# Patient Record
Sex: MALE | Race: WHITE | NOT HISPANIC OR LATINO | Employment: UNEMPLOYED | URBAN - NONMETROPOLITAN AREA
[De-identification: names, ages, dates, MRNs, and addresses within clinical notes are randomized per-mention and may not be internally consistent; named-entity substitution may affect disease eponyms.]

---

## 2021-11-02 ENCOUNTER — APPOINTMENT (OUTPATIENT)
Dept: GENERAL RADIOLOGY | Facility: HOSPITAL | Age: 41
End: 2021-11-02
Attending: STUDENT IN AN ORGANIZED HEALTH CARE EDUCATION/TRAINING PROGRAM
Payer: MEDICAID

## 2021-11-02 ENCOUNTER — HOSPITAL ENCOUNTER (EMERGENCY)
Facility: HOSPITAL | Age: 41
Discharge: ANOTHER HOSPITAL | End: 2021-11-03
Attending: STUDENT IN AN ORGANIZED HEALTH CARE EDUCATION/TRAINING PROGRAM | Admitting: STUDENT IN AN ORGANIZED HEALTH CARE EDUCATION/TRAINING PROGRAM
Payer: MEDICAID

## 2021-11-02 VITALS
BODY MASS INDEX: 48.15 KG/M2 | HEART RATE: 107 BPM | WEIGHT: 315 LBS | OXYGEN SATURATION: 87 % | RESPIRATION RATE: 64 BRPM | DIASTOLIC BLOOD PRESSURE: 80 MMHG | SYSTOLIC BLOOD PRESSURE: 100 MMHG | TEMPERATURE: 99.4 F

## 2021-11-02 DIAGNOSIS — J96.01 ACUTE RESPIRATORY FAILURE WITH HYPOXIA (H): ICD-10-CM

## 2021-11-02 DIAGNOSIS — N17.9 ACUTE KIDNEY INJURY (NONTRAUMATIC) (H): ICD-10-CM

## 2021-11-02 DIAGNOSIS — U07.1 LAB TEST POSITIVE FOR DETECTION OF COVID-19 VIRUS: ICD-10-CM

## 2021-11-02 LAB
ALBUMIN SERPL-MCNC: 2.4 G/DL (ref 3.4–5)
ALP SERPL-CCNC: 81 U/L (ref 40–150)
ALT SERPL W P-5'-P-CCNC: 37 U/L (ref 0–70)
ANION GAP SERPL CALCULATED.3IONS-SCNC: 14 MMOL/L (ref 3–14)
AST SERPL W P-5'-P-CCNC: 62 U/L (ref 0–45)
BASE EXCESS BLDV CALC-SCNC: 1 MMOL/L (ref -7.7–1.9)
BASOPHILS # BLD AUTO: 0.1 10E3/UL (ref 0–0.2)
BASOPHILS NFR BLD AUTO: 0 %
BILIRUB SERPL-MCNC: 0.3 MG/DL (ref 0.2–1.3)
BUN SERPL-MCNC: 42 MG/DL (ref 7–30)
CALCIUM SERPL-MCNC: 8.2 MG/DL (ref 8.5–10.1)
CHLORIDE BLD-SCNC: 92 MMOL/L (ref 94–109)
CO2 SERPL-SCNC: 22 MMOL/L (ref 20–32)
CREAT SERPL-MCNC: 2.5 MG/DL (ref 0.66–1.25)
CRP SERPL-MCNC: 142 MG/L (ref 0–8)
D DIMER PPP FEU-MCNC: 4.82 UG/ML FEU (ref 0–0.5)
EOSINOPHIL # BLD AUTO: 0 10E3/UL (ref 0–0.7)
EOSINOPHIL NFR BLD AUTO: 0 %
ERYTHROCYTE [DISTWIDTH] IN BLOOD BY AUTOMATED COUNT: 14.3 % (ref 10–15)
GFR SERPL CREATININE-BSD FRML MDRD: 30 ML/MIN/1.73M2
GLUCOSE BLD-MCNC: 136 MG/DL (ref 70–99)
HCO3 BLDV-SCNC: 26 MMOL/L (ref 21–28)
HCT VFR BLD AUTO: 47.6 % (ref 40–53)
HGB BLD-MCNC: 16.1 G/DL (ref 13.3–17.7)
IMM GRANULOCYTES # BLD: 0.3 10E3/UL
IMM GRANULOCYTES NFR BLD: 3 %
LACTATE SERPL-SCNC: 3.1 MMOL/L (ref 0.7–2)
LYMPHOCYTES # BLD AUTO: 1.4 10E3/UL (ref 0.8–5.3)
LYMPHOCYTES NFR BLD AUTO: 12 %
MCH RBC QN AUTO: 27.3 PG (ref 26.5–33)
MCHC RBC AUTO-ENTMCNC: 33.8 G/DL (ref 31.5–36.5)
MCV RBC AUTO: 81 FL (ref 78–100)
MONOCYTES # BLD AUTO: 0.6 10E3/UL (ref 0–1.3)
MONOCYTES NFR BLD AUTO: 6 %
NEUTROPHILS # BLD AUTO: 9.1 10E3/UL (ref 1.6–8.3)
NEUTROPHILS NFR BLD AUTO: 79 %
NRBC # BLD AUTO: 0.1 10E3/UL
NRBC BLD AUTO-RTO: 1 /100
NT-PROBNP SERPL-MCNC: 219 PG/ML (ref 0–450)
O2/TOTAL GAS SETTING VFR VENT: 70 %
OXYHGB MFR BLDV: 78 % (ref 70–75)
PCO2 BLDV: 40 MM HG (ref 40–50)
PH BLDV: 7.42 [PH] (ref 7.32–7.43)
PLATELET # BLD AUTO: 297 10E3/UL (ref 150–450)
PO2 BLDV: 44 MM HG (ref 25–47)
POTASSIUM BLD-SCNC: 3.4 MMOL/L (ref 3.4–5.3)
PROCALCITONIN SERPL-MCNC: 0.51 NG/ML
PROT SERPL-MCNC: 8.2 G/DL (ref 6.8–8.8)
RBC # BLD AUTO: 5.9 10E6/UL (ref 4.4–5.9)
SARS-COV-2 RNA RESP QL NAA+PROBE: POSITIVE
SODIUM SERPL-SCNC: 128 MMOL/L (ref 133–144)
TROPONIN I SERPL-MCNC: 0.05 UG/L (ref 0–0.04)
WBC # BLD AUTO: 11.5 10E3/UL (ref 4–11)

## 2021-11-02 PROCEDURE — 82040 ASSAY OF SERUM ALBUMIN: CPT | Performed by: STUDENT IN AN ORGANIZED HEALTH CARE EDUCATION/TRAINING PROGRAM

## 2021-11-02 PROCEDURE — 85379 FIBRIN DEGRADATION QUANT: CPT | Performed by: STUDENT IN AN ORGANIZED HEALTH CARE EDUCATION/TRAINING PROGRAM

## 2021-11-02 PROCEDURE — 93010 ELECTROCARDIOGRAM REPORT: CPT | Performed by: INTERNAL MEDICINE

## 2021-11-02 PROCEDURE — 96374 THER/PROPH/DIAG INJ IV PUSH: CPT | Mod: 59

## 2021-11-02 PROCEDURE — C9803 HOPD COVID-19 SPEC COLLECT: HCPCS

## 2021-11-02 PROCEDURE — 84145 PROCALCITONIN (PCT): CPT | Performed by: STUDENT IN AN ORGANIZED HEALTH CARE EDUCATION/TRAINING PROGRAM

## 2021-11-02 PROCEDURE — 31500 INSERT EMERGENCY AIRWAY: CPT

## 2021-11-02 PROCEDURE — 250N000011 HC RX IP 250 OP 636: Performed by: STUDENT IN AN ORGANIZED HEALTH CARE EDUCATION/TRAINING PROGRAM

## 2021-11-02 PROCEDURE — 83605 ASSAY OF LACTIC ACID: CPT | Performed by: STUDENT IN AN ORGANIZED HEALTH CARE EDUCATION/TRAINING PROGRAM

## 2021-11-02 PROCEDURE — 86140 C-REACTIVE PROTEIN: CPT | Performed by: STUDENT IN AN ORGANIZED HEALTH CARE EDUCATION/TRAINING PROGRAM

## 2021-11-02 PROCEDURE — 258N000003 HC RX IP 258 OP 636: Performed by: STUDENT IN AN ORGANIZED HEALTH CARE EDUCATION/TRAINING PROGRAM

## 2021-11-02 PROCEDURE — 87040 BLOOD CULTURE FOR BACTERIA: CPT | Performed by: STUDENT IN AN ORGANIZED HEALTH CARE EDUCATION/TRAINING PROGRAM

## 2021-11-02 PROCEDURE — 71045 X-RAY EXAM CHEST 1 VIEW: CPT | Mod: XU

## 2021-11-02 PROCEDURE — 99291 CRITICAL CARE FIRST HOUR: CPT | Mod: 25 | Performed by: STUDENT IN AN ORGANIZED HEALTH CARE EDUCATION/TRAINING PROGRAM

## 2021-11-02 PROCEDURE — 94660 CPAP INITIATION&MGMT: CPT

## 2021-11-02 PROCEDURE — 84484 ASSAY OF TROPONIN QUANT: CPT | Performed by: STUDENT IN AN ORGANIZED HEALTH CARE EDUCATION/TRAINING PROGRAM

## 2021-11-02 PROCEDURE — 99285 EMERGENCY DEPT VISIT HI MDM: CPT | Mod: 25

## 2021-11-02 PROCEDURE — 85025 COMPLETE CBC W/AUTO DIFF WBC: CPT | Performed by: STUDENT IN AN ORGANIZED HEALTH CARE EDUCATION/TRAINING PROGRAM

## 2021-11-02 PROCEDURE — 999N000157 HC STATISTIC RCP TIME EA 10 MIN

## 2021-11-02 PROCEDURE — 250N000013 HC RX MED GY IP 250 OP 250 PS 637: Performed by: STUDENT IN AN ORGANIZED HEALTH CARE EDUCATION/TRAINING PROGRAM

## 2021-11-02 PROCEDURE — U0005 INFEC AGEN DETEC AMPLI PROBE: HCPCS | Performed by: STUDENT IN AN ORGANIZED HEALTH CARE EDUCATION/TRAINING PROGRAM

## 2021-11-02 PROCEDURE — 82805 BLOOD GASES W/O2 SATURATION: CPT | Performed by: STUDENT IN AN ORGANIZED HEALTH CARE EDUCATION/TRAINING PROGRAM

## 2021-11-02 PROCEDURE — 93005 ELECTROCARDIOGRAM TRACING: CPT | Mod: 59

## 2021-11-02 PROCEDURE — 96376 TX/PRO/DX INJ SAME DRUG ADON: CPT | Mod: 59

## 2021-11-02 PROCEDURE — 31500 INSERT EMERGENCY AIRWAY: CPT | Performed by: STUDENT IN AN ORGANIZED HEALTH CARE EDUCATION/TRAINING PROGRAM

## 2021-11-02 PROCEDURE — 96375 TX/PRO/DX INJ NEW DRUG ADDON: CPT

## 2021-11-02 PROCEDURE — 36415 COLL VENOUS BLD VENIPUNCTURE: CPT | Performed by: STUDENT IN AN ORGANIZED HEALTH CARE EDUCATION/TRAINING PROGRAM

## 2021-11-02 PROCEDURE — 83880 ASSAY OF NATRIURETIC PEPTIDE: CPT | Performed by: STUDENT IN AN ORGANIZED HEALTH CARE EDUCATION/TRAINING PROGRAM

## 2021-11-02 RX ORDER — LORAZEPAM 2 MG/ML
2 INJECTION INTRAMUSCULAR ONCE
Status: DISCONTINUED | OUTPATIENT
Start: 2021-11-02 | End: 2021-11-03 | Stop reason: HOSPADM

## 2021-11-02 RX ORDER — ASPIRIN 81 MG/1
324 TABLET, CHEWABLE ORAL ONCE
Status: COMPLETED | OUTPATIENT
Start: 2021-11-02 | End: 2021-11-02

## 2021-11-02 RX ORDER — LORAZEPAM 2 MG/ML
1 INJECTION INTRAMUSCULAR
Status: COMPLETED | OUTPATIENT
Start: 2021-11-02 | End: 2021-11-02

## 2021-11-02 RX ORDER — IOPAMIDOL 755 MG/ML
73 INJECTION, SOLUTION INTRAVASCULAR ONCE
Status: DISCONTINUED | OUTPATIENT
Start: 2021-11-02 | End: 2021-11-03 | Stop reason: HOSPADM

## 2021-11-02 RX ORDER — LORAZEPAM 2 MG/ML
2 INJECTION INTRAMUSCULAR ONCE
Status: COMPLETED | OUTPATIENT
Start: 2021-11-02 | End: 2021-11-02

## 2021-11-02 RX ORDER — SODIUM CHLORIDE 9 MG/ML
INJECTION, SOLUTION INTRAVENOUS CONTINUOUS
Status: DISCONTINUED | OUTPATIENT
Start: 2021-11-02 | End: 2021-11-03 | Stop reason: HOSPADM

## 2021-11-02 RX ORDER — DEXAMETHASONE SODIUM PHOSPHATE 10 MG/ML
6 INJECTION, SOLUTION INTRAMUSCULAR; INTRAVENOUS ONCE
Status: COMPLETED | OUTPATIENT
Start: 2021-11-02 | End: 2021-11-02

## 2021-11-02 RX ORDER — HEPARIN SODIUM 10000 [USP'U]/100ML
0-5000 INJECTION, SOLUTION INTRAVENOUS CONTINUOUS
Status: DISCONTINUED | OUTPATIENT
Start: 2021-11-02 | End: 2021-11-03 | Stop reason: HOSPADM

## 2021-11-02 RX ORDER — HEPARIN SODIUM 10000 [USP'U]/100ML
0-5000 INJECTION, SOLUTION INTRAVENOUS CONTINUOUS
Status: DISCONTINUED | OUTPATIENT
Start: 2021-11-02 | End: 2021-11-02

## 2021-11-02 RX ORDER — ONDANSETRON 2 MG/ML
4 INJECTION INTRAMUSCULAR; INTRAVENOUS EVERY 30 MIN PRN
Status: DISCONTINUED | OUTPATIENT
Start: 2021-11-02 | End: 2021-11-03 | Stop reason: HOSPADM

## 2021-11-02 RX ADMIN — LORAZEPAM 1 MG: 2 INJECTION INTRAMUSCULAR; INTRAVENOUS at 22:03

## 2021-11-02 RX ADMIN — DEXAMETHASONE SODIUM PHOSPHATE 6 MG: 10 INJECTION, SOLUTION INTRAMUSCULAR; INTRAVENOUS at 22:13

## 2021-11-02 RX ADMIN — HEPARIN SODIUM 1800 UNITS/HR: 10000 INJECTION, SOLUTION INTRAVENOUS at 21:05

## 2021-11-02 RX ADMIN — LORAZEPAM 1 MG: 2 INJECTION INTRAMUSCULAR; INTRAVENOUS at 20:55

## 2021-11-02 RX ADMIN — SODIUM CHLORIDE: 9 INJECTION, SOLUTION INTRAVENOUS at 22:03

## 2021-11-02 RX ADMIN — SODIUM CHLORIDE 1000 ML: 9 INJECTION, SOLUTION INTRAVENOUS at 20:59

## 2021-11-02 RX ADMIN — LORAZEPAM 2 MG: 2 INJECTION INTRAMUSCULAR; INTRAVENOUS at 23:10

## 2021-11-02 RX ADMIN — ASPIRIN 324 MG: 81 TABLET, CHEWABLE ORAL at 20:50

## 2021-11-02 RX ADMIN — LORAZEPAM 2 MG: 2 INJECTION INTRAMUSCULAR; INTRAVENOUS at 22:17

## 2021-11-03 ENCOUNTER — HOSPITAL ENCOUNTER (INPATIENT)
Facility: CLINIC | Age: 41
LOS: 33 days | Discharge: ANOTHER HOSPITAL | End: 2021-12-06
Attending: STUDENT IN AN ORGANIZED HEALTH CARE EDUCATION/TRAINING PROGRAM | Admitting: STUDENT IN AN ORGANIZED HEALTH CARE EDUCATION/TRAINING PROGRAM
Payer: MEDICAID

## 2021-11-03 ENCOUNTER — APPOINTMENT (OUTPATIENT)
Dept: GENERAL RADIOLOGY | Facility: HOSPITAL | Age: 41
End: 2021-11-03
Attending: STUDENT IN AN ORGANIZED HEALTH CARE EDUCATION/TRAINING PROGRAM
Payer: MEDICAID

## 2021-11-03 ENCOUNTER — APPOINTMENT (OUTPATIENT)
Dept: GENERAL RADIOLOGY | Facility: CLINIC | Age: 41
End: 2021-11-03
Attending: STUDENT IN AN ORGANIZED HEALTH CARE EDUCATION/TRAINING PROGRAM
Payer: MEDICAID

## 2021-11-03 DIAGNOSIS — J96.01 ACUTE RESPIRATORY FAILURE WITH HYPOXIA (H): Primary | ICD-10-CM

## 2021-11-03 PROBLEM — J96.00 ACUTE RESPIRATORY FAILURE (H): Status: ACTIVE | Noted: 2021-11-03

## 2021-11-03 LAB
ANION GAP SERPL CALCULATED.3IONS-SCNC: 10 MMOL/L (ref 3–14)
ANION GAP SERPL CALCULATED.3IONS-SCNC: 8 MMOL/L (ref 3–14)
ANION GAP SERPL CALCULATED.3IONS-SCNC: 8 MMOL/L (ref 3–14)
ANION GAP SERPL CALCULATED.3IONS-SCNC: 9 MMOL/L (ref 3–14)
BASE EXCESS BLDA CALC-SCNC: -0.8 MMOL/L (ref -9–1.8)
BASE EXCESS BLDV CALC-SCNC: -1.1 MMOL/L (ref -7.7–1.9)
BASE EXCESS BLDV CALC-SCNC: -2.9 MMOL/L (ref -7.7–1.9)
BASE EXCESS BLDV CALC-SCNC: 0.2 MMOL/L (ref -7.7–1.9)
BASE EXCESS BLDV CALC-SCNC: 1.2 MMOL/L (ref -7.7–1.9)
BUN SERPL-MCNC: 47 MG/DL (ref 7–30)
BUN SERPL-MCNC: 50 MG/DL (ref 7–30)
BUN SERPL-MCNC: 51 MG/DL (ref 7–30)
BUN SERPL-MCNC: 53 MG/DL (ref 7–30)
CA-I BLD-MCNC: 4 MG/DL (ref 4.4–5.2)
CALCIUM SERPL-MCNC: 7.2 MG/DL (ref 8.5–10.1)
CALCIUM SERPL-MCNC: 7.6 MG/DL (ref 8.5–10.1)
CALCIUM SERPL-MCNC: 7.6 MG/DL (ref 8.5–10.1)
CALCIUM SERPL-MCNC: 7.7 MG/DL (ref 8.5–10.1)
CHLORIDE BLD-SCNC: 100 MMOL/L (ref 94–109)
CHLORIDE BLD-SCNC: 97 MMOL/L (ref 94–109)
CHLORIDE BLD-SCNC: 99 MMOL/L (ref 94–109)
CHLORIDE BLD-SCNC: 99 MMOL/L (ref 94–109)
CO2 SERPL-SCNC: 24 MMOL/L (ref 20–32)
CO2 SERPL-SCNC: 24 MMOL/L (ref 20–32)
CO2 SERPL-SCNC: 25 MMOL/L (ref 20–32)
CO2 SERPL-SCNC: 25 MMOL/L (ref 20–32)
CREAT SERPL-MCNC: 2.17 MG/DL (ref 0.66–1.25)
CREAT SERPL-MCNC: 2.38 MG/DL (ref 0.66–1.25)
CREAT SERPL-MCNC: 2.55 MG/DL (ref 0.66–1.25)
CREAT SERPL-MCNC: 2.85 MG/DL (ref 0.66–1.25)
ERYTHROCYTE [DISTWIDTH] IN BLOOD BY AUTOMATED COUNT: 14.3 % (ref 10–15)
ERYTHROCYTE [DISTWIDTH] IN BLOOD BY AUTOMATED COUNT: 14.3 % (ref 10–15)
GFR SERPL CREATININE-BSD FRML MDRD: 26 ML/MIN/1.73M2
GFR SERPL CREATININE-BSD FRML MDRD: 30 ML/MIN/1.73M2
GFR SERPL CREATININE-BSD FRML MDRD: 32 ML/MIN/1.73M2
GFR SERPL CREATININE-BSD FRML MDRD: 36 ML/MIN/1.73M2
GLUCOSE BLD-MCNC: 146 MG/DL (ref 70–99)
GLUCOSE BLD-MCNC: 149 MG/DL (ref 70–99)
GLUCOSE BLD-MCNC: 155 MG/DL (ref 70–99)
GLUCOSE BLD-MCNC: 156 MG/DL (ref 70–99)
GLUCOSE BLDC GLUCOMTR-MCNC: 155 MG/DL (ref 70–99)
GLUCOSE BLDC GLUCOMTR-MCNC: 162 MG/DL (ref 70–99)
GLUCOSE BLDC GLUCOMTR-MCNC: 168 MG/DL (ref 70–99)
GLUCOSE BLDC GLUCOMTR-MCNC: 169 MG/DL (ref 70–99)
GLUCOSE BLDC GLUCOMTR-MCNC: 178 MG/DL (ref 70–99)
HCO3 BLD-SCNC: 24 MMOL/L (ref 21–28)
HCO3 BLDV-SCNC: 26 MMOL/L (ref 21–28)
HCO3 BLDV-SCNC: 26 MMOL/L (ref 21–28)
HCO3 BLDV-SCNC: 27 MMOL/L (ref 21–28)
HCO3 BLDV-SCNC: 27 MMOL/L (ref 21–28)
HCT VFR BLD AUTO: 38.8 % (ref 40–53)
HCT VFR BLD AUTO: 40.1 % (ref 40–53)
HGB BLD-MCNC: 13 G/DL (ref 13.3–17.7)
HGB BLD-MCNC: 13.3 G/DL (ref 13.3–17.7)
LACTATE SERPL-SCNC: 1.5 MMOL/L (ref 0.7–2)
MAGNESIUM SERPL-MCNC: 2.1 MG/DL (ref 1.6–2.3)
MCH RBC QN AUTO: 27.4 PG (ref 26.5–33)
MCH RBC QN AUTO: 27.5 PG (ref 26.5–33)
MCHC RBC AUTO-ENTMCNC: 33.2 G/DL (ref 31.5–36.5)
MCHC RBC AUTO-ENTMCNC: 33.5 G/DL (ref 31.5–36.5)
MCV RBC AUTO: 82 FL (ref 78–100)
MCV RBC AUTO: 83 FL (ref 78–100)
O2/TOTAL GAS SETTING VFR VENT: 100 %
O2/TOTAL GAS SETTING VFR VENT: 90 %
PCO2 BLD: 41 MM HG (ref 35–45)
PCO2 BLDV: 49 MM HG (ref 40–50)
PCO2 BLDV: 51 MM HG (ref 40–50)
PCO2 BLDV: 52 MM HG (ref 40–50)
PCO2 BLDV: 59 MM HG (ref 40–50)
PH BLD: 7.38 [PH] (ref 7.35–7.45)
PH BLDV: 7.24 [PH] (ref 7.32–7.43)
PH BLDV: 7.31 [PH] (ref 7.32–7.43)
PH BLDV: 7.33 [PH] (ref 7.32–7.43)
PH BLDV: 7.36 [PH] (ref 7.32–7.43)
PHOSPHATE SERPL-MCNC: 6.5 MG/DL (ref 2.5–4.5)
PLATELET # BLD AUTO: 232 10E3/UL (ref 150–450)
PLATELET # BLD AUTO: 243 10E3/UL (ref 150–450)
PO2 BLD: 117 MM HG (ref 80–105)
PO2 BLDV: 37 MM HG (ref 25–47)
PO2 BLDV: 40 MM HG (ref 25–47)
PO2 BLDV: 40 MM HG (ref 25–47)
PO2 BLDV: 44 MM HG (ref 25–47)
POTASSIUM BLD-SCNC: 3.7 MMOL/L (ref 3.4–5.3)
POTASSIUM BLD-SCNC: 3.8 MMOL/L (ref 3.4–5.3)
POTASSIUM BLD-SCNC: 3.8 MMOL/L (ref 3.4–5.3)
POTASSIUM BLD-SCNC: 4 MMOL/L (ref 3.4–5.3)
RBC # BLD AUTO: 4.74 10E6/UL (ref 4.4–5.9)
RBC # BLD AUTO: 4.84 10E6/UL (ref 4.4–5.9)
SODIUM SERPL-SCNC: 130 MMOL/L (ref 133–144)
SODIUM SERPL-SCNC: 132 MMOL/L (ref 133–144)
SODIUM SERPL-SCNC: 133 MMOL/L (ref 133–144)
SODIUM SERPL-SCNC: 133 MMOL/L (ref 133–144)
TROPONIN I SERPL-MCNC: 0.24 UG/L (ref 0–0.04)
TROPONIN I SERPL-MCNC: 0.29 UG/L (ref 0–0.04)
UFH PPP CHRO-ACNC: 0.11 IU/ML
UFH PPP CHRO-ACNC: 0.52 IU/ML
WBC # BLD AUTO: 10.2 10E3/UL (ref 4–11)
WBC # BLD AUTO: 11.6 10E3/UL (ref 4–11)

## 2021-11-03 PROCEDURE — 94002 VENT MGMT INPAT INIT DAY: CPT

## 2021-11-03 PROCEDURE — 80048 BASIC METABOLIC PNL TOTAL CA: CPT | Performed by: STUDENT IN AN ORGANIZED HEALTH CARE EDUCATION/TRAINING PROGRAM

## 2021-11-03 PROCEDURE — 74018 RADEX ABDOMEN 1 VIEW: CPT | Mod: 26 | Performed by: STUDENT IN AN ORGANIZED HEALTH CARE EDUCATION/TRAINING PROGRAM

## 2021-11-03 PROCEDURE — 250N000009 HC RX 250

## 2021-11-03 PROCEDURE — 82803 BLOOD GASES ANY COMBINATION: CPT | Performed by: STUDENT IN AN ORGANIZED HEALTH CARE EDUCATION/TRAINING PROGRAM

## 2021-11-03 PROCEDURE — 85520 HEPARIN ASSAY: CPT | Performed by: STUDENT IN AN ORGANIZED HEALTH CARE EDUCATION/TRAINING PROGRAM

## 2021-11-03 PROCEDURE — 82962 GLUCOSE BLOOD TEST: CPT

## 2021-11-03 PROCEDURE — 200N000002 HC R&B ICU UMMC

## 2021-11-03 PROCEDURE — 36556 INSERT NON-TUNNEL CV CATH: CPT | Performed by: STUDENT IN AN ORGANIZED HEALTH CARE EDUCATION/TRAINING PROGRAM

## 2021-11-03 PROCEDURE — 71045 X-RAY EXAM CHEST 1 VIEW: CPT | Mod: 26 | Performed by: RADIOLOGY

## 2021-11-03 PROCEDURE — 36592 COLLECT BLOOD FROM PICC: CPT | Performed by: STUDENT IN AN ORGANIZED HEALTH CARE EDUCATION/TRAINING PROGRAM

## 2021-11-03 PROCEDURE — 999N000157 HC STATISTIC RCP TIME EA 10 MIN

## 2021-11-03 PROCEDURE — 84484 ASSAY OF TROPONIN QUANT: CPT | Performed by: STUDENT IN AN ORGANIZED HEALTH CARE EDUCATION/TRAINING PROGRAM

## 2021-11-03 PROCEDURE — 94644 CONT INHLJ TX 1ST HOUR: CPT

## 2021-11-03 PROCEDURE — 83735 ASSAY OF MAGNESIUM: CPT | Performed by: STUDENT IN AN ORGANIZED HEALTH CARE EDUCATION/TRAINING PROGRAM

## 2021-11-03 PROCEDURE — 250N000011 HC RX IP 250 OP 636: Performed by: STUDENT IN AN ORGANIZED HEALTH CARE EDUCATION/TRAINING PROGRAM

## 2021-11-03 PROCEDURE — 85027 COMPLETE CBC AUTOMATED: CPT | Performed by: STUDENT IN AN ORGANIZED HEALTH CARE EDUCATION/TRAINING PROGRAM

## 2021-11-03 PROCEDURE — 250N000009 HC RX 250: Performed by: STUDENT IN AN ORGANIZED HEALTH CARE EDUCATION/TRAINING PROGRAM

## 2021-11-03 PROCEDURE — 999N000065 XR CHEST PORT 1 VIEW

## 2021-11-03 PROCEDURE — 36600 WITHDRAWAL OF ARTERIAL BLOOD: CPT

## 2021-11-03 PROCEDURE — 5A1955Z RESPIRATORY VENTILATION, GREATER THAN 96 CONSECUTIVE HOURS: ICD-10-PCS | Performed by: STUDENT IN AN ORGANIZED HEALTH CARE EDUCATION/TRAINING PROGRAM

## 2021-11-03 PROCEDURE — 85041 AUTOMATED RBC COUNT: CPT | Performed by: STUDENT IN AN ORGANIZED HEALTH CARE EDUCATION/TRAINING PROGRAM

## 2021-11-03 PROCEDURE — 272N000272 HC CONTINUOUS NEBULIZER MICRO PUMP

## 2021-11-03 PROCEDURE — 250N000013 HC RX MED GY IP 250 OP 250 PS 637: Performed by: STUDENT IN AN ORGANIZED HEALTH CARE EDUCATION/TRAINING PROGRAM

## 2021-11-03 PROCEDURE — 83605 ASSAY OF LACTIC ACID: CPT | Performed by: STUDENT IN AN ORGANIZED HEALTH CARE EDUCATION/TRAINING PROGRAM

## 2021-11-03 PROCEDURE — 93010 ELECTROCARDIOGRAM REPORT: CPT | Performed by: INTERNAL MEDICINE

## 2021-11-03 PROCEDURE — 250N000012 HC RX MED GY IP 250 OP 636 PS 637: Performed by: STUDENT IN AN ORGANIZED HEALTH CARE EDUCATION/TRAINING PROGRAM

## 2021-11-03 PROCEDURE — 84100 ASSAY OF PHOSPHORUS: CPT | Performed by: STUDENT IN AN ORGANIZED HEALTH CARE EDUCATION/TRAINING PROGRAM

## 2021-11-03 PROCEDURE — 94645 CONT INHLJ TX EACH ADDL HOUR: CPT

## 2021-11-03 PROCEDURE — 93005 ELECTROCARDIOGRAM TRACING: CPT

## 2021-11-03 PROCEDURE — 82330 ASSAY OF CALCIUM: CPT | Performed by: STUDENT IN AN ORGANIZED HEALTH CARE EDUCATION/TRAINING PROGRAM

## 2021-11-03 PROCEDURE — C9113 INJ PANTOPRAZOLE SODIUM, VIA: HCPCS | Performed by: STUDENT IN AN ORGANIZED HEALTH CARE EDUCATION/TRAINING PROGRAM

## 2021-11-03 PROCEDURE — 99291 CRITICAL CARE FIRST HOUR: CPT | Mod: GC | Performed by: STUDENT IN AN ORGANIZED HEALTH CARE EDUCATION/TRAINING PROGRAM

## 2021-11-03 PROCEDURE — 74018 RADEX ABDOMEN 1 VIEW: CPT

## 2021-11-03 PROCEDURE — 258N000003 HC RX IP 258 OP 636: Performed by: STUDENT IN AN ORGANIZED HEALTH CARE EDUCATION/TRAINING PROGRAM

## 2021-11-03 PROCEDURE — 99291 CRITICAL CARE FIRST HOUR: CPT | Mod: 25 | Performed by: STUDENT IN AN ORGANIZED HEALTH CARE EDUCATION/TRAINING PROGRAM

## 2021-11-03 RX ORDER — FENTANYL CITRATE 50 UG/ML
50-100 INJECTION, SOLUTION INTRAMUSCULAR; INTRAVENOUS
Status: DISCONTINUED | OUTPATIENT
Start: 2021-11-03 | End: 2021-11-07

## 2021-11-03 RX ORDER — PROPOFOL 10 MG/ML
5-75 INJECTION, EMULSION INTRAVENOUS CONTINUOUS
Status: DISCONTINUED | OUTPATIENT
Start: 2021-11-03 | End: 2021-11-06

## 2021-11-03 RX ORDER — ONDANSETRON 2 MG/ML
4 INJECTION INTRAMUSCULAR; INTRAVENOUS EVERY 6 HOURS PRN
Status: DISCONTINUED | OUTPATIENT
Start: 2021-11-03 | End: 2021-12-06 | Stop reason: HOSPADM

## 2021-11-03 RX ORDER — AMINO AC/PROTEIN HYDR/WHEY PRO 10G-100/30
2 LIQUID (ML) ORAL
Status: DISCONTINUED | OUTPATIENT
Start: 2021-11-03 | End: 2021-11-29

## 2021-11-03 RX ORDER — PROCHLORPERAZINE 25 MG
25 SUPPOSITORY, RECTAL RECTAL EVERY 12 HOURS PRN
Status: DISCONTINUED | OUTPATIENT
Start: 2021-11-03 | End: 2021-12-06 | Stop reason: HOSPADM

## 2021-11-03 RX ORDER — DEXAMETHASONE SODIUM PHOSPHATE 4 MG/ML
6 INJECTION, SOLUTION INTRA-ARTICULAR; INTRALESIONAL; INTRAMUSCULAR; INTRAVENOUS; SOFT TISSUE DAILY
Status: COMPLETED | OUTPATIENT
Start: 2021-11-03 | End: 2021-11-12

## 2021-11-03 RX ORDER — NALOXONE HYDROCHLORIDE 0.4 MG/ML
0.4 INJECTION, SOLUTION INTRAMUSCULAR; INTRAVENOUS; SUBCUTANEOUS
Status: DISCONTINUED | OUTPATIENT
Start: 2021-11-03 | End: 2021-12-06 | Stop reason: HOSPADM

## 2021-11-03 RX ORDER — NALOXONE HYDROCHLORIDE 0.4 MG/ML
0.2 INJECTION, SOLUTION INTRAMUSCULAR; INTRAVENOUS; SUBCUTANEOUS
Status: DISCONTINUED | OUTPATIENT
Start: 2021-11-03 | End: 2021-12-06 | Stop reason: HOSPADM

## 2021-11-03 RX ORDER — AMOXICILLIN 250 MG
1 CAPSULE ORAL 2 TIMES DAILY PRN
Status: DISCONTINUED | OUTPATIENT
Start: 2021-11-03 | End: 2021-11-05

## 2021-11-03 RX ORDER — DEXTROSE MONOHYDRATE 100 MG/ML
INJECTION, SOLUTION INTRAVENOUS CONTINUOUS PRN
Status: DISCONTINUED | OUTPATIENT
Start: 2021-11-03 | End: 2021-12-06 | Stop reason: HOSPADM

## 2021-11-03 RX ORDER — HEPARIN SODIUM 10000 [USP'U]/100ML
0-5000 INJECTION, SOLUTION INTRAVENOUS CONTINUOUS
Status: DISCONTINUED | OUTPATIENT
Start: 2021-11-03 | End: 2021-11-08

## 2021-11-03 RX ORDER — DEXTROSE MONOHYDRATE 25 G/50ML
25-50 INJECTION, SOLUTION INTRAVENOUS
Status: DISCONTINUED | OUTPATIENT
Start: 2021-11-03 | End: 2021-12-06 | Stop reason: HOSPADM

## 2021-11-03 RX ORDER — ONDANSETRON 4 MG/1
4 TABLET, ORALLY DISINTEGRATING ORAL EVERY 6 HOURS PRN
Status: DISCONTINUED | OUTPATIENT
Start: 2021-11-03 | End: 2021-12-06 | Stop reason: HOSPADM

## 2021-11-03 RX ORDER — MIDAZOLAM HCL IN 0.9 % NACL/PF 1 MG/ML
1-8 PLASTIC BAG, INJECTION (ML) INTRAVENOUS CONTINUOUS
Status: DISCONTINUED | OUTPATIENT
Start: 2021-11-03 | End: 2021-11-06

## 2021-11-03 RX ORDER — NICOTINE POLACRILEX 4 MG
15-30 LOZENGE BUCCAL
Status: DISCONTINUED | OUTPATIENT
Start: 2021-11-03 | End: 2021-12-06 | Stop reason: HOSPADM

## 2021-11-03 RX ORDER — AMOXICILLIN 250 MG
2 CAPSULE ORAL 2 TIMES DAILY PRN
Status: DISCONTINUED | OUTPATIENT
Start: 2021-11-03 | End: 2021-11-05

## 2021-11-03 RX ORDER — ACETAMINOPHEN 325 MG/1
650 TABLET ORAL EVERY 4 HOURS PRN
Status: DISCONTINUED | OUTPATIENT
Start: 2021-11-03 | End: 2021-12-06 | Stop reason: HOSPADM

## 2021-11-03 RX ORDER — PROPOFOL 10 MG/ML
INJECTION, EMULSION INTRAVENOUS
Status: DISCONTINUED
Start: 2021-11-03 | End: 2021-11-03 | Stop reason: HOSPADM

## 2021-11-03 RX ORDER — B COMPLEX C NO.10/FOLIC ACID 900MCG/5ML
5 LIQUID (ML) ORAL DAILY
Status: DISCONTINUED | OUTPATIENT
Start: 2021-11-03 | End: 2021-11-26

## 2021-11-03 RX ORDER — CALCIUM GLUCONATE 20 MG/ML
2 INJECTION, SOLUTION INTRAVENOUS ONCE
Status: COMPLETED | OUTPATIENT
Start: 2021-11-03 | End: 2021-11-03

## 2021-11-03 RX ORDER — PROCHLORPERAZINE MALEATE 10 MG
10 TABLET ORAL EVERY 6 HOURS PRN
Status: DISCONTINUED | OUTPATIENT
Start: 2021-11-03 | End: 2021-12-06 | Stop reason: HOSPADM

## 2021-11-03 RX ORDER — CALCIUM GLUCONATE 20 MG/ML
1 INJECTION, SOLUTION INTRAVENOUS ONCE
Status: COMPLETED | OUTPATIENT
Start: 2021-11-03 | End: 2021-11-03

## 2021-11-03 RX ORDER — POLYETHYLENE GLYCOL 3350 17 G/17G
17 POWDER, FOR SOLUTION ORAL DAILY PRN
Status: DISCONTINUED | OUTPATIENT
Start: 2021-11-03 | End: 2021-11-18

## 2021-11-03 RX ADMIN — Medication 20 NG/KG/MIN: at 21:30

## 2021-11-03 RX ADMIN — HEPARIN SODIUM 1200 UNITS/HR: 10000 INJECTION, SOLUTION INTRAVENOUS at 13:29

## 2021-11-03 RX ADMIN — PROPOFOL 40 MCG/KG/MIN: 10 INJECTION, EMULSION INTRAVENOUS at 02:44

## 2021-11-03 RX ADMIN — PROPOFOL 25 MCG/KG/MIN: 10 INJECTION, EMULSION INTRAVENOUS at 11:24

## 2021-11-03 RX ADMIN — TOCILIZUMAB 800 MG: 20 INJECTION, SOLUTION, CONCENTRATE INTRAVENOUS at 13:31

## 2021-11-03 RX ADMIN — PROPOFOL 15 MCG/KG/MIN: 10 INJECTION, EMULSION INTRAVENOUS at 05:53

## 2021-11-03 RX ADMIN — Medication 20 NG/KG/MIN: at 12:35

## 2021-11-03 RX ADMIN — CALCIUM GLUCONATE 2 G: 20 INJECTION, SOLUTION INTRAVENOUS at 16:02

## 2021-11-03 RX ADMIN — Medication 2 PACKET: at 17:53

## 2021-11-03 RX ADMIN — DEXAMETHASONE SODIUM PHOSPHATE 6 MG: 4 INJECTION, SOLUTION INTRA-ARTICULAR; INTRALESIONAL; INTRAMUSCULAR; INTRAVENOUS; SOFT TISSUE at 12:47

## 2021-11-03 RX ADMIN — Medication 2 PACKET: at 21:00

## 2021-11-03 RX ADMIN — CALCIUM GLUCONATE 1 G: 20 INJECTION, SOLUTION INTRAVENOUS at 06:18

## 2021-11-03 RX ADMIN — Medication 200 MCG/HR: at 18:35

## 2021-11-03 RX ADMIN — INSULIN ASPART 1 UNITS: 100 INJECTION, SOLUTION INTRAVENOUS; SUBCUTANEOUS at 15:07

## 2021-11-03 RX ADMIN — MIDAZOLAM (PF) 1 MG/ML IN 0.9 % SODIUM CHLORIDE INTRAVENOUS SOLUTION 4 MG/HR: at 14:31

## 2021-11-03 RX ADMIN — PANTOPRAZOLE SODIUM 40 MG: 40 INJECTION, POWDER, FOR SOLUTION INTRAVENOUS at 08:26

## 2021-11-03 RX ADMIN — Medication 2 PACKET: at 12:49

## 2021-11-03 RX ADMIN — Medication 5 MG: at 15:18

## 2021-11-03 RX ADMIN — Medication 2 PACKET: at 13:46

## 2021-11-03 RX ADMIN — HEPARIN SODIUM 1350 UNITS/HR: 10000 INJECTION, SOLUTION INTRAVENOUS at 20:59

## 2021-11-03 RX ADMIN — INSULIN ASPART 1 UNITS: 100 INJECTION, SOLUTION INTRAVENOUS; SUBCUTANEOUS at 20:09

## 2021-11-03 RX ADMIN — PROPOFOL 20 MCG/KG/MIN: 10 INJECTION, EMULSION INTRAVENOUS at 22:40

## 2021-11-03 RX ADMIN — Medication 5 ML: at 12:48

## 2021-11-03 RX ADMIN — Medication 100 MCG/HR: at 02:49

## 2021-11-03 ASSESSMENT — ACTIVITIES OF DAILY LIVING (ADL)
ADLS_ACUITY_SCORE: 12
ADLS_ACUITY_SCORE: 18
ADLS_ACUITY_SCORE: 20
ADLS_ACUITY_SCORE: 18
ADLS_ACUITY_SCORE: 18
ADLS_ACUITY_SCORE: 20
ADLS_ACUITY_SCORE: 20
ADLS_ACUITY_SCORE: 18
ADLS_ACUITY_SCORE: 20
ADLS_ACUITY_SCORE: 18
ADLS_ACUITY_SCORE: 20
ADLS_ACUITY_SCORE: 20
ADLS_ACUITY_SCORE: 12
ADLS_ACUITY_SCORE: 20
ADLS_ACUITY_SCORE: 18
ADLS_ACUITY_SCORE: 18
ADLS_ACUITY_SCORE: 20
ADLS_ACUITY_SCORE: 20

## 2021-11-03 ASSESSMENT — MIFFLIN-ST. JEOR: SCORE: 2567

## 2021-11-03 NOTE — ED PROVIDER NOTES
History     Chief Complaint   Patient presents with     Shortness of Breath     HPI  Brandon Schafer is a 41 year old male with with no relevant Past medical history presents to the emergency department today complaining of shortness of breath.  He is had symptoms of headache and cough and fever with shortness of breath and diarrhea sore throat for the past week and is not Covid vaccinated.  No blood in his vomit, no blood in his cough.  Does not have a history of clots in his legs or his lungs.  He does complain of chest pain.  EMS was dispatched to his house, they found him saturating on room air at 55%, noted that his lower extremities appeared mottled, placed him on a BiPAP and transported him here.    Allergies:  No Known Allergies    Problem List:    Patient Active Problem List    Diagnosis Date Noted     Acute respiratory failure (H) 11/03/2021     Priority: Medium        Past Medical History:    No past medical history on file.    Past Surgical History:    No past surgical history on file.    Family History:    No family history on file.    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        Review of Systems  A complete review of systems was performed and is otherwise negative.     Physical Exam   BP: 121/58  Pulse: 114  Temp: 99.4  F (37.4  C)  Resp: (!) 28  Weight: (!) 161 kg (355 lb)  SpO2: 94 %      Physical Exam  Constitutional: Alert and conversant.  Increased work of breathing  HENT: NCAT   Eyes: Normal pupils   Neck: supple   CV: Normal rate, regular rhythm, no murmur   Pulmonary/Chest: BiPAP in place, clear  to auscultation bilaterally   Abdominal: Soft, non-tender, non-distended   MSK: ROSALES.   Neuro: Alert and appropriate   Skin: Warm and dry. No diaphoresis.  Mottled lower extremities  Psych: Appropriate mood and affect     ED Course     ED Course as of Nov 03 0719 Tue Nov 02, 2021   1955 41M with SOB on bipap. Likely covid, due to degree of hypoxia, will CTA for PE. CP present, Trop EKG. Anticipate admission. Sepsis work up underway.      2029 Patient with STEFAN.  Will hold off on CT scan at this time.  With the troponin elevated, dimer elevated, will treat with heparin   Creatinine(!): 2.50   2108 An event happened where the patient's BiPAP had fallen off and he had slid down to the bottom of the bed.  He was breathing in the 50s with oxygen back down in the 80s%, turning purple.  We did replace the BiPAP, moved him up the bed, sat him up.  His oxygen improved to 92% and his color improved as well.  I had a conversation with him once his oxygen had improved about whether he would be willing to go on a ventilator if we needed to if things got worse.  He stated he absolutely refused and would not want to be intubated.  I told him that because he was demonstrating how sick he was at this time, there is important to have a conversation about what to do if he is not able to discuss care with us if things got worse.  He stated he would not want he broke life-saving measures, he declines chest compressions, shocks.  He states that he would never want to be put on a ventilator even for short period of time.  He has the capacity to be to make this decision, his oxygen is at a reasonable level, he is reasonable, he understands that this could lead to his death.  He still is making this decision and I will respect it.      2116 He did ask if he could go home.  I told him that if he left here he would die.  He agreed to stay.      2222 SARS CoV2 PCR(!): Positive   2222 Unable to CT.  Will start heparin therapy   D-Dimer  Quantitative(!): 4.82   2222 Heparin therapy ongoing for elevated dimer and suspected clot in the lungs, this will suffice for treatment of the possible NSTEMI.  He did chew up to 324 mg of aspirin   Troponin I(!): 0.053   2222 Patient getting very anxious, requesting to take his mask off.  He understands that this will cause his oxygen to go down.  He asked that we treat his anxiety.  I did give him Ativan,      2223 I spoke with the patient, he states that at this point he would like his medical decision maker      2223  when he is unable to make decisions to be his brother Brad.  His brother Brad's phone number is [phone].  I have updated Brad.  He also noted that he has children and that if he could not take care of his children and somebody else needed to he would ask that Brad take care of the children.  At this time his oxygen was 92%.      2258 Patient has been ripping off his mask, he is not tolerating the BiPAP.  We have given him 3 mg IV Ativan and he still is not tolerating it.  He ripped it off and refuses to put it back on his oxygen is plummeting.  We put him on a nonrebreather and then onto high flow nasal cannula.  He is getting up to 86% on that not as high as we seem to be able to get it.  His respiratory rate is about in the 50s.  I impressed upon him the importance of proceeding to intubation at this point.  He refused.  I told him that this would be the only intervention that we can do now to help him survive.  He continued to refuse.  His brother is at the bedside here doing his best to help  him and convince him to be intubated      Wed Nov 03, 2021 0049 Patient became too obtunded to make decisions himself, appears delirious. I spoke with the flight crew physician and he and I are in agreement that the patient will likely code from a respiratory arrest in flight if not intubated. I spoke with Brad and the patient's mother about this. They felt that if Brandon could have  communicated, that Brandon would be willing to tolerate a temporary intubation for transport if he was able to communicate himself.  They state that we should intubate now for the flight and then they will discuss next steps with the [facility] staff when he gets there. I have called to update [facility]      0329 Patient intubated, and transported.        [facility]    -Intubation    Date/Time: 11/3/2021 3:29 AM  Performed by: Charly Kaur MD  Authorized by: Charly Kaur MD       PRE-PROCEDURE DETAILS     Patient status:  Altered mental status    Pretreatment medications:  None    Paralytics:  Rocuronium      PROCEDURE DETAILS     Preoxygenation:  BiPAP    Intubation method:  Oral    Oral intubation technique:  Video-assisted    Laryngoscope blade:  Mac 4    Tube size (mm):  7.5    Tube type:  Cuffed    Number of attempts:  1    Cricoid pressure: yes      Tube visualized through cords: yes      PLACEMENT ASSESSMENT     Tube secured with:  ETT lorenzana    Breath sounds:  Equal and absent over the epigastrium    Placement verification: chest rise, condensation, CXR verification, direct visualization, equal breath sounds, ETCO2 detector and tube exhalation      CXR findings:  ETT in proper place  PROCEDURE   Patient Tolerance:  Patient tolerated the procedure well with no immediate complications               Results for orders placed or performed during the hospital encounter of 11/02/21 (from the past 24 hour(s))   CBC with platelets differential    Narrative    The following orders were created for panel order CBC with platelets differential.  Procedure                               Abnormality         Status                     ---------                               -----------         ------                     CBC with platelets and d...[[id number]]  Abnormal            Final result                 Please view results for these tests on the individual orders.   Comprehensive metabolic panel   Result Value Ref Range     Sodium 128 (L) 133 - 144 mmol/L    Potassium 3.4 3.4 - 5.3 mmol/L    Chloride 92 (L) 94 - 109 mmol/L    Carbon Dioxide (CO2) 22 20 - 32 mmol/L    Anion Gap 14 3 - 14 mmol/L    Urea Nitrogen 42 (H) 7 - 30 mg/dL    Creatinine 2.50 (H) 0.66 - 1.25 mg/dL    Calcium 8.2 (L) 8.5 - 10.1 mg/dL    Glucose 136 (H) 70 - 99 mg/dL    Alkaline Phosphatase 81 40 - 150 U/L    AST 62 (H) 0 - 45 U/L    ALT 37 0 - 70 U/L    Protein Total 8.2 6.8 - 8.8 g/dL    Albumin 2.4 (L) 3.4 - 5.0 g/dL    Bilirubin Total 0.3 0.2 - 1.3 mg/dL    GFR Estimate 30 (L) >60 mL/min/1.73m2   Lactic acid whole blood   Result Value Ref Range    Lactic Acid 3.1 (H) 0.7 - 2.0 mmol/L   Blood gas venous and oxyhgb   Result Value Ref Range    pH Venous 7.42 7.32 - 7.43    pCO2 Venous 40 40 - 50 mm Hg    pO2 Venous 44 25 - 47 mm Hg    Bicarbonate Venous 26 21 - 28 mmol/L    FIO2 70     Oxyhemoglobin Venous 78 (H) 70 - 75 %    Base Excess/Deficit (+/-) 1.0 -7.7 - 1.9 mmol/L   D dimer quantitative   Result Value Ref Range    D-Dimer Quantitative 4.82 (H) 0.00 - 0.50 ug/mL FEU    Narrative    This D-dimer assay is intended for use in conjunction with a clinical pretest probability assessment model to exclude pulmonary embolism (PE) and deep venous thrombosis (DVT) in outpatients suspected of PE or DVT. The cut-off value is 0.50 ug/mL FEU.   Procalcitonin   Result Value Ref Range    Procalcitonin 0.51 (H) <0.05 ng/mL   CRP inflammation   Result Value Ref Range    CRP Inflammation 142.0 (H) 0.0 - 8.0 mg/L   CBC with platelets and differential   Result Value Ref Range    WBC Count 11.5 (H) 4.0 - 11.0 10e3/uL    RBC Count 5.90 4.40 - 5.90 10e6/uL    Hemoglobin 16.1 13.3 - 17.7 g/dL    Hematocrit 47.6 40.0 - 53.0 %    MCV 81 78 - 100 fL    MCH 27.3 26.5 - 33.0 pg    MCHC 33.8 31.5 - 36.5 g/dL    RDW 14.3 10.0 - 15.0 %    Platelet Count 297 150 - 450 10e3/uL    % Neutrophils 79 %    % Lymphocytes 12 %    % Monocytes 6 %    % Eosinophils 0 %    % Basophils 0 %    %  Immature Granulocytes 3 %    NRBCs per 100 WBC 1 (H) <1 /100    Absolute Neutrophils 9.1 (H) 1.6 - 8.3 10e3/uL    Absolute Lymphocytes 1.4 0.8 - 5.3 10e3/uL    Absolute Monocytes 0.6 0.0 - 1.3 10e3/uL    Absolute Eosinophils 0.0 0.0 - 0.7 10e3/uL    Absolute Basophils 0.1 0.0 - 0.2 10e3/uL    Absolute Immature Granulocytes 0.3 (H) <=0.0 10e3/uL    Absolute NRBCs 0.1 10e3/uL   Troponin I   Result Value Ref Range    Troponin I 0.053 (H) 0.000 - 0.045 ug/L   NT pro BNP   Result Value Ref Range    N terminal Pro BNP Inpatient 219 0 - 450 pg/mL   Asymptomatic COVID-19 Virus (Coronavirus) by PCR Nasopharyngeal    Specimen: Nasopharyngeal; Swab   Result Value Ref Range    SARS CoV2 PCR Positive (A) Negative    Narrative    Testing was performed using the [employer] Xpress SARS-CoV-2 Assay on the   Cepheid Gene-Xpert Instrument Systems. Additional information about   this Emergency Use Authorization (EUA) assay can be found via the Lab   Guide. This test should be ordered for the detection of SARS-CoV-2 in   individuals who meet SARS-CoV-2 clinical and/or epidemiological   criteria. Test performance is unknown in asymptomatic patients. This   test is for in vitro diagnostic use under the FDA EUA for   laboratories certified under CLIA to perform high complexity testing.   This test has not been FDA cleared or approved. A negative result   does not rule out the presence of PCR inhibitors in the specimen or   target RNA in concentration below the limit of detection for the   assay. The possibility of a false negative should be considered if   the patient's recent exposure or clinical presentation suggests   COVID-19. This test was validated by [facility] laboratory. This laboratory is certified under the Clinical Laboratory Improve  ment Amendments (CLIA) as qualified to perform high complexity testing.   XR Chest Port 1 View    Narrative    Procedure:XR CHEST PORT 1 VIEW    Clinical history:Male, 41 years,  covid likely    Technique: Single view was obtained.    Comparison: None    Findings: The cardiac silhouette is normal. The pulmonary vasculature  is normal.    The lungs there is a subtle areas of increased density in the mid and  lower lung zones. No evidence of pleural effusion. Bony structures are  unremarkable.      Impression    Impression:   Bilateral pneumonia primarily involving the mid and lower lung zones.    This report is in agreement with the preliminary report.    WHIT PRATER MD         SYSTEM ID:  [id number]   -Intubation    Narrative    Charly Kaur MD     11/3/2021  3:35 AM  [facility]    -Intubation    Date/Time: 11/3/2021 3:29 AM  Performed by: Charly Kaur MD  Authorized by: Charly Kaur MD       PRE-PROCEDURE DETAILS     Patient status:  Altered mental status    Pretreatment medications:  None    Paralytics:  Rocuronium      PROCEDURE DETAILS     Preoxygenation:  BiPAP    Intubation method:  Oral    Oral intubation technique:  Video-assisted    Laryngoscope blade:  Mac 4    Tube size (mm):  7.5    Tube type:  Cuffed    Number of attempts:  1    Cricoid pressure: yes      Tube visualized through cords: yes      PLACEMENT ASSESSMENT     Tube secured with:  ETT lorenzana    Breath sounds:  Equal and absent over the epigastrium    Placement verification: chest rise, condensation, CXR verification,   direct visualization, equal breath sounds, ETCO2 detector and tube   exhalation      CXR findings:  ETT in proper place  PROCEDURE   Patient Tolerance:  Patient tolerated the procedure well with no immediate   complications         Medications   LORazepam (ATIVAN) injection 1 mg (1 mg Intravenous Given 11/2/21 2203)   aspirin (ASA) chewable tablet 324 mg (324 mg Oral Given 11/2/21 2050)   0.9% sodium chloride BOLUS (0 mLs Intravenous Stopped 11/2/21 2203)   heparin ANTICOAGULANT loading dose for  HIGH INTENSITY TREATMENT* Give BEFORE starting heparin infusion (8,000 Units Intravenous Given  11/2/21 2105)   dexamethasone PF (DECADRON) injection 6 mg (6 mg Intravenous Given 11/2/21 2213)   LORazepam (ATIVAN) injection 2 mg (2 mg Intravenous Given 11/2/21 2217)   LORazepam (ATIVAN) injection 2 mg (2 mg Intravenous Given 11/2/21 2310)       Assessments & Plan (with Medical Decision Making)     I have reviewed the nursing notes.    I have reviewed the findings, diagnosis, plan and need for follow up with the patient.    Critical Care time was 75 minutes for this patient excluding procedures.    Discharge Medication List as of 11/3/2021  1:27 AM          Final diagnoses:   Acute respiratory failure with hypoxia (H)       11/2/2021   [facility]Y [facility]     hCarly Kaur MD  11/03/21 0335       Charly Kaur MD  11/03/21 0727

## 2021-11-03 NOTE — PROCEDURES
Procedure: Right internal jugular triple lumen central line placement  Brandon A Gilmar  Date of service: November 3, 2021    Diagnosis: COVID ARDS  Indication: Need for access with ARDS, multiple drips needed  Consent: Emergent    Sterile gown, cap, sterile gloves, face mask and hand hygeine performed.  The RIJ was prepped and draped in the usual fashion with large sterile drape.  Ultrasound guidance was used to cannulate the RIJ and the catheter was inserted using seldinger technique, sutured in place, and sterile dressing placed. 1 attempt needed. Chest xray pending. No immediate complications.     Melida Rolle MD  Pulmonary & Critical Care Medicine

## 2021-11-03 NOTE — PROGRESS NOTES
[facility]      MICU Progress Note  Brandon Schafer MRN: [id number]  1980  Date of Admission:11/3/2021  Primary care provider: No primary care provider on file.      Assessment and Plan:     Brandon Schafer is a 41 year old male who is admitted for hypoxic respiratory failure secondary to Covid-19. He is currently intubated and sedated.       Changes Today:  - See goals of care note from 11/3. Will give Brandon vidales trial on current therapies for the next 48h.   - Start tocilizumab   - Start tube feeds  - Start a heparin gtt for ppx  - Trend ABGs      Neurology/Psych:  # Pain and sedation  -Fentanyl gtt  -Versed gtt  -Propofol gtt  -RASS goal -5  -Will not plan to paralyze at this point given the possible transition to comfort care      Cardiovascular:  #Elevated Troponin  Troponin found to be 0.053 at outside hospital. Likely due to demand ischemia in the setting of respiratory failure.   - Trend troponin to peak       Pulmonary:  #Acute hypoxic respiratory failure secondary to Covid 19  Patient intubated prior to transport flight due to concern for airway. Will continue Covid-19 directed therapies over at least the next 48h. However, will avoid paralyzing given the potential to transition to comfort focused care.   - Dexamethasone 6 mg Qday   - Defer remdesivir given renal disease  - Tocilizumab 800mg today   - Repeat CXR tomorrow am  - BID ABGs (RT collect)      Gastrointestinal:  No acute issues.  - PPI for GI ppx    #Nutrition:   - RD consult to initial tube feeds      Renal/Electrolytes:  # Acute kidney injury  Pt has not seen a doctor in 20yrs per family. Unclear if he had any underlying renal abnormalities. Currently making urine and does not have any emergent indications for dialysis.   - Trend BMP, Mg q6h      Hematology/Oncology:  # Risk for VTE in the setting of Covid-19  - Heparin gtt       Endocrinology:  # Hyperglycemia in the setting of critical illness  - sliding scale insulin        Infectious Disease:  # Covid-19 pneumonia  Treatments as above. No indication for antibiotic coverage at this time.       Skin/Musculoskeletal/Rheumatology:  No concerns at this time.      General Cares/Prophylaxis:    DVT Prophylaxis: Heparin gtt  GI Prophylaxis: PPI  Restraints: Soft restraints   Family Communication: BrotherBrad. Updated today.   Code Status: DNR     Lines/tubes/drains:  - ETT  - OG tube  - R internal jugular CVC       Patient was seen and discussed with attending physician Dr. Owens, who agrees with above assessment and plan.    Tamara Bailey MD  [facility]  Medicine-Pediatrics, PGY-3      Interval History:     No acute events since admission. Increasing sedation requirements to remain comfortable on the ventilator. Good UOP. Electrolytes stable. Tolerating trickle feeds.     PHYSICAL EXAM  Temp  Av.6  F (37  C)  Min: 98  F (36.7  C)  Max: 99.4  F (37.4  C)      Pulse  Av.5  Min: 76  Max: 114 Resp  Av.1  Min: 12  Max: 65  FiO2 (%)  Av.7 %  Min: 70 %  Max: 100 %  SpO2  Av.3 %  Min: 82 %  Max: 100 %       Ventilation Mode: CMV/AC  (Continuous Mandatory Ventilation/ Assist Control)  FiO2 (%): 100 %  Rate Set (breaths/minute): 14 breaths/min  Tidal Volume Set (mL): 450 mL  PEEP (cm H2O): 14 cmH2O  Oxygen Concentration (%): 100 %  Resp: 30      Intake/Output Summary (Last 24 hours) at 11/3/2021 1551  Last data filed at 11/3/2021 1500  Gross per 24 hour   Intake 308.06 ml   Output 1500 ml   Net -1191.94 ml       GEN: Intubated, sedated   EYES: PERRL, Anicteric sclera.   CV: RRR, no gallops, rubs, or mumurs  PULM: Coarse breath sounds bilaterally, symmetric chest rise  GI: normal bowel sounds, non-tender, no rebound tenderness or guarding, soft periumbilical hernia  : stevenson catheter in place  EXTREMITIES: no pedal edema, chronic venous stasis changes of bilateral lower extremities  NEURO: sedated  SKIN: chronic venous stasis changes of  bilateral lower extremities     Labs  ROUTINE ICU LABS (Last four results)  CMP  Recent Labs   Lab 11/03/21  1507 11/03/21  1454 11/03/21  1306 11/03/21  1246 11/03/21  0840 11/03/21 0416 11/03/21 0222 11/02/21 1943     --   --  132*  --  130*  --  128*   POTASSIUM 3.8  --   --  3.7  --  4.0  --  3.4   CHLORIDE 100  --   --  99  --  97  --  92*   CO2 25  --   --  25  --  24  --  22   ANIONGAP 8  --   --  8  --  9  --  14   * 168* 162* 146*   < > 155*   < > 136*   BUN 53*  --   --  50*  --  47*  --  42*   CR 2.38*  --   --  2.55*  --  2.85*  --  2.50*   GFRESTIMATED 32*  --   --  30*  --  26*  --  30*   VIPUL 7.6*  --   --  7.6*  --  7.2*  --  8.2*   MAG  --   --   --   --   --  2.1  --   --    PHOS  --   --   --   --   --  6.5*  --   --    PROTTOTAL  --   --   --   --   --   --   --  8.2   ALBUMIN  --   --   --   --   --   --   --  2.4*   BILITOTAL  --   --   --   --   --   --   --  0.3   ALKPHOS  --   --   --   --   --   --   --  81   AST  --   --   --   --   --   --   --  62*   ALT  --   --   --   --   --   --   --  37    < > = values in this interval not displayed.     CBC  Recent Labs   Lab 11/03/21  1247 11/03/21 0416 11/02/21 1943   WBC 10.2 11.6* 11.5*   RBC 4.74 4.84 5.90   HGB 13.0* 13.3 16.1   HCT 38.8* 40.1 47.6   MCV 82 83 81   MCH 27.4 27.5 27.3   MCHC 33.5 33.2 33.8   RDW 14.3 14.3 14.3    243 297     INRNo lab results found in last 7 days.  Arterial Blood Gas  Recent Labs   Lab 11/03/21  1507 11/03/21  0318 11/02/21  1943   O2PER 100 100 70

## 2021-11-03 NOTE — PROGRESS NOTES
Goals of care discussion:     Per discussion with pt's brother (Brad Schafer [phone]) and mother (Mahsa Schafer), we will give Brandon 48h to declare himself on the ventilator. Brandon expressed to the ED physicians that he would not want to be on a ventilator and would not want CPR. His mother and brother want to respect those wishes. They agreed to a 48h trial in order to see if the current therapies will improve Brandon's clinical status.     If at any point he decompensates, we will update Brad and Mahsa, and will likely transition to comfort focused care at that time. If he remains stable or improves, then we will re-discuss Brandon's care with his family around 3pm on Friday, 11/5.     Dr. Owens led the discussion, and I was present for the conversation.     Tamara Bailey MD  [facility]  Medicine-Pediatrics, PGY-3

## 2021-11-03 NOTE — ED NOTES
Pt pulling off Bipap with RT and RN in room. Pt refusing to let staff place Bipap back on. NRB placed on patient while RT went to get Airvo Hi-Flow NC. Pt placed on Airvo HF NC at 2243.

## 2021-11-03 NOTE — ED NOTES
[employer] will be landing at 2300. They will call when they are 15 minutes away. Security notified and Dr. Kaur notified.

## 2021-11-03 NOTE — PROGRESS NOTES
Called to ER. Pt very restless and pulling off mask. Placed on NRB until I could place on Airvo. 60L and FiO2 100%. Waiting for transfer. Respirations 50s to 60s.     2300: Flight crew here and has taken over airway. RT standing by

## 2021-11-03 NOTE — H&P
MEDICAL ICU H&P  11/03/2021    Date of Hospital Admission: 11/03/2021  Date of ICU Admission: 11/03/2021  Reason for Critical Care Admission: Acute hypoxic respiratory failure  Date of Service (when I saw the patient): 11/03/2021    ASSESSMENT: Brandon Schafer is a 41 year old male who was admitted for hypoxic respiratory failure secondary to Covid-19 lung injury.    PLAN:    Neuro:  # Pain and sedation  -Fentanyl gtt  -Versed gtt  -Propofol gtt  - RASS goal -5    Pulmonary:  #Acute hypoxic respiratory failure secondary to Covid 19  Patient intubated prior to transport flight due to concern for airway. Inflammatory markers elevated. On arrival, discussed patient's wishes with brother and mother.  Will hold off on paralyzing and proning as family stated that patient would not want aggressive, invasive interventions and they are considering compassionate extubation.     - Dexamethasone 6 mg Qday   - Defer remdesivir given renal disease  - Consider tocilizumab 800 mg, pending goals of care  - CXR    Cardiovascular:  #Elevated Troponin  Troponin found to be 0.53 at outside hospital. Likely due to demand ischemia in the setting of Covid 19. Will hold off on further workup until goals clarified with family.     GI/Nutrition:  Hold off on enteral feeds for now.     Renal/Fluids/Electrolytes:  #Acidemia, respiratory with inadequate metabolic compensation  Patient's pH currently at 7.24. bicarb at 26 on VBG. BMP pending. This most likely represents a respiratory acidosis secondary to Covid 19.   #Hyponatremia  -Consider diuresis in the morning  #Hypocalcemia  Calcium and ionized calcium low  -Replete Ca  #Acute Kidney injury  Patient's creatine at 2.5. Per family, has kidney issues at baseline.      Endocrine:  No acute issues    ID:  #Covid 19 pneumonia  - Covid treatments as above.     #Sepsis -- viral vs bacterial  Blood cultures drawn by outside ED on 11/02. Patient is afebrile and has a mild leukocytosis at 11.6.  Lactate initially elevated at outside ED, now back within normal limits.  - Hold off on antibiotics for now    Hematology:    NTD    Musculoskeletal:  NTD    Skin:  #Stasis dermatitis  -WOC consult    General Cares/Prophylaxis:    DVT Prophylaxis: Defer to primary service  GI Prophylaxis: PPI  Restraints: None  Family Communication: Goals of care discussed with mother and brother  Code Status: DNR    Lines/tubes/drains:  - 7.5 cm ET tube  - OG tube  - Peripheral IV X2    Disposition:  - Medical ICU     Patient seen and findings/plan discussed with medical ICU staff, Dr. Rolle.    Garland Solano  MS4  -----------------------------------------------------------------------    Resident/Fellow Attestation   I, Surekha Jameson, was present with the medical/[employer] student who participated in the service and in the documentation of the note.  I have verified the history and personally performed the physical exam and medical decision making.  I agree with the assessment and plan of care as documented in the note.      41M without documented chronic medical conditions, presents with acute hypoxic respiratory failure. Per chart, told ED provider that he did not want to be intubated. While in the ED, he became delirious and removed BiPAP and tried to leave the hospital. His family was called and ultimately decided that he should be intubated for airway protection for his trip to [facility] for further cares. When I called family upon his arrival, they shared their concerns that he would not wish to be intubated and would not want aggressive cares, given their concern for very poor prognosis and lasting morbidity related to COVID-19 diagnosis. Unfortunately, he would very likely die shortly after extubation without respiratory support and it is not clear if he would be able to safely transition back to BiPAP. After extensive discussions overnight, the patient's mother and brother will come to [facility] for a care conference with the primary  team and possibly pursue comfort cares with compassionate extubation this afternoon. On my exam, he is intubated, deeply sedated and supine. His heart rate is regular, his respirations are synchronous with the vent, his abdomen is soft, his lower extremities are cool with weak pulses, no edema, and chronic skin changes suggestive of PAD. There are scattered, 1cm diameter, round, tan, plaques on bilateral lower extremities.     We will continue patient on ventilator overnight with deep sedation for vent synchrony, and avoid any further aggressive treatments or invasive measures until care conference completed.     Surekha Jameson MD  PGY3  Date of Service (when I saw the patient): 11/03/21    -----------------------------------------------------------------------    HISTORY PRESENTING ILLNESS:   41 y.o. male who presented to [facility] ED with shortness of breath by EMS after saturating 55% on room air, found to be Covid 19 positive. Patient was placed on Bipap in the ED and was planned for air transport to the ICU. However, due to concerns regarding patient's ability to protect his airway during flight, he was intubated prior to transportation    REVIEW OF SYSTEMS: Unable to obtain secondary to sedation    PAST MEDICAL HISTORY:   No past medical history on file.  SURGICAL HISTORY:  No past surgical history on file.  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     Sexual activity: Not  on file   Other Topics Concern     Not on file   Social History Narrative     Not on file     Social Determinants of Health     Financial Resource Strain:      Difficulty of Paying Living Expenses:    Food Insecurity:      Worried About Running Out of Food in the Last Year:      Ran Out of Food in the Last Year:    Transportation Needs:      Lack of Transportation (Medical):      Lack of Transportation (Non-Medical):    Physical Activity:      Days of Exercise per Week:      Minutes of Exercise per Session:    Stress:      Feeling of Stress :    Social Connections:      Frequency of Communication with Friends and Family:      Frequency of Social Gatherings with Friends and Family:      Attends Jewish Services:      Active Member of Clubs or Organizations:      Attends Club or Organization Meetings:      Marital Status:    Intimate Partner Violence:      Fear of Current or Ex-Partner:      Emotionally Abused:      Physically Abused:      Sexually Abused:      FAMILY HISTORY:   No family history on file.  ALLERGIES:   No Known Allergies  MEDICATIONS:  [COMPLETED] 0.9% sodium chloride BOLUS   Followed by  sodium chloride 0.9% infusion  [COMPLETED] aspirin (ASA) chewable tablet 324 mg  [COMPLETED] dexamethasone PF (DECADRON) injection 6 mg  heparin 25,000 units in 0.45% NaCl 250 mL ANTICOAGULANT infusion  [COMPLETED] heparin ANTICOAGULANT loading dose for  HIGH INTENSITY TREATMENT* Give BEFORE starting heparin infusion  iopamidol (ISOVUE-370) solution 73 mL  [COMPLETED] LORazepam (ATIVAN) injection 1 mg  [COMPLETED] LORazepam (ATIVAN) injection 2 mg  LORazepam (ATIVAN) injection 2 mg  [COMPLETED] LORazepam (ATIVAN) injection 2 mg  ondansetron (ZOFRAN) injection 4 mg  sodium chloride (PF) 0.9% PF flush 100 mL  sodium chloride (PF) 0.9% PF flush 3 mL  sodium chloride (PF) 0.9% PF flush 3 mL    Acetaminophen (TYLENOL PO), Take 1,000 mg by mouth daily as needed for mild pain or fever      PHYSICAL EXAMINATION:  Temp:   [99.4  F (37.4  C)] 99.4  F (37.4  C)  Pulse:  [106-114] 107  Resp:  [28-65] 64  BP: (100-142)/() 100/80  FiO2 (%):  [70 %-100 %] 100 %  SpO2:  [86 %-97 %] 87 %  General: Supine, deeply sedated  HEENT: Neck Supple  Neuro: Unable to assess while sedated  Pulm/Resp: Bilateral rhonchi in all lung fields  CV: RRR, no murmurs rubs or gallops  Abdomen: Soft, non-distended, 5 cm umbilical hernia present  : stevenson catheter in place, urine yellow and clear  Incisions/Skin: Bilateral evidence of stasis dermatitis    LABS: Reviewed.   Arterial Blood Gases   No lab results found in last 7 days.  Complete Blood Count   Recent Labs   Lab 11/02/21 1943   WBC 11.5*   HGB 16.1        Basic Metabolic Panel  Recent Labs   Lab 11/02/21 1943   *   POTASSIUM 3.4   CHLORIDE 92*   CO2 22   BUN 42*   CR 2.50*   *     Liver Function Tests  Recent Labs   Lab 11/02/21 1943   AST 62*   ALT 37   ALKPHOS 81   BILITOTAL 0.3   ALBUMIN 2.4*     Coagulation Profile  No lab results found in last 7 days.    IMAGING:  No results found for this or any previous visit (from the past 24 hour(s)).

## 2021-11-03 NOTE — ED NOTES
Pt intubated. ET tube 7.5cm, 27 at the lip and secured by MD. OG tube placed by MD. 68cm at the lip. Secured with tape. Verified by xray.

## 2021-11-03 NOTE — ED NOTES
Report given to SHONDA Bhatt [facility] flight team. Pt placed on flight team monitors at that time.

## 2021-11-03 NOTE — ED NOTES
2310 Air transport crew arrived, report given, care assumed and patient transferred to air care monitors. Due to concerns for the patient airway in flight calls were made between the MD at [facility] and Medical Director for [facility] team. After talking with family and between MDs the patient was intubated by the MD in the ED prior to the flight. Pt exited under the care of [facility] flight crew at 0051.

## 2021-11-03 NOTE — PLAN OF CARE
Admitted/transferred from: [facility]  Reason for admission/transfer: Respiratory failure   2 RN skin assessment: completed by Trey  Result of skin assessment and interventions/actions: Intact ex cracked heels. Jaison bilateral lower extremities  Height, weight, drug calc weight: 6 feet, 163.4kg   Patient belongings None  MDRO education added to care plan N/A    Major Shift Events:  Admitted around 0215 from [facility]. Sedation switched to propofol and fentanyl. Coughs with stimulation. Pupils reactive. Loose stool x1, Flores placed with 800mL output. Updates given to family through MICU resident. CMV vent settings 100%, 450, RR 30, PEEP 14. R internal jugular placed at bedside. Chest and abdominal xray completed.      Plan: Possible comfort cares today. Fiance here waiting in lobby.   For vital signs and complete assessments, please see documentation flowsheets.

## 2021-11-03 NOTE — ED NOTES
"Pt found with BiPap off trying to crawl out of bed. Other staff notified and help place patient back into the bed with the Bipap being held in place until patient better situated and then re-straped to face in proper position verified by RT. MD talking with patient. Pt stated to MD and staff \"I want to go home.\" MD stated to patient \"If you leave you will die at home.\" Pt still wanting to go home but is willing to stay for treatment at this time.   "

## 2021-11-03 NOTE — ED TRIAGE NOTES
"Pt arrives via EMS after 1 week of \"not feeling well\". Pt was found to be at 55% O2 upon EMS arrival. Pt placed on NRB then switched to Bipap. Pt arrives on bipap at 93% on 70% FiO2. Pt is alert and oriented x4.   "

## 2021-11-03 NOTE — PROGRESS NOTES
"CLINICAL NUTRITION SERVICES - ASSESSMENT NOTE     Nutrition Prescription    RECOMMENDATIONS FOR MDs/PROVIDERS TO ORDER:  --Additional water flushes and bowel regimen as per primary team.     Malnutrition Status:    Unable to determine due to unable to perform all aspects for NFPE    Recommendations already ordered by Registered Dietitian (RD):  TF:   --Enteral access: OGT  --GOAL: Novasource Renal @ 30 ml/hr (720 ml) + 10 Prosource provides 1840 kcal (23 kcal/kg IBW), 175 g pro (2.2 g/kg IBW), 132 g CHO, 516 ml free water, and 0 g fiber daily, 1540 mg Phos daily.   --Start TF @ 10 ml/hr and advance by 10 ml q 8 hrs until goal rate.  --Do not start or advance TF rate unless K+ >3.0, Mg++ > 1.5,  and Phos > 1.9.  --Minimum 30 ml q 4 hrs water flushes for tube patency.  --If gastric enteral access: HOB > 30 degrees or Reverse Trendelenburg >10-25 degrees. Do not reduce TF rate of hold TF unless pt shows signs of TF intolerance.   --Nephronex    Future/Additional Recommendations:  --TF tolerance, advancement via OGT.  --GOC.      REASON FOR ASSESSMENT  Brandon Schafer is a/an 41 year old male assessed by the dietitian for Provider Order - Registered Dietitian to Assess and Order TF per Medical Nutrition Therapy Protocol    NUTRITION HISTORY  PMH limited, admitted from OSH with COVID-19. Pt was intubated prior to transfer to [facility]. GOC conversations ongoing. Okay to begin TF while awaiting decisions.     CURRENT NUTRITION ORDERS  Diet: NPO    LABS  Na 130 (L)  K+ 4.0 (WNL)  Cr 2.85 (H)  Phos 6.5 (H)  .0 (H)    MEDICATIONS  Decadron  Fentanyl  Propofol    ANTHROPOMETRICS  Height: 182.9 cm (6' 0\")  Most Recent Weight: (!) 163.4 kg (360 lb 3.7 oz)    IBW: 80.9 kg (202% IBW)  BMI: Obesity Grade III BMI >40 (48.75 kg/m2)  Weight History:   Wt Readings from Last 30 Encounters:   11/03/21 (!) 163.4 kg (360 lb 3.7 oz)   11/02/21 (!) 161 kg (355 lb)   03/10/15 113.4 kg (250 lb)     Dosing Weight: 81 kg IBW    ASSESSED " NUTRITION NEEDS  Estimated Energy Needs: 9736-9294 kcals/day (22 - 25 kcal/kg IBW)  Justification: Obese and Vented  Estimated Protein Needs: 162-202 grams protein/day (2 - 2.5 grams of pro/kg IBW)  Justification: Hypercatabolism with critical illness and Obesity guidelines  Estimated Fluid Needs: (1 mL/kcal)   Justification: Maintenance and Per provider pending fluid status    PHYSICAL FINDINGS  See malnutrition section below.  ETT  OGT (AXR)    MALNUTRITION  % Intake: Unable to assess  % Weight Loss: Unable to assess  Subcutaneous Fat Loss: Unable to assess COVID+  Muscle Loss: Unable to assess COVID+  Fluid Accumulation/Edema: None noted  Malnutrition Diagnosis: Unable to determine due to unable to perform all aspects for NFPE    NUTRITION DIAGNOSIS  Inadequate oral intake related to mechanical ventilation inhibiting ability to take nutrition PO as evidenced by NPO status, requiring the start of enteral nutrition to meet 100% of needs.       INTERVENTIONS  Implementation  Collaboration with other providers - rounds  Enteral Nutrition - Initiate     Goals  Total avg nutritional intake to meet a minimum of 22 kcal/kg and 2 g PRO/kg daily (per dosing wt 81 kg IBW).     Monitoring/Evaluation  Progress toward goals will be monitored and evaluated per protocol.    Shawanda Powell, MS, RD, LD, [facility] pager: [phone]  ASCOM: [id number]

## 2021-11-03 NOTE — PROGRESS NOTES
Pt to ER via ambulance on bipap. Placed on hospital BiPAP with settings 15/8 with FiO2 70%. SpO2 94%, Pt uncomfortable. MD sobeida carreon RN aware and meds given.

## 2021-11-03 NOTE — PROGRESS NOTES
Care Management Follow Up    Length of Stay (days): 0    Expected Discharge Date:  TBD     Concerns to be Addressed: family support  Patient plan of care discussed at interdisciplinary rounds: Yes    Additional Information:  SW spoke to pt's brother (Brad) via phone to coordinate a time for a care conference tomorrow. Brad indicated that after family discussions that they are likely going to speak to the medical team about moving to comfort cares to respect pt's wishes. SW offered support to pt's brother and will send requested resources via email ([email or web address]). CHRISTIAN coordinated with MICU team.     DINH Haas, SW  ICU   [facility]  Ph: [phone]  [phone]

## 2021-11-03 NOTE — PROGRESS NOTES
Decision made to intubate pt. Dr Kaur intubated with 7.5mm ETT using glidescope. Positive CO2 color change. Breath sounds adequate per MD. PEEP valve placed on ambu at 10. SpO2 increasing to mid 90s approx 2 minutes post intubation. ETT secured with anchorfast at 27cm at the lip. ETCO2 in place reading mid 50s per flight crew equipment. Pt placed on flight ventilator per their settings.

## 2021-11-03 NOTE — PROGRESS NOTES
Patient arrived in ICU with 7.5 ETT, 27 @ lip. Current vent settings: CMV/AC: 28, 450, 100%, +14. RT will continue to follow.

## 2021-11-03 NOTE — PROGRESS NOTES
Called to ER. Pt pulled mask off. SpO2 low 80s. Placed back on mask with FiO2 90%. SpO2 90% at this time

## 2021-11-03 NOTE — PLAN OF CARE
Patient currently on CMV , Rate 30, Peep 14, FiO2 80% with O2 sat at 95%. All VS WNL at this time.     Propofol at 20, Versed at 4, Fentanyl at 200, Heparin at 1200. Insulin sliding scale initiated. TF started at 10 w/30cc FWF q4.     Trops elevated, MICU aware.     Calcium supplemented per order. Spoke to family several times today.     No changes to report at this time. Patient and family currently on ipad.

## 2021-11-04 ENCOUNTER — APPOINTMENT (OUTPATIENT)
Dept: ULTRASOUND IMAGING | Facility: CLINIC | Age: 41
End: 2021-11-04
Attending: STUDENT IN AN ORGANIZED HEALTH CARE EDUCATION/TRAINING PROGRAM
Payer: MEDICAID

## 2021-11-04 ENCOUNTER — APPOINTMENT (OUTPATIENT)
Dept: GENERAL RADIOLOGY | Facility: CLINIC | Age: 41
End: 2021-11-04
Attending: STUDENT IN AN ORGANIZED HEALTH CARE EDUCATION/TRAINING PROGRAM
Payer: MEDICAID

## 2021-11-04 LAB
ALBUMIN SERPL-MCNC: 1.9 G/DL (ref 3.4–5)
ALP SERPL-CCNC: 62 U/L (ref 40–150)
ALT SERPL W P-5'-P-CCNC: 31 U/L (ref 0–70)
ANION GAP SERPL CALCULATED.3IONS-SCNC: 6 MMOL/L (ref 3–14)
ANION GAP SERPL CALCULATED.3IONS-SCNC: 6 MMOL/L (ref 3–14)
AST SERPL W P-5'-P-CCNC: 40 U/L (ref 0–45)
ATRIAL RATE - MUSE: 71 BPM
ATRIAL RATE - MUSE: 72 BPM
BASE EXCESS BLDA CALC-SCNC: -0.3 MMOL/L (ref -9–1.8)
BASE EXCESS BLDA CALC-SCNC: -0.7 MMOL/L (ref -9–1.8)
BASE EXCESS BLDV CALC-SCNC: 0.7 MMOL/L (ref -7.7–1.9)
BILIRUB DIRECT SERPL-MCNC: <0.1 MG/DL (ref 0–0.2)
BILIRUB SERPL-MCNC: 0.3 MG/DL (ref 0.2–1.3)
BUN SERPL-MCNC: 54 MG/DL (ref 7–30)
BUN SERPL-MCNC: 57 MG/DL (ref 7–30)
CALCIUM SERPL-MCNC: 7.5 MG/DL (ref 8.5–10.1)
CALCIUM SERPL-MCNC: 7.8 MG/DL (ref 8.5–10.1)
CHLORIDE BLD-SCNC: 102 MMOL/L (ref 94–109)
CHLORIDE BLD-SCNC: 102 MMOL/L (ref 94–109)
CO2 SERPL-SCNC: 25 MMOL/L (ref 20–32)
CO2 SERPL-SCNC: 26 MMOL/L (ref 20–32)
CREAT SERPL-MCNC: 1.89 MG/DL (ref 0.66–1.25)
CREAT SERPL-MCNC: 1.96 MG/DL (ref 0.66–1.25)
DIASTOLIC BLOOD PRESSURE - MUSE: NORMAL MMHG
DIASTOLIC BLOOD PRESSURE - MUSE: NORMAL MMHG
ERYTHROCYTE [DISTWIDTH] IN BLOOD BY AUTOMATED COUNT: 14.3 % (ref 10–15)
GFR SERPL CREATININE-BSD FRML MDRD: 41 ML/MIN/1.73M2
GFR SERPL CREATININE-BSD FRML MDRD: 43 ML/MIN/1.73M2
GLUCOSE BLD-MCNC: 142 MG/DL (ref 70–99)
GLUCOSE BLD-MCNC: 183 MG/DL (ref 70–99)
GLUCOSE BLDC GLUCOMTR-MCNC: 143 MG/DL (ref 70–99)
GLUCOSE BLDC GLUCOMTR-MCNC: 150 MG/DL (ref 70–99)
GLUCOSE BLDC GLUCOMTR-MCNC: 156 MG/DL (ref 70–99)
GLUCOSE BLDC GLUCOMTR-MCNC: 165 MG/DL (ref 70–99)
GLUCOSE BLDC GLUCOMTR-MCNC: 188 MG/DL (ref 70–99)
GLUCOSE BLDC GLUCOMTR-MCNC: 201 MG/DL (ref 70–99)
HCO3 BLD-SCNC: 25 MMOL/L (ref 21–28)
HCO3 BLD-SCNC: 26 MMOL/L (ref 21–28)
HCO3 BLDV-SCNC: 27 MMOL/L (ref 21–28)
HCT VFR BLD AUTO: 37.3 % (ref 40–53)
HGB BLD-MCNC: 12.3 G/DL (ref 13.3–17.7)
INTERPRETATION ECG - MUSE: NORMAL
INTERPRETATION ECG - MUSE: NORMAL
MAGNESIUM SERPL-MCNC: 2.3 MG/DL (ref 1.6–2.3)
MCH RBC QN AUTO: 27.4 PG (ref 26.5–33)
MCHC RBC AUTO-ENTMCNC: 33 G/DL (ref 31.5–36.5)
MCV RBC AUTO: 83 FL (ref 78–100)
O2/TOTAL GAS SETTING VFR VENT: 80 %
O2/TOTAL GAS SETTING VFR VENT: 80 %
O2/TOTAL GAS SETTING VFR VENT: 90 %
P AXIS - MUSE: 43 DEGREES
P AXIS - MUSE: 69 DEGREES
PCO2 BLD: 46 MM HG (ref 35–45)
PCO2 BLD: 46 MM HG (ref 35–45)
PCO2 BLDV: 47 MM HG (ref 40–50)
PH BLD: 7.35 [PH] (ref 7.35–7.45)
PH BLD: 7.36 [PH] (ref 7.35–7.45)
PH BLDV: 7.36 [PH] (ref 7.32–7.43)
PLATELET # BLD AUTO: 255 10E3/UL (ref 150–450)
PO2 BLD: 118 MM HG (ref 80–105)
PO2 BLD: 125 MM HG (ref 80–105)
PO2 BLDV: 43 MM HG (ref 25–47)
POTASSIUM BLD-SCNC: 3.8 MMOL/L (ref 3.4–5.3)
POTASSIUM BLD-SCNC: 3.9 MMOL/L (ref 3.4–5.3)
PR INTERVAL - MUSE: 150 MS
PR INTERVAL - MUSE: 158 MS
PROCALCITONIN SERPL-MCNC: 0.42 NG/ML
PROT SERPL-MCNC: 6.1 G/DL (ref 6.8–8.8)
QRS DURATION - MUSE: 82 MS
QRS DURATION - MUSE: 84 MS
QT - MUSE: 412 MS
QT - MUSE: 416 MS
QTC - MUSE: 451 MS
QTC - MUSE: 452 MS
R AXIS - MUSE: -3 DEGREES
R AXIS - MUSE: -4 DEGREES
RBC # BLD AUTO: 4.49 10E6/UL (ref 4.4–5.9)
SODIUM SERPL-SCNC: 133 MMOL/L (ref 133–144)
SODIUM SERPL-SCNC: 134 MMOL/L (ref 133–144)
SYSTOLIC BLOOD PRESSURE - MUSE: NORMAL MMHG
SYSTOLIC BLOOD PRESSURE - MUSE: NORMAL MMHG
T AXIS - MUSE: 129 DEGREES
T AXIS - MUSE: 151 DEGREES
UFH PPP CHRO-ACNC: 0.14 IU/ML
UFH PPP CHRO-ACNC: 0.4 IU/ML
UFH PPP CHRO-ACNC: <0.1 IU/ML
VENTRICULAR RATE- MUSE: 71 BPM
VENTRICULAR RATE- MUSE: 72 BPM
WBC # BLD AUTO: 7.2 10E3/UL (ref 4–11)

## 2021-11-04 PROCEDURE — 250N000009 HC RX 250: Performed by: STUDENT IN AN ORGANIZED HEALTH CARE EDUCATION/TRAINING PROGRAM

## 2021-11-04 PROCEDURE — 93970 EXTREMITY STUDY: CPT | Mod: 26 | Performed by: RADIOLOGY

## 2021-11-04 PROCEDURE — C9113 INJ PANTOPRAZOLE SODIUM, VIA: HCPCS | Performed by: STUDENT IN AN ORGANIZED HEALTH CARE EDUCATION/TRAINING PROGRAM

## 2021-11-04 PROCEDURE — 85027 COMPLETE CBC AUTOMATED: CPT | Performed by: STUDENT IN AN ORGANIZED HEALTH CARE EDUCATION/TRAINING PROGRAM

## 2021-11-04 PROCEDURE — 94640 AIRWAY INHALATION TREATMENT: CPT | Mod: 76

## 2021-11-04 PROCEDURE — 71045 X-RAY EXAM CHEST 1 VIEW: CPT

## 2021-11-04 PROCEDURE — 93005 ELECTROCARDIOGRAM TRACING: CPT

## 2021-11-04 PROCEDURE — 71045 X-RAY EXAM CHEST 1 VIEW: CPT | Mod: 26 | Performed by: RADIOLOGY

## 2021-11-04 PROCEDURE — 84145 PROCALCITONIN (PCT): CPT | Performed by: STUDENT IN AN ORGANIZED HEALTH CARE EDUCATION/TRAINING PROGRAM

## 2021-11-04 PROCEDURE — 93010 ELECTROCARDIOGRAM REPORT: CPT | Performed by: INTERNAL MEDICINE

## 2021-11-04 PROCEDURE — 83735 ASSAY OF MAGNESIUM: CPT | Performed by: STUDENT IN AN ORGANIZED HEALTH CARE EDUCATION/TRAINING PROGRAM

## 2021-11-04 PROCEDURE — 200N000002 HC R&B ICU UMMC

## 2021-11-04 PROCEDURE — 3E043XZ INTRODUCTION OF VASOPRESSOR INTO CENTRAL VEIN, PERCUTANEOUS APPROACH: ICD-10-PCS | Performed by: STUDENT IN AN ORGANIZED HEALTH CARE EDUCATION/TRAINING PROGRAM

## 2021-11-04 PROCEDURE — 80048 BASIC METABOLIC PNL TOTAL CA: CPT | Performed by: STUDENT IN AN ORGANIZED HEALTH CARE EDUCATION/TRAINING PROGRAM

## 2021-11-04 PROCEDURE — 999N000157 HC STATISTIC RCP TIME EA 10 MIN

## 2021-11-04 PROCEDURE — XW033H5 INTRODUCTION OF TOCILIZUMAB INTO PERIPHERAL VEIN, PERCUTANEOUS APPROACH, NEW TECHNOLOGY GROUP 5: ICD-10-PCS | Performed by: STUDENT IN AN ORGANIZED HEALTH CARE EDUCATION/TRAINING PROGRAM

## 2021-11-04 PROCEDURE — 82803 BLOOD GASES ANY COMBINATION: CPT | Performed by: STUDENT IN AN ORGANIZED HEALTH CARE EDUCATION/TRAINING PROGRAM

## 2021-11-04 PROCEDURE — 82040 ASSAY OF SERUM ALBUMIN: CPT | Performed by: STUDENT IN AN ORGANIZED HEALTH CARE EDUCATION/TRAINING PROGRAM

## 2021-11-04 PROCEDURE — 250N000011 HC RX IP 250 OP 636: Performed by: STUDENT IN AN ORGANIZED HEALTH CARE EDUCATION/TRAINING PROGRAM

## 2021-11-04 PROCEDURE — 93970 EXTREMITY STUDY: CPT

## 2021-11-04 PROCEDURE — 94003 VENT MGMT INPAT SUBQ DAY: CPT

## 2021-11-04 PROCEDURE — 250N000013 HC RX MED GY IP 250 OP 250 PS 637: Performed by: STUDENT IN AN ORGANIZED HEALTH CARE EDUCATION/TRAINING PROGRAM

## 2021-11-04 PROCEDURE — 99291 CRITICAL CARE FIRST HOUR: CPT | Mod: 25 | Performed by: STUDENT IN AN ORGANIZED HEALTH CARE EDUCATION/TRAINING PROGRAM

## 2021-11-04 PROCEDURE — 99223 1ST HOSP IP/OBS HIGH 75: CPT | Performed by: NURSE PRACTITIONER

## 2021-11-04 PROCEDURE — 36620 INSERTION CATHETER ARTERY: CPT | Performed by: STUDENT IN AN ORGANIZED HEALTH CARE EDUCATION/TRAINING PROGRAM

## 2021-11-04 PROCEDURE — 85520 HEPARIN ASSAY: CPT | Performed by: STUDENT IN AN ORGANIZED HEALTH CARE EDUCATION/TRAINING PROGRAM

## 2021-11-04 PROCEDURE — 82310 ASSAY OF CALCIUM: CPT | Performed by: STUDENT IN AN ORGANIZED HEALTH CARE EDUCATION/TRAINING PROGRAM

## 2021-11-04 PROCEDURE — 94645 CONT INHLJ TX EACH ADDL HOUR: CPT

## 2021-11-04 RX ORDER — FUROSEMIDE 10 MG/ML
20 INJECTION INTRAMUSCULAR; INTRAVENOUS ONCE
Status: COMPLETED | OUTPATIENT
Start: 2021-11-04 | End: 2021-11-04

## 2021-11-04 RX ORDER — AZITHROMYCIN 250 MG/1
250 TABLET, FILM COATED ORAL DAILY
Status: COMPLETED | OUTPATIENT
Start: 2021-11-05 | End: 2021-11-08

## 2021-11-04 RX ORDER — POLYETHYLENE GLYCOL 3350 17 G/17G
17 POWDER, FOR SOLUTION ORAL DAILY
Status: DISCONTINUED | OUTPATIENT
Start: 2021-11-04 | End: 2021-11-15

## 2021-11-04 RX ORDER — IPRATROPIUM BROMIDE AND ALBUTEROL SULFATE 2.5; .5 MG/3ML; MG/3ML
3 SOLUTION RESPIRATORY (INHALATION) EVERY 4 HOURS
Status: DISCONTINUED | OUTPATIENT
Start: 2021-11-04 | End: 2021-11-05

## 2021-11-04 RX ORDER — LIDOCAINE 40 MG/G
CREAM TOPICAL
Status: COMPLETED | OUTPATIENT
Start: 2021-11-04 | End: 2021-11-04

## 2021-11-04 RX ORDER — NOREPINEPHRINE BITARTRATE 0.06 MG/ML
.01-.6 INJECTION, SOLUTION INTRAVENOUS CONTINUOUS
Status: DISCONTINUED | OUTPATIENT
Start: 2021-11-04 | End: 2021-11-13

## 2021-11-04 RX ORDER — AZITHROMYCIN 250 MG/1
500 TABLET, FILM COATED ORAL ONCE
Status: COMPLETED | OUTPATIENT
Start: 2021-11-04 | End: 2021-11-04

## 2021-11-04 RX ADMIN — PROPOFOL 40 MCG/KG/MIN: 10 INJECTION, EMULSION INTRAVENOUS at 13:41

## 2021-11-04 RX ADMIN — PROPOFOL 45 MCG/KG/MIN: 10 INJECTION, EMULSION INTRAVENOUS at 20:20

## 2021-11-04 RX ADMIN — INSULIN ASPART 1 UNITS: 100 INJECTION, SOLUTION INTRAVENOUS; SUBCUTANEOUS at 16:20

## 2021-11-04 RX ADMIN — IPRATROPIUM BROMIDE AND ALBUTEROL SULFATE 3 ML: .5; 3 SOLUTION RESPIRATORY (INHALATION) at 16:21

## 2021-11-04 RX ADMIN — FUROSEMIDE 20 MG: 10 INJECTION, SOLUTION INTRAMUSCULAR; INTRAVENOUS at 09:58

## 2021-11-04 RX ADMIN — Medication 2 PACKET: at 21:55

## 2021-11-04 RX ADMIN — PANTOPRAZOLE SODIUM 40 MG: 40 INJECTION, POWDER, FOR SOLUTION INTRAVENOUS at 20:12

## 2021-11-04 RX ADMIN — Medication 2 PACKET: at 10:40

## 2021-11-04 RX ADMIN — Medication 2 PACKET: at 18:07

## 2021-11-04 RX ADMIN — Medication 20 NG/KG/MIN: at 06:35

## 2021-11-04 RX ADMIN — IPRATROPIUM BROMIDE AND ALBUTEROL SULFATE 3 ML: .5; 3 SOLUTION RESPIRATORY (INHALATION) at 13:10

## 2021-11-04 RX ADMIN — MIDAZOLAM (PF) 1 MG/ML IN 0.9 % SODIUM CHLORIDE INTRAVENOUS SOLUTION 6 MG/HR: at 06:05

## 2021-11-04 RX ADMIN — INSULIN ASPART 1 UNITS: 100 INJECTION, SOLUTION INTRAVENOUS; SUBCUTANEOUS at 03:31

## 2021-11-04 RX ADMIN — HEPARIN SODIUM 1800 UNITS/HR: 10000 INJECTION, SOLUTION INTRAVENOUS at 13:36

## 2021-11-04 RX ADMIN — Medication 100 MCG/HR: at 08:30

## 2021-11-04 RX ADMIN — INSULIN ASPART 1 UNITS: 100 INJECTION, SOLUTION INTRAVENOUS; SUBCUTANEOUS at 00:33

## 2021-11-04 RX ADMIN — INSULIN ASPART 2 UNITS: 100 INJECTION, SOLUTION INTRAVENOUS; SUBCUTANEOUS at 20:26

## 2021-11-04 RX ADMIN — HEPARIN SODIUM 1350 UNITS/HR: 10000 INJECTION, SOLUTION INTRAVENOUS at 00:33

## 2021-11-04 RX ADMIN — IPRATROPIUM BROMIDE AND ALBUTEROL SULFATE 3 ML: .5; 3 SOLUTION RESPIRATORY (INHALATION) at 19:36

## 2021-11-04 RX ADMIN — Medication 20 NG/KG/MIN: at 16:30

## 2021-11-04 RX ADMIN — Medication 2 PACKET: at 14:58

## 2021-11-04 RX ADMIN — INSULIN ASPART 1 UNITS: 100 INJECTION, SOLUTION INTRAVENOUS; SUBCUTANEOUS at 12:26

## 2021-11-04 RX ADMIN — POLYETHYLENE GLYCOL 3350 17 G: 17 POWDER, FOR SOLUTION ORAL at 18:07

## 2021-11-04 RX ADMIN — PANTOPRAZOLE SODIUM 40 MG: 40 INJECTION, POWDER, FOR SOLUTION INTRAVENOUS at 08:12

## 2021-11-04 RX ADMIN — DEXAMETHASONE SODIUM PHOSPHATE 6 MG: 4 INJECTION, SOLUTION INTRA-ARTICULAR; INTRALESIONAL; INTRAMUSCULAR; INTRAVENOUS; SOFT TISSUE at 12:26

## 2021-11-04 RX ADMIN — PROPOFOL 45 MCG/KG/MIN: 10 INJECTION, EMULSION INTRAVENOUS at 18:31

## 2021-11-04 RX ADMIN — PROPOFOL 30 MCG/KG/MIN: 10 INJECTION, EMULSION INTRAVENOUS at 08:12

## 2021-11-04 RX ADMIN — MIDAZOLAM (PF) 1 MG/ML IN 0.9 % SODIUM CHLORIDE INTRAVENOUS SOLUTION 6 MG/HR: at 21:48

## 2021-11-04 RX ADMIN — PROPOFOL 30 MCG/KG/MIN: 10 INJECTION, EMULSION INTRAVENOUS at 10:40

## 2021-11-04 RX ADMIN — Medication 2 PACKET: at 08:31

## 2021-11-04 RX ADMIN — PROPOFOL 20 MCG/KG/MIN: 10 INJECTION, EMULSION INTRAVENOUS at 03:53

## 2021-11-04 RX ADMIN — INSULIN ASPART 1 UNITS: 100 INJECTION, SOLUTION INTRAVENOUS; SUBCUTANEOUS at 08:40

## 2021-11-04 RX ADMIN — Medication 0.03 MCG/KG/MIN: at 14:50

## 2021-11-04 RX ADMIN — AZITHROMYCIN 500 MG: 250 TABLET, FILM COATED ORAL at 09:58

## 2021-11-04 RX ADMIN — PROPOFOL 45 MCG/KG/MIN: 10 INJECTION, EMULSION INTRAVENOUS at 22:26

## 2021-11-04 RX ADMIN — PROPOFOL 40 MCG/KG/MIN: 10 INJECTION, EMULSION INTRAVENOUS at 16:19

## 2021-11-04 RX ADMIN — LIDOCAINE: 40 CREAM TOPICAL at 12:25

## 2021-11-04 RX ADMIN — Medication 5 ML: at 08:30

## 2021-11-04 ASSESSMENT — ACTIVITIES OF DAILY LIVING (ADL)
ADLS_ACUITY_SCORE: 21
ADLS_ACUITY_SCORE: 21
ADLS_ACUITY_SCORE: 20
ADLS_ACUITY_SCORE: 21
ADLS_ACUITY_SCORE: 20
ADLS_ACUITY_SCORE: 20
ADLS_ACUITY_SCORE: 21
ADLS_ACUITY_SCORE: 21
ADLS_ACUITY_SCORE: 20
ADLS_ACUITY_SCORE: 21
ADLS_ACUITY_SCORE: 20

## 2021-11-04 NOTE — PLAN OF CARE
Major Shift Events: Still sedated on Propofol, Fentanyl and Versed. RASS goal -4 to -5. Veletri at 20ng/kg/min. Moderate amount of thick creamy/blood tinged secretions through ETT tube. Flores with about 100ml/hr output. No BM overnight. TF running at goal through OG at 30ml/hr. Trops trending down. MICU notified about inverted t wave, EKG done with no evident changes. Vitals stable throughout the night. CMV settings 80%/450/RR30/16    Plan: CC on Friday at 3pm with MICU, pts mother and brother.     For vital signs and complete assessments, please see documentation flowsheets.

## 2021-11-04 NOTE — PLAN OF CARE
Major Shift Events:  RASS -4/-5. Prop, fent, versed for sedation. Levo added for MAP > 65. BP tenuous when interacting w patient. CMV 80%/30/450/16. Intermittent emesis throughout shift. Brown w slight red tinge from mouth and nose. MICU notified. Tube feeds held at 1600. Very thick/dry secretions, awaiting med changes to help loosen/moisten secretions. Flores in place w adequate UOP. 20mg IV Lasix x1 w good response. Miralax added to promote GI motility and hopefully decrease emesis occurences. Hep antiXa at 1200 <0.1, bolus given and dose increased to 1800 units.   A line placed by MD this morning.  Plan: Monitor for signs of emesis. Adjust meds as appropriate. Care conference tomorrow at 1500. Restart tube feeds when notified. Continue w current POC.  For vital signs and complete assessments, please see documentation flowsheets.

## 2021-11-04 NOTE — PROGRESS NOTES
[facility]      MICU Progress Note  Brandon Schafer MRN: [id number]  1980  Date of Admission:11/3/2021  Primary care provider: No primary care provider on file.      Assessment and Plan:     Brandon Schafer is a 41 year old male who is admitted for hypoxic respiratory failure secondary to Covid-19. He is currently intubated and sedated.       Changes Today:  - arterial line placed for ABGs  - bilateral LE US- no DVT  - scheduled duonebs given smoking history  - 5 day course of azithromycin for possible COPD exacerbation  - lasix 20mg IV once  - NE gtt for BP support    Neurology/Psych:  # Pain and sedation  -Fentanyl gtt  -Versed gtt  -Propofol gtt  -RASS goal -5  -Will not plan to paralyze at this point given the possible transition to comfort care      Cardiovascular:  #Elevated Troponin, resolved   Troponin found to be 0.053 at outside hospital. Peaked at 0.24. Likely due to demand ischemia in the setting of respiratory failure.       Pulmonary:  #Acute hypoxic respiratory failure secondary to Covid 19  Patient intubated prior to transport flight due to concern for airway. Will continue Covid-19 directed therapies over at least the next 48h. However, will avoid paralyzing given the potential to transition to comfort focused care.   - Dexamethasone 6 mg Qday   - Defer remdesivir given renal disease  - Tocilizumab 800mg today   - Repeat CXR tomorrow am  - BID ABGs (RT collect)    # Concern for COPD exacerbation  H/o smoking.  - Azithromycin, 5 day course  - Scheduled duonebs      Gastrointestinal:  No acute issues.  - PPI for GI ppx    #Nutrition:   - RD consult for tube feeds      Renal/Electrolytes:  # Acute kidney injury, improving   Pt has not seen a doctor in 20yrs per family. Unclear if he had any underlying renal abnormalities. Currently making urine and does not have any emergent indications for dialysis.   - Lasix 20mg IV once today   - Trend BMP, Mg q12h      Hematology/Oncology:  # Risk  for VTE in the setting of Covid-19  Bilateral lower extremity US negative for DVT.   - Heparin gtt       Endocrinology:  # Hyperglycemia in the setting of critical illness  - sliding scale insulin       Infectious Disease:  # Covid-19 pneumonia  Treatments as above. No indication for antibiotic coverage at this time.       Skin/Musculoskeletal/Rheumatology:  No concerns at this time.      General Cares/Prophylaxis:    DVT Prophylaxis: Heparin gtt  GI Prophylaxis: PPI  Restraints: Soft restraints   Family Communication: BrotherBrad. Updated today.   Code Status: DNR     Lines/tubes/drains:  - ETT  - OG tube  - R internal jugular CVC       Patient was seen and discussed with attending physician Dr. Owens, who agrees with above assessment and plan.    Tamara Bailey MD  [facility]  Medicine-Pediatrics, PGY-3      Interval History:     No acute events overnight. Making urine. Stable on current vent settings. MAPs >65.     PHYSICAL EXAM  Temp  Av.6  F (37  C)  Min: 98  F (36.7  C)  Max: 99.4  F (37.4  C)      Pulse  Av.5  Min: 76  Max: 114 Resp  Av.1  Min: 12  Max: 65  FiO2 (%)  Av.7 %  Min: 70 %  Max: 100 %  SpO2  Av.3 %  Min: 82 %  Max: 100 %       Ventilation Mode: CMV/AC  (Continuous Mandatory Ventilation/ Assist Control)  FiO2 (%): 80 %  Rate Set (breaths/minute): 30 breaths/min  Tidal Volume Set (mL): 450 mL  PEEP (cm H2O): 16 cmH2O  Oxygen Concentration (%): 80 %  Resp: 30      Intake/Output Summary (Last 24 hours) at 2021 1341  Last data filed at 2021 1200  Gross per 24 hour   Intake 2160.78 ml   Output 2150 ml   Net 10.78 ml       GEN: Intubated, sedated   EYES: PERRL, Anicteric sclera.   CV: RRR, no gallops, rubs, or mumurs  PULM: Coarse breath sounds bilaterally, symmetric chest rise  GI: normal bowel sounds, non-tender, no rebound tenderness or guarding, soft periumbilical hernia  : stevenson catheter in place  EXTREMITIES: no pedal edema, chronic  venous stasis changes of bilateral lower extremities  NEURO: sedated  SKIN: chronic venous stasis changes of bilateral lower extremities     Labs  ROUTINE ICU LABS (Last four results)  CMP  Recent Labs   Lab 11/04/21  1225 11/04/21  0839 11/04/21  0335 11/04/21  0331 11/04/21  0029 11/03/21 2129 11/03/21 2008 11/03/21  1507 11/03/21  1306 11/03/21  1246 11/03/21  0840 11/03/21  0416 11/03/21  0222 11/02/21  1943   NA  --   --  133  --   --  133  --  133  --  132*   < > 130*  --  128*   POTASSIUM  --   --  3.8  --   --  3.8  --  3.8  --  3.7   < > 4.0  --  3.4   CHLORIDE  --   --  102  --   --  99  --  100  --  99   < > 97  --  92*   CO2  --   --  25  --   --  24  --  25  --  25   < > 24  --  22   ANIONGAP  --   --  6  --   --  10  --  8  --  8   < > 9  --  14   * 143* 142* 150*   < > 156*   < > 149*   < > 146*   < > 155*   < > 136*   BUN  --   --  54*  --   --  51*  --  53*  --  50*   < > 47*  --  42*   CR  --   --  1.96*  --   --  2.17*  --  2.38*  --  2.55*   < > 2.85*  --  2.50*   GFRESTIMATED  --   --  41*  --   --  36*  --  32*  --  30*   < > 26*  --  30*   VIPUL  --   --  7.8*  --   --  7.7*  --  7.6*  --  7.6*   < > 7.2*  --  8.2*   MAG  --   --   --   --   --   --   --   --   --   --   --  2.1  --   --    PHOS  --   --   --   --   --   --   --   --   --   --   --  6.5*  --   --    PROTTOTAL  --   --  6.1*  --   --   --   --   --   --   --   --   --   --  8.2   ALBUMIN  --   --  1.9*  --   --   --   --   --   --   --   --   --   --  2.4*   BILITOTAL  --   --  0.3  --   --   --   --   --   --   --   --   --   --  0.3   ALKPHOS  --   --  62  --   --   --   --   --   --   --   --   --   --  81   AST  --   --  40  --   --   --   --   --   --   --   --   --   --  62*   ALT  --   --  31  --   --   --   --   --   --   --   --   --   --  37    < > = values in this interval not displayed.     CBC  Recent Labs   Lab 11/04/21  0335 11/03/21  1247 11/03/21  0416 11/02/21  1943   WBC 7.2 10.2 11.6* 11.5*   RBC 4.49  4.74 4.84 5.90   HGB 12.3* 13.0* 13.3 16.1   HCT 37.3* 38.8* 40.1 47.6   MCV 83 82 83 81   MCH 27.4 27.4 27.5 27.3   MCHC 33.0 33.5 33.2 33.8   RDW 14.3 14.3 14.3 14.3    232 243 297     INRNo lab results found in last 7 days.  Arterial Blood Gas  Recent Labs   Lab 11/04/21  0335 11/03/21  2129 11/03/21  1801 11/03/21  1648   PH  --   --   --  7.38   PCO2  --   --   --  41   PO2  --   --   --  117*   HCO3  --   --   --  24   O2PER 90 90 100 100

## 2021-11-04 NOTE — PROCEDURES
[facility] Procedure Note         Medicine Bedside Procedure:     PROCEDURE PERFORMED:  Arterial Catheter Site:  Left Radial Artery    PAUSE FOR THE CAUSE: Right patient: Yes      Right body part: Yes      Right procedure Yes    ULTRASOUND GUIDANCE:  Yes    PRIMARY INDICATION:  monitoring of blood pressure, monitoring of arterial oxygenation and respiratory failure    DIAGNOSTIC DATA REVIEW:    CBC:   Lab Results   Component Value Date    WBC 7.2 11/04/2021    RBC 4.49 11/04/2021    HGB 12.3 11/04/2021    HCT 37.3 11/04/2021    MCV 83 11/04/2021    RDW 14.3 11/04/2021     11/04/2021       H&P STATUS: H&P was reviewed, the patient was examined and no change has occurred in patient's condition since H&P was completed.    BARRIER PRECAUTIONS & STERILE TECHNIQUE  As documented in the Pre-Procedure Check List.    LOCAL ANESTHESIA:  Lidocaine 1% 3ml without epinephrine    NUMBER OF KITS USED:  1    STERILE DRESSINGS:  Applied    ESTIMATED BLOOD LOSS:  Minimal    SPECIMENS COLLECTED:  Non-applicable    SPECIMENS SENT:  Non-applicable    FOLLOW- UP CHEST X-RAY:  Not indicated  COMPLICATIONS:  none    PRIMARY PROCEDURALIST:  MD William Alex MS4    SUPERVISING PHYSICIAN:  Cherelle Owens MD

## 2021-11-04 NOTE — PROGRESS NOTES
Care Management Follow Up    Length of Stay (days): 1    Expected Discharge Date:       Concerns to be Addressed:  Care Conference      Patient plan of care discussed at interdisciplinary rounds: Yes    Anticipated Discharge Disposition:  unknown     Anticipated Discharge Services:  [facility]  Anticipated Discharge DME:      Patient/family educated on Medicare website which has current facility and service quality ratings:    Education Provided on the Discharge Plan:    Patient/Family in Agreement with the Plan:      Referrals Placed by CM/SW:    Private pay costs discussed: Not applicable    Additional Information:  Talked to Brad over the phone, patients brother. He agreed to a 3pm care conference tomorrow Friday 11/5. Writer gave him the call information to brother for conference.    Kelsey Gonzalez RN Care Coordinator   [facility]  [phone]           Kelsey Gonzalez, RN

## 2021-11-04 NOTE — CONSULTS
[facility]  Palliative Care Consultation Note    Patient: Brandon Schafer  Date of Admission:  11/3/2021    Requesting Clinician / Team: Tamara Bailey MD / MICU  Reason for consult:  Goals of care     Decisional support     Patient and family support    Recommendations:    Continue with time limited trial of full restorative cares, with plan to re-evaluate treatment tomorrow afternoon      Palliative will plan to participate in family care conference tomorrow at 3 pm; please keep us updated if there are changes to that plan.     These recommendations have been discussed with MICU.    Thank you for the opportunity to participate in the care of this patient and family. Our team: will continue to follow.       During regular M-F work hours -- if you are not sure who specifically to contact -- please contact us by sending a text page to our team consult pager at [phone].    After regular work hours and on weekends/holidays, you can call our answering service at [phone]. Also, who's on call for us is available in [facility] [employer].     JUANCHO Murray CNP  Palliative Care Consult Team  Pager: [phone]    70 minutes spent. This includes > than 50% of my time coordinating care and discussing patient's covid infection, complicated medical situation and goals of care, with his brother Brad. Attempted to reach mother by phone as well, but her phone does not accept unknown phone numbers.      Assessments:  Brandon Schafer is a 41 year old male with COVID 19 infection, who was admitted with hypoxic respiratory failure, intubated and sedated.      Today, the patient was seen for goals of care and family support in the setting of:  COVID 19 infection  Hypoxic respiratory failure requiring mechanical ventilation     Prognosis, Goals, & Planning:      Functional Status just prior to hospitalization: 0 (Fully active, able to carry on all activities without  "restriction)      Prognosis, Goals, and/or Advance Care Planning were addressed today: Yes family understands Brandon might not survive his current illness and want to follow his wishes for which treatments he would be willing to accept.       Patient's decision making preferences: unable to assess - patient's brother said Brandon is a very private and independent person, who typically made decisions on his own          Patient has decision-making capacity today for complex decisions: No            I have concerns about the patient/family's health literacy today: No           Patient has a completed Health Care Directive: No.       Code status: No CPR; pre-arrest intubation OK    Coping, Meaning, & Spirituality:   Mood, coping, and/or meaning in the context of serious illness were addressed today: Yes  Summary/Comments: Per Brad, Brandon's Scientologist benjamín has been critical to his sobriety and helpful with coping.     Social:     Living situation: with MICH Perez and two children (Harriett 9, Yvette 3)    Martin family / caregivers: brother Brad is surrogate spokesperson, 1 other brother and sister, mother Mahsa, and S.O. 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 Family reports Brandon had not had medical treatment for ~20 years. On Tuesday night (11/2), Brandon's brother Brad said he was called over to Brandon's house to check on him. rBad said Brandon \"looked terribly\" so he called EMS because he didn't believe his brother would survive the night if he didn't go to the hospital. Brandon was brought to [facility] and then transferred to [facility] with hypoxic respiratory failure.     Brad shared that the ED physician from [facility] shared with him that Brandon did not want to be intubated. Brad believes Brandon's vitals were stable " (oxygen around 90%) when he told the doctor this, and based on what the ED staff shared with Brad, Brad believes Brandon had capacity. Brandon's mother ultimately made the decision to transfer him to [facility] for a second opinion, and he was intubated prior to the transfer flight, for airway concerns. This has weighed heavy on Brad, and something he acknowledged has caused him to feel a lot of pressure. Brad shared a desire to make the right decision and honor his brother's wishes. He and Brandon had never previously spoken about their wishes at end of life before, but per Brad, it would surprise him if Brandon had spoken to anyone about that; he was very private and kept to himself. Brad shared that a DNR/DNI wish would very much be in alignment with how Brandon has lived his life.     Family had been prepared to extubate to comfort measures only (CMO) but decided to continue current cares and give Brandon a time limited trial, to see if he could make any improvements in 48 hours; time will be up and family care conference is planned for Friday afternoon at 3 pm (11/5). Brad was agreeable to palliative's participation in this meeting.     Key Palliative Symptom Data:  We are not helping to manage these symptoms currently in this patient.    ROS:  Unable to complete ROS as patient is intubated, sedated, and in covid isolation      Past Medical History:  No past medical history on file. Unable to discuss with patient, as he is intubated and sedated     Past Surgical History:  No past surgical history on file. Unable to discuss with patient, as he is intubated and sedate      Family History:  Unable to discuss with patient, as he is intubated and sedated      Allergies:  No Known Allergies     Medications:  I have reviewed this patient's medication profile and medications from this hospitalization.     Physical Exam:  Vital Signs: Temp: 98.9  F (37.2  C) Temp src: Axillary BP: 100/55 Pulse: 73   Resp: 30 SpO2: 97 % O2 Device:  Mechanical Ventilator    Weight: 360 lbs 3.71 oz    Exam limited by positive covid infection.  Constitutional - critically ill male, intubated and sedated, with multiple infusions running     Data reviewed:  [facility] Labs   Lab 11/04/21  1225 11/04/21  0839 11/04/21  0335 11/04/21  0331 11/04/21  0029 11/03/21 2129 11/03/21 2008 11/03/21  1507 11/03/21  1306 11/03/21  1246 11/03/21  0840 11/03/21  0416 11/03/21  0222 11/02/21  1943   NA  --   --  133  --   --  133  --  133  --  132*   < > 130*  --  128*   POTASSIUM  --   --  3.8  --   --  3.8  --  3.8  --  3.7   < > 4.0  --  3.4   CHLORIDE  --   --  102  --   --  99  --  100  --  99   < > 97  --  92*   CO2  --   --  25  --   --  24  --  25  --  25   < > 24  --  22   ANIONGAP  --   --  6  --   --  10  --  8  --  8   < > 9  --  14   * 143* 142* 150*   < > 156*   < > 149*   < > 146*   < > 155*   < > 136*   BUN  --   --  54*  --   --  51*  --  53*  --  50*   < > 47*  --  42*   CR  --   --  1.96*  --   --  2.17*  --  2.38*  --  2.55*   < > 2.85*  --  2.50*   GFRESTIMATED  --   --  41*  --   --  36*  --  32*  --  30*   < > 26*  --  30*   VIPUL  --   --  7.8*  --   --  7.7*  --  7.6*  --  7.6*   < > 7.2*  --  8.2*   MAG  --   --   --   --   --   --   --   --   --   --   --  2.1  --   --    PHOS  --   --   --   --   --   --   --   --   --   --   --  6.5*  --   --    PROTTOTAL  --   --  6.1*  --   --   --   --   --   --   --   --   --   --  8.2   ALBUMIN  --   --  1.9*  --   --   --   --   --   --   --   --   --   --  2.4*   BILITOTAL  --   --  0.3  --   --   --   --   --   --   --   --   --   --  0.3   ALKPHOS  --   --  62  --   --   --   --   --   --   --   --   --   --  81   AST  --   --  40  --   --   --   --   --   --   --   --   --   --  62*   ALT  --   --  31  --   --   --   --   --   --   --   --   --   --  37    < > = values in this interval not displayed.     CBC  Recent Labs   Lab 11/04/21  0335 11/03/21  1247 11/03/21  0416 11/02/21  1943   WBC 7.2 10.2  11.6* 11.5*   RBC 4.49 4.74 4.84 5.90   HGB 12.3* 13.0* 13.3 16.1   HCT 37.3* 38.8* 40.1 47.6   MCV 83 82 83 81   MCH 27.4 27.4 27.5 27.3   MCHC 33.0 33.5 33.2 33.8   RDW 14.3 14.3 14.3 14.3    232 243 297     Arterial Blood Gas  Recent Labs   Lab 11/04/21  0335 11/03/21  2129 11/03/21  1801 11/03/21  1648   PH  --   --   --  7.38   PCO2  --   --   --  41   PO2  --   --   --  117*   HCO3  --   --   --  24   O2PER 90 90 100 100

## 2021-11-05 ENCOUNTER — APPOINTMENT (OUTPATIENT)
Dept: GENERAL RADIOLOGY | Facility: CLINIC | Age: 41
End: 2021-11-05
Attending: NURSE PRACTITIONER
Payer: MEDICAID

## 2021-11-05 LAB
ANION GAP SERPL CALCULATED.3IONS-SCNC: 7 MMOL/L (ref 3–14)
ANION GAP SERPL CALCULATED.3IONS-SCNC: 8 MMOL/L (ref 3–14)
BASE EXCESS BLDA CALC-SCNC: 0.3 MMOL/L (ref -9–1.8)
BASE EXCESS BLDA CALC-SCNC: 0.8 MMOL/L (ref -9–1.8)
BASE EXCESS BLDA CALC-SCNC: 1 MMOL/L (ref -9–1.8)
BUN SERPL-MCNC: 48 MG/DL (ref 7–30)
BUN SERPL-MCNC: 52 MG/DL (ref 7–30)
CALCIUM SERPL-MCNC: 7.5 MG/DL (ref 8.5–10.1)
CALCIUM SERPL-MCNC: 7.8 MG/DL (ref 8.5–10.1)
CHLORIDE BLD-SCNC: 104 MMOL/L (ref 94–109)
CHLORIDE BLD-SCNC: 105 MMOL/L (ref 94–109)
CO2 SERPL-SCNC: 25 MMOL/L (ref 20–32)
CO2 SERPL-SCNC: 26 MMOL/L (ref 20–32)
CREAT SERPL-MCNC: 1.53 MG/DL (ref 0.66–1.25)
CREAT SERPL-MCNC: 1.56 MG/DL (ref 0.66–1.25)
ERYTHROCYTE [DISTWIDTH] IN BLOOD BY AUTOMATED COUNT: 14.6 % (ref 10–15)
GFR SERPL CREATININE-BSD FRML MDRD: 54 ML/MIN/1.73M2
GFR SERPL CREATININE-BSD FRML MDRD: 55 ML/MIN/1.73M2
GLUCOSE BLD-MCNC: 165 MG/DL (ref 70–99)
GLUCOSE BLD-MCNC: 174 MG/DL (ref 70–99)
GLUCOSE BLDC GLUCOMTR-MCNC: 136 MG/DL (ref 70–99)
GLUCOSE BLDC GLUCOMTR-MCNC: 144 MG/DL (ref 70–99)
GLUCOSE BLDC GLUCOMTR-MCNC: 160 MG/DL (ref 70–99)
GLUCOSE BLDC GLUCOMTR-MCNC: 168 MG/DL (ref 70–99)
GLUCOSE BLDC GLUCOMTR-MCNC: 180 MG/DL (ref 70–99)
GLUCOSE BLDC GLUCOMTR-MCNC: 198 MG/DL (ref 70–99)
HCO3 BLD-SCNC: 26 MMOL/L (ref 21–28)
HCO3 BLD-SCNC: 26 MMOL/L (ref 21–28)
HCO3 BLD-SCNC: 27 MMOL/L (ref 21–28)
HCT VFR BLD AUTO: 37.3 % (ref 40–53)
HGB BLD-MCNC: 12.2 G/DL (ref 13.3–17.7)
MAGNESIUM SERPL-MCNC: 2.5 MG/DL (ref 1.6–2.3)
MAGNESIUM SERPL-MCNC: 2.6 MG/DL (ref 1.6–2.3)
MCH RBC QN AUTO: 27.5 PG (ref 26.5–33)
MCHC RBC AUTO-ENTMCNC: 32.7 G/DL (ref 31.5–36.5)
MCV RBC AUTO: 84 FL (ref 78–100)
O2/TOTAL GAS SETTING VFR VENT: 60 %
O2/TOTAL GAS SETTING VFR VENT: 80 %
O2/TOTAL GAS SETTING VFR VENT: 80 %
PCO2 BLD: 43 MM HG (ref 35–45)
PCO2 BLD: 44 MM HG (ref 35–45)
PCO2 BLD: 45 MM HG (ref 35–45)
PH BLD: 7.37 [PH] (ref 7.35–7.45)
PH BLD: 7.38 [PH] (ref 7.35–7.45)
PH BLD: 7.39 [PH] (ref 7.35–7.45)
PLATELET # BLD AUTO: 303 10E3/UL (ref 150–450)
PO2 BLD: 104 MM HG (ref 80–105)
PO2 BLD: 120 MM HG (ref 80–105)
PO2 BLD: 92 MM HG (ref 80–105)
POTASSIUM BLD-SCNC: 3.7 MMOL/L (ref 3.4–5.3)
POTASSIUM BLD-SCNC: 4 MMOL/L (ref 3.4–5.3)
RBC # BLD AUTO: 4.43 10E6/UL (ref 4.4–5.9)
SODIUM SERPL-SCNC: 137 MMOL/L (ref 133–144)
SODIUM SERPL-SCNC: 138 MMOL/L (ref 133–144)
UFH PPP CHRO-ACNC: 0.27 IU/ML
WBC # BLD AUTO: 10.5 10E3/UL (ref 4–11)

## 2021-11-05 PROCEDURE — 94003 VENT MGMT INPAT SUBQ DAY: CPT

## 2021-11-05 PROCEDURE — C9113 INJ PANTOPRAZOLE SODIUM, VIA: HCPCS | Performed by: STUDENT IN AN ORGANIZED HEALTH CARE EDUCATION/TRAINING PROGRAM

## 2021-11-05 PROCEDURE — 999N000065 XR ABDOMEN PORT 1 VIEWS

## 2021-11-05 PROCEDURE — 83735 ASSAY OF MAGNESIUM: CPT | Performed by: STUDENT IN AN ORGANIZED HEALTH CARE EDUCATION/TRAINING PROGRAM

## 2021-11-05 PROCEDURE — 200N000002 HC R&B ICU UMMC

## 2021-11-05 PROCEDURE — 74018 RADEX ABDOMEN 1 VIEW: CPT | Mod: 26 | Performed by: RADIOLOGY

## 2021-11-05 PROCEDURE — 94640 AIRWAY INHALATION TREATMENT: CPT

## 2021-11-05 PROCEDURE — 94645 CONT INHLJ TX EACH ADDL HOUR: CPT

## 2021-11-05 PROCEDURE — 82803 BLOOD GASES ANY COMBINATION: CPT | Performed by: STUDENT IN AN ORGANIZED HEALTH CARE EDUCATION/TRAINING PROGRAM

## 2021-11-05 PROCEDURE — 250N000013 HC RX MED GY IP 250 OP 250 PS 637: Performed by: STUDENT IN AN ORGANIZED HEALTH CARE EDUCATION/TRAINING PROGRAM

## 2021-11-05 PROCEDURE — 99233 SBSQ HOSP IP/OBS HIGH 50: CPT | Performed by: NURSE PRACTITIONER

## 2021-11-05 PROCEDURE — 44500 INTRO GASTROINTESTINAL TUBE: CPT

## 2021-11-05 PROCEDURE — 85520 HEPARIN ASSAY: CPT | Performed by: STUDENT IN AN ORGANIZED HEALTH CARE EDUCATION/TRAINING PROGRAM

## 2021-11-05 PROCEDURE — 250N000011 HC RX IP 250 OP 636: Performed by: STUDENT IN AN ORGANIZED HEALTH CARE EDUCATION/TRAINING PROGRAM

## 2021-11-05 PROCEDURE — 80048 BASIC METABOLIC PNL TOTAL CA: CPT | Performed by: STUDENT IN AN ORGANIZED HEALTH CARE EDUCATION/TRAINING PROGRAM

## 2021-11-05 PROCEDURE — 250N000011 HC RX IP 250 OP 636

## 2021-11-05 PROCEDURE — 250N000009 HC RX 250: Performed by: STUDENT IN AN ORGANIZED HEALTH CARE EDUCATION/TRAINING PROGRAM

## 2021-11-05 PROCEDURE — 85014 HEMATOCRIT: CPT | Performed by: STUDENT IN AN ORGANIZED HEALTH CARE EDUCATION/TRAINING PROGRAM

## 2021-11-05 PROCEDURE — 94640 AIRWAY INHALATION TREATMENT: CPT | Mod: 76

## 2021-11-05 PROCEDURE — 250N000009 HC RX 250

## 2021-11-05 PROCEDURE — 999N000015 HC STATISTIC ARTERIAL MONITORING DAILY

## 2021-11-05 PROCEDURE — 99291 CRITICAL CARE FIRST HOUR: CPT | Performed by: STUDENT IN AN ORGANIZED HEALTH CARE EDUCATION/TRAINING PROGRAM

## 2021-11-05 PROCEDURE — 999N000157 HC STATISTIC RCP TIME EA 10 MIN

## 2021-11-05 RX ORDER — IPRATROPIUM BROMIDE AND ALBUTEROL SULFATE 2.5; .5 MG/3ML; MG/3ML
3 SOLUTION RESPIRATORY (INHALATION)
Status: DISCONTINUED | OUTPATIENT
Start: 2021-11-05 | End: 2021-11-08

## 2021-11-05 RX ORDER — LIDOCAINE HYDROCHLORIDE 20 MG/ML
5 SOLUTION OROPHARYNGEAL ONCE
Status: DISCONTINUED | OUTPATIENT
Start: 2021-11-05 | End: 2021-11-08 | Stop reason: CLARIF

## 2021-11-05 RX ORDER — ACETYLCYSTEINE 200 MG/ML
2 SOLUTION ORAL; RESPIRATORY (INHALATION) 2 TIMES DAILY
Status: DISCONTINUED | OUTPATIENT
Start: 2021-11-05 | End: 2021-11-12

## 2021-11-05 RX ORDER — AMOXICILLIN 250 MG
1 CAPSULE ORAL 2 TIMES DAILY
Status: DISCONTINUED | OUTPATIENT
Start: 2021-11-05 | End: 2021-11-15

## 2021-11-05 RX ORDER — FUROSEMIDE 10 MG/ML
20 INJECTION INTRAMUSCULAR; INTRAVENOUS ONCE
Status: COMPLETED | OUTPATIENT
Start: 2021-11-05 | End: 2021-11-05

## 2021-11-05 RX ORDER — AMOXICILLIN 250 MG
2 CAPSULE ORAL 2 TIMES DAILY
Status: DISCONTINUED | OUTPATIENT
Start: 2021-11-05 | End: 2021-11-15

## 2021-11-05 RX ADMIN — INSULIN ASPART 1 UNITS: 100 INJECTION, SOLUTION INTRAVENOUS; SUBCUTANEOUS at 03:30

## 2021-11-05 RX ADMIN — ACETYLCYSTEINE 2 ML: 200 SOLUTION ORAL; RESPIRATORY (INHALATION) at 17:50

## 2021-11-05 RX ADMIN — PANTOPRAZOLE SODIUM 40 MG: 40 INJECTION, POWDER, FOR SOLUTION INTRAVENOUS at 20:04

## 2021-11-05 RX ADMIN — Medication 200 MCG/HR: at 17:18

## 2021-11-05 RX ADMIN — PROPOFOL 45 MCG/KG/MIN: 10 INJECTION, EMULSION INTRAVENOUS at 10:50

## 2021-11-05 RX ADMIN — PROPOFOL 60 MCG/KG/MIN: 10 INJECTION, EMULSION INTRAVENOUS at 17:19

## 2021-11-05 RX ADMIN — DOCUSATE SODIUM 50 MG AND SENNOSIDES 8.6 MG 2 TABLET: 8.6; 5 TABLET, FILM COATED ORAL at 08:31

## 2021-11-05 RX ADMIN — PROPOFOL 45 MCG/KG/MIN: 10 INJECTION, EMULSION INTRAVENOUS at 09:02

## 2021-11-05 RX ADMIN — PROPOFOL 60 MCG/KG/MIN: 10 INJECTION, EMULSION INTRAVENOUS at 15:38

## 2021-11-05 RX ADMIN — DEXAMETHASONE SODIUM PHOSPHATE 6 MG: 4 INJECTION, SOLUTION INTRA-ARTICULAR; INTRALESIONAL; INTRAMUSCULAR; INTRAVENOUS; SOFT TISSUE at 12:04

## 2021-11-05 RX ADMIN — AZITHROMYCIN 250 MG: 250 TABLET, FILM COATED ORAL at 08:31

## 2021-11-05 RX ADMIN — PROPOFOL 45 MCG/KG/MIN: 10 INJECTION, EMULSION INTRAVENOUS at 13:55

## 2021-11-05 RX ADMIN — IPRATROPIUM BROMIDE AND ALBUTEROL SULFATE 3 ML: .5; 3 SOLUTION RESPIRATORY (INHALATION) at 17:50

## 2021-11-05 RX ADMIN — PROPOFOL 45 MCG/KG/MIN: 10 INJECTION, EMULSION INTRAVENOUS at 04:17

## 2021-11-05 RX ADMIN — PROPOFOL 75 MCG/KG/MIN: 10 INJECTION, EMULSION INTRAVENOUS at 18:29

## 2021-11-05 RX ADMIN — INSULIN ASPART 1 UNITS: 100 INJECTION, SOLUTION INTRAVENOUS; SUBCUTANEOUS at 15:24

## 2021-11-05 RX ADMIN — POLYETHYLENE GLYCOL 3350 17 G: 17 POWDER, FOR SOLUTION ORAL at 08:31

## 2021-11-05 RX ADMIN — HEPARIN SODIUM 1800 UNITS/HR: 10000 INJECTION, SOLUTION INTRAVENOUS at 13:55

## 2021-11-05 RX ADMIN — HEPARIN SODIUM 1800 UNITS/HR: 10000 INJECTION, SOLUTION INTRAVENOUS at 00:14

## 2021-11-05 RX ADMIN — PROPOFOL 45 MCG/KG/MIN: 10 INJECTION, EMULSION INTRAVENOUS at 00:09

## 2021-11-05 RX ADMIN — Medication 2 PACKET: at 10:21

## 2021-11-05 RX ADMIN — IPRATROPIUM BROMIDE AND ALBUTEROL SULFATE 3 ML: .5; 3 SOLUTION RESPIRATORY (INHALATION) at 07:31

## 2021-11-05 RX ADMIN — INSULIN ASPART 2 UNITS: 100 INJECTION, SOLUTION INTRAVENOUS; SUBCUTANEOUS at 00:03

## 2021-11-05 RX ADMIN — IPRATROPIUM BROMIDE AND ALBUTEROL SULFATE 3 ML: .5; 3 SOLUTION RESPIRATORY (INHALATION) at 00:28

## 2021-11-05 RX ADMIN — INSULIN ASPART 1 UNITS: 100 INJECTION, SOLUTION INTRAVENOUS; SUBCUTANEOUS at 08:34

## 2021-11-05 RX ADMIN — PROPOFOL 75 MCG/KG/MIN: 10 INJECTION, EMULSION INTRAVENOUS at 21:12

## 2021-11-05 RX ADMIN — MIDAZOLAM (PF) 1 MG/ML IN 0.9 % SODIUM CHLORIDE INTRAVENOUS SOLUTION 6 MG/HR: at 12:35

## 2021-11-05 RX ADMIN — FUROSEMIDE 20 MG: 10 INJECTION, SOLUTION INTRAVENOUS at 10:19

## 2021-11-05 RX ADMIN — PROPOFOL 70 MCG/KG/MIN: 10 INJECTION, EMULSION INTRAVENOUS at 23:58

## 2021-11-05 RX ADMIN — Medication 5 ML: at 08:31

## 2021-11-05 RX ADMIN — PANTOPRAZOLE SODIUM 40 MG: 40 INJECTION, POWDER, FOR SOLUTION INTRAVENOUS at 08:31

## 2021-11-05 RX ADMIN — INSULIN ASPART 1 UNITS: 100 INJECTION, SOLUTION INTRAVENOUS; SUBCUTANEOUS at 20:01

## 2021-11-05 RX ADMIN — PROPOFOL 45 MCG/KG/MIN: 10 INJECTION, EMULSION INTRAVENOUS at 06:26

## 2021-11-05 RX ADMIN — Medication 2 PACKET: at 08:34

## 2021-11-05 RX ADMIN — Medication 2 PACKET: at 14:52

## 2021-11-05 RX ADMIN — Medication 50 MCG: at 02:11

## 2021-11-05 RX ADMIN — PROPOFOL 45 MCG/KG/MIN: 10 INJECTION, EMULSION INTRAVENOUS at 02:11

## 2021-11-05 RX ADMIN — PROPOFOL 75 MCG/KG/MIN: 10 INJECTION, EMULSION INTRAVENOUS at 22:33

## 2021-11-05 RX ADMIN — PROPOFOL 75 MCG/KG/MIN: 10 INJECTION, EMULSION INTRAVENOUS at 19:53

## 2021-11-05 RX ADMIN — Medication 50 MCG: at 13:59

## 2021-11-05 RX ADMIN — Medication 20 NG/KG/MIN: at 14:03

## 2021-11-05 RX ADMIN — IPRATROPIUM BROMIDE AND ALBUTEROL SULFATE 3 ML: .5; 3 SOLUTION RESPIRATORY (INHALATION) at 04:09

## 2021-11-05 RX ADMIN — Medication 125 MCG/HR: at 01:27

## 2021-11-05 RX ADMIN — Medication 20 NG/KG/MIN: at 03:05

## 2021-11-05 ASSESSMENT — ACTIVITIES OF DAILY LIVING (ADL)
ADLS_ACUITY_SCORE: 23
ADLS_ACUITY_SCORE: 21
ADLS_ACUITY_SCORE: 23
ADLS_ACUITY_SCORE: 21
ADLS_ACUITY_SCORE: 23
ADLS_ACUITY_SCORE: 23
ADLS_ACUITY_SCORE: 21
ADLS_ACUITY_SCORE: 23
ADLS_ACUITY_SCORE: 21
ADLS_ACUITY_SCORE: 23
ADLS_ACUITY_SCORE: 21
ADLS_ACUITY_SCORE: 23
ADLS_ACUITY_SCORE: 21
ADLS_ACUITY_SCORE: 23

## 2021-11-05 ASSESSMENT — MIFFLIN-ST. JEOR: SCORE: 2562

## 2021-11-05 NOTE — PROGRESS NOTES
CLINICAL NUTRITION SERVICES - BRIEF NOTE  *Please see full assessment note from 11/3/2021    New Findings:  ppFT attempted (see procedure note). FT likely gastric. Discussed with MICU team and okay to resume TF via FT if gastric.    Interventions  Collaboration with other providers    RD to follow per protocol.    Shawanda Powell MS, RD, LD, [facility]  MICU pager: [phone]  ASCOM: [phone]

## 2021-11-05 NOTE — PROGRESS NOTES
"[facility]  Palliative Care Daily Progress Note       Recommendations & Counseling       Continue full restorative medical efforts. If complications occur and prognosis becomes less optimistic, family would want to re-evaluate treatment plan, given patient has expressed resistance to being on life support in the past.       Kate Richardson, Palliative , is also involved for ongoing emotional and spiritual support      Thank you for the opportunity to be involved in the care of this patient. Please reach out with questions or concerns.     JUANCHO Murray CNP  Palliative Care Consult Team  Pager: [phone]    [facility] Inpatient Team Consult pager [phone] (M-F 8-4:30)  After-hours Answering Service [phone]   Palliative Clinic: [phone]     45 minutes spent. This includes participating in a family meeting from [phone], where we discussed Brandon's covid infection, a medical update, and discussing goals of care.       Assessments          Brandon Schafer is a 41 year old male with COVID 19 infection, who was admitted with hypoxic respiratory failure, intubated and sedated. Planning to prone this afternoon.     Today, the patient was seen for:  COVID 19 infection  Hypoxic respiratory failure requiring mechanical ventilation     Prognosis, Goals, or Advance Care Planning was addressed today with: Yes.    Mood, coping, and/or meaning in the context of serious illness were addressed today: Yes.            Interval History:     Family care conference held today with patient's mother Mahsa and two brothers Brad and Kirit. Dr. Cardozo from [facility] gave a medical update stating that Brandon's kidneys have improved and now his main medical issue is just his lungs, which are \"stable to slightly improved.\" We discussed how family has been faced with difficult decision making, given Brandon had said previously he would not want to be on life support, but is making " progress and seems to have a reasonable chance for a full recovery. Family asked for a medical recommendation about how to proceed and Dr. Cardozo shared that she would continue current cares, unless we run into complications. Family seemed appreciative for her perspective, and agreed to continue the current course. Weekly care conferences were planned for Thursdays at 1pm and the ICU team agreed to continue daily updates until then.     Key Palliative Symptoms:  We are not helping to manage these symptoms currently in this patient.           Review of Systems:     Unable to discuss with patient, as he is intubated and sedated          Medications:     I have reviewed this patient's medication profile and medications during this hospitalization.           Physical Exam:   Vitals were reviewed  Temp: 98.6  F (37  C) Temp src: Axillary   Pulse: 72   Resp: 29 SpO2: 94 % O2 Device: Mechanical Ventilator      Exam limited by positive covid infection.  Constitutional - critically ill male, intubated and sedated, with multiple infusions running            Data Reviewed:       [facility] Labs   Lab 11/05/21  1203 11/05/21  0830 11/05/21  0329 11/05/21  0318 11/04/21  1618 11/04/21  1614 11/04/21  0839 11/04/21  0335 11/04/21  0029 11/03/21  2129 11/03/21  0840 11/03/21  0416 11/03/21  0222 11/02/21  1943   NA  --   --   --  137  --  134  --  133  --  133   < > 130*  --  128*   POTASSIUM  --   --   --  3.7  --  3.9  --  3.8  --  3.8   < > 4.0  --  3.4   CHLORIDE  --   --   --  105  --  102  --  102  --  99   < > 97  --  92*   CO2  --   --   --  25  --  26  --  25  --  24   < > 24  --  22   ANIONGAP  --   --   --  7  --  6  --  6  --  10   < > 9  --  14   * 144* 160* 165*   < > 183*   < > 142*   < > 156*   < > 155*   < > 136*   BUN  --   --   --  52*  --  57*  --  54*  --  51*   < > 47*  --  42*   CR  --   --   --  1.56*  --  1.89*  --  1.96*  --  2.17*   < > 2.85*  --  2.50*   GFRESTIMATED  --   --   --  54*  --  43*  --   41*  --  36*   < > 26*  --  30*   VIPUL  --   --   --  7.5*  --  7.5*  --  7.8*  --  7.7*   < > 7.2*  --  8.2*   MAG  --   --   --  2.6*  --  2.3  --   --   --   --   --  2.1  --   --    PHOS  --   --   --   --   --   --   --   --   --   --   --  6.5*  --   --    PROTTOTAL  --   --   --   --   --   --   --  6.1*  --   --   --   --   --  8.2   ALBUMIN  --   --   --   --   --   --   --  1.9*  --   --   --   --   --  2.4*   BILITOTAL  --   --   --   --   --   --   --  0.3  --   --   --   --   --  0.3   ALKPHOS  --   --   --   --   --   --   --  62  --   --   --   --   --  81   AST  --   --   --   --   --   --   --  40  --   --   --   --   --  62*   ALT  --   --   --   --   --   --   --  31  --   --   --   --   --  37    < > = values in this interval not displayed.     CBC  Recent Labs   Lab 11/05/21 0318 11/04/21  0335 11/03/21  1247 11/03/21  0416   WBC 10.5 7.2 10.2 11.6*   RBC 4.43 4.49 4.74 4.84   HGB 12.2* 12.3* 13.0* 13.3   HCT 37.3* 37.3* 38.8* 40.1   MCV 84 83 82 83   MCH 27.5 27.4 27.4 27.5   MCHC 32.7 33.0 33.5 33.2   RDW 14.6 14.3 14.3 14.3    255 232 243   Arterial Blood Gas  Recent Labs   Lab 11/05/21  1031 11/05/21  0320 11/04/21  2151 11/04/21  1614   PH 7.38 7.39 7.36 7.35   PCO2 45 43 46* 46*   PO2 92 120* 125* 118*   HCO3 27 26 26 25   O2PER 80 80 80 80

## 2021-11-05 NOTE — PROGRESS NOTES
PALLIATIVE CARE SOCIAL WORK Progress Note   [facility] Unit 4A    Care Conference    PCSW attended care conference with primary team and palliative team. Family participated by phone. Family asked good questions and appreciate the care Brandon is receiving. They want to continue the current plan of care, knowing if he has further setbacks he would likely not want to continue. Discussed visitor policy and how this can change when he's recovered from covid or end of life. They appreciate the ipad to talk with him and will check with bedside RN to schedule more time.     Plan: PCSW will continue to follow for support while Palliative Care is following.     DINH Pineda, [facility]  Palliative Care Clinical   Pager [phone]    [facility] Inpatient Team Consult Pager [phone] Mon-Fri 8-4:30  After hours Answering Service [phone]

## 2021-11-05 NOTE — PROCEDURES
Small Bowel Feeding Tube Placement Assessment  Reason for Feeding Tube Placement: ppFT per provider request in a pt going to prone, some emesis this am  Cortrak Start Time: 14:00   Cortrak End Time: 15:30  Medicine Delivered During Procedure: sedation per RN  Placement Successful:  No, presumed gastric vs proximal duodenum    Procedure Complications: kinking of FT past pylorus and unable to flush tube  Final Placement Charan at exit of nare 98 cm  Face to Face time with patient: 90 minutes    Bridle Placement:   Reason for bridle placement: securement of FT   Medicine delivered during procedure: lubricating jelly   Procedure: Successful  Location of top of clip on FT: @ 99 cm marker   Condition of nose/skin at time of bridle placement: Unremarkable   Face to Face time with patient: 5 minutes.    Shawanda Powell, MS, RD, LD, [facility]  MICU pager: [phone]  ASCOM: [id number]

## 2021-11-05 NOTE — PROGRESS NOTES
MEDICAL ICU PROGRESS NOTE  11/05/2021      Date of Service (when I saw the patient): 11/05/2021    ASSESSMENT: Brandon Schafer is a 41 year old male with questionable PMH of remote VTE who was admitted on 11/3/2021 with ARDS 2/2 COVID pneumonia.    CHANGES and MAJOR THINGS TODAY:   Furosemide 20 mg IV once  Senna scheduled, bisacodyl supp  Mucomyst, metaneb BID  Duonebs q6 hours  Prone positioning  Family meeting at 12:15      PLAN:    Neuro:  # Pain and sedation  - Fentanyl infusion  - Midazolam infusion  - Propofol infusion  - RASS goal -5  - Not currently planning on initiating NMB    Pulmonary:  # ARDS 2/2 COVID pneumonia  Intubated 11/2 prior to transport flight.  - Lung protective ventilation with AC 30/450/+16/80% with PIP 33 and Pplat 27; P/F 150 this morning  - No indication for NMB at this time  - Will place in prone position today  - Inhaled epoprostenol at 20 ng/kg/min  - Volume management with 20 mg furosemide net neg 500-1L  - Secretion clearance with duonebs q6 hours, and BID mucomyst, metaneb  - Infectious work-up and treatment as below    # ?COPD exacerbation  Wheezing on exam on 11/4.  - Continue Duonebs  - Azithromycin x5 days    Cardiovascular:  # Hypotension  Transient, and in the setting of sedation. No prior TTE. Troponin peaked at 0.293. EKG with T wave inversion in lateral leads. Suspect troponin was 2/2 demand ischemia in setting of hypoxia.    GI/Nutrition:  # At risk for malnutrition  - Nutrition consult for TF  [facility]  - Bowel regimen: scheduled Miralax, add scheduled senna; prn senna and Miralax    # Coffee ground material from OG (11/4), resolved  - PPI increased to BID    Renal/Fluids/Electrolytes:  # Non-oliguric STEFAN  Unclear baseline, has not seen doctor in 20 years. Cr 2.5 on admission.  - Continue diuresis with furosemide 20 mg daily   - Goal net negative 1L    Endocrine:  # Stress and steroid induced hyperglycemia  - Medium resistance sliding scale  insulin     ID:  # COVID pneumonia  Symptoms (headache, SOB, fever, cough, diarrhea) 1 week prior to presentation. Not COVID vaccinated. Tested positive on presentation on 11/2. S/p tocilizumab 11/4.  - Dexamethasone 6 mg daily  - Deferring remdesivir given STEFAN    Hematology:    # Coagulopathy 2/2 COVID  US BLE negative for DVT.  - Heparin infusion    General Cares/Prophylaxis:    DVT Prophylaxis: Heparin infusion  GI Prophylaxis: PPI  Family Communication: Brothers and mother will be updated at family meeting at 12:15 pm  Code Status: DNR    Lines/tubes/drains:  - PIV x2  - CVC internal jugular   - Arterial line left radial  - OG  - Flores  - ETT    Disposition:  - Medical ICU     Patient seen and findings/plan discussed with medical ICU staff, Dr. Owens.    Abril Laguerre, ACNP    ====================================  INTERVAL HISTORY:   Thick ETT secretions, difficult to suction. BP stable. Good UOP.    OBJECTIVE:   1. VITAL SIGNS:   Temp:  [98.1  F (36.7  C)-98.9  F (37.2  C)] 98.5  F (36.9  C)  Pulse:  [69-86] 81  Resp:  [24-37] 30  BP: ()/(49-55) 104/54  MAP:  [63 mmHg-94 mmHg] 74 mmHg  Arterial Line BP: ()/(51-71) 113/59  FiO2 (%):  [80 %] 80 %  SpO2:  [94 %-98 %] 97 %  Ventilation Mode: CMV/AC  (Continuous Mandatory Ventilation/ Assist Control)  FiO2 (%): 80 %  Rate Set (breaths/minute): 30 breaths/min  Tidal Volume Set (mL): 450 mL  PEEP (cm H2O): 16 cmH2O  Oxygen Concentration (%): 80 %  Resp: 30    2. INTAKE/ OUTPUT:   I/O last 3 completed shifts:  In: 2708.37 [I.V.:1703.37; NG/GT:695]  Out: 3010 [Urine:3010]    3. PHYSICAL EXAMINATION:  General: Sedate  HEENT: Mucous membranes moist  Neuro: RASS -4 to -5  Pulm/Resp: Clear breath sounds bilaterally without rhonchi, crackles or wheeze, breathing non-labored  CV: RRR, S1/S2 without m/r/g; pedal pulses 2+ with trace LE edema  Abdomen: Obese, soft, non-distended, non-tender  : Flores catheter in place, urine yellow and clear  Incisions/Skin: No  rashes noted    4. LABS:   Arterial Blood Gases   Recent Labs   Lab 11/05/21  0320 11/04/21 2151 11/04/21  1614 11/03/21  1648   PH 7.39 7.36 7.35 7.38   PCO2 43 46* 46* 41   PO2 120* 125* 118* 117*   HCO3 26 26 25 24     Complete Blood Count   Recent Labs   Lab 11/05/21 0318 11/04/21  0335 11/03/21  1247 11/03/21  0416   WBC 10.5 7.2 10.2 11.6*   HGB 12.2* 12.3* 13.0* 13.3    255 232 243     Basic Metabolic Panel  Recent Labs   Lab 11/05/21 0329 11/05/21 0318 11/05/21  0002 11/04/21 2024 11/04/21  1618 11/04/21  1614 11/04/21  0839 11/04/21  0335 11/04/21  0029 11/03/21 2129   NA  --  137  --   --   --  134  --  133  --  133   POTASSIUM  --  3.7  --   --   --  3.9  --  3.8  --  3.8   CHLORIDE  --  105  --   --   --  102  --  102  --  99   CO2  --  25  --   --   --  26  --  25  --  24   BUN  --  52*  --   --   --  57*  --  54*  --  51*   CR  --  1.56*  --   --   --  1.89*  --  1.96*  --  2.17*   * 165* 198* 201*   < > 183*   < > 142*   < > 156*    < > = values in this interval not displayed.     Liver Function Tests  Recent Labs   Lab 11/04/21 0335 11/02/21  1943   AST 40 62*   ALT 31 37   ALKPHOS 62 81   BILITOTAL 0.3 0.3   ALBUMIN 1.9* 2.4*     5. RADIOLOGY:   Recent Results (from the past 24 hour(s))   US Lower Extremity Venous Duplex Bilateral    Narrative    EXAMINATION: DOPPLER VENOUS ULTRASOUND OF BILATERAL LOWER EXTREMITIES,  11/4/2021 10:13 AM     COMPARISON: None.    HISTORY: Concern for VTE in the setting of COVID     TECHNIQUE:  Gray-scale evaluation with compression, spectral flow and  color Doppler assessment of the deep venous system of both legs from  groin to knee, and then at the ankles.    FINDINGS:  In both lower extremities, the common femoral, femoral, popliteal and  posterior tibial veins demonstrate normal compressibility and blood  flow.      Impression    IMPRESSION:  No evidence of deep venous thrombosis in either lower extremity.       I have personally reviewed the  examination and initial interpretation  and I agree with the findings.    DELFINA CORONEL MD         SYSTEM ID:  [id number]

## 2021-11-05 NOTE — PROGRESS NOTES
[facility] MICU STAFF NOTE     Brandon Schafer remains critically ill with hypoxic respiratory failure covid pneumonia/ards .      I personally examined and evaluated the patient today. I have evaluated all laboratory values and imaging studies from the past 24 hours.     Key findings and decisions made today included:         42 y/o male admitted with hypoxemic respiratory failure due to covid pneumonia / ards      Prior medical history , limited . Distant history of DVT?      brother, mother - surrogate decision makers - patient wishes prior intubation expressed not to be on prolonged life support .        Interval events - adequate diuresis . Stable/improved oxygenation .      => covid pneumonia/ ards   --> lung protective ventilation . monitoring ABGs . Tocilizumab. Contraindication to remdesivir - renal imapirment . Steroids .   => bronchospasm , potential undiagnosed airway disease . Hx of smoking   --> duonebs , azithromycin sot 11/4 - eot 11/8 , steroids on board   => STEFAN on CKD ? , improving   --> monitor lytes, urine output         Adaquate urine output . On ppi . TF . - BM . On hep prophylaxis      Family meeting today - continue current course of management , if severely decompensates inform family with likely plan to transition to comfort measures .        I personally managed the ventilator, sedation, pain control and analgesia, metabolic abnormalities, antibiotic therapy, and nutritional status     Consults ongoing and ordered are:none      Procedures that will happen today are: mechanical ventilation      All treatments were placed at my direction.  I formulated today s plan with the house staff team or resident(s) and agree with the findings and plan in the associated note.      I spent a total of 40 minutes (excluding procedure time) personally providing and directing critical care services at the bedside and on the critical care unit for Brandon Schafer     Cherelle Owens MD   Pulmonary  and Critical Care Staff

## 2021-11-05 NOTE — PLAN OF CARE
Major Shift Events:  Sedated, RASS -4- -5. Grimaces to pain. Afebrile. SR 70-80s. MAP goal >65. Required Levo briefly for increased sedation w/ NJ placement, has been off since. Tolerating CMV settings on 70-80% FiO2. PEEP decreased to 14. Minimal secretions via ETT and PO. NG placed by dietary. OG taken out. NG retracted per MD d/t inability to flush. Abdominal XR ordered to verify, restart TFs once verified. No BM this shift, BM meds given and flatus+. Flores in place w/ adequate UO. Lasix 20 mg given x1. Care conference w/ family completed, will continue current cares. Proned @ 6880. Updated family multiple times through shift.   Plan: Continue to monitor hemodynamic and respiratory status. Update team if there are any changes.   For vital signs and complete assessments, please see documentation flowsheets.

## 2021-11-05 NOTE — PROGRESS NOTES
SPIRITUAL HEALTH SERVICES  SPIRITUAL ASSESSMENT Progress Note (Palliative Focus)  [facility] 4A    REFERRAL SOURCE: Staff referral.    Prayer outside patient Brandon Schafer's room per family's request, as conveyed by Palliative Care medical provider. Per family, Brandon's Shinto benjamín is an important source of meaning to him and part of his recovery journey.     Plan: I will follow for spiritual support while Palliative Care is consulted.    Kate Richardson  Palliative   Pager [phone]  [facility] Inpatient Team Consult pager [phone] (M-F 8-4:30)  After-hours Answering Service [phone]

## 2021-11-05 NOTE — CARE CONFERENCE
A meeting was held with the family of Brandon Schafer. It occurred in the conference room with family on the phone, Brandon was unable to participate secondary to intubation/sedation.     Those present from the care team included:  Cherelle Owens - ICU attending  Abril Laguerre - ICU NP  Teetee Ruiz - palliative NP  Yodit - palliative     Those present from the family included:  Brother, Brad  Brother, Kirit  Mother  They requested immediate family only be present at the meeting    We reviewed the notable changes of the last 48 hours, mainly that his renal function is improving and oxygenation stable. While he is on a lot of oxygen support on the ventilator, we suspect his FiO2 could be weaned down. We discussed that he is near the threshold for prone positioning, and anticipating he may benefit from it we will plan to prone him today. We will give a dose of diuretic and increase his bowel regimen.     Given no catastrophic complications have occurred and he is stable to improving, we recommended continuing our current cares. The family agreed that would be reasonable. We discussed that he will likely require the ventilator on the order of days to weeks.     Plan:  - Continue current cares  - Place prone  - Call family if any complications occur that would delay/derail his improvement, they would discuss changing goals of care if that were to happen  - Family meeting again Thursday, November 11 at 1:00 pm; Teetee and Yodit from palliative care will attend    RYNE Chirinos

## 2021-11-05 NOTE — PLAN OF CARE
Problem: Adult Inpatient Plan of Care  Goal: Optimal Comfort and Wellbeing  Outcome: No Change     Major Shift Events:  Patient -4 to -5 RASS. Fentanyl, Propofol, and Versed used for sedation. Pupils 1-2 mm, round and sluggish. Pt only responded to vigorous stimuli, facial muscles would tense. Current Vent settings: CMV, 80%, 30 RR, 450 Tidal, Peep 16. Secretions thick and yellow from in line suction. Foamy secretions noticed from mouth occasionally. Normal sinus, SBP and MAP within goal range. No BP interventions needed. TF restarted at 10 ml/hr per MICU. Flores had adequate yellow output. No BM during shift. Anti xa results were within goal range at 0.4 and 0.27 (Goal range: 0.25 to 0.5). Heparin running at 1,800 units/hr throughout shift.      Plan: Reassess plan after Care conference  tomorrow. Plan to increase TF 10 ml Q8 until goal of 30 ml/hr.     For vital signs and complete assessments, please see documentation flowsheets.

## 2021-11-06 ENCOUNTER — APPOINTMENT (OUTPATIENT)
Dept: GENERAL RADIOLOGY | Facility: CLINIC | Age: 41
End: 2021-11-06
Attending: STUDENT IN AN ORGANIZED HEALTH CARE EDUCATION/TRAINING PROGRAM
Payer: MEDICAID

## 2021-11-06 LAB
ANION GAP SERPL CALCULATED.3IONS-SCNC: 6 MMOL/L (ref 3–14)
ANION GAP SERPL CALCULATED.3IONS-SCNC: 6 MMOL/L (ref 3–14)
BASE EXCESS BLDA CALC-SCNC: 1.9 MMOL/L (ref -9–1.8)
BASE EXCESS BLDA CALC-SCNC: 2 MMOL/L (ref -9–1.8)
BASE EXCESS BLDA CALC-SCNC: 2.4 MMOL/L (ref -9–1.8)
BUN SERPL-MCNC: 44 MG/DL (ref 7–30)
BUN SERPL-MCNC: 45 MG/DL (ref 7–30)
CALCIUM SERPL-MCNC: 7.7 MG/DL (ref 8.5–10.1)
CALCIUM SERPL-MCNC: 8 MG/DL (ref 8.5–10.1)
CHLORIDE BLD-SCNC: 106 MMOL/L (ref 94–109)
CHLORIDE BLD-SCNC: 107 MMOL/L (ref 94–109)
CO2 SERPL-SCNC: 26 MMOL/L (ref 20–32)
CO2 SERPL-SCNC: 26 MMOL/L (ref 20–32)
CREAT SERPL-MCNC: 1.3 MG/DL (ref 0.66–1.25)
CREAT SERPL-MCNC: 1.33 MG/DL (ref 0.66–1.25)
ERYTHROCYTE [DISTWIDTH] IN BLOOD BY AUTOMATED COUNT: 15.1 % (ref 10–15)
GFR SERPL CREATININE-BSD FRML MDRD: 65 ML/MIN/1.73M2
GFR SERPL CREATININE-BSD FRML MDRD: 67 ML/MIN/1.73M2
GLUCOSE BLD-MCNC: 134 MG/DL (ref 70–99)
GLUCOSE BLD-MCNC: 171 MG/DL (ref 70–99)
GLUCOSE BLDC GLUCOMTR-MCNC: 101 MG/DL (ref 70–99)
GLUCOSE BLDC GLUCOMTR-MCNC: 127 MG/DL (ref 70–99)
GLUCOSE BLDC GLUCOMTR-MCNC: 132 MG/DL (ref 70–99)
GLUCOSE BLDC GLUCOMTR-MCNC: 136 MG/DL (ref 70–99)
GLUCOSE BLDC GLUCOMTR-MCNC: 146 MG/DL (ref 70–99)
GLUCOSE BLDC GLUCOMTR-MCNC: 154 MG/DL (ref 70–99)
HCO3 BLD-SCNC: 27 MMOL/L (ref 21–28)
HCO3 BLD-SCNC: 28 MMOL/L (ref 21–28)
HCO3 BLD-SCNC: 28 MMOL/L (ref 21–28)
HCT VFR BLD AUTO: 38.6 % (ref 40–53)
HGB BLD-MCNC: 12.7 G/DL (ref 13.3–17.7)
MAGNESIUM SERPL-MCNC: 2.6 MG/DL (ref 1.6–2.3)
MAGNESIUM SERPL-MCNC: 2.8 MG/DL (ref 1.6–2.3)
MCH RBC QN AUTO: 28.2 PG (ref 26.5–33)
MCHC RBC AUTO-ENTMCNC: 32.9 G/DL (ref 31.5–36.5)
MCV RBC AUTO: 86 FL (ref 78–100)
O2/TOTAL GAS SETTING VFR VENT: 50 %
O2/TOTAL GAS SETTING VFR VENT: 50 %
O2/TOTAL GAS SETTING VFR VENT: 90 %
PCO2 BLD: 41 MM HG (ref 35–45)
PCO2 BLD: 46 MM HG (ref 35–45)
PCO2 BLD: 47 MM HG (ref 35–45)
PH BLD: 7.38 [PH] (ref 7.35–7.45)
PH BLD: 7.39 [PH] (ref 7.35–7.45)
PH BLD: 7.43 [PH] (ref 7.35–7.45)
PHOSPHATE SERPL-MCNC: 3.2 MG/DL (ref 2.5–4.5)
PLATELET # BLD AUTO: 354 10E3/UL (ref 150–450)
PO2 BLD: 104 MM HG (ref 80–105)
PO2 BLD: 80 MM HG (ref 80–105)
PO2 BLD: 82 MM HG (ref 80–105)
POTASSIUM BLD-SCNC: 3.8 MMOL/L (ref 3.4–5.3)
POTASSIUM BLD-SCNC: 4.2 MMOL/L (ref 3.4–5.3)
RBC # BLD AUTO: 4.51 10E6/UL (ref 4.4–5.9)
SODIUM SERPL-SCNC: 138 MMOL/L (ref 133–144)
SODIUM SERPL-SCNC: 139 MMOL/L (ref 133–144)
TRIGL SERPL-MCNC: 770 MG/DL
UFH PPP CHRO-ACNC: 0.27 IU/ML
WBC # BLD AUTO: 15.1 10E3/UL (ref 4–11)

## 2021-11-06 PROCEDURE — 250N000011 HC RX IP 250 OP 636

## 2021-11-06 PROCEDURE — 999N000157 HC STATISTIC RCP TIME EA 10 MIN: Mod: 76

## 2021-11-06 PROCEDURE — 93010 ELECTROCARDIOGRAM REPORT: CPT | Performed by: INTERNAL MEDICINE

## 2021-11-06 PROCEDURE — 250N000009 HC RX 250: Performed by: STUDENT IN AN ORGANIZED HEALTH CARE EDUCATION/TRAINING PROGRAM

## 2021-11-06 PROCEDURE — 250N000011 HC RX IP 250 OP 636: Performed by: STUDENT IN AN ORGANIZED HEALTH CARE EDUCATION/TRAINING PROGRAM

## 2021-11-06 PROCEDURE — 999N000015 HC STATISTIC ARTERIAL MONITORING DAILY

## 2021-11-06 PROCEDURE — 94640 AIRWAY INHALATION TREATMENT: CPT | Mod: 76

## 2021-11-06 PROCEDURE — 94003 VENT MGMT INPAT SUBQ DAY: CPT

## 2021-11-06 PROCEDURE — 71045 X-RAY EXAM CHEST 1 VIEW: CPT | Mod: 26 | Performed by: RADIOLOGY

## 2021-11-06 PROCEDURE — 250N000009 HC RX 250

## 2021-11-06 PROCEDURE — 99291 CRITICAL CARE FIRST HOUR: CPT | Mod: GC | Performed by: STUDENT IN AN ORGANIZED HEALTH CARE EDUCATION/TRAINING PROGRAM

## 2021-11-06 PROCEDURE — C9113 INJ PANTOPRAZOLE SODIUM, VIA: HCPCS | Performed by: STUDENT IN AN ORGANIZED HEALTH CARE EDUCATION/TRAINING PROGRAM

## 2021-11-06 PROCEDURE — 85520 HEPARIN ASSAY: CPT | Performed by: STUDENT IN AN ORGANIZED HEALTH CARE EDUCATION/TRAINING PROGRAM

## 2021-11-06 PROCEDURE — 258N000003 HC RX IP 258 OP 636: Performed by: STUDENT IN AN ORGANIZED HEALTH CARE EDUCATION/TRAINING PROGRAM

## 2021-11-06 PROCEDURE — 250N000013 HC RX MED GY IP 250 OP 250 PS 637: Performed by: STUDENT IN AN ORGANIZED HEALTH CARE EDUCATION/TRAINING PROGRAM

## 2021-11-06 PROCEDURE — 93005 ELECTROCARDIOGRAM TRACING: CPT

## 2021-11-06 PROCEDURE — 84100 ASSAY OF PHOSPHORUS: CPT | Performed by: STUDENT IN AN ORGANIZED HEALTH CARE EDUCATION/TRAINING PROGRAM

## 2021-11-06 PROCEDURE — 84478 ASSAY OF TRIGLYCERIDES: CPT | Performed by: STUDENT IN AN ORGANIZED HEALTH CARE EDUCATION/TRAINING PROGRAM

## 2021-11-06 PROCEDURE — 80048 BASIC METABOLIC PNL TOTAL CA: CPT | Performed by: STUDENT IN AN ORGANIZED HEALTH CARE EDUCATION/TRAINING PROGRAM

## 2021-11-06 PROCEDURE — 83735 ASSAY OF MAGNESIUM: CPT | Performed by: STUDENT IN AN ORGANIZED HEALTH CARE EDUCATION/TRAINING PROGRAM

## 2021-11-06 PROCEDURE — 74018 RADEX ABDOMEN 1 VIEW: CPT | Mod: 26 | Performed by: RADIOLOGY

## 2021-11-06 PROCEDURE — 250N000013 HC RX MED GY IP 250 OP 250 PS 637

## 2021-11-06 PROCEDURE — 85027 COMPLETE CBC AUTOMATED: CPT | Performed by: STUDENT IN AN ORGANIZED HEALTH CARE EDUCATION/TRAINING PROGRAM

## 2021-11-06 PROCEDURE — 71045 X-RAY EXAM CHEST 1 VIEW: CPT | Mod: 76

## 2021-11-06 PROCEDURE — 200N000002 HC R&B ICU UMMC

## 2021-11-06 PROCEDURE — 74018 RADEX ABDOMEN 1 VIEW: CPT

## 2021-11-06 PROCEDURE — 71045 X-RAY EXAM CHEST 1 VIEW: CPT

## 2021-11-06 PROCEDURE — 82803 BLOOD GASES ANY COMBINATION: CPT | Performed by: STUDENT IN AN ORGANIZED HEALTH CARE EDUCATION/TRAINING PROGRAM

## 2021-11-06 PROCEDURE — 94645 CONT INHLJ TX EACH ADDL HOUR: CPT

## 2021-11-06 RX ORDER — QUETIAPINE FUMARATE 25 MG/1
25 TABLET, FILM COATED ORAL 2 TIMES DAILY
Status: DISCONTINUED | OUTPATIENT
Start: 2021-11-06 | End: 2021-11-17

## 2021-11-06 RX ORDER — PROPOFOL 10 MG/ML
INJECTION, EMULSION INTRAVENOUS
Status: COMPLETED
Start: 2021-11-06 | End: 2021-11-06

## 2021-11-06 RX ORDER — FUROSEMIDE 10 MG/ML
20 INJECTION INTRAMUSCULAR; INTRAVENOUS ONCE
Status: COMPLETED | OUTPATIENT
Start: 2021-11-06 | End: 2021-11-06

## 2021-11-06 RX ORDER — ATROPINE SULFATE 0.1 MG/ML
INJECTION INTRAVENOUS
Status: DISCONTINUED
Start: 2021-11-06 | End: 2021-11-06 | Stop reason: HOSPADM

## 2021-11-06 RX ORDER — QUETIAPINE FUMARATE 50 MG/1
50 TABLET, FILM COATED ORAL AT BEDTIME
Status: DISCONTINUED | OUTPATIENT
Start: 2021-11-06 | End: 2021-11-17

## 2021-11-06 RX ORDER — DEXMEDETOMIDINE HYDROCHLORIDE 4 UG/ML
0.2-0.7 INJECTION, SOLUTION INTRAVENOUS CONTINUOUS
Status: DISCONTINUED | OUTPATIENT
Start: 2021-11-06 | End: 2021-11-06

## 2021-11-06 RX ORDER — MIDAZOLAM HCL IN 0.9 % NACL/PF 1 MG/ML
1-14 PLASTIC BAG, INJECTION (ML) INTRAVENOUS CONTINUOUS
Status: DISCONTINUED | OUTPATIENT
Start: 2021-11-06 | End: 2021-11-15

## 2021-11-06 RX ORDER — BISACODYL 10 MG
10 SUPPOSITORY, RECTAL RECTAL ONCE
Status: COMPLETED | OUTPATIENT
Start: 2021-11-06 | End: 2021-11-06

## 2021-11-06 RX ADMIN — INSULIN ASPART 1 UNITS: 100 INJECTION, SOLUTION INTRAVENOUS; SUBCUTANEOUS at 17:50

## 2021-11-06 RX ADMIN — Medication 2 PACKET: at 18:26

## 2021-11-06 RX ADMIN — INSULIN ASPART 1 UNITS: 100 INJECTION, SOLUTION INTRAVENOUS; SUBCUTANEOUS at 19:46

## 2021-11-06 RX ADMIN — Medication 200 MCG/HR: at 05:46

## 2021-11-06 RX ADMIN — FENTANYL CITRATE 100 MCG: 50 INJECTION, SOLUTION INTRAMUSCULAR; INTRAVENOUS at 15:12

## 2021-11-06 RX ADMIN — PROPOFOL 70 MCG/KG/MIN: 10 INJECTION, EMULSION INTRAVENOUS at 06:58

## 2021-11-06 RX ADMIN — MIDAZOLAM (PF) 1 MG/ML IN 0.9 % SODIUM CHLORIDE INTRAVENOUS SOLUTION 8 MG/HR: at 00:06

## 2021-11-06 RX ADMIN — KETAMINE HYDROCHLORIDE 100 MG/HR: 100 INJECTION, SOLUTION, CONCENTRATE INTRAMUSCULAR; INTRAVENOUS at 16:04

## 2021-11-06 RX ADMIN — BISACODYL 10 MG: 10 SUPPOSITORY RECTAL at 19:46

## 2021-11-06 RX ADMIN — PROPOFOL 60 MCG/KG/MIN: 10 INJECTION, EMULSION INTRAVENOUS at 10:22

## 2021-11-06 RX ADMIN — PROPOFOL 65 MCG/KG/MIN: 10 INJECTION, EMULSION INTRAVENOUS at 04:38

## 2021-11-06 RX ADMIN — PROPOFOL 65 MCG/KG/MIN: 10 INJECTION, EMULSION INTRAVENOUS at 08:11

## 2021-11-06 RX ADMIN — Medication 2 PACKET: at 22:35

## 2021-11-06 RX ADMIN — ACETYLCYSTEINE 2 ML: 200 SOLUTION ORAL; RESPIRATORY (INHALATION) at 07:13

## 2021-11-06 RX ADMIN — MIDAZOLAM 2 MG: 1 INJECTION INTRAMUSCULAR; INTRAVENOUS at 14:03

## 2021-11-06 RX ADMIN — DEXAMETHASONE SODIUM PHOSPHATE 6 MG: 4 INJECTION, SOLUTION INTRA-ARTICULAR; INTRALESIONAL; INTRAMUSCULAR; INTRAVENOUS; SOFT TISSUE at 13:50

## 2021-11-06 RX ADMIN — Medication 20 NG/KG/MIN: at 19:58

## 2021-11-06 RX ADMIN — DOCUSATE SODIUM 50 MG AND SENNOSIDES 8.6 MG 2 TABLET: 8.6; 5 TABLET, FILM COATED ORAL at 19:45

## 2021-11-06 RX ADMIN — MIDAZOLAM 4 MG: 1 INJECTION INTRAMUSCULAR; INTRAVENOUS at 16:11

## 2021-11-06 RX ADMIN — IPRATROPIUM BROMIDE AND ALBUTEROL SULFATE 3 ML: .5; 3 SOLUTION RESPIRATORY (INHALATION) at 07:13

## 2021-11-06 RX ADMIN — AZITHROMYCIN 250 MG: 250 TABLET, FILM COATED ORAL at 18:25

## 2021-11-06 RX ADMIN — MIDAZOLAM 1 MG: 1 INJECTION INTRAMUSCULAR; INTRAVENOUS at 15:27

## 2021-11-06 RX ADMIN — PROPOFOL 70 MCG/KG/MIN: 10 INJECTION, EMULSION INTRAVENOUS at 01:24

## 2021-11-06 RX ADMIN — IPRATROPIUM BROMIDE AND ALBUTEROL SULFATE 3 ML: .5; 3 SOLUTION RESPIRATORY (INHALATION) at 02:32

## 2021-11-06 RX ADMIN — IPRATROPIUM BROMIDE AND ALBUTEROL SULFATE 3 ML: .5; 3 SOLUTION RESPIRATORY (INHALATION) at 21:33

## 2021-11-06 RX ADMIN — MIDAZOLAM 2 MG: 1 INJECTION INTRAMUSCULAR; INTRAVENOUS at 17:20

## 2021-11-06 RX ADMIN — Medication 50 MCG: at 14:00

## 2021-11-06 RX ADMIN — PANTOPRAZOLE SODIUM 40 MG: 40 INJECTION, POWDER, FOR SOLUTION INTRAVENOUS at 19:45

## 2021-11-06 RX ADMIN — DEXMEDETOMIDINE 0.2 MCG/KG/HR: 100 INJECTION, SOLUTION, CONCENTRATE INTRAVENOUS at 10:20

## 2021-11-06 RX ADMIN — FUROSEMIDE 20 MG: 10 INJECTION, SOLUTION INTRAVENOUS at 12:35

## 2021-11-06 RX ADMIN — HEPARIN SODIUM 1800 UNITS/HR: 10000 INJECTION, SOLUTION INTRAVENOUS at 04:37

## 2021-11-06 RX ADMIN — PROPOFOL 70 MCG/KG/MIN: 10 INJECTION, EMULSION INTRAVENOUS at 05:45

## 2021-11-06 RX ADMIN — Medication 5 ML: at 18:26

## 2021-11-06 RX ADMIN — FENTANYL CITRATE 50 MCG: 50 INJECTION, SOLUTION INTRAMUSCULAR; INTRAVENOUS at 16:10

## 2021-11-06 RX ADMIN — Medication 7 MG/HR: at 12:37

## 2021-11-06 RX ADMIN — Medication 50 MCG: at 17:20

## 2021-11-06 RX ADMIN — Medication 20 NG/KG/MIN: at 00:03

## 2021-11-06 RX ADMIN — QUETIAPINE FUMARATE 50 MG: 50 TABLET ORAL at 19:45

## 2021-11-06 RX ADMIN — PANTOPRAZOLE SODIUM 40 MG: 40 INJECTION, POWDER, FOR SOLUTION INTRAVENOUS at 07:49

## 2021-11-06 RX ADMIN — PROPOFOL 65 MCG/KG/MIN: 10 INJECTION, EMULSION INTRAVENOUS at 02:41

## 2021-11-06 RX ADMIN — Medication 50 MCG: at 12:29

## 2021-11-06 RX ADMIN — Medication 200 MCG/HR: at 17:15

## 2021-11-06 RX ADMIN — Medication 20 NG/KG/MIN: at 09:51

## 2021-11-06 RX ADMIN — HEPARIN SODIUM 1800 UNITS/HR: 10000 INJECTION, SOLUTION INTRAVENOUS at 17:39

## 2021-11-06 RX ADMIN — ACETYLCYSTEINE 2 ML: 200 SOLUTION ORAL; RESPIRATORY (INHALATION) at 21:33

## 2021-11-06 ASSESSMENT — ACTIVITIES OF DAILY LIVING (ADL)
ADLS_ACUITY_SCORE: 19
ADLS_ACUITY_SCORE: 23
ADLS_ACUITY_SCORE: 19
ADLS_ACUITY_SCORE: 23
ADLS_ACUITY_SCORE: 23
ADLS_ACUITY_SCORE: 19
ADLS_ACUITY_SCORE: 23
ADLS_ACUITY_SCORE: 19
ADLS_ACUITY_SCORE: 23
ADLS_ACUITY_SCORE: 19
ADLS_ACUITY_SCORE: 23

## 2021-11-06 NOTE — PROGRESS NOTES
@ 0400 on 11/6/21 todd SchaferBrandon experienced a chipped tooth during the pronated head turn. Staff involved include : Garland Ruiz RN & Miles Lacy RT. Pt was lifted using mechanical lift to allow proper head clearance. Then RT began moving the ETT tube from the Right side of the mouth to the left. During the move I (Garland Ruiz) heard a clicking sound. After finishing the reposition and lowering the pt back down with the lift I noticed bleeding coming from the pts mouth. Pt was suctioned and upon inspection with a pen light, I noticed that the middle incision on the right upper quadrant of teeth was chipped and pt was biting down on tongue. Bleeding was minimal and stopped quickly thereafter requiring minimal suction to maintain airway. Unable to locate chipped tooth, likely d/t suctioning. Tongue was disengaged during suctioning. Additionally during this turn the NG tube was dislodged and bridle broken. NG Location changed from 89 @ nare and bridled to 66 @ nare. MD was informed of this event, and no additional orders were added to plan of care at this time.

## 2021-11-06 NOTE — PLAN OF CARE
Major Shift Events  Overnight pt remained in stable condition with issues of chipped middle incisor, hypercapnia, and hypertriglyceridemia. @ 0400 pt experienced a chipped tooth during ETT tube reposition r/t proned pt (see progress note for further details on event). Additionally pt blood gas and labs showed elevated pCO2 and Triglycerides (r/t propofol), MD informed of these results. For pCO2= 47 MD was comfortable with current gas and no changes were planned. For Triglycerides = 770, MD was informed and no change in sedation plan at this time but will consult with day team to change propofol for precedex if able.     Neuro: RASS: -4, pupils 2 mm round sluggish, no response to painful stimuli, facial grimace to deep pain  Cv: SR (60-70), MAP > 65, SYS < 170, pulses +2, afebrile  Resp: ETT @ 27 lip, CMV (50%, 450, 30, 14), LS: clear/dim, small inline secretions yellow thick, large oral secretions clear thin, yellow mucous draining from nare  GI/: Flores w/ AUO, BM -, BS SUE d/t prone, TF held d/t NG placement requiring verification  Skin: Umbillicus hernia, chipped tooth, generalized ecchymosis, scleral edema     Drips: Fent 200, Heparin 1800  Sedation: Versed 8, Prop 75     Plan: Follow POC. Monitor closely, notify MD of acute changes.  For vital signs and complete assessments, please see documentation flowsheets.

## 2021-11-06 NOTE — PROGRESS NOTES
[facility] MICU STAFF NOTE     Brandon Schafer remains critically ill with hypoxic respiratory failure covid pneumonia/ards .      I personally examined and evaluated the patient today. I have evaluated all laboratory values and imaging studies from the past 24 hours.     Key findings and decisions made today included:         40 y/o male admitted with hypoxemic respiratory failure due to covid pneumonia / ards      Prior medical history , limited . Distant history of DVT?      brothers, mother - surrogate decision makers - patient wishes prior intubation expressed not to be on prolonged life support . In collaboration with surrogate decision makers, palliative team --> continue current course of management , if severely decompensates inform family with likely plan to transition to comfort measures .         Interval events - pro jose l overnight . Decreasing o2 requirements . Renal function continues to improve by sCr marker and adequate urine output . Will work towards lightening sedation and potential SAT/SBT if vent parameters permit  . Favor  higher relative PEEP due to body habitus to avoid atelectasis .       => covid pneumonia/ ards   --> lung protective ventilation . monitoring ABGs . Tocilizumab. Contraindication to remdesivir - renal imapirment . Steroids .   => bronchospasm , potential undiagnosed airway disease . Hx of smoking   --> duonebs , azithromycin sot 11/4 - eot 11/8 , steroids on board   => STEFAN on CKD ? , improving   --> monitor lytes, urine output          Adaquate urine output . On ppi . TF . - BM, optimize bowel regimen  . On hep prophylaxis         I personally managed the ventilator, sedation, pain control and analgesia, metabolic abnormalities, antibiotic therapy, and nutritional status     Consults ongoing and ordered are:none      Procedures that will happen today are: mechanical ventilation      All treatments were placed at my direction.  I formulated today s plan with the  house staff team or resident(s) and agree with the findings and plan in the associated note.      I spent a total of 40 minutes (excluding procedure time) personally providing and directing critical care services at the bedside and on the critical care unit for Brandon Owens MD   Pulmonary and Critical Care Staff

## 2021-11-06 NOTE — PROGRESS NOTES
MEDICAL ICU PROGRESS NOTE  11/06/2021      Date of Service (when I saw the patient): 11/06/2021    ASSESSMENT: Brandon Schafer is a 41 year old male with questionable PMH of remote VTE who was admitted on 11/3/2021 with ARDS 2/2 COVID pneumonia.    CHANGES and MAJOR THINGS TODAY:   - discontinue propofol due to elevated triglycerides   - trial of precedex led to bradycardia, so will avoid for now  - ketamine for additional sedation as needed   - start seroquel 25mg, 25mg, and 50mg to augment sedation  - CXR and ABG after supination this am       PLAN:    Neuro:  # Pain and sedation  - Fentanyl infusion  - Midazolam infusion  - Discontinue propofol   - Can start ketamine for additional sedation if needed   - RASS goal -2 while supine  - Not currently planning on initiating NMB    Pulmonary:  # ARDS 2/2 COVID pneumonia  Intubated 11/2 prior to transport flight. Proned on 11/5.   - Lung protective ventilation with AC 30/450/+14/50%   - No indication for NMB at this time  - Inhaled epoprostenol at 20 ng/kg/min  - Secretion clearance with duonebs q6 hours, and BID mucomyst, metaneb  - Infectious work-up and treatment as below    # ?COPD exacerbation  Wheezing on exam on 11/4.  - Continue Duonebs  - Azithromycin x5 days    Cardiovascular:  # Hypotension  Transient, and in the setting of sedation. No prior TTE. Troponin peaked at 0.293. EKG with T wave inversion in lateral leads. Suspect troponin was 2/2 demand ischemia in setting of hypoxia.  - Norepinephrine gtt as needed     GI/Nutrition:  # At risk for malnutrition  - Nutrition consult for TF  - Novasource Renal at goal 30 mL/hour  - Bowel regimen: scheduled Miralax, add scheduled senna; prn senna and Miralax    # Coffee ground material from OG (11/4), resolved  - PPI increased to BID    Renal/Fluids/Electrolytes:  # Non-oliguric STEFAN  Unclear baseline, has not seen doctor in 20 years. Cr 2.5 on admission.  - Continue diuresis with furosemide 20 mg daily   - Goal net  negative 1L    Endocrine:  # Stress and steroid induced hyperglycemia  - Medium resistance sliding scale insulin     ID:  # COVID pneumonia  Symptoms (headache, SOB, fever, cough, diarrhea) 1 week prior to presentation. Not COVID vaccinated. Tested positive on presentation on 11/2. S/p tocilizumab 11/4.  - Dexamethasone 6 mg daily  - Deferring remdesivir given STEFAN    Hematology:    # Coagulopathy 2/2 COVID  US BLE negative for DVT.  - Heparin infusion    General Cares/Prophylaxis:    DVT Prophylaxis: Heparin infusion  GI Prophylaxis: PPI  Family Communication: Brother Brad   Code Status: DNR    Lines/tubes/drains:  - PIV x2  - CVC internal jugular   - Arterial line left radial  - OG  - Flores  - ETT    Disposition:  - Medical ICU     Patient seen and findings/plan discussed with medical ICU staff, Dr. Owens.    Tamara Bailey MD  [facility]  Medicine-Pediatrics, PGY-3    ====================================  INTERVAL HISTORY:   NAEO. Proned overnight. Chipped tooth with a head turn (see nursing notes). BP has been stable without NE; however, did have some dips while on precedex this am. No stool output.     OBJECTIVE:   1. VITAL SIGNS:   Temp:  [97.4  F (36.3  C)-99  F (37.2  C)] 98.5  F (36.9  C)  Pulse:  [37-75] 70  Resp:  [9-32] 29  MAP:  [58 mmHg-119 mmHg] 83 mmHg  Arterial Line BP: ()/(48-82) 140/63  FiO2 (%):  [40 %-80 %] 50 %  SpO2:  [90 %-98 %] 92 %  Ventilation Mode: CMV/AC  (Continuous Mandatory Ventilation/ Assist Control)  FiO2 (%): 50 %  Rate Set (breaths/minute): 30 breaths/min  Tidal Volume Set (mL): 450 mL  PEEP (cm H2O): 14 cmH2O  Oxygen Concentration (%): 50 %  Resp: 29    2. INTAKE/ OUTPUT:   I/O last 3 completed shifts:  In: 2734.09 [I.V.:2239.09; NG/GT:315]  Out: 2475 [Urine:2475]    3. PHYSICAL EXAMINATION:  General: Sedate  HEENT: Mucous membranes moist  Neuro: RASS -4 to -5  Pulm/Resp: Clear breath sounds bilaterally without rhonchi, crackles or wheeze, breathing  non-labored  CV: RRR, S1/S2 without m/r/g; pedal pulses 2+ with trace LE edema  Abdomen: Obese, soft, non-distended, non-tender  : Flores catheter in place, urine yellow and clear  Incisions/Skin: No rashes noted    4. LABS:   Arterial Blood Gases   Recent Labs   Lab 11/06/21  1112 11/06/21  0318 11/05/21  1737 11/05/21  1031   PH 7.43 7.38 7.37 7.38   PCO2 41 47* 44 45   PO2 82 80 104 92   HCO3 27 28 26 27     Complete Blood Count   Recent Labs   Lab 11/06/21  0317 11/05/21 0318 11/04/21 0335 11/03/21  1247   WBC 15.1* 10.5 7.2 10.2   HGB 12.7* 12.2* 12.3* 13.0*    303 255 232     Basic Metabolic Panel  Recent Labs   Lab 11/06/21  1241 11/06/21  0808 11/06/21  0317 11/06/21  0316 11/05/21 2000 11/05/21  1529 11/05/21  0329 11/05/21 0318 11/04/21  1618 11/04/21  1614   NA  --   --  138  --   --  138  --  137  --  134   POTASSIUM  --   --  3.8  --   --  4.0  --  3.7  --  3.9   CHLORIDE  --   --  106  --   --  104  --  105  --  102   CO2  --   --  26  --   --  26  --  25  --  26   BUN  --   --  45*  --   --  48*  --  52*  --  57*   CR  --   --  1.33*  --   --  1.53*  --  1.56*  --  1.89*   * 132* 134* 127*   < > 174*   < > 165*   < > 183*    < > = values in this interval not displayed.     Liver Function Tests  Recent Labs   Lab 11/04/21 0335 11/02/21 1943   AST 40 62*   ALT 31 37   ALKPHOS 62 81   BILITOTAL 0.3 0.3   ALBUMIN 1.9* 2.4*     5. RADIOLOGY:   Recent Results (from the past 24 hour(s))   XR Abdomen Port 1 View    Narrative    Portable abdomen one view    INDICATIONS: NG placement    COMPARISON: 11/3/2021    FINDINGS: NG tube tip and sidehole projected in the stomach. Feeding  tube is abdomen near the duodenal bulb. Nonobstructive bowel gas  pattern.      Impression    IMPRESSION: NG tube tip and sidehole projecting in the stomach.    GREGORY BECKWITH MD         SYSTEM ID:  [id number]

## 2021-11-06 NOTE — PLAN OF CARE
Major Shift Events:  Pt got very agitated and attempted to self-extubate while completing xray to verify feeding tube advancement this afternoon at approx 1500. Pt had loud cuff leak so anethesia was paged stat  as RN was concerned we had lost  airway. Fortunately airway was maintained and ETT was able to be repositioned by RT and cuff reinflated. Re-initiated bilateral wrist restraints after this event. Vent settings remain unchanged, despite increasing FIO2 this evening. Pt coughing with turns and increased agitation during this events. Having thick sputum. Xray completed after this event. After this event Ketamine gtt was initiated after this event to maintain RASS of -3. Tube feeding restarted this evening after xray confirmation. Lasix given.  Plan: Remain ICU. Awaiting decision if patient will be proned tonight.   For vital signs and complete assessments, please see documentation flowsheets.       Problem: Device-Related Complication Risk (Artificial Airway)  Goal: Optimal Device Function  Outcome: No Change    Problem: Restraint for Non-Violent/Non-Self-Destructive Behavior  Goal: Prevent/Manage Potential Problems  Description: Maintain safety of patient and others during period of restraint.  Promote psychological and physical wellbeing.  Prevent injury to skin and involved body parts.  Promote nutrition, hydration, and elimination.  Outcome: Change based on patient need/priority

## 2021-11-07 LAB
ALBUMIN SERPL-MCNC: 2.4 G/DL (ref 3.4–5)
ALP SERPL-CCNC: 82 U/L (ref 40–150)
ALT SERPL W P-5'-P-CCNC: 98 U/L (ref 0–70)
ANION GAP SERPL CALCULATED.3IONS-SCNC: 4 MMOL/L (ref 3–14)
ANION GAP SERPL CALCULATED.3IONS-SCNC: 6 MMOL/L (ref 3–14)
AST SERPL W P-5'-P-CCNC: 100 U/L (ref 0–45)
BASE EXCESS BLDA CALC-SCNC: 1.5 MMOL/L (ref -9–1.8)
BASE EXCESS BLDA CALC-SCNC: 2.4 MMOL/L (ref -9–1.8)
BASE EXCESS BLDA CALC-SCNC: 4 MMOL/L (ref -9–1.8)
BILIRUB DIRECT SERPL-MCNC: 0.1 MG/DL (ref 0–0.2)
BILIRUB SERPL-MCNC: 0.4 MG/DL (ref 0.2–1.3)
BUN SERPL-MCNC: 42 MG/DL (ref 7–30)
BUN SERPL-MCNC: 46 MG/DL (ref 7–30)
CALCIUM SERPL-MCNC: 8.1 MG/DL (ref 8.5–10.1)
CALCIUM SERPL-MCNC: 8.2 MG/DL (ref 8.5–10.1)
CHLORIDE BLD-SCNC: 109 MMOL/L (ref 94–109)
CHLORIDE BLD-SCNC: 109 MMOL/L (ref 94–109)
CO2 SERPL-SCNC: 27 MMOL/L (ref 20–32)
CO2 SERPL-SCNC: 28 MMOL/L (ref 20–32)
CREAT SERPL-MCNC: 1.15 MG/DL (ref 0.66–1.25)
CREAT SERPL-MCNC: 1.26 MG/DL (ref 0.66–1.25)
ERYTHROCYTE [DISTWIDTH] IN BLOOD BY AUTOMATED COUNT: 15.6 % (ref 10–15)
GFR SERPL CREATININE-BSD FRML MDRD: 69 ML/MIN/1.73M2
GFR SERPL CREATININE-BSD FRML MDRD: 78 ML/MIN/1.73M2
GLUCOSE BLD-MCNC: 105 MG/DL (ref 70–99)
GLUCOSE BLD-MCNC: 187 MG/DL (ref 70–99)
GLUCOSE BLDC GLUCOMTR-MCNC: 111 MG/DL (ref 70–99)
GLUCOSE BLDC GLUCOMTR-MCNC: 111 MG/DL (ref 70–99)
GLUCOSE BLDC GLUCOMTR-MCNC: 117 MG/DL (ref 70–99)
GLUCOSE BLDC GLUCOMTR-MCNC: 142 MG/DL (ref 70–99)
GLUCOSE BLDC GLUCOMTR-MCNC: 191 MG/DL (ref 70–99)
GLUCOSE BLDC GLUCOMTR-MCNC: 93 MG/DL (ref 70–99)
HCO3 BLD-SCNC: 28 MMOL/L (ref 21–28)
HCO3 BLD-SCNC: 28 MMOL/L (ref 21–28)
HCO3 BLD-SCNC: 29 MMOL/L (ref 21–28)
HCT VFR BLD AUTO: 40.6 % (ref 40–53)
HGB BLD-MCNC: 12.8 G/DL (ref 13.3–17.7)
MAGNESIUM SERPL-MCNC: 2.4 MG/DL (ref 1.6–2.3)
MAGNESIUM SERPL-MCNC: 2.6 MG/DL (ref 1.6–2.3)
MCH RBC QN AUTO: 27.4 PG (ref 26.5–33)
MCHC RBC AUTO-ENTMCNC: 31.5 G/DL (ref 31.5–36.5)
MCV RBC AUTO: 87 FL (ref 78–100)
O2/TOTAL GAS SETTING VFR VENT: 80 %
O2/TOTAL GAS SETTING VFR VENT: 80 %
O2/TOTAL GAS SETTING VFR VENT: 90 %
PCO2 BLD: 46 MM HG (ref 35–45)
PCO2 BLD: 47 MM HG (ref 35–45)
PCO2 BLD: 51 MM HG (ref 35–45)
PH BLD: 7.35 [PH] (ref 7.35–7.45)
PH BLD: 7.38 [PH] (ref 7.35–7.45)
PH BLD: 7.41 [PH] (ref 7.35–7.45)
PLATELET # BLD AUTO: 350 10E3/UL (ref 150–450)
PO2 BLD: 63 MM HG (ref 80–105)
PO2 BLD: 80 MM HG (ref 80–105)
PO2 BLD: 88 MM HG (ref 80–105)
POTASSIUM BLD-SCNC: 4 MMOL/L (ref 3.4–5.3)
POTASSIUM BLD-SCNC: 4.1 MMOL/L (ref 3.4–5.3)
PROT SERPL-MCNC: 6.4 G/DL (ref 6.8–8.8)
RBC # BLD AUTO: 4.68 10E6/UL (ref 4.4–5.9)
SODIUM SERPL-SCNC: 141 MMOL/L (ref 133–144)
SODIUM SERPL-SCNC: 142 MMOL/L (ref 133–144)
UFH PPP CHRO-ACNC: 0.17 IU/ML
UFH PPP CHRO-ACNC: 0.24 IU/ML
UFH PPP CHRO-ACNC: 0.3 IU/ML
WBC # BLD AUTO: 15.6 10E3/UL (ref 4–11)

## 2021-11-07 PROCEDURE — 999N000157 HC STATISTIC RCP TIME EA 10 MIN: Mod: 76

## 2021-11-07 PROCEDURE — 250N000009 HC RX 250: Performed by: STUDENT IN AN ORGANIZED HEALTH CARE EDUCATION/TRAINING PROGRAM

## 2021-11-07 PROCEDURE — 200N000002 HC R&B ICU UMMC

## 2021-11-07 PROCEDURE — 258N000003 HC RX IP 258 OP 636: Performed by: STUDENT IN AN ORGANIZED HEALTH CARE EDUCATION/TRAINING PROGRAM

## 2021-11-07 PROCEDURE — 250N000011 HC RX IP 250 OP 636: Performed by: STUDENT IN AN ORGANIZED HEALTH CARE EDUCATION/TRAINING PROGRAM

## 2021-11-07 PROCEDURE — 250N000009 HC RX 250

## 2021-11-07 PROCEDURE — 82803 BLOOD GASES ANY COMBINATION: CPT | Performed by: STUDENT IN AN ORGANIZED HEALTH CARE EDUCATION/TRAINING PROGRAM

## 2021-11-07 PROCEDURE — C9113 INJ PANTOPRAZOLE SODIUM, VIA: HCPCS | Performed by: STUDENT IN AN ORGANIZED HEALTH CARE EDUCATION/TRAINING PROGRAM

## 2021-11-07 PROCEDURE — 85027 COMPLETE CBC AUTOMATED: CPT | Performed by: STUDENT IN AN ORGANIZED HEALTH CARE EDUCATION/TRAINING PROGRAM

## 2021-11-07 PROCEDURE — 80048 BASIC METABOLIC PNL TOTAL CA: CPT | Performed by: STUDENT IN AN ORGANIZED HEALTH CARE EDUCATION/TRAINING PROGRAM

## 2021-11-07 PROCEDURE — 99291 CRITICAL CARE FIRST HOUR: CPT | Mod: GC | Performed by: STUDENT IN AN ORGANIZED HEALTH CARE EDUCATION/TRAINING PROGRAM

## 2021-11-07 PROCEDURE — 999N000015 HC STATISTIC ARTERIAL MONITORING DAILY

## 2021-11-07 PROCEDURE — 85520 HEPARIN ASSAY: CPT | Performed by: STUDENT IN AN ORGANIZED HEALTH CARE EDUCATION/TRAINING PROGRAM

## 2021-11-07 PROCEDURE — 250N000013 HC RX MED GY IP 250 OP 250 PS 637

## 2021-11-07 PROCEDURE — 94640 AIRWAY INHALATION TREATMENT: CPT | Mod: 76

## 2021-11-07 PROCEDURE — 83735 ASSAY OF MAGNESIUM: CPT | Performed by: STUDENT IN AN ORGANIZED HEALTH CARE EDUCATION/TRAINING PROGRAM

## 2021-11-07 PROCEDURE — 94003 VENT MGMT INPAT SUBQ DAY: CPT

## 2021-11-07 PROCEDURE — 250N000013 HC RX MED GY IP 250 OP 250 PS 637: Performed by: STUDENT IN AN ORGANIZED HEALTH CARE EDUCATION/TRAINING PROGRAM

## 2021-11-07 PROCEDURE — 82310 ASSAY OF CALCIUM: CPT | Performed by: STUDENT IN AN ORGANIZED HEALTH CARE EDUCATION/TRAINING PROGRAM

## 2021-11-07 PROCEDURE — 94645 CONT INHLJ TX EACH ADDL HOUR: CPT

## 2021-11-07 PROCEDURE — 82248 BILIRUBIN DIRECT: CPT | Performed by: STUDENT IN AN ORGANIZED HEALTH CARE EDUCATION/TRAINING PROGRAM

## 2021-11-07 RX ORDER — FUROSEMIDE 10 MG/ML
20 INJECTION INTRAMUSCULAR; INTRAVENOUS ONCE
Status: COMPLETED | OUTPATIENT
Start: 2021-11-07 | End: 2021-11-07

## 2021-11-07 RX ADMIN — QUETIAPINE FUMARATE 50 MG: 50 TABLET ORAL at 19:51

## 2021-11-07 RX ADMIN — DEXAMETHASONE SODIUM PHOSPHATE 6 MG: 4 INJECTION, SOLUTION INTRA-ARTICULAR; INTRALESIONAL; INTRAMUSCULAR; INTRAVENOUS; SOFT TISSUE at 13:13

## 2021-11-07 RX ADMIN — Medication 12 MG/HR: at 16:17

## 2021-11-07 RX ADMIN — IPRATROPIUM BROMIDE AND ALBUTEROL SULFATE 3 ML: .5; 3 SOLUTION RESPIRATORY (INHALATION) at 14:02

## 2021-11-07 RX ADMIN — Medication 20 NG/KG/MIN: at 04:47

## 2021-11-07 RX ADMIN — Medication 2 PACKET: at 07:49

## 2021-11-07 RX ADMIN — IPRATROPIUM BROMIDE AND ALBUTEROL SULFATE 3 ML: .5; 3 SOLUTION RESPIRATORY (INHALATION) at 20:08

## 2021-11-07 RX ADMIN — MIDAZOLAM 4 MG: 1 INJECTION INTRAMUSCULAR; INTRAVENOUS at 21:49

## 2021-11-07 RX ADMIN — IPRATROPIUM BROMIDE AND ALBUTEROL SULFATE 3 ML: .5; 3 SOLUTION RESPIRATORY (INHALATION) at 07:11

## 2021-11-07 RX ADMIN — INSULIN ASPART 2 UNITS: 100 INJECTION, SOLUTION INTRAVENOUS; SUBCUTANEOUS at 16:17

## 2021-11-07 RX ADMIN — HEPARIN SODIUM 1950 UNITS/HR: 10000 INJECTION, SOLUTION INTRAVENOUS at 16:35

## 2021-11-07 RX ADMIN — MIDAZOLAM 4 MG: 1 INJECTION INTRAMUSCULAR; INTRAVENOUS at 19:53

## 2021-11-07 RX ADMIN — ACETYLCYSTEINE 2 ML: 200 SOLUTION ORAL; RESPIRATORY (INHALATION) at 07:11

## 2021-11-07 RX ADMIN — MIDAZOLAM 4 MG: 1 INJECTION INTRAMUSCULAR; INTRAVENOUS at 18:00

## 2021-11-07 RX ADMIN — Medication 2 PACKET: at 11:16

## 2021-11-07 RX ADMIN — Medication 100 MCG: at 18:00

## 2021-11-07 RX ADMIN — PANTOPRAZOLE SODIUM 40 MG: 40 INJECTION, POWDER, FOR SOLUTION INTRAVENOUS at 07:51

## 2021-11-07 RX ADMIN — IPRATROPIUM BROMIDE AND ALBUTEROL SULFATE 3 ML: .5; 3 SOLUTION RESPIRATORY (INHALATION) at 03:00

## 2021-11-07 RX ADMIN — FUROSEMIDE 20 MG: 10 INJECTION, SOLUTION INTRAVENOUS at 13:13

## 2021-11-07 RX ADMIN — Medication 100 MCG: at 12:15

## 2021-11-07 RX ADMIN — QUETIAPINE FUMARATE 25 MG: 25 TABLET ORAL at 07:50

## 2021-11-07 RX ADMIN — Medication 200 MCG/HR: at 14:21

## 2021-11-07 RX ADMIN — DOCUSATE SODIUM 50 MG AND SENNOSIDES 8.6 MG 1 TABLET: 8.6; 5 TABLET, FILM COATED ORAL at 07:50

## 2021-11-07 RX ADMIN — AZITHROMYCIN 250 MG: 250 TABLET, FILM COATED ORAL at 07:50

## 2021-11-07 RX ADMIN — Medication 2 PACKET: at 21:51

## 2021-11-07 RX ADMIN — Medication 20 NG/KG/MIN: at 14:20

## 2021-11-07 RX ADMIN — MIDAZOLAM 4 MG: 1 INJECTION INTRAMUSCULAR; INTRAVENOUS at 12:10

## 2021-11-07 RX ADMIN — Medication 200 MCG/HR: at 04:49

## 2021-11-07 RX ADMIN — POLYETHYLENE GLYCOL 3350 17 G: 17 POWDER, FOR SOLUTION ORAL at 07:50

## 2021-11-07 RX ADMIN — ACETYLCYSTEINE 2 ML: 200 SOLUTION ORAL; RESPIRATORY (INHALATION) at 20:08

## 2021-11-07 RX ADMIN — Medication 12 MG/HR: at 23:56

## 2021-11-07 RX ADMIN — INSULIN ASPART 1 UNITS: 100 INJECTION, SOLUTION INTRAVENOUS; SUBCUTANEOUS at 19:51

## 2021-11-07 RX ADMIN — Medication 5 ML: at 07:50

## 2021-11-07 RX ADMIN — Medication 20 NG/KG/MIN: at 23:38

## 2021-11-07 RX ADMIN — Medication 100 MCG: at 23:52

## 2021-11-07 RX ADMIN — DOCUSATE SODIUM 50 MG AND SENNOSIDES 8.6 MG 2 TABLET: 8.6; 5 TABLET, FILM COATED ORAL at 19:51

## 2021-11-07 RX ADMIN — QUETIAPINE FUMARATE 25 MG: 25 TABLET ORAL at 13:14

## 2021-11-07 RX ADMIN — MIDAZOLAM 4 MG: 1 INJECTION INTRAMUSCULAR; INTRAVENOUS at 23:52

## 2021-11-07 RX ADMIN — Medication 2 PACKET: at 13:18

## 2021-11-07 RX ADMIN — Medication 9 MG/HR: at 08:16

## 2021-11-07 RX ADMIN — PANTOPRAZOLE SODIUM 40 MG: 40 INJECTION, POWDER, FOR SOLUTION INTRAVENOUS at 19:51

## 2021-11-07 RX ADMIN — Medication 2 PACKET: at 18:08

## 2021-11-07 RX ADMIN — Medication 100 MCG: at 21:49

## 2021-11-07 RX ADMIN — Medication 8 MG/HR: at 00:31

## 2021-11-07 RX ADMIN — FENTANYL CITRATE 100 MCG: 50 INJECTION, SOLUTION INTRAMUSCULAR; INTRAVENOUS at 04:00

## 2021-11-07 RX ADMIN — KETAMINE HYDROCHLORIDE 125 MG/HR: 100 INJECTION, SOLUTION, CONCENTRATE INTRAMUSCULAR; INTRAVENOUS at 11:16

## 2021-11-07 RX ADMIN — Medication 100 MCG: at 19:53

## 2021-11-07 ASSESSMENT — ACTIVITIES OF DAILY LIVING (ADL)
ADLS_ACUITY_SCORE: 23
ADLS_ACUITY_SCORE: 23
ADLS_ACUITY_SCORE: 19
ADLS_ACUITY_SCORE: 23
ADLS_ACUITY_SCORE: 19
ADLS_ACUITY_SCORE: 23
ADLS_ACUITY_SCORE: 23
ADLS_ACUITY_SCORE: 19
ADLS_ACUITY_SCORE: 19
ADLS_ACUITY_SCORE: 23
ADLS_ACUITY_SCORE: 19
ADLS_ACUITY_SCORE: 19
ADLS_ACUITY_SCORE: 23

## 2021-11-07 ASSESSMENT — MIFFLIN-ST. JEOR: SCORE: 2536

## 2021-11-07 NOTE — PLAN OF CARE
Major Shift Events  Overnight pt remained in stable condition with issues of hypercapnia, hypoxia, and hypertension.     At the beginning of the shift MD was informed of hypercapnia (pCO2 = 46) and was asked if the pt should be pronated similar to the previous night. MD was ok with lab values at this time, as such pt remained supine overnight. @ 0400 during assessment pt became very agitated during repositioning and became hypertensive. MD was informed however d/t epic downtime unable to order PRN BP management meds at this time, as such MD was ok with current episode of hypertension. Fentanyl PRN bolus was administered to aleviate pain related causes of HTN. Lab results in the AM showed worsening hypoxia, consistent hypercapnia, and subtherapeutic heparin levels. MD was informed of blood gas results and ordered an increase in pt sedation and FiO2 to improve oxygenation status.     Neuro: RASS: -2 to -3, pupils 2 mm round sluggish, withdrawing in all ex., facial grimace to painful stimuli, not following commands, moving ex independently during agitation  Cv: SR (60-70), MAP > 65, SYS < 170, pulses +2, afebrile  Resp: ETT @ 27 lip, CMV (90%, 450, 30, 14), LS: coarse/dim, moderate inline secretions yellow thick, large oral secretions clear thin, yellow mucous draining from nare  GI/: Flores w/ AUO, BM +, BS minimal, TF 30 ml/hr, FWF 30 q4  Skin: Umbillicus hernia, chipped tooth     Drips: Fentanyl 200 mcg/hr, Heparin 1950 units/hr, Epoprostenol 20 ng/kg/min  Sedation: Versed 9 mg/hr, Ketamine 125 mg/hr     Plan: Follow POC. Monitor closely, notify MD of acute changes.  For vital signs and complete assessments, please see documentation flowsheets.

## 2021-11-07 NOTE — PROGRESS NOTES
Patient pronated at approximately 1215 this afternoon. Will plan on drawing ABG between 1330 and 1400.    Will continue to monitor for safety and comfort.    Jacob Corona RN

## 2021-11-07 NOTE — PROGRESS NOTES
MEDICAL ICU PROGRESS NOTE  11/07/2021      Date of Service (when I saw the patient): 11/07/2021    ASSESSMENT: Brandon Schafer is a 41 year old male with questionable PMH of remote VTE who was admitted on 11/3/2021 with ARDS 2/2 COVID pneumonia.    CHANGES and MAJOR THINGS TODAY:   - Prone  - 20mg lasix IV once today      PLAN:    Neuro:  # Pain and sedation  - Fentanyl infusion  - Midazolam infusion  - Ketamine   - RASS goal -2 while supine, -4 while proned   - Not currently planning on initiating NMB    Pulmonary:  # ARDS 2/2 COVID pneumonia  Intubated 11/2 prior to transport flight. Proned on 11/5.   - Lung protective ventilation with AC 30/450/+14/90%   - No indication for NMB at this time  - Inhaled epoprostenol at 20 ng/kg/min  - Secretion clearance with duonebs q6 hours, and BID mucomyst, metaneb  - Infectious work-up and treatment as below  - proning again today     # ?COPD exacerbation  Wheezing on exam on 11/4.  - Continue Duonebs  - Azithromycin x5 days    Cardiovascular:  # Hypotension, resolved   Transient, and in the setting of sedation. No prior TTE. Troponin peaked at 0.293. EKG with T wave inversion in lateral leads. Suspect troponin was 2/2 demand ischemia in setting of hypoxia.  - Norepinephrine gtt as needed     GI/Nutrition:  # At risk for malnutrition  - Nutrition consult for TF  - Novasource Renal at goal 30 mL/hour  - Bowel regimen: scheduled Miralax, add scheduled senna; prn senna and Miralax    # Coffee ground material from OG (11/4), resolved  - PPI increased to BID    Renal/Fluids/Electrolytes:  # Non-oliguric STEFAN  Unclear baseline, has not seen doctor in 20 years. Cr 2.5 on admission.  - Continue diuresis with furosemide 20 mg daily   - Goal net negative 1L    Endocrine:  # Stress and steroid induced hyperglycemia  - Medium resistance sliding scale insulin     ID:  # COVID pneumonia  Symptoms (headache, SOB, fever, cough, diarrhea) 1 week prior to presentation. Not COVID vaccinated.  Tested positive on presentation on 11/2. S/p tocilizumab 11/4.  - Dexamethasone 6 mg daily (total 10 days)  - Deferring remdesivir given STEFAN    Hematology:    # Coagulopathy 2/2 COVID  US BLE negative for DVT.  - Heparin infusion  - Consider transitioning to alternative AC tomorrow     General Cares/Prophylaxis:    DVT Prophylaxis: Heparin infusion  GI Prophylaxis: PPI  Family Communication: Brother Brad   Code Status: DNR    Lines/tubes/drains:  - PIV x2  - CVC internal jugular   - Arterial line left radial  - OG  - Flores  - ETT    Disposition:  - Medical ICU     Patient seen and findings/plan discussed with medical ICU staff, Dr. Owens.    Tamara Bailey MD  [facility]  Medicine-Pediatrics, PGY-3    ====================================  INTERVAL HISTORY:   NAEO. BP stable off pressors. Increasing FiO2 requirements. Good UOP response to lasix. No stool output.     OBJECTIVE:   1. VITAL SIGNS:   Temp:  [98.1  F (36.7  C)-99.5  F (37.5  C)] 99.5  F (37.5  C)  Pulse:  [67-90] 85  Resp:  [29-35] 35  BP: (174)/(87) 174/87  MAP:  [69 mmHg-129 mmHg] 79 mmHg  Arterial Line BP: ()/() 123/61  FiO2 (%):  [70 %-100 %] 80 %  SpO2:  [91 %-99 %] 95 %  Ventilation Mode: CMV/AC  (Continuous Mandatory Ventilation/ Assist Control)  FiO2 (%): 80 %  Rate Set (breaths/minute): 30 breaths/min  Tidal Volume Set (mL): 450 mL  PEEP (cm H2O): 14 cmH2O  Oxygen Concentration (%): 90 %  Resp: (!) 35    2. INTAKE/ OUTPUT:   I/O last 3 completed shifts:  In: 1764.91 [I.V.:1194.91; NG/GT:240]  Out: 2925 [Urine:2925]    3. PHYSICAL EXAMINATION:  General: Sedate  HEENT: Mucous membranes moist  Neuro: RASS -3  Pulm/Resp: Clear breath sounds bilaterally without rhonchi, crackles or wheeze, breathing non-labored  CV: RRR, S1/S2 without m/r/g; pedal pulses 2+ with trace LE edema  Abdomen: Obese, soft, non-distended, non-tender; chelo-umbilical hernia that his soft and compressible   : Flores catheter in place, urine  yellow and clear  Incisions/Skin: No rashes noted    4. LABS:   Arterial Blood Gases   Recent Labs   Lab 11/07/21  1131 11/07/21  0523 11/06/21 1747 11/06/21  1112   PH 7.38 7.41 7.39 7.43   PCO2 47* 46* 46* 41   PO2 80 63* 104 82   HCO3 28 29* 28 27     Complete Blood Count   Recent Labs   Lab 11/07/21  0507 11/06/21 0317 11/05/21 0318 11/04/21 0335   WBC 15.6* 15.1* 10.5 7.2   HGB 12.8* 12.7* 12.2* 12.3*    354 303 255     Basic Metabolic Panel  Recent Labs   Lab 11/07/21  1127 11/07/21  0744 11/07/21  0507 11/07/21  0505 11/06/21 1749 11/06/21  1746 11/06/21  0808 11/06/21 0317 11/05/21 2000 11/05/21  1529   NA  --   --  141  --   --  139  --  138  --  138   POTASSIUM  --   --  4.0  --   --  4.2  --  3.8  --  4.0   CHLORIDE  --   --  109  --   --  107  --  106  --  104   CO2  --   --  28  --   --  26  --  26  --  26   BUN  --   --  42*  --   --  44*  --  45*  --  48*   CR  --   --  1.15  --   --  1.30*  --  1.33*  --  1.53*   * 111* 105* 93   < > 171*   < > 134*   < > 174*    < > = values in this interval not displayed.     Liver Function Tests  Recent Labs   Lab 11/07/21  0507 11/04/21 0335 11/02/21  1943   * 40 62*   ALT 98* 31 37   ALKPHOS 82 62 81   BILITOTAL 0.4 0.3 0.3   ALBUMIN 2.4* 1.9* 2.4*     5. RADIOLOGY:   Recent Results (from the past 24 hour(s))   XR Abdomen Port 1 View    Narrative    Exam: XR ABDOMEN PORT 1 VIEWS, 11/6/2021 4:28 PM    Indication: Verifying feeding tube placement    Comparison: Abdominal radiograph 11/5/2021.    Findings:   Feeding tube is looped within the stomach with tip projecting over the  expected location of the fundus. Previously imaged additional gastric  tube is no longer visualized. Nonobstructive bowel gas pattern with  relatively visualized airspace opacities in the lung bases.      Impression    Impression:   1. Feeding tube axial loop in the stomach with tip projecting over the  expected location of the fundus of the stomach.  2.  Partially visualized air space opacities in the lungs.  3. Nonobstructive bowel gas pattern.    I have personally reviewed the examination and initial interpretation  and I agree with the findings.    DIONISIO PARRA MD         SYSTEM ID:  [id number]   XR Chest Port 1 View    Narrative    Exam: XR CHEST PORT 1 VIEW, 11/6/2021 4:33 PM    Indication: Covid ARDS    Comparison: X-ray 11/4/2021    Findings:   Endotracheal tube tip projects over the midthoracic trachea. Right IJ  central venous catheter tip is unchanged. Enteric tube courses past  the level of the diaphragm with tip excluded from the field-of-view.    Cardiomediastinal silhouette is enlarged. Indistinct pulmonary  vasculature with bilateral airspace opacities similar to prior. No  pneumothorax or pleural effusions.      Impression    Impression:   1. Supporting lines and tubes including endotracheal tube unchanged  from prior exam.  2. No interval change in bilateral airspace opacities related to covid  pneumonia/ARDS.    I have personally reviewed the examination and initial interpretation  and I agree with the findings.    DIONISIO PARRA MD         SYSTEM ID:  [id number]   XR Chest Port 1 View    Narrative    Exam: XR CHEST PORT 1 VIEW, 11/6/2021 4:36 PM    Indication: ETT position verification    Comparison: Chest x-ray same day 3:47 PM.    Findings:   Unchanged endotracheal tube tip projecting over the midthoracic  trachea and approximately 3.7 cm above the level the becca.  Additional supporting lines and tubes unchanged. Unchanged bilateral  airspace opacities. No pneumothorax or pleural effusions.      Impression    Impression:   1. Endotracheal tube in satisfactory position as above.  2. Stable diffuse airspace opacities related to COVID/ARDS.    I have personally reviewed the examination and initial interpretation  and I agree with the findings.    DIONISIO PARRA MD         SYSTEM ID:  [id number]

## 2021-11-07 NOTE — PHARMACY-ADMISSION MEDICATION HISTORY
Admission Medication History Completed by Pharmacy    See [facility] Admission Navigator for allergy information, preferred outpatient pharmacy, prior to admission medications and immunization status.     Medication History Sources:     CareEverywhere/SureScripts    Changes made to PTA medication list (reason):    Added: None    Deleted: None    Changed: None    Additional Information:    None    Prior to Admission medications    Medication Sig Last Dose Taking? Auth Provider   Acetaminophen (TYLENOL PO) Take 1,000 mg by mouth daily as needed for mild pain or fever More than a month at Unknown time Yes Reported, Patient       Date completed: 11/06/21    Medication history completed by: Michelle Jacinto,  NoriD

## 2021-11-08 LAB
ANION GAP SERPL CALCULATED.3IONS-SCNC: 6 MMOL/L (ref 3–14)
ATRIAL RATE - MUSE: 65 BPM
BACTERIA BLD CULT: NO GROWTH
BACTERIA BLD CULT: NO GROWTH
BASE EXCESS BLDA CALC-SCNC: 1.2 MMOL/L (ref -9–1.8)
BASE EXCESS BLDA CALC-SCNC: 1.8 MMOL/L (ref -9–1.8)
BASE EXCESS BLDA CALC-SCNC: 2.7 MMOL/L (ref -9–1.8)
BASE EXCESS BLDA CALC-SCNC: 2.7 MMOL/L (ref -9–1.8)
BUN SERPL-MCNC: 48 MG/DL (ref 7–30)
CALCIUM SERPL-MCNC: 8.6 MG/DL (ref 8.5–10.1)
CHLORIDE BLD-SCNC: 113 MMOL/L (ref 94–109)
CO2 SERPL-SCNC: 26 MMOL/L (ref 20–32)
CREAT SERPL-MCNC: 1.16 MG/DL (ref 0.66–1.25)
DIASTOLIC BLOOD PRESSURE - MUSE: NORMAL MMHG
ERYTHROCYTE [DISTWIDTH] IN BLOOD BY AUTOMATED COUNT: 15.9 % (ref 10–15)
GFR SERPL CREATININE-BSD FRML MDRD: 77 ML/MIN/1.73M2
GLUCOSE BLD-MCNC: 106 MG/DL (ref 70–99)
GLUCOSE BLDC GLUCOMTR-MCNC: 100 MG/DL (ref 70–99)
GLUCOSE BLDC GLUCOMTR-MCNC: 115 MG/DL (ref 70–99)
GLUCOSE BLDC GLUCOMTR-MCNC: 115 MG/DL (ref 70–99)
GLUCOSE BLDC GLUCOMTR-MCNC: 118 MG/DL (ref 70–99)
GLUCOSE BLDC GLUCOMTR-MCNC: 153 MG/DL (ref 70–99)
GLUCOSE BLDC GLUCOMTR-MCNC: 184 MG/DL (ref 70–99)
HCO3 BLD-SCNC: 28 MMOL/L (ref 21–28)
HCT VFR BLD AUTO: 43.7 % (ref 40–53)
HGB BLD-MCNC: 13.6 G/DL (ref 13.3–17.7)
INTERPRETATION ECG - MUSE: NORMAL
MAGNESIUM SERPL-MCNC: 2.5 MG/DL (ref 1.6–2.3)
MCH RBC QN AUTO: 27.5 PG (ref 26.5–33)
MCHC RBC AUTO-ENTMCNC: 31.1 G/DL (ref 31.5–36.5)
MCV RBC AUTO: 89 FL (ref 78–100)
O2/TOTAL GAS SETTING VFR VENT: 60 %
O2/TOTAL GAS SETTING VFR VENT: 60 %
O2/TOTAL GAS SETTING VFR VENT: 70 %
O2/TOTAL GAS SETTING VFR VENT: 70 %
P AXIS - MUSE: 40 DEGREES
PCO2 BLD: 46 MM HG (ref 35–45)
PCO2 BLD: 47 MM HG (ref 35–45)
PCO2 BLD: 50 MM HG (ref 35–45)
PCO2 BLD: 53 MM HG (ref 35–45)
PH BLD: 7.33 [PH] (ref 7.35–7.45)
PH BLD: 7.36 [PH] (ref 7.35–7.45)
PH BLD: 7.39 [PH] (ref 7.35–7.45)
PH BLD: 7.4 [PH] (ref 7.35–7.45)
PHOSPHATE SERPL-MCNC: 3.7 MG/DL (ref 2.5–4.5)
PLATELET # BLD AUTO: 311 10E3/UL (ref 150–450)
PO2 BLD: 122 MM HG (ref 80–105)
PO2 BLD: 61 MM HG (ref 80–105)
PO2 BLD: 68 MM HG (ref 80–105)
PO2 BLD: 85 MM HG (ref 80–105)
POTASSIUM BLD-SCNC: 4.1 MMOL/L (ref 3.4–5.3)
PR INTERVAL - MUSE: 168 MS
QRS DURATION - MUSE: 84 MS
QT - MUSE: 410 MS
QTC - MUSE: 426 MS
R AXIS - MUSE: -1 DEGREES
RBC # BLD AUTO: 4.94 10E6/UL (ref 4.4–5.9)
SODIUM SERPL-SCNC: 145 MMOL/L (ref 133–144)
SYSTOLIC BLOOD PRESSURE - MUSE: NORMAL MMHG
T AXIS - MUSE: 144 DEGREES
UFH PPP CHRO-ACNC: 0.19 IU/ML
UFH PPP CHRO-ACNC: 0.55 IU/ML
VENTRICULAR RATE- MUSE: 65 BPM
WBC # BLD AUTO: 15.4 10E3/UL (ref 4–11)

## 2021-11-08 PROCEDURE — 258N000003 HC RX IP 258 OP 636: Performed by: STUDENT IN AN ORGANIZED HEALTH CARE EDUCATION/TRAINING PROGRAM

## 2021-11-08 PROCEDURE — 250N000013 HC RX MED GY IP 250 OP 250 PS 637

## 2021-11-08 PROCEDURE — 200N000002 HC R&B ICU UMMC

## 2021-11-08 PROCEDURE — 250N000011 HC RX IP 250 OP 636: Performed by: NURSE PRACTITIONER

## 2021-11-08 PROCEDURE — 94640 AIRWAY INHALATION TREATMENT: CPT | Mod: 76

## 2021-11-08 PROCEDURE — 250N000009 HC RX 250: Performed by: STUDENT IN AN ORGANIZED HEALTH CARE EDUCATION/TRAINING PROGRAM

## 2021-11-08 PROCEDURE — 250N000009 HC RX 250

## 2021-11-08 PROCEDURE — 250N000011 HC RX IP 250 OP 636: Performed by: STUDENT IN AN ORGANIZED HEALTH CARE EDUCATION/TRAINING PROGRAM

## 2021-11-08 PROCEDURE — 82803 BLOOD GASES ANY COMBINATION: CPT | Performed by: STUDENT IN AN ORGANIZED HEALTH CARE EDUCATION/TRAINING PROGRAM

## 2021-11-08 PROCEDURE — 85520 HEPARIN ASSAY: CPT | Performed by: STUDENT IN AN ORGANIZED HEALTH CARE EDUCATION/TRAINING PROGRAM

## 2021-11-08 PROCEDURE — 84100 ASSAY OF PHOSPHORUS: CPT | Performed by: STUDENT IN AN ORGANIZED HEALTH CARE EDUCATION/TRAINING PROGRAM

## 2021-11-08 PROCEDURE — 94640 AIRWAY INHALATION TREATMENT: CPT

## 2021-11-08 PROCEDURE — 250N000013 HC RX MED GY IP 250 OP 250 PS 637: Performed by: STUDENT IN AN ORGANIZED HEALTH CARE EDUCATION/TRAINING PROGRAM

## 2021-11-08 PROCEDURE — 999N000015 HC STATISTIC ARTERIAL MONITORING DAILY

## 2021-11-08 PROCEDURE — 80048 BASIC METABOLIC PNL TOTAL CA: CPT | Performed by: STUDENT IN AN ORGANIZED HEALTH CARE EDUCATION/TRAINING PROGRAM

## 2021-11-08 PROCEDURE — 94003 VENT MGMT INPAT SUBQ DAY: CPT

## 2021-11-08 PROCEDURE — 83735 ASSAY OF MAGNESIUM: CPT | Performed by: STUDENT IN AN ORGANIZED HEALTH CARE EDUCATION/TRAINING PROGRAM

## 2021-11-08 PROCEDURE — 85027 COMPLETE CBC AUTOMATED: CPT | Performed by: STUDENT IN AN ORGANIZED HEALTH CARE EDUCATION/TRAINING PROGRAM

## 2021-11-08 PROCEDURE — 99291 CRITICAL CARE FIRST HOUR: CPT | Mod: GC | Performed by: STUDENT IN AN ORGANIZED HEALTH CARE EDUCATION/TRAINING PROGRAM

## 2021-11-08 PROCEDURE — 94645 CONT INHLJ TX EACH ADDL HOUR: CPT

## 2021-11-08 PROCEDURE — C9113 INJ PANTOPRAZOLE SODIUM, VIA: HCPCS | Performed by: STUDENT IN AN ORGANIZED HEALTH CARE EDUCATION/TRAINING PROGRAM

## 2021-11-08 PROCEDURE — 999N000157 HC STATISTIC RCP TIME EA 10 MIN

## 2021-11-08 RX ORDER — LORAZEPAM 2 MG/1
6 TABLET ORAL EVERY 4 HOURS
Status: DISCONTINUED | OUTPATIENT
Start: 2021-11-08 | End: 2021-11-10

## 2021-11-08 RX ORDER — FUROSEMIDE 10 MG/ML
20 INJECTION INTRAMUSCULAR; INTRAVENOUS ONCE
Status: COMPLETED | OUTPATIENT
Start: 2021-11-08 | End: 2021-11-08

## 2021-11-08 RX ORDER — IPRATROPIUM BROMIDE AND ALBUTEROL SULFATE 2.5; .5 MG/3ML; MG/3ML
3 SOLUTION RESPIRATORY (INHALATION) 2 TIMES DAILY
Status: DISCONTINUED | OUTPATIENT
Start: 2021-11-09 | End: 2021-11-13

## 2021-11-08 RX ORDER — DEXMEDETOMIDINE HYDROCHLORIDE 4 UG/ML
0.2-0.7 INJECTION, SOLUTION INTRAVENOUS CONTINUOUS
Status: DISCONTINUED | OUTPATIENT
Start: 2021-11-08 | End: 2021-11-15

## 2021-11-08 RX ORDER — FENTANYL CITRATE 50 UG/ML
100 INJECTION, SOLUTION INTRAMUSCULAR; INTRAVENOUS ONCE
Status: COMPLETED | OUTPATIENT
Start: 2021-11-08 | End: 2021-11-08

## 2021-11-08 RX ORDER — OXYCODONE HYDROCHLORIDE 10 MG/1
20 TABLET ORAL EVERY 6 HOURS
Status: DISCONTINUED | OUTPATIENT
Start: 2021-11-08 | End: 2021-11-10

## 2021-11-08 RX ADMIN — Medication 20 NG/KG/MIN: at 08:05

## 2021-11-08 RX ADMIN — FUROSEMIDE 20 MG: 10 INJECTION, SOLUTION INTRAVENOUS at 17:27

## 2021-11-08 RX ADMIN — IPRATROPIUM BROMIDE AND ALBUTEROL SULFATE 3 ML: .5; 3 SOLUTION RESPIRATORY (INHALATION) at 20:18

## 2021-11-08 RX ADMIN — POLYETHYLENE GLYCOL 3350 17 G: 17 POWDER, FOR SOLUTION ORAL at 07:49

## 2021-11-08 RX ADMIN — Medication 100 MCG: at 12:04

## 2021-11-08 RX ADMIN — Medication 2 PACKET: at 14:25

## 2021-11-08 RX ADMIN — Medication 50 MCG: at 22:00

## 2021-11-08 RX ADMIN — QUETIAPINE FUMARATE 50 MG: 50 TABLET ORAL at 20:04

## 2021-11-08 RX ADMIN — AZITHROMYCIN 250 MG: 250 TABLET, FILM COATED ORAL at 07:49

## 2021-11-08 RX ADMIN — IPRATROPIUM BROMIDE AND ALBUTEROL SULFATE 3 ML: .5; 3 SOLUTION RESPIRATORY (INHALATION) at 07:50

## 2021-11-08 RX ADMIN — Medication 2 PACKET: at 17:31

## 2021-11-08 RX ADMIN — Medication 13 MG/HR: at 17:46

## 2021-11-08 RX ADMIN — Medication 50 MCG: at 14:30

## 2021-11-08 RX ADMIN — PANTOPRAZOLE SODIUM 40 MG: 40 INJECTION, POWDER, FOR SOLUTION INTRAVENOUS at 07:51

## 2021-11-08 RX ADMIN — IPRATROPIUM BROMIDE AND ALBUTEROL SULFATE 3 ML: .5; 3 SOLUTION RESPIRATORY (INHALATION) at 02:23

## 2021-11-08 RX ADMIN — Medication 5 ML: at 07:50

## 2021-11-08 RX ADMIN — QUETIAPINE FUMARATE 25 MG: 25 TABLET ORAL at 14:28

## 2021-11-08 RX ADMIN — Medication 2 PACKET: at 10:51

## 2021-11-08 RX ADMIN — Medication 13 MG/HR: at 04:46

## 2021-11-08 RX ADMIN — Medication 100 MCG: at 13:23

## 2021-11-08 RX ADMIN — INSULIN ASPART 1 UNITS: 100 INJECTION, SOLUTION INTRAVENOUS; SUBCUTANEOUS at 20:05

## 2021-11-08 RX ADMIN — DEXMEDETOMIDINE 0.4 MCG/KG/HR: 100 INJECTION, SOLUTION, CONCENTRATE INTRAVENOUS at 17:47

## 2021-11-08 RX ADMIN — Medication 50 MCG: at 15:06

## 2021-11-08 RX ADMIN — Medication 100 MCG: at 01:49

## 2021-11-08 RX ADMIN — Medication 275 MCG/HR: at 10:41

## 2021-11-08 RX ADMIN — Medication 100 MCG: at 15:58

## 2021-11-08 RX ADMIN — LORAZEPAM 6 MG: 2 TABLET ORAL at 17:29

## 2021-11-08 RX ADMIN — ACETYLCYSTEINE 2 ML: 200 SOLUTION ORAL; RESPIRATORY (INHALATION) at 07:50

## 2021-11-08 RX ADMIN — Medication 50 MG: at 13:26

## 2021-11-08 RX ADMIN — LORAZEPAM 6 MG: 2 TABLET ORAL at 14:51

## 2021-11-08 RX ADMIN — Medication 100 MCG: at 05:42

## 2021-11-08 RX ADMIN — FUROSEMIDE 20 MG: 10 INJECTION, SOLUTION INTRAVENOUS at 10:44

## 2021-11-08 RX ADMIN — MIDAZOLAM 4 MG: 1 INJECTION INTRAMUSCULAR; INTRAVENOUS at 02:44

## 2021-11-08 RX ADMIN — Medication 20 NG/KG/MIN: at 18:13

## 2021-11-08 RX ADMIN — OXYCODONE HYDROCHLORIDE 20 MG: 10 TABLET ORAL at 14:52

## 2021-11-08 RX ADMIN — OXYCODONE HYDROCHLORIDE 20 MG: 10 TABLET ORAL at 20:04

## 2021-11-08 RX ADMIN — Medication 100 MCG: at 04:48

## 2021-11-08 RX ADMIN — IPRATROPIUM BROMIDE AND ALBUTEROL SULFATE 3 ML: .5; 3 SOLUTION RESPIRATORY (INHALATION) at 13:04

## 2021-11-08 RX ADMIN — Medication 100 MCG: at 10:55

## 2021-11-08 RX ADMIN — Medication 100 MCG: at 02:11

## 2021-11-08 RX ADMIN — PANTOPRAZOLE SODIUM 40 MG: 40 INJECTION, POWDER, FOR SOLUTION INTRAVENOUS at 20:04

## 2021-11-08 RX ADMIN — ACETYLCYSTEINE 2 ML: 200 SOLUTION ORAL; RESPIRATORY (INHALATION) at 20:18

## 2021-11-08 RX ADMIN — FENTANYL CITRATE 100 MCG: 50 INJECTION, SOLUTION INTRAMUSCULAR; INTRAVENOUS at 02:55

## 2021-11-08 RX ADMIN — MIDAZOLAM 4 MG: 1 INJECTION INTRAMUSCULAR; INTRAVENOUS at 02:56

## 2021-11-08 RX ADMIN — ACETAMINOPHEN 650 MG: 325 TABLET, FILM COATED ORAL at 07:50

## 2021-11-08 RX ADMIN — Medication 50 MCG: at 20:15

## 2021-11-08 RX ADMIN — ENOXAPARIN SODIUM 40 MG: 40 INJECTION SUBCUTANEOUS at 10:43

## 2021-11-08 RX ADMIN — KETAMINE HYDROCHLORIDE 250 MG/HR: 100 INJECTION, SOLUTION, CONCENTRATE INTRAMUSCULAR; INTRAVENOUS at 12:33

## 2021-11-08 RX ADMIN — INSULIN ASPART 1 UNITS: 100 INJECTION, SOLUTION INTRAVENOUS; SUBCUTANEOUS at 15:52

## 2021-11-08 RX ADMIN — DEXMEDETOMIDINE 0.7 MCG/KG/HR: 100 INJECTION, SOLUTION, CONCENTRATE INTRAVENOUS at 21:14

## 2021-11-08 RX ADMIN — Medication 50 MG: at 05:42

## 2021-11-08 RX ADMIN — Medication 100 MCG: at 17:51

## 2021-11-08 RX ADMIN — DEXAMETHASONE SODIUM PHOSPHATE 6 MG: 4 INJECTION, SOLUTION INTRA-ARTICULAR; INTRALESIONAL; INTRAMUSCULAR; INTRAVENOUS; SOFT TISSUE at 12:38

## 2021-11-08 RX ADMIN — Medication 200 MCG/HR: at 02:14

## 2021-11-08 RX ADMIN — DOCUSATE SODIUM 50 MG AND SENNOSIDES 8.6 MG 2 TABLET: 8.6; 5 TABLET, FILM COATED ORAL at 20:04

## 2021-11-08 RX ADMIN — MIDAZOLAM 4 MG: 1 INJECTION INTRAMUSCULAR; INTRAVENOUS at 01:48

## 2021-11-08 RX ADMIN — LORAZEPAM 6 MG: 2 TABLET ORAL at 22:44

## 2021-11-08 RX ADMIN — ACETAMINOPHEN 650 MG: 325 TABLET, FILM COATED ORAL at 17:29

## 2021-11-08 RX ADMIN — POLYETHYLENE GLYCOL 3350 17 G: 17 POWDER, FOR SOLUTION ORAL at 12:22

## 2021-11-08 RX ADMIN — Medication 250 MCG/HR: at 17:49

## 2021-11-08 RX ADMIN — Medication 13 MG/HR: at 11:51

## 2021-11-08 RX ADMIN — DOCUSATE SODIUM 50 MG AND SENNOSIDES 8.6 MG 2 TABLET: 8.6; 5 TABLET, FILM COATED ORAL at 07:49

## 2021-11-08 RX ADMIN — Medication 2 PACKET: at 07:51

## 2021-11-08 RX ADMIN — KETAMINE HYDROCHLORIDE 250 MG/HR: 100 INJECTION, SOLUTION, CONCENTRATE INTRAMUSCULAR; INTRAVENOUS at 02:55

## 2021-11-08 RX ADMIN — HEPARIN SODIUM 1900 UNITS/HR: 10000 INJECTION, SOLUTION INTRAVENOUS at 04:47

## 2021-11-08 RX ADMIN — Medication 50 MCG: at 08:12

## 2021-11-08 RX ADMIN — QUETIAPINE FUMARATE 25 MG: 25 TABLET ORAL at 07:50

## 2021-11-08 RX ADMIN — Medication 2 PACKET: at 22:44

## 2021-11-08 ASSESSMENT — ACTIVITIES OF DAILY LIVING (ADL)
ADLS_ACUITY_SCORE: 25
ADLS_ACUITY_SCORE: 23
ADLS_ACUITY_SCORE: 25
ADLS_ACUITY_SCORE: 23
ADLS_ACUITY_SCORE: 25
ADLS_ACUITY_SCORE: 23
ADLS_ACUITY_SCORE: 23
ADLS_ACUITY_SCORE: 25
ADLS_ACUITY_SCORE: 23
ADLS_ACUITY_SCORE: 25
ADLS_ACUITY_SCORE: 23

## 2021-11-08 ASSESSMENT — MIFFLIN-ST. JEOR
SCORE: 2511
SCORE: 2508

## 2021-11-08 NOTE — PROGRESS NOTES
Emergent Agitation Episode 0210 - 0240  Involved parties: Garland AUGUSTIN, RN & Mahesh FLORES RT    @ 0145 prior to pronation head turn noticed the pt was overbreathing the vent with facial grimace. In an attempt to alleviate pain and increase comfort during the turn pt was bolused with PRN Fentanyl 100 mcg and Versed 4 mg. Pts respirations declined from 34 RR to 31 RR s/p intervention. RT arrived in the room @ 0205 and pt was prepared for the turn. During the turn pt became acutely agitated and began pushing himself out of bed. RT managed airway while RN attempted to restrain patient. MICU was called and informed of acute agitation event. Attempts to restrain pt resulted in increased agitation from the pt and RN bolused an additional 4 mg Versed. Staff assist was called at this time d/t concern for loss of pt airway.     Multiple RN's arrived in room to assist with restraining pt and an additional 100 mcg bolus of Fentanyl was given. MD arrived at the room and ordered an additional 4 mg Versed via peripheral IV instead of central line where previous bolus were given. 4 mg Versed was given in the L-AC PIV. Pt began to relax at this time but continued to over breath the vent RR=40 and facial grimace.    Pt maintains advanced airway with LS present in all quadrants. ETT tube remains @ 27 lip. Moderate amount of thick blood tinged secretions via inline suction likely d/t excessive coughing during event.    MICU MD, charge RN, and bedside RN discussed event and formulated the following plan:  - Increase range of Versed drip  - Increase range of Fentanyl drip  - Add PRN bolus of ketamine  - Increase dosage of ketamine within current range  - Maintain RASS -4 to -5

## 2021-11-08 NOTE — PLAN OF CARE
Major Shift Events  Overnight pt remained in stable condition with issues of acute agitation event, see progress note for more details. Pt was turned supine @ 0600    Neuro: RASS Goal -4 to -5, pupils 2 mm round sluggish, facial grimace to painful stimuli/stimulation, not following commands, moving ex independently during agitation  Cv: SR/ST ('s), BP WDL, Pulses +2, ex warm, afebrile, cap refill <3  Resp: ETT 7.0 @ 27 lip, CMV (70%, 450, 30, 14), PIP 30's, moderate amount of blood tinge secretions, mucous drainage from nare/mouth  GI/: Flores w/ AUO, BM -, BS +, TF 30 mL/hr, FWF 30 mL q4  Skin: Umbillicus hernia, chipped tooth     Drips: Fentanyl 275 mcg/hr, Heparin 1900 unit(s)/hr, epoprostenol 20 ng/kg/min  Sedation: Versed 13 mg/hr, Ketamine 250 mg/hr     Plan: Follow POC. Monitor closely, notify MD of acute changes.  For vital signs and complete assessments, please see documentation flowsheets.

## 2021-11-08 NOTE — PROGRESS NOTES
Major Shift Events: RASS goal of -4 to -5. Pt sedated. Pupils 2, equal and sluggish. SR, pt does get tachy w/ agitation/ turns. SBP goal <170 and MAPs >65. Afebrile. Pressors remain off. Pt proned at 1230. CMV settings: 50%, 450, rate 30, PEEP 14. Veletri running through vent tubing @ 20 ng/kg/min. Moderate amount of creamy/ red streaked secretions. LS: coarse/ diminished. TF @ goal, standard FWF. Rectal pouch remains on. Miralax given x2 and senna w/ no BM. Flores in place w/ adequate UO, 20mg IV lasix given x2 w/ good response. Heparin gtt discontinued, on subQ Lovenox now.  Gtts: fentanyl@ 250 mcg/hr, ketamine@ 175 mg/hr, versed@ 13mg/hr, precedex@ 0.7 mcg/kg/hr.    Plan: Trend ABGs and SPO2. Supine pt at 0600. Continue to monitor ABGs, wean vent settings as tolerated. Care conference on Thursday (11/11/21). For vital signs and complete assessments, please see documentation flowsheets.  Yifan Caceres RN on 11/8/2021 at 6:18 PM

## 2021-11-08 NOTE — PLAN OF CARE
No acute changes in patient's condition today. Patient was pronated at approximately 1215 this afternoon. Patient RASS goal increased to -4 to -5 while prone.    Neuro: Patient heavily sedated with RASS at -4. Pupils are 2mm bilaterally and sluggishly reactive to light. Patient grimaces to painful stimuli, no longer localizes, and does not follow commands.  CV: NSR with SBP goal less than 170 mmHg and MAP goal greater than 65 mmHg.  Pulm: ETT and on CMV setting. FiO2 at 80%, Vt at 450, RR at 30, and PEEP at 14. Lung sounds coarse with thick creamy yellow secretions being suction from the patient's ETT and nose.  GI: NGT with TF at goal rate of 30 ml/hr and FW flushes at 30 q4 hours. Patient had one moderate sized mucoidal stool during today's shift. Patient had rectal pouch placed after pronated. Patient has a large umbilical hernia that extends several cm when he coughs - hernia is soft to palpation.  : Flores catheter with adequate UO. 1 dose of 20 mg of lasix given during today's shift.  IV/Gtt: Right triple lumen internal jugular CVC and left PIVs x2. Left radial arterial line present. Fentanyl at 200 mcg/hr, ketamine at 200 mg/hr, Flolan at 20 ng/kg/min, heparin at 1950 units/hr, and midazolam at 12 mg/hr.    Will continue to monitor for safety and comfort.    Jacob Corona RN

## 2021-11-08 NOTE — PROGRESS NOTES
Brief Cross Cover Note    Called to patient's bedside due to acute agitation, concern for impending self-extubation. Upon my arrival, several nurses were in the room, restraining the patient. Boluses from the pump were ineffective. Fentanyl 100mg and versed 4mg were given via boluses from the pump. Versed 4mg was emergently given via peripheral with concern that CVC lumen may be clogged. All CVC lumens checked and were drawing appropriately. After discussion with bedside RN and charge RN, increased fentanyl max range to 300mg, increased versed max range to 14mg, and added ketamine 50-100mg bolus q1h PRN. After discussion with team, decision to keep patient proned with intention to increase sedation to return to RASS -4 to -5 goal. If these increases in sedation ranges are ineffective, day team to consider altering sedation regimen.     Surekha Jameson MD

## 2021-11-09 ENCOUNTER — APPOINTMENT (OUTPATIENT)
Dept: GENERAL RADIOLOGY | Facility: CLINIC | Age: 41
End: 2021-11-09
Attending: STUDENT IN AN ORGANIZED HEALTH CARE EDUCATION/TRAINING PROGRAM
Payer: MEDICAID

## 2021-11-09 LAB
ANION GAP SERPL CALCULATED.3IONS-SCNC: 4 MMOL/L (ref 3–14)
BASE EXCESS BLDA CALC-SCNC: 1.7 MMOL/L (ref -9–1.8)
BASE EXCESS BLDA CALC-SCNC: 1.9 MMOL/L (ref -9–1.8)
BASE EXCESS BLDA CALC-SCNC: 1.9 MMOL/L (ref -9–1.8)
BASE EXCESS BLDA CALC-SCNC: 2.2 MMOL/L (ref -9–1.8)
BUN SERPL-MCNC: 45 MG/DL (ref 7–30)
CALCIUM SERPL-MCNC: 8.6 MG/DL (ref 8.5–10.1)
CHLORIDE BLD-SCNC: 116 MMOL/L (ref 94–109)
CO2 SERPL-SCNC: 26 MMOL/L (ref 20–32)
CREAT SERPL-MCNC: 1.21 MG/DL (ref 0.66–1.25)
ERYTHROCYTE [DISTWIDTH] IN BLOOD BY AUTOMATED COUNT: 16 % (ref 10–15)
GFR SERPL CREATININE-BSD FRML MDRD: 73 ML/MIN/1.73M2
GLUCOSE BLD-MCNC: 129 MG/DL (ref 70–99)
GLUCOSE BLDC GLUCOMTR-MCNC: 113 MG/DL (ref 70–99)
GLUCOSE BLDC GLUCOMTR-MCNC: 122 MG/DL (ref 70–99)
GLUCOSE BLDC GLUCOMTR-MCNC: 123 MG/DL (ref 70–99)
GLUCOSE BLDC GLUCOMTR-MCNC: 126 MG/DL (ref 70–99)
GLUCOSE BLDC GLUCOMTR-MCNC: 128 MG/DL (ref 70–99)
GLUCOSE BLDC GLUCOMTR-MCNC: 159 MG/DL (ref 70–99)
HCO3 BLD-SCNC: 28 MMOL/L (ref 21–28)
HCO3 BLD-SCNC: 28 MMOL/L (ref 21–28)
HCO3 BLD-SCNC: 29 MMOL/L (ref 21–28)
HCO3 BLD-SCNC: 29 MMOL/L (ref 21–28)
HCT VFR BLD AUTO: 43.6 % (ref 40–53)
HGB BLD-MCNC: 13.3 G/DL (ref 13.3–17.7)
MAGNESIUM SERPL-MCNC: 2.5 MG/DL (ref 1.6–2.3)
MCH RBC QN AUTO: 27.5 PG (ref 26.5–33)
MCHC RBC AUTO-ENTMCNC: 30.5 G/DL (ref 31.5–36.5)
MCV RBC AUTO: 90 FL (ref 78–100)
O2/TOTAL GAS SETTING VFR VENT: 50 %
O2/TOTAL GAS SETTING VFR VENT: 70 %
O2/TOTAL GAS SETTING VFR VENT: 80 %
O2/TOTAL GAS SETTING VFR VENT: 80 %
OXYHGB MFR BLD: 97 % (ref 92–100)
PCO2 BLD: 50 MM HG (ref 35–45)
PCO2 BLD: 51 MM HG (ref 35–45)
PCO2 BLD: 52 MM HG (ref 35–45)
PCO2 BLD: 53 MM HG (ref 35–45)
PH BLD: 7.34 [PH] (ref 7.35–7.45)
PH BLD: 7.34 [PH] (ref 7.35–7.45)
PH BLD: 7.36 [PH] (ref 7.35–7.45)
PH BLD: 7.36 [PH] (ref 7.35–7.45)
PHOSPHATE SERPL-MCNC: 3.9 MG/DL (ref 2.5–4.5)
PLATELET # BLD AUTO: 279 10E3/UL (ref 150–450)
PO2 BLD: 110 MM HG (ref 80–105)
PO2 BLD: 68 MM HG (ref 80–105)
PO2 BLD: 74 MM HG (ref 80–105)
PO2 BLD: 83 MM HG (ref 80–105)
POTASSIUM BLD-SCNC: 4.4 MMOL/L (ref 3.4–5.3)
RBC # BLD AUTO: 4.83 10E6/UL (ref 4.4–5.9)
SODIUM SERPL-SCNC: 146 MMOL/L (ref 133–144)
WBC # BLD AUTO: 14.1 10E3/UL (ref 4–11)

## 2021-11-09 PROCEDURE — 36620 INSERTION CATHETER ARTERY: CPT | Mod: GC | Performed by: STUDENT IN AN ORGANIZED HEALTH CARE EDUCATION/TRAINING PROGRAM

## 2021-11-09 PROCEDURE — 250N000009 HC RX 250: Performed by: STUDENT IN AN ORGANIZED HEALTH CARE EDUCATION/TRAINING PROGRAM

## 2021-11-09 PROCEDURE — 80048 BASIC METABOLIC PNL TOTAL CA: CPT | Performed by: STUDENT IN AN ORGANIZED HEALTH CARE EDUCATION/TRAINING PROGRAM

## 2021-11-09 PROCEDURE — 999N000185 HC STATISTIC TRANSPORT TIME EA 15 MIN

## 2021-11-09 PROCEDURE — 83735 ASSAY OF MAGNESIUM: CPT | Performed by: STUDENT IN AN ORGANIZED HEALTH CARE EDUCATION/TRAINING PROGRAM

## 2021-11-09 PROCEDURE — 71045 X-RAY EXAM CHEST 1 VIEW: CPT | Mod: 26 | Performed by: RADIOLOGY

## 2021-11-09 PROCEDURE — 250N000011 HC RX IP 250 OP 636: Performed by: STUDENT IN AN ORGANIZED HEALTH CARE EDUCATION/TRAINING PROGRAM

## 2021-11-09 PROCEDURE — 94640 AIRWAY INHALATION TREATMENT: CPT | Mod: 76

## 2021-11-09 PROCEDURE — 272N000452 HC KIT SHRLOCK 5FR POWER PICC TRIPLE LUMEN

## 2021-11-09 PROCEDURE — 85027 COMPLETE CBC AUTOMATED: CPT | Performed by: STUDENT IN AN ORGANIZED HEALTH CARE EDUCATION/TRAINING PROGRAM

## 2021-11-09 PROCEDURE — G0463 HOSPITAL OUTPT CLINIC VISIT: HCPCS

## 2021-11-09 PROCEDURE — 258N000003 HC RX IP 258 OP 636: Performed by: STUDENT IN AN ORGANIZED HEALTH CARE EDUCATION/TRAINING PROGRAM

## 2021-11-09 PROCEDURE — C9113 INJ PANTOPRAZOLE SODIUM, VIA: HCPCS | Performed by: STUDENT IN AN ORGANIZED HEALTH CARE EDUCATION/TRAINING PROGRAM

## 2021-11-09 PROCEDURE — 36620 INSERTION CATHETER ARTERY: CPT | Mod: GC | Performed by: ANESTHESIOLOGY

## 2021-11-09 PROCEDURE — 272N000201 ZZ HC ADHESIVE SKIN CLOSURE, DERMABOND

## 2021-11-09 PROCEDURE — 94645 CONT INHLJ TX EACH ADDL HOUR: CPT

## 2021-11-09 PROCEDURE — 36569 INSJ PICC 5 YR+ W/O IMAGING: CPT

## 2021-11-09 PROCEDURE — 250N000009 HC RX 250

## 2021-11-09 PROCEDURE — 200N000002 HC R&B ICU UMMC

## 2021-11-09 PROCEDURE — 999N000065 XR CHEST PORT 1 VIEW

## 2021-11-09 PROCEDURE — 94003 VENT MGMT INPAT SUBQ DAY: CPT

## 2021-11-09 PROCEDURE — 250N000013 HC RX MED GY IP 250 OP 250 PS 637: Performed by: STUDENT IN AN ORGANIZED HEALTH CARE EDUCATION/TRAINING PROGRAM

## 2021-11-09 PROCEDURE — 82805 BLOOD GASES W/O2 SATURATION: CPT | Performed by: STUDENT IN AN ORGANIZED HEALTH CARE EDUCATION/TRAINING PROGRAM

## 2021-11-09 PROCEDURE — 82803 BLOOD GASES ANY COMBINATION: CPT | Performed by: STUDENT IN AN ORGANIZED HEALTH CARE EDUCATION/TRAINING PROGRAM

## 2021-11-09 PROCEDURE — 84100 ASSAY OF PHOSPHORUS: CPT | Performed by: STUDENT IN AN ORGANIZED HEALTH CARE EDUCATION/TRAINING PROGRAM

## 2021-11-09 PROCEDURE — 999N000157 HC STATISTIC RCP TIME EA 10 MIN

## 2021-11-09 PROCEDURE — 99291 CRITICAL CARE FIRST HOUR: CPT | Mod: 25 | Performed by: STUDENT IN AN ORGANIZED HEALTH CARE EDUCATION/TRAINING PROGRAM

## 2021-11-09 PROCEDURE — 250N000013 HC RX MED GY IP 250 OP 250 PS 637

## 2021-11-09 PROCEDURE — A7035 POS AIRWAY PRESS HEADGEAR: HCPCS

## 2021-11-09 PROCEDURE — 250N000011 HC RX IP 250 OP 636: Performed by: NURSE PRACTITIONER

## 2021-11-09 PROCEDURE — 36620 INSERTION CATHETER ARTERY: CPT | Mod: GC

## 2021-11-09 PROCEDURE — 999N000015 HC STATISTIC ARTERIAL MONITORING DAILY

## 2021-11-09 PROCEDURE — 999N000127 HC STATISTIC PERIPHERAL IV START W US GUIDANCE

## 2021-11-09 RX ORDER — FUROSEMIDE 10 MG/ML
20 INJECTION INTRAMUSCULAR; INTRAVENOUS ONCE
Status: COMPLETED | OUTPATIENT
Start: 2021-11-09 | End: 2021-11-09

## 2021-11-09 RX ORDER — LIDOCAINE 40 MG/G
CREAM TOPICAL
Status: ACTIVE | OUTPATIENT
Start: 2021-11-09 | End: 2021-11-12

## 2021-11-09 RX ORDER — FUROSEMIDE 10 MG/ML
20 INJECTION INTRAMUSCULAR; INTRAVENOUS ONCE
Status: COMPLETED | OUTPATIENT
Start: 2021-11-09 | End: 2021-11-10

## 2021-11-09 RX ORDER — HEPARIN SODIUM,PORCINE 10 UNIT/ML
5-20 VIAL (ML) INTRAVENOUS EVERY 24 HOURS
Status: DISCONTINUED | OUTPATIENT
Start: 2021-11-09 | End: 2021-12-04 | Stop reason: CLARIF

## 2021-11-09 RX ORDER — HEPARIN SODIUM,PORCINE 10 UNIT/ML
5-20 VIAL (ML) INTRAVENOUS
Status: DISCONTINUED | OUTPATIENT
Start: 2021-11-09 | End: 2021-12-06 | Stop reason: HOSPADM

## 2021-11-09 RX ADMIN — Medication 50 MCG: at 07:47

## 2021-11-09 RX ADMIN — Medication 2 PACKET: at 10:38

## 2021-11-09 RX ADMIN — POLYETHYLENE GLYCOL 3350 17 G: 17 POWDER, FOR SOLUTION ORAL at 07:48

## 2021-11-09 RX ADMIN — KETAMINE HYDROCHLORIDE 150 MG/HR: 100 INJECTION, SOLUTION, CONCENTRATE INTRAMUSCULAR; INTRAVENOUS at 00:56

## 2021-11-09 RX ADMIN — POLYETHYLENE GLYCOL 3350 17 G: 17 POWDER, FOR SOLUTION ORAL at 12:17

## 2021-11-09 RX ADMIN — Medication 2 PACKET: at 17:55

## 2021-11-09 RX ADMIN — DEXMEDETOMIDINE 0.7 MCG/KG/HR: 100 INJECTION, SOLUTION, CONCENTRATE INTRAVENOUS at 08:31

## 2021-11-09 RX ADMIN — LORAZEPAM 6 MG: 2 TABLET ORAL at 10:35

## 2021-11-09 RX ADMIN — IPRATROPIUM BROMIDE AND ALBUTEROL SULFATE 3 ML: .5; 3 SOLUTION RESPIRATORY (INHALATION) at 07:51

## 2021-11-09 RX ADMIN — LORAZEPAM 6 MG: 2 TABLET ORAL at 02:30

## 2021-11-09 RX ADMIN — OXYCODONE HYDROCHLORIDE 20 MG: 10 TABLET ORAL at 07:55

## 2021-11-09 RX ADMIN — Medication 2 PACKET: at 07:55

## 2021-11-09 RX ADMIN — QUETIAPINE FUMARATE 50 MG: 50 TABLET ORAL at 20:05

## 2021-11-09 RX ADMIN — Medication 20 NG/KG/MIN: at 13:28

## 2021-11-09 RX ADMIN — LORAZEPAM 6 MG: 2 TABLET ORAL at 14:28

## 2021-11-09 RX ADMIN — Medication 100 MCG: at 09:05

## 2021-11-09 RX ADMIN — INSULIN ASPART 1 UNITS: 100 INJECTION, SOLUTION INTRAVENOUS; SUBCUTANEOUS at 14:46

## 2021-11-09 RX ADMIN — Medication 2 PACKET: at 14:31

## 2021-11-09 RX ADMIN — DEXAMETHASONE SODIUM PHOSPHATE 6 MG: 4 INJECTION, SOLUTION INTRA-ARTICULAR; INTRALESIONAL; INTRAMUSCULAR; INTRAVENOUS; SOFT TISSUE at 12:16

## 2021-11-09 RX ADMIN — DEXMEDETOMIDINE 0.7 MCG/KG/HR: 100 INJECTION, SOLUTION, CONCENTRATE INTRAVENOUS at 04:42

## 2021-11-09 RX ADMIN — ACETAMINOPHEN 650 MG: 325 TABLET, FILM COATED ORAL at 17:55

## 2021-11-09 RX ADMIN — Medication 100 MCG: at 17:48

## 2021-11-09 RX ADMIN — Medication 12 MG/HR: at 08:03

## 2021-11-09 RX ADMIN — Medication 225 MCG/HR: at 11:31

## 2021-11-09 RX ADMIN — OXYCODONE HYDROCHLORIDE 20 MG: 10 TABLET ORAL at 02:30

## 2021-11-09 RX ADMIN — Medication 250 MCG/HR: at 02:55

## 2021-11-09 RX ADMIN — Medication 13 MG/HR: at 22:46

## 2021-11-09 RX ADMIN — PANTOPRAZOLE SODIUM 40 MG: 40 INJECTION, POWDER, FOR SOLUTION INTRAVENOUS at 08:04

## 2021-11-09 RX ADMIN — OXYCODONE HYDROCHLORIDE 20 MG: 10 TABLET ORAL at 14:28

## 2021-11-09 RX ADMIN — PANTOPRAZOLE SODIUM 40 MG: 40 INJECTION, POWDER, FOR SOLUTION INTRAVENOUS at 20:05

## 2021-11-09 RX ADMIN — ACETAMINOPHEN 650 MG: 325 TABLET, FILM COATED ORAL at 12:16

## 2021-11-09 RX ADMIN — DEXMEDETOMIDINE 0.7 MCG/KG/HR: 100 INJECTION, SOLUTION, CONCENTRATE INTRAVENOUS at 12:16

## 2021-11-09 RX ADMIN — Medication 12 MG/HR: at 00:22

## 2021-11-09 RX ADMIN — ACETAMINOPHEN 650 MG: 325 TABLET, FILM COATED ORAL at 07:49

## 2021-11-09 RX ADMIN — Medication 5 ML: at 07:49

## 2021-11-09 RX ADMIN — DEXMEDETOMIDINE 0.7 MCG/KG/HR: 100 INJECTION, SOLUTION, CONCENTRATE INTRAVENOUS at 01:02

## 2021-11-09 RX ADMIN — LORAZEPAM 6 MG: 2 TABLET ORAL at 06:54

## 2021-11-09 RX ADMIN — Medication 2 PACKET: at 21:31

## 2021-11-09 RX ADMIN — OXYCODONE HYDROCHLORIDE 20 MG: 10 TABLET ORAL at 20:05

## 2021-11-09 RX ADMIN — DOCUSATE SODIUM 50 MG AND SENNOSIDES 8.6 MG 2 TABLET: 8.6; 5 TABLET, FILM COATED ORAL at 07:48

## 2021-11-09 RX ADMIN — ACETYLCYSTEINE 2 ML: 200 SOLUTION ORAL; RESPIRATORY (INHALATION) at 07:51

## 2021-11-09 RX ADMIN — Medication 50 MCG: at 10:52

## 2021-11-09 RX ADMIN — LORAZEPAM 6 MG: 2 TABLET ORAL at 17:55

## 2021-11-09 RX ADMIN — DOCUSATE SODIUM 50 MG AND SENNOSIDES 8.6 MG 2 TABLET: 8.6; 5 TABLET, FILM COATED ORAL at 20:05

## 2021-11-09 RX ADMIN — Medication 225 MCG/HR: at 21:31

## 2021-11-09 RX ADMIN — QUETIAPINE FUMARATE 25 MG: 25 TABLET ORAL at 07:49

## 2021-11-09 RX ADMIN — LIDOCAINE HYDROCHLORIDE 3 ML: 10 INJECTION, SOLUTION EPIDURAL; INFILTRATION; INTRACAUDAL; PERINEURAL at 12:22

## 2021-11-09 RX ADMIN — LORAZEPAM 6 MG: 2 TABLET ORAL at 21:30

## 2021-11-09 RX ADMIN — FUROSEMIDE 20 MG: 10 INJECTION, SOLUTION INTRAVENOUS at 12:16

## 2021-11-09 RX ADMIN — ENOXAPARIN SODIUM 40 MG: 40 INJECTION SUBCUTANEOUS at 10:37

## 2021-11-09 RX ADMIN — DEXMEDETOMIDINE 0.7 MCG/KG/HR: 100 INJECTION, SOLUTION, CONCENTRATE INTRAVENOUS at 20:08

## 2021-11-09 RX ADMIN — Medication 20 NG/KG/MIN: at 04:46

## 2021-11-09 RX ADMIN — Medication 50 MCG: at 11:49

## 2021-11-09 RX ADMIN — ACETYLCYSTEINE 2 ML: 200 SOLUTION ORAL; RESPIRATORY (INHALATION) at 20:21

## 2021-11-09 RX ADMIN — IPRATROPIUM BROMIDE AND ALBUTEROL SULFATE 3 ML: .5; 3 SOLUTION RESPIRATORY (INHALATION) at 20:21

## 2021-11-09 RX ADMIN — QUETIAPINE FUMARATE 25 MG: 25 TABLET ORAL at 14:29

## 2021-11-09 RX ADMIN — DEXMEDETOMIDINE 0.7 MCG/KG/HR: 100 INJECTION, SOLUTION, CONCENTRATE INTRAVENOUS at 16:15

## 2021-11-09 RX ADMIN — Medication 100 MCG: at 13:00

## 2021-11-09 ASSESSMENT — ACTIVITIES OF DAILY LIVING (ADL)
ADLS_ACUITY_SCORE: 27
ADLS_ACUITY_SCORE: 27
ADLS_ACUITY_SCORE: 25
ADLS_ACUITY_SCORE: 27
ADLS_ACUITY_SCORE: 25
ADLS_ACUITY_SCORE: 27
ADLS_ACUITY_SCORE: 27
ADLS_ACUITY_SCORE: 25
ADLS_ACUITY_SCORE: 25
ADLS_ACUITY_SCORE: 27
ADLS_ACUITY_SCORE: 25
ADLS_ACUITY_SCORE: 27
ADLS_ACUITY_SCORE: 27

## 2021-11-09 NOTE — PROCEDURES
[facility]    Insert arterial line    Date/Time: 11/9/2021 2:24 PM  Performed by: Jovan Betancourt MD  Authorized by: Melida Rolle MD     UNIVERSAL PROTOCOL   Site Marked: Yes  Prior Images Obtained and Reviewed:  NA  Required items: Required blood products, implants, devices and special equipment available    Patient identity confirmed:  Anonymous protocol, patient vented/unresponsive  NA - No sedation, light sedation, or local anesthesia  Confirmation Checklist:  Correct equipment/implants were available, procedure was appropriate and matched the consent or emergent situation, patient's identity using two indicators and relevant allergies  Universal Protocol: the Joint Commission Universal Protocol was followed    Preparation: Patient was prepped and draped in usual sterile fashion        Indication: multiple ABGs  Location: right radial       ANESTHESIA    Anesthesia: Local infiltration      SEDATION    Patient Sedated: Yes    Vital signs: Vital signs monitored during sedation      PROCEDURE DETAILS          Number of Attempts:  1  Post-procedure:  Line sutured and dressing applied  CMS: normal  PROCEDURE   Patient Tolerance:  Patient tolerated the procedure well with no immediate complications    Length of time physician/provider present for 1:1 monitoring during sedation: 20

## 2021-11-09 NOTE — PROVIDER NOTIFICATION
Notified MICU resident of 0530 ABG results. PO2 decreased overnight now 68. Newly placed ulnar arterial line no longer draws back, wave form flat. Orders to keep patient prone at this time, will readdress arterial line placement in the AM. FiO2 increased to 60%.

## 2021-11-09 NOTE — PROVIDER NOTIFICATION
Notified MICU resident arterial line does not draw back, does not have waveform. Attempted to troubleshoot line, reposition without success. Plan to leave line in place overnight and have day team attempt to rewire if possible. Physician still OK to supinate patient at 0600 per plan of care.

## 2021-11-09 NOTE — PLAN OF CARE
Neuro: patient RASS -4/-5. Pupils equal/sluggishly reactive. Sedated on fentanyl, precedex, ketamine, versed drips. Slow sedation wean overnight. No agitation issues with head turns.  CV: hemodynamically stable, tmax 99.8.  RR: intubated on CMV 30, 450, 14, 60%. 0500 ABG with decreased PO2 68. Plan to keep patient prone at this time. FiO2 increased to 60%. Lungs coarse, diminished.  GI: tube feed at goal rate 30ml/hr + 60ml/hr free water flush  : urinary catheter in place with adequate clear yellow output  Skin: generalized bruising, missing tooth with small amounts of oral bleeding  L/T/D: RIJ CVC, PIV x 2, ulnar artery line, urinary catheter, NG    >Radial artery line replaced d/t loss of waveform/not drawing back. Left ulnar artery line placed, quickly developed flat waveform, does not aspirate despite mechanical manipulation of the wrist. MICU notified, plan to address on day shift.

## 2021-11-09 NOTE — PROCEDURES
[facility]    Triple Lumen PICC Placement    Date/Time: 11/9/2021 12:16 PM  Performed by: Magali Yarbrough RN  Authorized by: Bonifacio Kirkpatrick MD   Indications: vascular access    UNIVERSAL PROTOCOL   Site Marked: Yes  Prior Images Obtained and Reviewed:  Yes  Required items: Required blood products, implants, devices and special equipment available    Patient identity confirmed:  Verbally with patient  NA - No sedation, light sedation, or local anesthesia  Confirmation Checklist:  Patient's identity using two indicators, relevant allergies, procedure was appropriate and matched the consent or emergent situation and correct equipment/implants were available  Time out: Immediately prior to the procedure a time out was called    Universal Protocol: the Joint Commission Universal Protocol was followed    Preparation: Patient was prepped and draped in usual sterile fashion           ANESTHESIA    Anesthesia: See MAR for details  Local Anesthetic:  Lidocaine 1% without epinephrine  Anesthetic Total (mL):  3      SEDATION    Patient Sedated: No        Preparation: skin prepped with ChloraPrep  Skin prep agent: skin prep agent completely dried prior to procedure  Sterile barriers: maximum sterile barriers were used: cap, mask, sterile gown, sterile gloves, and large sterile sheet  Hand hygiene: hand hygiene performed prior to central venous catheter insertion  Type of line used: Power PICC  Catheter type: triple lumen  Lumen type: non-valved  Catheter size: 5 Fr  Brand: [employer]  Lot number: [id number]  Placement method: venipuncture, MST, ultrasound and tip confirmation system (Bard 3CG)  Number of attempts: 1  Successful placement: yes  Orientation: right  Location: basilic vein (vein diameter- 0.50cm)  Arm circumference: adults 10 cm  Extremity circumference: 38  Visible catheter length: 1  Total catheter length: 55  Dressing and securement: alcohol impregnated caps, blood cleaned  with CHG, chlorhexidine patch applied, glue, site cleaned, statlock and sterile dressing applied  Post procedure assessment: blood return through all ports and free fluid flow (Placement verified by Bard 3CG)  PROCEDURE   Patient Tolerance:  Patient tolerated the procedure well with no immediate complications  Describe Procedure: PICC placement verified by Bard 3CG. Tip at satisfactory position. PICC okay to use.

## 2021-11-09 NOTE — PROCEDURES
[facility] Procedure Note         Medicine Bedside Procedure:     Called to patient's bedside with non-functioning right radial arterial line--does not give pressure readings, does not draw blood. Sterilized the site, attempted to re-wire the right radial art line. Unfortunately, minimal to absent blood return with new catheter at right radial site. Decision to place right ulnar arterial line instead.     PROCEDURE PERFORMED:  Arterial Catheter Site:  Right ulnar artery.     PAUSE FOR THE CAUSE: Right patient: Yes      Right body part: Yes      Right procedure Yes    ULTRASOUND GUIDANCE:  Yes    PRIMARY INDICATION:  monitoring of blood pressure and monitoring of arterial oxygenation    DIAGNOSTIC DATA REVIEW:  Non-Applicable    H&P STATUS: H&P was reviewed, the patient was examined and no change has occurred in patient's condition since H&P was completed.    BARRIER PRECAUTIONS & STERILE TECHNIQUE  As documented in the Pre-Procedure Check List.    LOCAL ANESTHESIA:  None    NUMBER OF KITS USED:  2    STERILE DRESSINGS:  Applied    ESTIMATED BLOOD LOSS:  Minimal    SPECIMENS COLLECTED:  Non-applicable    SPECIMENS SENT:  Non-applicable    FOLLOW- UP CHEST X-RAY:  Not indicatedar  COMPLICATIONS:  none    PRIMARY PROCEDURALIST:   Surekha Jameson MD, completed by Dr. Roman    SUPERVISING PHYSICIAN:  Dr. Roman

## 2021-11-09 NOTE — PROGRESS NOTES
MEDICAL ICU PROGRESS NOTE  11/09/2021      Date of Service (when I saw the patient): 11/09/2021    ASSESSMENT: Brandon Schafer is a 41 year old male with questionable PMH of remote VTE who was admitted on 11/3/2021 with ARDS 2/2 COVID pneumonia.    CHANGES and MAJOR THINGS TODAY:   - Re-prone 11/9  - Increase FWF to 100 ml q4h   - 20mg IV lasix this am, reassess Q6H    PLAN:    Neuro:  # Pain and sedation  - Fentanyl infusion  - Midazolam infusion, attempt to wean down first  - Ketamine infusion  - Oxycodone 20 mg Q6H  - Ativan 6 mg Q4H  - Seroquel 25mg, 25mg, 50mg   - RASS goal -2 while supine, -5 while proned   - Not currently planning on initiating NMB    Pulmonary:  # ARDS 2/2 COVID pneumonia  Intubated 11/2 prior to transport flight. Proned on 11/5.   - Lung protective ventilation with AC 30/450/+14/70%   - No indication for NMB at this time  - Inhaled epoprostenol at 20 ng/kg/min  - Secretion clearance with duonebs q6 hours, and BID mucomyst, metaneb  - Infectious work-up and treatment as below  - Re-pone 11/9    # ?COPD exacerbation  Wheezing on exam on 11/4. S/p azithromycin for 5d. Steroids per Covid protocol.     Cardiovascular:  # Hypotension, resolved   Transient, and in the setting of sedation. No prior TTE. Troponin peaked at 0.293. EKG with T wave inversion in lateral leads. Suspect troponin was 2/2 demand ischemia in setting of hypoxia.  - Norepinephrine gtt as needed     GI/Nutrition:  # At risk for malnutrition  - Nutrition consult for TF  - Recipe per RD   - Bowel regimen: scheduled Miralax, and scheduled senna; prn senna and Miralax    # Coffee ground material from OG (11/4), resolved  - PPI BID    Renal/Fluids/Electrolytes:  # Non-oliguric STEFAN- Improved  Unclear baseline, has not seen doctor in 20 years. Cr 2.5 on admission, now improved.   - Continue diuresis with furosemide 20 mg, reassess Q6H   - Goal net negative 1L    # Hypernatremia  - increase FWF to 100ml q4h     Endocrine:  # Stress  and steroid induced hyperglycemia  - Medium resistance sliding scale insulin     ID:  # COVID pneumonia  Symptoms (headache, SOB, fever, cough, diarrhea) 1 week prior to presentation. Not COVID vaccinated. Tested positive on presentation on 11/2. S/p tocilizumab 11/4.  - Dexamethasone 6 mg daily (total 10 days)  - Deferring remdesivir given STEFAN    Hematology:    # Coagulopathy 2/2 COVID  US BLE negative for DVT.  - lovenox ppx     General Cares/Prophylaxis:    DVT Prophylaxis: Lovenox ppx  GI Prophylaxis: PPI  Family Communication: Brother Brad. Care conference   Code Status: DNR    Lines/tubes/drains:  - PIV x2  - Right PICC  - Arterial line left radial  - OG  - Flores  - ETT    Disposition:  - Medical ICU     Patient seen and findings/plan discussed with medical ICU staff, Dr. Rolle.    Jovan Betancourt  [facility]  Internal Medicine, PGY3    ====================================  INTERVAL HISTORY:   Overall sedation improved with addition of enteral oxycodone and ativan. This morning acute increase in FiO2 thought to be due to biting down on tube, improved w/ guard placement. Patient placed in supine at around 9AM.     Overnight, arterial line was replaced but stopped working again, plan to replace today. Central line was also pulled back. Thus decision was made to remove and replaced w/ triple lumen PICC.     OBJECTIVE:   1. VITAL SIGNS:   Temp:  [98.5  F (36.9  C)-99.8  F (37.7  C)] 99.2  F (37.3  C)  Pulse:  [] 77  Resp:  [29-35] 30  BP: (103-119)/(58-80) 104/64  MAP:  [4 mmHg-168 mmHg] 6 mmHg  Arterial Line BP: (4-171)/(4-165) 6/6  FiO2 (%):  [50 %-100 %] 70 %  SpO2:  [91 %-99 %] 92 %  Ventilation Mode: CMV/AC  (Continuous Mandatory Ventilation/ Assist Control)  FiO2 (%): 70 %  Rate Set (breaths/minute): 30 breaths/min  Tidal Volume Set (mL): 450 mL  PEEP (cm H2O): 14 cmH2O  Oxygen Concentration (%): 60 %  Resp: 30    2. INTAKE/ OUTPUT:   I/O last 3 completed shifts:  In: 2636.43  [I.V.:1197.43; NG/GT:835]  Out: 2890 [Urine:2890]    3. PHYSICAL EXAMINATION:  General: intubated and sedated  HEENT: Mucous membranes moist  Neuro: RASS -4  Pulm/Resp: Bilateral coarse breath sounds  CV: RRR, S1/S2 without m/r/g; pedal pulses 2+ with trace LE edema  Abdomen: Obese,  non-tender; chelo-umbilical hernia that is soft and compressible   : Flores catheter in place, urine yellow and clear  Incisions/Skin: No rashes noted    4. LABS:   Arterial Blood Gases   Recent Labs   Lab 11/09/21  0533 11/08/21  1744 11/08/21  1136 11/08/21  0838   PH 7.36 7.33* 7.40 7.36   PCO2 50* 53* 46* 50*   PO2 68* 122* 61* 68*   HCO3 28 28 28 28     Complete Blood Count   Recent Labs   Lab 11/09/21  0326 11/08/21  0427 11/07/21  0507 11/06/21  0317   WBC 14.1* 15.4* 15.6* 15.1*   HGB 13.3 13.6 12.8* 12.7*    311 350 354     Basic Metabolic Panel  Recent Labs   Lab 11/09/21  0809 11/09/21  0422 11/09/21  0326 11/09/21  0030 11/08/21  0810 11/08/21  0427 11/07/21  1616 11/07/21  1615 11/07/21  0744 11/07/21  0507   NA  --   --  146*  --   --  145*  --  142  --  141   POTASSIUM  --   --  4.4  --   --  4.1  --  4.1  --  4.0   CHLORIDE  --   --  116*  --   --  113*  --  109  --  109   CO2  --   --  26  --   --  26  --  27  --  28   BUN  --   --  45*  --   --  48*  --  46*  --  42*   CR  --   --  1.21  --   --  1.16  --  1.26*  --  1.15   * 128* 129* 122*   < > 106*   < > 187*   < > 105*    < > = values in this interval not displayed.     Liver Function Tests  Recent Labs   Lab 11/07/21  0507 11/04/21  0335 11/02/21  1943   * 40 62*   ALT 98* 31 37   ALKPHOS 82 62 81   BILITOTAL 0.4 0.3 0.3   ALBUMIN 2.4* 1.9* 2.4*     5. RADIOLOGY:   Recent Results (from the past 24 hour(s))   XR Chest Port 1 View    Impression    RESIDENT PRELIMINARY INTERPRETATION  Impression:   1. Diffuse bilateral patchy opacities, consistent with history of  COVID ARDS.  2. Support devices as described in the body of report.

## 2021-11-09 NOTE — PROGRESS NOTES
Care Management Follow Up    Length of Stay (days): 6    Expected Discharge Date: 12/06/2021     Concerns to be Addressed:  Care Conference    Patient plan of care discussed at interdisciplinary rounds: Yes    Anticipated Discharge Disposition:       Anticipated Discharge Services:    Anticipated Discharge DME:      Patient/family educated on Medicare website which has current facility and service quality ratings:    Education Provided on the Discharge Plan:    Patient/Family in Agreement with the Plan:      Referrals Placed by CM/SW:    Private pay costs discussed: Not applicable    Additional Information:  Writer called Brad (brother) to set care conference time for Thursday 11/11 at 2pm. Brad agreed to that time and stated that he had call in information.     Kelsey Gonzalez RN Care Coordinator   [facility]  [phone]           Kelsey Gonzalez, RN

## 2021-11-09 NOTE — CONSULTS
WO Nurse Inpatient Pressure Injury Assessment   Reason for consultation: Evaluate and treat Bl cheeks      ASSESSMENT  Pressure Injury: on septum , hospital acquired ,   This is a Medical Device Related Pressure Injury (MDRPI) due to ETAD head gear was pushed against the area during proning  Pressure Injury is Stage Deep Tissue Pressure Injury (DTPI)   Contributing factor of the pressure injury: pressure and immobility  Status: initial assessment and evolving  Recommend provider order: None, at this time     Bl cheeks with intact epidermis and blanchable erythema. RN placed foam dressing under the ETAD.     TREATMENT PLAN  Septum wound: Daily    Cleanse and apply a layer of no sting film barrier.  Ensure to place a piece of optifoam along the septal area and mold the Z-flow to avoid direct pressure while proning.    BL cheeks- Continue with mepilex foam dressing for prevention.    Orders Written  WOC Nurse follow-up plan:twice weekly  Nursing to notify the Provider(s) and re-consult the WOC Nurse if wound(s) deteriorates or new skin concern.    Patient History  According to provider note(s): Brandon Schafer is a 41 year old male with questionable PMH of remote VTE who was admitted on 11/3/2021 with ARDS 2/2 COVID pneumonia.    Objective Data  Containment of urine/stool: Indwelling catheter    Current Diet/ Nutrition:  Orders Placed This Encounter      NPO for Medical/Clinical Reasons Except for: Meds      Output:   I/O last 3 completed shifts:  In: 2752.43 [I.V.:1197.43; NG/GT:835]  Out: 2890 [Urine:2890]    Risk Assessment:   Sensory Perception: 2-->very limited  Moisture: 3-->occasionally moist  Activity: 1-->bedfast  Mobility: 1-->completely immobile  Nutrition: 3-->adequate  Friction and Shear: 2-->potential problem  Adin Score: 12      Labs: Recent Labs   Lab 11/09/21  0326 11/08/21  0427 11/07/21  0507 11/03/21  0416 11/02/21  1943   ALBUMIN  --   --  2.4*   < > 2.4*   HGB 13.3   < > 12.8*   < > 16.1   WBC  14.1*   < > 15.6*   < > 11.5*   CRP  --   --   --   --  142.0*    < > = values in this interval not displayed.       Physical Exam  Skin inspection: focused BL cheeks, mouth and nose  Patient is high risk for pressure injury development secondary to proning    Wound Location:  Septum        Date of last Photo 11/9  Wound History: Pt has been proning intermittently.  Measurements (length x width x depth, in cm) 0.5 cm x 0.6 cm  x  0.0 cm   Wound Base:  100 % non-blanchable, erythema and purple  Tunneling N/A  Undermining N/A  Palpation of the wound bed: normal   Periwound skin: intact  Color: normal and consistent with surrounding tissue  Temperature: normal   Drainage:, none  Description of drainage: none  Odor: none  Pain: unable to assess due to  sedation ,     Interventions  Current support surface: Standard  Low air loss mattress  Current off-loading measures: Pillows  Repositioning aid: Pillows  Visual inspection of wound(s) completed   Tube Securement: ETT head gear  Wound Care: was done per plan of care.  Supplies: floor stock and discussed with RN  Educated provided: plan of care and Off-loading pressure  Education provided to: nurse  Discussed importance of:repositioning every 2 hours, off-loading pressure to wound, off-loading mattress and moisture management  Discussed plan of care with Nurse    Miranda Aranda RN

## 2021-11-10 LAB
ANION GAP SERPL CALCULATED.3IONS-SCNC: 2 MMOL/L (ref 3–14)
BASE EXCESS BLDA CALC-SCNC: 1.4 MMOL/L (ref -9–1.8)
BUN SERPL-MCNC: 49 MG/DL (ref 7–30)
CALCIUM SERPL-MCNC: 9 MG/DL (ref 8.5–10.1)
CHLORIDE BLD-SCNC: 116 MMOL/L (ref 94–109)
CO2 SERPL-SCNC: 30 MMOL/L (ref 20–32)
CREAT SERPL-MCNC: 1.27 MG/DL (ref 0.66–1.25)
ERYTHROCYTE [DISTWIDTH] IN BLOOD BY AUTOMATED COUNT: 15.9 % (ref 10–15)
GFR SERPL CREATININE-BSD FRML MDRD: 69 ML/MIN/1.73M2
GLUCOSE BLD-MCNC: 124 MG/DL (ref 70–99)
GLUCOSE BLDC GLUCOMTR-MCNC: 113 MG/DL (ref 70–99)
GLUCOSE BLDC GLUCOMTR-MCNC: 115 MG/DL (ref 70–99)
GLUCOSE BLDC GLUCOMTR-MCNC: 121 MG/DL (ref 70–99)
GLUCOSE BLDC GLUCOMTR-MCNC: 121 MG/DL (ref 70–99)
GLUCOSE BLDC GLUCOMTR-MCNC: 127 MG/DL (ref 70–99)
GLUCOSE BLDC GLUCOMTR-MCNC: 129 MG/DL (ref 70–99)
GLUCOSE BLDC GLUCOMTR-MCNC: 146 MG/DL (ref 70–99)
GLUCOSE BLDC GLUCOMTR-MCNC: 162 MG/DL (ref 70–99)
HCO3 BLD-SCNC: 29 MMOL/L (ref 21–28)
HCT VFR BLD AUTO: 43.5 % (ref 40–53)
HGB BLD-MCNC: 13.1 G/DL (ref 13.3–17.7)
MAGNESIUM SERPL-MCNC: 2.3 MG/DL (ref 1.6–2.3)
MCH RBC QN AUTO: 27.9 PG (ref 26.5–33)
MCHC RBC AUTO-ENTMCNC: 30.1 G/DL (ref 31.5–36.5)
MCV RBC AUTO: 93 FL (ref 78–100)
O2/TOTAL GAS SETTING VFR VENT: 70 %
PCO2 BLD: 57 MM HG (ref 35–45)
PH BLD: 7.32 [PH] (ref 7.35–7.45)
PHOSPHATE SERPL-MCNC: 4.7 MG/DL (ref 2.5–4.5)
PLATELET # BLD AUTO: 191 10E3/UL (ref 150–450)
PO2 BLD: 93 MM HG (ref 80–105)
POTASSIUM BLD-SCNC: 4.8 MMOL/L (ref 3.4–5.3)
RBC # BLD AUTO: 4.7 10E6/UL (ref 4.4–5.9)
SODIUM SERPL-SCNC: 148 MMOL/L (ref 133–144)
WBC # BLD AUTO: 14.2 10E3/UL (ref 4–11)

## 2021-11-10 PROCEDURE — 999N000157 HC STATISTIC RCP TIME EA 10 MIN

## 2021-11-10 PROCEDURE — 94645 CONT INHLJ TX EACH ADDL HOUR: CPT

## 2021-11-10 PROCEDURE — 250N000013 HC RX MED GY IP 250 OP 250 PS 637: Performed by: STUDENT IN AN ORGANIZED HEALTH CARE EDUCATION/TRAINING PROGRAM

## 2021-11-10 PROCEDURE — 250N000011 HC RX IP 250 OP 636: Performed by: STUDENT IN AN ORGANIZED HEALTH CARE EDUCATION/TRAINING PROGRAM

## 2021-11-10 PROCEDURE — 94640 AIRWAY INHALATION TREATMENT: CPT | Mod: 76

## 2021-11-10 PROCEDURE — 200N000002 HC R&B ICU UMMC

## 2021-11-10 PROCEDURE — 84100 ASSAY OF PHOSPHORUS: CPT | Performed by: STUDENT IN AN ORGANIZED HEALTH CARE EDUCATION/TRAINING PROGRAM

## 2021-11-10 PROCEDURE — 250N000011 HC RX IP 250 OP 636

## 2021-11-10 PROCEDURE — 94003 VENT MGMT INPAT SUBQ DAY: CPT

## 2021-11-10 PROCEDURE — 250N000011 HC RX IP 250 OP 636: Performed by: NURSE PRACTITIONER

## 2021-11-10 PROCEDURE — 80048 BASIC METABOLIC PNL TOTAL CA: CPT | Performed by: STUDENT IN AN ORGANIZED HEALTH CARE EDUCATION/TRAINING PROGRAM

## 2021-11-10 PROCEDURE — 82803 BLOOD GASES ANY COMBINATION: CPT | Performed by: STUDENT IN AN ORGANIZED HEALTH CARE EDUCATION/TRAINING PROGRAM

## 2021-11-10 PROCEDURE — 272N000010 HC KIT CATH ARTERIAL EXT SUPPLY

## 2021-11-10 PROCEDURE — 85027 COMPLETE CBC AUTOMATED: CPT | Performed by: STUDENT IN AN ORGANIZED HEALTH CARE EDUCATION/TRAINING PROGRAM

## 2021-11-10 PROCEDURE — C9113 INJ PANTOPRAZOLE SODIUM, VIA: HCPCS | Performed by: STUDENT IN AN ORGANIZED HEALTH CARE EDUCATION/TRAINING PROGRAM

## 2021-11-10 PROCEDURE — 36620 INSERTION CATHETER ARTERY: CPT | Mod: GC | Performed by: STUDENT IN AN ORGANIZED HEALTH CARE EDUCATION/TRAINING PROGRAM

## 2021-11-10 PROCEDURE — 250N000009 HC RX 250: Performed by: STUDENT IN AN ORGANIZED HEALTH CARE EDUCATION/TRAINING PROGRAM

## 2021-11-10 PROCEDURE — 83735 ASSAY OF MAGNESIUM: CPT | Performed by: STUDENT IN AN ORGANIZED HEALTH CARE EDUCATION/TRAINING PROGRAM

## 2021-11-10 PROCEDURE — 250N000009 HC RX 250

## 2021-11-10 PROCEDURE — 999N000015 HC STATISTIC ARTERIAL MONITORING DAILY

## 2021-11-10 PROCEDURE — 99291 CRITICAL CARE FIRST HOUR: CPT | Mod: GC | Performed by: STUDENT IN AN ORGANIZED HEALTH CARE EDUCATION/TRAINING PROGRAM

## 2021-11-10 PROCEDURE — 250N000013 HC RX MED GY IP 250 OP 250 PS 637

## 2021-11-10 PROCEDURE — 258N000003 HC RX IP 258 OP 636: Performed by: STUDENT IN AN ORGANIZED HEALTH CARE EDUCATION/TRAINING PROGRAM

## 2021-11-10 RX ORDER — BISACODYL 10 MG
10 SUPPOSITORY, RECTAL RECTAL DAILY
Status: DISCONTINUED | OUTPATIENT
Start: 2021-11-10 | End: 2021-11-11

## 2021-11-10 RX ORDER — OXYCODONE HYDROCHLORIDE 10 MG/1
20 TABLET ORAL EVERY 4 HOURS
Status: DISCONTINUED | OUTPATIENT
Start: 2021-11-10 | End: 2021-11-17

## 2021-11-10 RX ORDER — FUROSEMIDE 10 MG/ML
20 INJECTION INTRAMUSCULAR; INTRAVENOUS ONCE
Status: COMPLETED | OUTPATIENT
Start: 2021-11-10 | End: 2021-11-10

## 2021-11-10 RX ORDER — LORAZEPAM 2 MG/1
8 TABLET ORAL EVERY 4 HOURS
Status: DISCONTINUED | OUTPATIENT
Start: 2021-11-10 | End: 2021-11-17

## 2021-11-10 RX ADMIN — ACETAMINOPHEN 650 MG: 325 TABLET, FILM COATED ORAL at 15:59

## 2021-11-10 RX ADMIN — DEXMEDETOMIDINE 0.7 MCG/KG/HR: 100 INJECTION, SOLUTION, CONCENTRATE INTRAVENOUS at 21:05

## 2021-11-10 RX ADMIN — Medication 75 MG: at 20:59

## 2021-11-10 RX ADMIN — Medication 225 MCG/HR: at 07:55

## 2021-11-10 RX ADMIN — Medication 100 MCG: at 16:25

## 2021-11-10 RX ADMIN — LORAZEPAM 6 MG: 2 TABLET ORAL at 01:57

## 2021-11-10 RX ADMIN — IPRATROPIUM BROMIDE AND ALBUTEROL SULFATE 3 ML: .5; 3 SOLUTION RESPIRATORY (INHALATION) at 08:15

## 2021-11-10 RX ADMIN — KETAMINE HYDROCHLORIDE 100 MG/HR: 100 INJECTION, SOLUTION, CONCENTRATE INTRAMUSCULAR; INTRAVENOUS at 00:11

## 2021-11-10 RX ADMIN — ENOXAPARIN SODIUM 40 MG: 40 INJECTION SUBCUTANEOUS at 10:34

## 2021-11-10 RX ADMIN — ACETYLCYSTEINE 2 ML: 200 SOLUTION ORAL; RESPIRATORY (INHALATION) at 08:15

## 2021-11-10 RX ADMIN — OXYCODONE HYDROCHLORIDE 20 MG: 10 TABLET ORAL at 01:57

## 2021-11-10 RX ADMIN — BISACODYL 10 MG: 10 SUPPOSITORY RECTAL at 12:24

## 2021-11-10 RX ADMIN — OXYCODONE HYDROCHLORIDE 20 MG: 10 TABLET ORAL at 20:29

## 2021-11-10 RX ADMIN — INSULIN ASPART 1 UNITS: 100 INJECTION, SOLUTION INTRAVENOUS; SUBCUTANEOUS at 16:25

## 2021-11-10 RX ADMIN — Medication 100 MCG: at 08:05

## 2021-11-10 RX ADMIN — Medication 14 MG/HR: at 06:08

## 2021-11-10 RX ADMIN — LORAZEPAM 8 MG: 2 TABLET ORAL at 18:05

## 2021-11-10 RX ADMIN — DEXMEDETOMIDINE 0.7 MCG/KG/HR: 100 INJECTION, SOLUTION, CONCENTRATE INTRAVENOUS at 14:39

## 2021-11-10 RX ADMIN — Medication 5 ML: at 07:53

## 2021-11-10 RX ADMIN — DEXMEDETOMIDINE 0.7 MCG/KG/HR: 100 INJECTION, SOLUTION, CONCENTRATE INTRAVENOUS at 07:06

## 2021-11-10 RX ADMIN — Medication 2 PACKET: at 10:34

## 2021-11-10 RX ADMIN — INSULIN ASPART 1 UNITS: 100 INJECTION, SOLUTION INTRAVENOUS; SUBCUTANEOUS at 20:42

## 2021-11-10 RX ADMIN — Medication 200 MCG/HR: at 17:17

## 2021-11-10 RX ADMIN — DEXMEDETOMIDINE 0.7 MCG/KG/HR: 100 INJECTION, SOLUTION, CONCENTRATE INTRAVENOUS at 04:02

## 2021-11-10 RX ADMIN — DEXMEDETOMIDINE 0.7 MCG/KG/HR: 100 INJECTION, SOLUTION, CONCENTRATE INTRAVENOUS at 00:38

## 2021-11-10 RX ADMIN — QUETIAPINE FUMARATE 50 MG: 50 TABLET ORAL at 20:29

## 2021-11-10 RX ADMIN — POLYETHYLENE GLYCOL 3350 17 G: 17 POWDER, FOR SOLUTION ORAL at 07:53

## 2021-11-10 RX ADMIN — LORAZEPAM 8 MG: 2 TABLET ORAL at 23:12

## 2021-11-10 RX ADMIN — Medication 2 PACKET: at 21:01

## 2021-11-10 RX ADMIN — FUROSEMIDE 20 MG: 10 INJECTION, SOLUTION INTRAVENOUS at 18:06

## 2021-11-10 RX ADMIN — Medication 100 MCG: at 14:20

## 2021-11-10 RX ADMIN — DEXMEDETOMIDINE 0.7 MCG/KG/HR: 100 INJECTION, SOLUTION, CONCENTRATE INTRAVENOUS at 18:05

## 2021-11-10 RX ADMIN — Medication 20 NG/KG/MIN: at 02:03

## 2021-11-10 RX ADMIN — Medication 50 MG: at 08:50

## 2021-11-10 RX ADMIN — LORAZEPAM 6 MG: 2 TABLET ORAL at 06:08

## 2021-11-10 RX ADMIN — FUROSEMIDE 20 MG: 10 INJECTION, SOLUTION INTRAVENOUS at 00:03

## 2021-11-10 RX ADMIN — QUETIAPINE FUMARATE 25 MG: 25 TABLET ORAL at 14:13

## 2021-11-10 RX ADMIN — Medication 14 MG/HR: at 12:24

## 2021-11-10 RX ADMIN — Medication 2 PACKET: at 07:53

## 2021-11-10 RX ADMIN — LORAZEPAM 8 MG: 2 TABLET ORAL at 14:13

## 2021-11-10 RX ADMIN — DOCUSATE SODIUM 50 MG AND SENNOSIDES 8.6 MG 2 TABLET: 8.6; 5 TABLET, FILM COATED ORAL at 20:29

## 2021-11-10 RX ADMIN — PANTOPRAZOLE SODIUM 40 MG: 40 INJECTION, POWDER, FOR SOLUTION INTRAVENOUS at 07:54

## 2021-11-10 RX ADMIN — PANTOPRAZOLE SODIUM 40 MG: 40 INJECTION, POWDER, FOR SOLUTION INTRAVENOUS at 20:29

## 2021-11-10 RX ADMIN — DEXAMETHASONE SODIUM PHOSPHATE 6 MG: 4 INJECTION, SOLUTION INTRA-ARTICULAR; INTRALESIONAL; INTRAMUSCULAR; INTRAVENOUS; SOFT TISSUE at 12:06

## 2021-11-10 RX ADMIN — OXYCODONE HYDROCHLORIDE 20 MG: 10 TABLET ORAL at 12:06

## 2021-11-10 RX ADMIN — ACETAMINOPHEN 650 MG: 325 TABLET, FILM COATED ORAL at 23:12

## 2021-11-10 RX ADMIN — Medication 14 MG/HR: at 20:57

## 2021-11-10 RX ADMIN — Medication 20 NG/KG/MIN: at 11:50

## 2021-11-10 RX ADMIN — OXYCODONE HYDROCHLORIDE 20 MG: 10 TABLET ORAL at 07:53

## 2021-11-10 RX ADMIN — LORAZEPAM 6 MG: 2 TABLET ORAL at 10:33

## 2021-11-10 RX ADMIN — Medication 2 PACKET: at 14:13

## 2021-11-10 RX ADMIN — DOCUSATE SODIUM 50 MG AND SENNOSIDES 8.6 MG 2 TABLET: 8.6; 5 TABLET, FILM COATED ORAL at 07:53

## 2021-11-10 RX ADMIN — OXYCODONE HYDROCHLORIDE 20 MG: 10 TABLET ORAL at 15:58

## 2021-11-10 RX ADMIN — FUROSEMIDE 20 MG: 10 INJECTION, SOLUTION INTRAMUSCULAR; INTRAVENOUS at 12:05

## 2021-11-10 RX ADMIN — Medication 100 MCG: at 11:15

## 2021-11-10 RX ADMIN — QUETIAPINE FUMARATE 25 MG: 25 TABLET ORAL at 07:53

## 2021-11-10 RX ADMIN — OXYCODONE HYDROCHLORIDE 20 MG: 10 TABLET ORAL at 23:12

## 2021-11-10 RX ADMIN — Medication 2 PACKET: at 18:05

## 2021-11-10 RX ADMIN — Medication 20 NG/KG/MIN: at 21:13

## 2021-11-10 ASSESSMENT — ACTIVITIES OF DAILY LIVING (ADL)
ADLS_ACUITY_SCORE: 25

## 2021-11-10 NOTE — PROCEDURES
[facility]    Arterial line placement    Date/Time: 11/10/2021 4:44 PM  Performed by: William Fink MS4; Supervised by Lenin Goins MD  Authorized by: Melida Rolle MD     UNIVERSAL PROTOCOL   Site Marked: Yes  Prior Images Obtained and Reviewed:  NA  Patient identity confirmed:  Arm band  NA - No sedation, light sedation, or local anesthesia  Confirmation Checklist:  Patient's identity using two indicators, relevant allergies, correct equipment/implants were available and procedure was appropriate and matched the consent or emergent situation  Time out: Immediately prior to the procedure a time out was called    Universal Protocol: the Joint Commission Universal Protocol was followed    Preparation: Patient was prepped and draped in usual sterile fashion    ESBL (mL):  5      Indication: multiple ABGs hemodynamic monitoring  Location: left radial      SEDATION    Patient Sedated: Yes    Vital signs: Vital signs monitored during sedation      PROCEDURE DETAILS      Seldinger technique: Seldinger technique used    Number of Attempts:  1  Post-procedure:  Line sutured and dressing applied    PROCEDURE   Patient Tolerance:  Patient tolerated the procedure well with no immediate complications    Length of time physician/provider present for 1:1 monitoring during sedation: 15    I was present with the student who participated in the service and in the documentation of the note. I have verified the history and personally performed the physical exam and medical decisionmaking. I agree with the procedure as documented above.     Indication: Hemodynamic monitoring  Diagnosis: Acute hypoxic respiratory failure    Melida Rolle MD  Date of service 11/10/21

## 2021-11-10 NOTE — PLAN OF CARE
Major Shift Events:RASS goal of -4 to -5. Pt sedated. Pupils 2, equal and sluggish. SR. Tmax 99.5 F. SBP goal <170 and MAPs >65. Afebrile. Pressors remain off. Pt supine at 0915. Pt then proned again at 1750. CMV settings: 80%, 450, rate 30, PEEP 14. SPO2 goal >88%. Veletri running through vent tubing @ 20 ng/kg/min. Moderate amount of creamy/ red streaked secretions. LS: coarse/ diminished. TF @ goal, FWF 100ml/[id number]. Rectal pouch remains on. Miralax given x2 and senna w/ no BM. Flores in place w/ adequate UO, 20mg IV lasix given x1 w/ good response. New R radial arterial line and R triple lumen PICC placed today.  Gtts: fentanyl@ 225 mcg/hr, ketamine@ 100 mg/hr, versed@ 13mg/hr, precedex@ 0.7 mcg/kg/hr. PRN bumps of fentanyl and versed given.     Plan: Check ABG at 0000. Trend ABGs and SPO2. Supine pt at 0600. Continue to monitor ABGs, wean vent settings as tolerated. Care conference on Thursday (11/11/21). For vital signs and complete assessments, please see documentation flowsheets.  Yifan Caceres RN on 11/9/2021 at 6:34 PM

## 2021-11-10 NOTE — PROVIDER NOTIFICATION
MICU notified that patients A-line has further dampened and is unable to draw for labs. MD states will wait till morning to address/rewire, no VBG recommended.

## 2021-11-10 NOTE — PLAN OF CARE
Rass -4/-5, patient sedated. Pupils 2, equal and sluggish. SR. Tmax 99.6; SBP < 170 and MAP >65 w/o intervention, MD aware of Positional/Dampened/innacurate A-line and RN going by cuff pressures; CMV settings: 70%, 450, rate 30, PEEP 14. SPO2 goal >88% Veletri running through vent tubing @ 20 ng/kg/min. PF 96 to 157 after prone for 4hrs, Moderate amount of creamy/ red streaked secretion to ETT, Nares draining moderate amt tan secretions, Lung Sounds coarse; Flores with WDL UOP, Lasix 20mg given with good effect; TF infusing @ 30ml/hr goal with 100Q4 FWF; R radial arterial line and R triple lumen PICC.  Gtts: fentanyl@ 225 mcg/hr, ketamine@ 100 mg/hr, versed@ 14mg/hr, precedex@ 0.7 mcg/kg/hr.    Plan: Continue to monitor ABGs, wean vent settings as tolerated. Care conference on Thursday (11/11/21). For vital signs and complete assessments, please see documentation flowsheets.

## 2021-11-10 NOTE — PROGRESS NOTES
MEDICAL ICU PROGRESS NOTE  11/10/2021      Date of Service (when I saw the patient): 11/10/2021    ASSESSMENT: Brandon Schafer is a 41 year old male with questionable PMH of remote VTE who was admitted on 11/3/2021 with ARDS 2/2 COVID pneumonia.    CHANGES and MAJOR THINGS TODAY:   - Plan to keep supine today   - Increase FWF to 100 ml q2h   - 20mg IV lasix this am, reassess afternoon  - Increase oxycodone to 20 mg Q4H  - Increase ativan to 8 mg Q4H  - Plan for possible care conference 11/11    PLAN:    Neuro:  # Pain and sedation  - Fentanyl infusion  - Midazolam infusion, attempt to wean down first  - Ketamine infusion  - Increase Oxycodone 20 mg Q4H  - Increase Ativan 8 mg Q4H  - Seroquel 25mg, 25mg, 50mg   - RASS goal -2 while supine, -5 while proned   - Not currently planning on initiating NMB    Pulmonary:  # ARDS 2/2 COVID pneumonia  Intubated 11/2 prior to transport flight. Proned on 11/5-11/9.   - Lung protective ventilation with AC 30/450/+14/70%   - No indication for NMB at this time  - Inhaled epoprostenol at 20 ng/kg/min  - Secretion clearance with duonebs q6 hours, and BID mucomyst, metaneb  - Infectious work-up and treatment as below  - Keep supine 11/10    # ?COPD exacerbation  Wheezing on exam on 11/4. S/p azithromycin for 5d. Steroids per Covid protocol.     Cardiovascular:  # Hypotension, resolved   Transient, and in the setting of sedation. No prior TTE. Troponin peaked at 0.293. EKG with T wave inversion in lateral leads. Suspect troponin was 2/2 demand ischemia in setting of hypoxia.  - Norepinephrine gtt as needed     GI/Nutrition:  # At risk for malnutrition  - Nutrition consult for TF  - Recipe per RD   - Bowel regimen: scheduled Miralax, and scheduled senna; scheduled bisacodyl prn senna and Miralax    # Coffee ground material from OG (11/4), resolved  - PPI BID    Renal/Fluids/Electrolytes:  # Non-oliguric STEFAN- Improved  Unclear baseline, has not seen doctor in 20 years. Cr 2.5 on  admission, now improved.   - Continue diuresis with furosemide 20 mg, reassess Q12H   - Goal net negative 1L    # Hypernatremia  - increase FWF to 100ml q2h     Endocrine:  # Stress and steroid induced hyperglycemia  - Medium resistance sliding scale insulin     ID:  # COVID pneumonia  Symptoms (headache, SOB, fever, cough, diarrhea) 1 week prior to presentation. Not COVID vaccinated. Tested positive on presentation on 11/2. S/p tocilizumab 11/4.  - Dexamethasone 6 mg daily (total 10 days)  - Deferring remdesivir given STEFAN    Hematology:    # Coagulopathy 2/2 COVID  US BLE negative for DVT.  - lovenox ppx     General Cares/Prophylaxis:    DVT Prophylaxis: Lovenox ppx  GI Prophylaxis: PPI  Family Communication: Brother Brad. Care conference   Code Status: DNR    Lines/tubes/drains:  - PIV x2  - Right PICC  - Arterial line right radial  - OG  - Flores  - ETT    Disposition:  - Medical ICU     Patient seen and findings/plan discussed with medical ICU staff, Dr. Rolle.    Jovan Betancourt  [facility]  Internal Medicine, PGY3    ====================================  INTERVAL HISTORY:   NAEO. Arterial line that was placed yesterday, malfunctioned in AM. Patient hypertensive and tachycardic this AM requiring increase in sedation. No BM for the past 2 days.      OBJECTIVE:   1. VITAL SIGNS:   Temp:  [99.2  F (37.3  C)-99.8  F (37.7  C)] 99.8  F (37.7  C)  Pulse:  [] 130  Resp:  [14-38] 32  BP: ()/() 176/99  MAP:  [5 mmHg-147 mmHg] 120 mmHg  Arterial Line BP: (5-160)/(5-142) 129/113  FiO2 (%):  [70 %-80 %] 70 %  SpO2:  [88 %-99 %] 96 %  Ventilation Mode: CMV/AC  (Continuous Mandatory Ventilation/ Assist Control)  FiO2 (%): 70 %  Rate Set (breaths/minute): 30 breaths/min  Tidal Volume Set (mL): 450 mL  PEEP (cm H2O): 14 cmH2O  Oxygen Concentration (%): 70 %  Resp: (!) 32    2. INTAKE/ OUTPUT:   I/O last 3 completed shifts:  In: 3076.6 [I.V.:1326.6; NG/GT:1030]  Out: 3475 [Urine:3475]    3.  PHYSICAL EXAMINATION:  General: intubated and sedated  HEENT: Mucous membranes moist  Neuro: Goal RASS -4  Pulm/Resp: Bilateral coarse breath sounds  CV: RRR, S1/S2 without m/r/g; pedal pulses 2+ with trace LE edema  Abdomen: Obese,  non-tender; chelo-umbilical hernia that is soft and compressible   : Flores catheter in place, urine yellow and clear  Incisions/Skin: No rashes noted    4. LABS:   Arterial Blood Gases   Recent Labs   Lab 11/10/21  0847 11/09/21  2146 11/09/21  1627 11/09/21  1440   PH 7.32* 7.34* 7.36 7.34*   PCO2 57* 53* 51* 52*   PO2 93 110* 74* 83   HCO3 29* 29* 29* 28     Complete Blood Count   Recent Labs   Lab 11/10/21  0433 11/09/21  0326 11/08/21  0427 11/07/21  0507   WBC 14.2* 14.1* 15.4* 15.6*   HGB 13.1* 13.3 13.6 12.8*    279 311 350     Basic Metabolic Panel  Recent Labs   Lab 11/10/21  0848 11/10/21  0813 11/10/21  0436 11/10/21  0433 11/09/21  0422 11/09/21  0326 11/08/21  0810 11/08/21  0427 11/07/21  1616 11/07/21  1615   NA  --   --   --  148*  --  146*  --  145*  --  142   POTASSIUM  --   --   --  4.8  --  4.4  --  4.1  --  4.1   CHLORIDE  --   --   --  116*  --  116*  --  113*  --  109   CO2  --   --   --  30  --  26  --  26  --  27   BUN  --   --   --  49*  --  45*  --  48*  --  46*   CR  --   --   --  1.27*  --  1.21  --  1.16  --  1.26*   * 113* 115* 124*   < > 129*   < > 106*   < > 187*    < > = values in this interval not displayed.     Liver Function Tests  Recent Labs   Lab 11/07/21  0507 11/04/21  0335   * 40   ALT 98* 31   ALKPHOS 82 62   BILITOTAL 0.4 0.3   ALBUMIN 2.4* 1.9*     5. RADIOLOGY:   No results found for this or any previous visit (from the past 24 hour(s)).

## 2021-11-10 NOTE — PROGRESS NOTES
CLINICAL NUTRITION SERVICES - REASSESSMENT NOTE     Nutrition Prescription    RECOMMENDATIONS FOR MDs/PROVIDERS TO ORDER:  --Minimize interruptions to TF regimen as much as able.     Malnutrition Status:    Unable to determine due to unable to perform all aspects for NFPE    Recommendations already ordered by Registered Dietitian (RD):  --Continue TF as ordered via small-bore NGT.       --GOAL: Novasource Renal @ 30 ml/hr (720 ml) + 10 Prosource provides 1840 kcal (23 kcal/kg IBW), 175 g pro (2.2 g/kg IBW), 132 g CHO, 516 ml free water, and 0 g fiber daily, 1540 mg Phos daily.   --HOB >30 degrees or Reverse Trendelenburg >10-25 degrees.      Future/Additional Recommendations:  --Weight trends.  --Ongoing TF tolerance via NGT.      EVALUATION OF THE PROGRESS TOWARD GOALS   Diet: NPO  Nutrition Support:   11/3 - ___ : Novasource Renal @ 30 ml/hr (720 ml) + 10 Prosource provides 1840 kcal (23 kcal/kg IBW), 175 g pro (2.2 g/kg IBW), 132 g CHO, 516 ml free water, and 0 g fiber daily, 1540 mg Phos daily.   Intake: average daily TF intake x 7 days = 426 ml volume, 1451 kcal (18 kcal/kg IBW or 80% low-end needs), 149 g pro (1.8 g/kg IBW or 90% low-end needs)     NEW FINDINGS   Weight: most recent weight on 11/8 - lowest weight this admission of 157.5 kg. Cannot rule out fluid shifts  Labs: Na 148 (H), Cr 1.27 (H), Phos 4.7 (H)  Meds: nephronex, dulcolax, decadron, medium sliding scale insulin, miralax, prosource 2 packets 5 times/day, senna-docusate BID, oxycodone, ativan, precedex, fentanyl, ketamine, versed   GI: BM x2 on 11/7  Renal: STEFAN  Resp: intubated, COVID+  Skin: WOCN 11/9:  Pressure Injury: on septum , hospital acquired ,   This is a Medical Device Related Pressure Injury (MDRPI) due to ETAD head gear was pushed against the area during proning  Pressure Injury is Stage Deep Tissue Pressure Injury (DTPI)   Contributing factor of the pressure injury: pressure and immobility  Status: initial assessment and  evolving     Bl cheeks with intact epidermis and blanchable erythema. RN placed foam dressing under the ETAD.    MALNUTRITION  % Intake: Decreased intake does not meet criteria  % Weight Loss: difficult to assess 2/2 fluid status  Subcutaneous Fat Loss: Unable to assess COVID+  Muscle Loss: Unable to assess COVID+  Fluid Accumulation/Edema: None noted  Malnutrition Diagnosis: Unable to determine due to unable to perform all aspects for NFPE    Previous Goals   Total avg nutritional intake to meet a minimum of 22 kcal/kg and 2 g PRO/kg daily (per dosing wt 81 kg IBW).  Evaluation: Not met    Previous Nutrition Diagnosis  Inadequate oral intake related to mechanical ventilation inhibiting ability to take nutrition PO as evidenced by NPO status, requiring the start of enteral nutrition to meet 100% of needs.     Evaluation: No change    CURRENT NUTRITION DIAGNOSIS  Inadequate protein-energy intake related to inadequate enteral nutrition infusions as evidenced by average daily TF intake x 7 days providing 1451 kcal (18 kcal/kg IBW or 80% low-end needs), 149 g pro (1.8 g/kg IBW or 90% low-end needs).    INTERVENTIONS  Implementation  Collaboration with other providers - rounds  Enteral Nutrition - continue as ordered    Goals  Total avg nutritional intake to meet a minimum of 22 kcal/kg and 2 g PRO/kg daily (per dosing wt 81 kg IBW).    Monitoring/Evaluation  Progress toward goals will be monitored and evaluated per protocol.    Shawanda Powell, MS, RD, LD, [facility]  MICU pager: [phone]  ASCOM: [id number]

## 2021-11-10 NOTE — PLAN OF CARE
Major Shift Events: Pt remains sedated with Versed, Fentanyl, Precedex, and Ketamine. RASS -3/-4. W/D in BUE consistently. PERRL 2 mm. Tmax 100.4, Tylenol given. SBP<170 with PRN sedation bolus. MAP>65 w/o intervention. CMV settings remain, 70%, 450, 30, 14. Moderate secretions noted via inline suction. TF @ goal via NJ, FWF increased to 100mL  Q2H. Rectal pouch intact, no OP. Flores in place, Lasix given x1 with good effect. Skin grossly unchanged. umbilical hernia noted. PICC line WNL. PIV x3 WNL. ART line moved to L radial, waveform normal, brisk blood return.    Plan: Wean sedation and vent support as tolerated.    For vital signs and complete assessments, please see documentation flowsheets.

## 2021-11-11 ENCOUNTER — APPOINTMENT (OUTPATIENT)
Dept: GENERAL RADIOLOGY | Facility: CLINIC | Age: 41
End: 2021-11-11
Attending: STUDENT IN AN ORGANIZED HEALTH CARE EDUCATION/TRAINING PROGRAM
Payer: MEDICAID

## 2021-11-11 LAB
ALBUMIN UR-MCNC: 70 MG/DL
ANION GAP SERPL CALCULATED.3IONS-SCNC: 2 MMOL/L (ref 3–14)
ANION GAP SERPL CALCULATED.3IONS-SCNC: 4 MMOL/L (ref 3–14)
APPEARANCE UR: ABNORMAL
BACTERIA #/AREA URNS HPF: ABNORMAL /HPF
BASE EXCESS BLDA CALC-SCNC: 1.5 MMOL/L (ref -9–1.8)
BASE EXCESS BLDA CALC-SCNC: 3.5 MMOL/L (ref -9–1.8)
BILIRUB UR QL STRIP: NEGATIVE
BUN SERPL-MCNC: 50 MG/DL (ref 7–30)
BUN SERPL-MCNC: 51 MG/DL (ref 7–30)
C PNEUM DNA SPEC QL NAA+PROBE: NOT DETECTED
CALCIUM SERPL-MCNC: 8.3 MG/DL (ref 8.5–10.1)
CALCIUM SERPL-MCNC: 8.4 MG/DL (ref 8.5–10.1)
CHLORIDE BLD-SCNC: 113 MMOL/L (ref 94–109)
CHLORIDE BLD-SCNC: 115 MMOL/L (ref 94–109)
CO2 SERPL-SCNC: 29 MMOL/L (ref 20–32)
CO2 SERPL-SCNC: 29 MMOL/L (ref 20–32)
COLOR UR AUTO: YELLOW
CREAT SERPL-MCNC: 1.27 MG/DL (ref 0.66–1.25)
CREAT SERPL-MCNC: 1.3 MG/DL (ref 0.66–1.25)
ERYTHROCYTE [DISTWIDTH] IN BLOOD BY AUTOMATED COUNT: 15.6 % (ref 10–15)
FLUAV H1 2009 PAND RNA SPEC QL NAA+PROBE: NOT DETECTED
FLUAV H1 RNA SPEC QL NAA+PROBE: NOT DETECTED
FLUAV H3 RNA SPEC QL NAA+PROBE: NOT DETECTED
FLUAV RNA SPEC QL NAA+PROBE: NOT DETECTED
FLUBV RNA SPEC QL NAA+PROBE: NOT DETECTED
GFR SERPL CREATININE-BSD FRML MDRD: 67 ML/MIN/1.73M2
GFR SERPL CREATININE-BSD FRML MDRD: 69 ML/MIN/1.73M2
GLUCOSE BLD-MCNC: 124 MG/DL (ref 70–99)
GLUCOSE BLD-MCNC: 196 MG/DL (ref 70–99)
GLUCOSE BLDC GLUCOMTR-MCNC: 114 MG/DL (ref 70–99)
GLUCOSE BLDC GLUCOMTR-MCNC: 127 MG/DL (ref 70–99)
GLUCOSE BLDC GLUCOMTR-MCNC: 138 MG/DL (ref 70–99)
GLUCOSE BLDC GLUCOMTR-MCNC: 149 MG/DL (ref 70–99)
GLUCOSE BLDC GLUCOMTR-MCNC: 179 MG/DL (ref 70–99)
GLUCOSE UR STRIP-MCNC: NEGATIVE MG/DL
HADV DNA SPEC QL NAA+PROBE: NOT DETECTED
HCO3 BLD-SCNC: 29 MMOL/L (ref 21–28)
HCO3 BLD-SCNC: 30 MMOL/L (ref 21–28)
HCOV PNL SPEC NAA+PROBE: NOT DETECTED
HCT VFR BLD AUTO: 39.5 % (ref 40–53)
HGB BLD-MCNC: 12 G/DL (ref 13.3–17.7)
HGB UR QL STRIP: ABNORMAL
HMPV RNA SPEC QL NAA+PROBE: NOT DETECTED
HPIV1 RNA SPEC QL NAA+PROBE: NOT DETECTED
HPIV2 RNA SPEC QL NAA+PROBE: NOT DETECTED
HPIV3 RNA SPEC QL NAA+PROBE: NOT DETECTED
HPIV4 RNA SPEC QL NAA+PROBE: NOT DETECTED
HYALINE CASTS: 8 /LPF
KETONES UR STRIP-MCNC: NEGATIVE MG/DL
LEUKOCYTE ESTERASE UR QL STRIP: ABNORMAL
M PNEUMO DNA SPEC QL NAA+PROBE: NOT DETECTED
MAGNESIUM SERPL-MCNC: 2.1 MG/DL (ref 1.6–2.3)
MCH RBC QN AUTO: 27.9 PG (ref 26.5–33)
MCHC RBC AUTO-ENTMCNC: 30.4 G/DL (ref 31.5–36.5)
MCV RBC AUTO: 92 FL (ref 78–100)
MRSA DNA SPEC QL NAA+PROBE: NEGATIVE
NITRATE UR QL: NEGATIVE
O2/TOTAL GAS SETTING VFR VENT: 100 %
O2/TOTAL GAS SETTING VFR VENT: 85 %
PCO2 BLD: 52 MM HG (ref 35–45)
PCO2 BLD: 62 MM HG (ref 35–45)
PH BLD: 7.29 [PH] (ref 7.35–7.45)
PH BLD: 7.37 [PH] (ref 7.35–7.45)
PH UR STRIP: 5.5 [PH] (ref 5–7)
PLATELET # BLD AUTO: 143 10E3/UL (ref 150–450)
PO2 BLD: 108 MM HG (ref 80–105)
PO2 BLD: 59 MM HG (ref 80–105)
POTASSIUM BLD-SCNC: 4.3 MMOL/L (ref 3.4–5.3)
POTASSIUM BLD-SCNC: 4.5 MMOL/L (ref 3.4–5.3)
RBC # BLD AUTO: 4.3 10E6/UL (ref 4.4–5.9)
RBC URINE: 136 /HPF
RSV RNA SPEC QL NAA+PROBE: NOT DETECTED
RSV RNA SPEC QL NAA+PROBE: NOT DETECTED
RV+EV RNA SPEC QL NAA+PROBE: NOT DETECTED
SA TARGET DNA: POSITIVE
SODIUM SERPL-SCNC: 146 MMOL/L (ref 133–144)
SODIUM SERPL-SCNC: 146 MMOL/L (ref 133–144)
SP GR UR STRIP: 1.03 (ref 1–1.03)
SQUAMOUS EPITHELIAL: <1 /HPF
UROBILINOGEN UR STRIP-MCNC: NORMAL MG/DL
WBC # BLD AUTO: 14.3 10E3/UL (ref 4–11)
WBC CLUMPS #/AREA URNS HPF: PRESENT /HPF
WBC URINE: >182 /HPF

## 2021-11-11 PROCEDURE — 258N000003 HC RX IP 258 OP 636: Performed by: STUDENT IN AN ORGANIZED HEALTH CARE EDUCATION/TRAINING PROGRAM

## 2021-11-11 PROCEDURE — 250N000011 HC RX IP 250 OP 636: Performed by: STUDENT IN AN ORGANIZED HEALTH CARE EDUCATION/TRAINING PROGRAM

## 2021-11-11 PROCEDURE — 200N000002 HC R&B ICU UMMC

## 2021-11-11 PROCEDURE — 250N000011 HC RX IP 250 OP 636: Performed by: NURSE PRACTITIONER

## 2021-11-11 PROCEDURE — 87086 URINE CULTURE/COLONY COUNT: CPT | Performed by: STUDENT IN AN ORGANIZED HEALTH CARE EDUCATION/TRAINING PROGRAM

## 2021-11-11 PROCEDURE — 999N000015 HC STATISTIC ARTERIAL MONITORING DAILY

## 2021-11-11 PROCEDURE — 250N000013 HC RX MED GY IP 250 OP 250 PS 637: Performed by: STUDENT IN AN ORGANIZED HEALTH CARE EDUCATION/TRAINING PROGRAM

## 2021-11-11 PROCEDURE — 999N000157 HC STATISTIC RCP TIME EA 10 MIN

## 2021-11-11 PROCEDURE — 82803 BLOOD GASES ANY COMBINATION: CPT | Performed by: STUDENT IN AN ORGANIZED HEALTH CARE EDUCATION/TRAINING PROGRAM

## 2021-11-11 PROCEDURE — 36415 COLL VENOUS BLD VENIPUNCTURE: CPT | Performed by: STUDENT IN AN ORGANIZED HEALTH CARE EDUCATION/TRAINING PROGRAM

## 2021-11-11 PROCEDURE — 250N000009 HC RX 250: Performed by: STUDENT IN AN ORGANIZED HEALTH CARE EDUCATION/TRAINING PROGRAM

## 2021-11-11 PROCEDURE — 99233 SBSQ HOSP IP/OBS HIGH 50: CPT | Performed by: NURSE PRACTITIONER

## 2021-11-11 PROCEDURE — 94003 VENT MGMT INPAT SUBQ DAY: CPT

## 2021-11-11 PROCEDURE — 80048 BASIC METABOLIC PNL TOTAL CA: CPT | Performed by: NURSE PRACTITIONER

## 2021-11-11 PROCEDURE — 87186 SC STD MICRODIL/AGAR DIL: CPT | Performed by: STUDENT IN AN ORGANIZED HEALTH CARE EDUCATION/TRAINING PROGRAM

## 2021-11-11 PROCEDURE — 71045 X-RAY EXAM CHEST 1 VIEW: CPT

## 2021-11-11 PROCEDURE — 87641 MR-STAPH DNA AMP PROBE: CPT | Performed by: NURSE PRACTITIONER

## 2021-11-11 PROCEDURE — C9113 INJ PANTOPRAZOLE SODIUM, VIA: HCPCS | Performed by: STUDENT IN AN ORGANIZED HEALTH CARE EDUCATION/TRAINING PROGRAM

## 2021-11-11 PROCEDURE — 94645 CONT INHLJ TX EACH ADDL HOUR: CPT

## 2021-11-11 PROCEDURE — 87040 BLOOD CULTURE FOR BACTERIA: CPT | Performed by: STUDENT IN AN ORGANIZED HEALTH CARE EDUCATION/TRAINING PROGRAM

## 2021-11-11 PROCEDURE — 83735 ASSAY OF MAGNESIUM: CPT | Performed by: STUDENT IN AN ORGANIZED HEALTH CARE EDUCATION/TRAINING PROGRAM

## 2021-11-11 PROCEDURE — 250N000009 HC RX 250

## 2021-11-11 PROCEDURE — 94640 AIRWAY INHALATION TREATMENT: CPT | Mod: 76

## 2021-11-11 PROCEDURE — 80048 BASIC METABOLIC PNL TOTAL CA: CPT | Performed by: STUDENT IN AN ORGANIZED HEALTH CARE EDUCATION/TRAINING PROGRAM

## 2021-11-11 PROCEDURE — 250N000013 HC RX MED GY IP 250 OP 250 PS 637: Performed by: NURSE PRACTITIONER

## 2021-11-11 PROCEDURE — 250N000013 HC RX MED GY IP 250 OP 250 PS 637

## 2021-11-11 PROCEDURE — G0463 HOSPITAL OUTPT CLINIC VISIT: HCPCS

## 2021-11-11 PROCEDURE — 87205 SMEAR GRAM STAIN: CPT | Performed by: STUDENT IN AN ORGANIZED HEALTH CARE EDUCATION/TRAINING PROGRAM

## 2021-11-11 PROCEDURE — 71045 X-RAY EXAM CHEST 1 VIEW: CPT | Mod: 26 | Performed by: RADIOLOGY

## 2021-11-11 PROCEDURE — 87581 M.PNEUMON DNA AMP PROBE: CPT | Performed by: NURSE PRACTITIONER

## 2021-11-11 PROCEDURE — 85027 COMPLETE CBC AUTOMATED: CPT | Performed by: STUDENT IN AN ORGANIZED HEALTH CARE EDUCATION/TRAINING PROGRAM

## 2021-11-11 PROCEDURE — 99291 CRITICAL CARE FIRST HOUR: CPT | Mod: GC | Performed by: STUDENT IN AN ORGANIZED HEALTH CARE EDUCATION/TRAINING PROGRAM

## 2021-11-11 PROCEDURE — 81001 URINALYSIS AUTO W/SCOPE: CPT | Performed by: STUDENT IN AN ORGANIZED HEALTH CARE EDUCATION/TRAINING PROGRAM

## 2021-11-11 RX ORDER — PIPERACILLIN SODIUM, TAZOBACTAM SODIUM 3; .375 G/15ML; G/15ML
3.38 INJECTION, POWDER, LYOPHILIZED, FOR SOLUTION INTRAVENOUS EVERY 6 HOURS
Status: DISCONTINUED | OUTPATIENT
Start: 2021-11-11 | End: 2021-11-11

## 2021-11-11 RX ORDER — PIPERACILLIN SODIUM, TAZOBACTAM SODIUM 4; .5 G/20ML; G/20ML
4.5 INJECTION, POWDER, LYOPHILIZED, FOR SOLUTION INTRAVENOUS EVERY 6 HOURS
Status: DISCONTINUED | OUTPATIENT
Start: 2021-11-11 | End: 2021-11-14

## 2021-11-11 RX ORDER — BISACODYL 10 MG
10 SUPPOSITORY, RECTAL RECTAL EVERY 8 HOURS
Status: DISCONTINUED | OUTPATIENT
Start: 2021-11-11 | End: 2021-11-12

## 2021-11-11 RX ORDER — FUROSEMIDE 10 MG/ML
20 INJECTION INTRAMUSCULAR; INTRAVENOUS 2 TIMES DAILY
Status: COMPLETED | OUTPATIENT
Start: 2021-11-11 | End: 2021-11-11

## 2021-11-11 RX ADMIN — Medication 2 PACKET: at 10:05

## 2021-11-11 RX ADMIN — ACETYLCYSTEINE 2 ML: 200 SOLUTION ORAL; RESPIRATORY (INHALATION) at 19:16

## 2021-11-11 RX ADMIN — DEXMEDETOMIDINE 0.7 MCG/KG/HR: 100 INJECTION, SOLUTION, CONCENTRATE INTRAVENOUS at 05:32

## 2021-11-11 RX ADMIN — Medication 250 MCG/HR: at 03:51

## 2021-11-11 RX ADMIN — Medication 20 NG/KG/MIN: at 07:57

## 2021-11-11 RX ADMIN — PANTOPRAZOLE SODIUM 40 MG: 40 INJECTION, POWDER, FOR SOLUTION INTRAVENOUS at 07:41

## 2021-11-11 RX ADMIN — QUETIAPINE FUMARATE 50 MG: 50 TABLET ORAL at 20:37

## 2021-11-11 RX ADMIN — LORAZEPAM 8 MG: 2 TABLET ORAL at 05:38

## 2021-11-11 RX ADMIN — Medication 2 PACKET: at 07:42

## 2021-11-11 RX ADMIN — DEXMEDETOMIDINE 0.7 MCG/KG/HR: 100 INJECTION, SOLUTION, CONCENTRATE INTRAVENOUS at 13:26

## 2021-11-11 RX ADMIN — Medication 13 MG/HR: at 18:21

## 2021-11-11 RX ADMIN — Medication 14 MG/HR: at 03:51

## 2021-11-11 RX ADMIN — LORAZEPAM 8 MG: 2 TABLET ORAL at 14:18

## 2021-11-11 RX ADMIN — DEXMEDETOMIDINE 0.7 MCG/KG/HR: 100 INJECTION, SOLUTION, CONCENTRATE INTRAVENOUS at 01:12

## 2021-11-11 RX ADMIN — QUETIAPINE FUMARATE 25 MG: 25 TABLET ORAL at 14:18

## 2021-11-11 RX ADMIN — KETAMINE HYDROCHLORIDE 100 MG/HR: 100 INJECTION, SOLUTION, CONCENTRATE INTRAMUSCULAR; INTRAVENOUS at 22:24

## 2021-11-11 RX ADMIN — ACETAMINOPHEN 650 MG: 325 TABLET, FILM COATED ORAL at 05:39

## 2021-11-11 RX ADMIN — LORAZEPAM 8 MG: 2 TABLET ORAL at 21:49

## 2021-11-11 RX ADMIN — ACETYLCYSTEINE 2 ML: 200 SOLUTION ORAL; RESPIRATORY (INHALATION) at 07:58

## 2021-11-11 RX ADMIN — IPRATROPIUM BROMIDE AND ALBUTEROL SULFATE 3 ML: .5; 3 SOLUTION RESPIRATORY (INHALATION) at 19:16

## 2021-11-11 RX ADMIN — Medication 2 PACKET: at 14:18

## 2021-11-11 RX ADMIN — IPRATROPIUM BROMIDE AND ALBUTEROL SULFATE 3 ML: .5; 3 SOLUTION RESPIRATORY (INHALATION) at 07:58

## 2021-11-11 RX ADMIN — INSULIN ASPART 1 UNITS: 100 INJECTION, SOLUTION INTRAVENOUS; SUBCUTANEOUS at 16:07

## 2021-11-11 RX ADMIN — DEXMEDETOMIDINE 0.7 MCG/KG/HR: 100 INJECTION, SOLUTION, CONCENTRATE INTRAVENOUS at 09:44

## 2021-11-11 RX ADMIN — LORAZEPAM 8 MG: 2 TABLET ORAL at 09:44

## 2021-11-11 RX ADMIN — Medication 100 MCG: at 21:00

## 2021-11-11 RX ADMIN — Medication 2 PACKET: at 17:30

## 2021-11-11 RX ADMIN — BISACODYL 10 MG: 10 SUPPOSITORY RECTAL at 07:41

## 2021-11-11 RX ADMIN — Medication 13 MG/HR: at 11:46

## 2021-11-11 RX ADMIN — Medication 20 NG/KG/MIN: at 16:38

## 2021-11-11 RX ADMIN — Medication 2 PACKET: at 21:50

## 2021-11-11 RX ADMIN — POLYETHYLENE GLYCOL 3350 17 G: 17 POWDER, FOR SOLUTION ORAL at 07:42

## 2021-11-11 RX ADMIN — ENOXAPARIN SODIUM 40 MG: 40 INJECTION SUBCUTANEOUS at 09:44

## 2021-11-11 RX ADMIN — DEXAMETHASONE SODIUM PHOSPHATE 6 MG: 4 INJECTION, SOLUTION INTRA-ARTICULAR; INTRALESIONAL; INTRAMUSCULAR; INTRAVENOUS; SOFT TISSUE at 11:51

## 2021-11-11 RX ADMIN — VANCOMYCIN HYDROCHLORIDE 2500 MG: 1 INJECTION, POWDER, LYOPHILIZED, FOR SOLUTION INTRAVENOUS at 11:55

## 2021-11-11 RX ADMIN — OXYCODONE HYDROCHLORIDE 20 MG: 10 TABLET ORAL at 16:06

## 2021-11-11 RX ADMIN — LORAZEPAM 8 MG: 2 TABLET ORAL at 01:12

## 2021-11-11 RX ADMIN — DOCUSATE SODIUM 286 ML: 10 LIQUID ORAL at 14:24

## 2021-11-11 RX ADMIN — OXYCODONE HYDROCHLORIDE 20 MG: 10 TABLET ORAL at 03:51

## 2021-11-11 RX ADMIN — PIPERACILLIN SODIUM AND TAZOBACTAM SODIUM 4.5 G: 4; .5 INJECTION, POWDER, LYOPHILIZED, FOR SOLUTION INTRAVENOUS at 17:29

## 2021-11-11 RX ADMIN — Medication 250 MCG/HR: at 13:27

## 2021-11-11 RX ADMIN — Medication 5 ML: at 07:42

## 2021-11-11 RX ADMIN — PANTOPRAZOLE SODIUM 40 MG: 40 INJECTION, POWDER, FOR SOLUTION INTRAVENOUS at 20:37

## 2021-11-11 RX ADMIN — OXYCODONE HYDROCHLORIDE 20 MG: 10 TABLET ORAL at 11:50

## 2021-11-11 RX ADMIN — FUROSEMIDE 20 MG: 10 INJECTION, SOLUTION INTRAVENOUS at 11:50

## 2021-11-11 RX ADMIN — LORAZEPAM 8 MG: 2 TABLET ORAL at 17:29

## 2021-11-11 RX ADMIN — FUROSEMIDE 20 MG: 10 INJECTION, SOLUTION INTRAVENOUS at 20:37

## 2021-11-11 RX ADMIN — PIPERACILLIN SODIUM AND TAZOBACTAM SODIUM 4.5 G: 4; .5 INJECTION, POWDER, LYOPHILIZED, FOR SOLUTION INTRAVENOUS at 11:51

## 2021-11-11 RX ADMIN — QUETIAPINE FUMARATE 25 MG: 25 TABLET ORAL at 07:42

## 2021-11-11 RX ADMIN — BISACODYL 10 MG: 10 SUPPOSITORY RECTAL at 16:06

## 2021-11-11 RX ADMIN — OXYCODONE HYDROCHLORIDE 20 MG: 10 TABLET ORAL at 20:36

## 2021-11-11 RX ADMIN — Medication 250 MCG/HR: at 22:27

## 2021-11-11 RX ADMIN — DEXMEDETOMIDINE 0.7 MCG/KG/HR: 100 INJECTION, SOLUTION, CONCENTRATE INTRAVENOUS at 21:47

## 2021-11-11 RX ADMIN — OXYCODONE HYDROCHLORIDE 20 MG: 10 TABLET ORAL at 07:41

## 2021-11-11 RX ADMIN — DEXMEDETOMIDINE 0.7 MCG/KG/HR: 100 INJECTION, SOLUTION, CONCENTRATE INTRAVENOUS at 16:38

## 2021-11-11 RX ADMIN — INSULIN ASPART 1 UNITS: 100 INJECTION, SOLUTION INTRAVENOUS; SUBCUTANEOUS at 12:16

## 2021-11-11 RX ADMIN — Medication 100 MCG: at 22:21

## 2021-11-11 RX ADMIN — DOCUSATE SODIUM 50 MG AND SENNOSIDES 8.6 MG 2 TABLET: 8.6; 5 TABLET, FILM COATED ORAL at 07:41

## 2021-11-11 ASSESSMENT — ACTIVITIES OF DAILY LIVING (ADL)
ADLS_ACUITY_SCORE: 25

## 2021-11-11 ASSESSMENT — MIFFLIN-ST. JEOR: SCORE: 2433

## 2021-11-11 NOTE — PROGRESS NOTES
[facility]  Palliative Care Daily Progress Note       Recommendations & Counseling       Continue full restorative medical efforts. Family is hopeful that since Brandon continues to only have one organ system affected, and is stable, that he will make a meaningful recovery. They believe an important motivation for him would be his kids.       If complications occur and prognosis becomes less optimistic, family would want to re-evaluate treatment plan, given patient has expressed resistance to being on life support in the past.       Kate Richardson, Palliative , is also involved for ongoing emotional and spiritual support      Thank you for the opportunity to be involved in the care of this patient. Please reach out with questions or concerns.     JUANCHO Murray CNP  Palliative Care Consult Team  Pager: [phone]    [facility] Inpatient Team Consult pager [phone] (M-F 8-4:30)  After-hours Answering Service [phone]   Palliative Clinic: [phone]     55 minutes spent. This includes participating in a provider pre-conference and then family meeting from [phone], where we discussed Brandon's covid infection, a medical update, and discussing goals of care.       Assessments          Brandon Schafer is a 41 year old male with COVID 19 infection, who was admitted with hypoxic respiratory failure, intubated and sedated. He is being proned as needed.      Today, the patient was seen for:  COVID 19 infection  Hypoxic respiratory failure requiring mechanical ventilation and proning     Prognosis, Goals, or Advance Care Planning was addressed today with: Yes.    Mood, coping, and/or meaning in the context of serious illness were addressed today: Yes.            Interval History:     Family care conference held today with patient's mother Mahsa and two brothers Brad and Kirit. Dr. Betancourt from [facility] gave a medical update stating that Brandon's lung remain very  sick, but stable with current support. Bradnon's PaO2 is between 60-80% on FiO2 of 70-80%. His kidneys are still functioning well and she is optimistic they will not need support (e.g. dialysis). Family asked very insightful questions, specifically about the possibility of a brain injury 2/2 prolonged hypoxic state and shared their concern that they aren't sure if Brandon would want a trach. It is still too early to know about brain injury; there is no evidence that he has one or doesn't, but it is a possibility. Family takes great comfort in knowing that the medical team would tell them if they do not think Brandon has a reasonable chance of recovery. Per family's request, we discussed what a transition to comfort measures would look like if we reached that point. Family would like to continue current (full restorative) cares and meet again next week for a family care conference.       Key Palliative Symptoms:  We are not helping to manage these symptoms currently in this patient.           Review of Systems:     Unable to discuss with patient, as he is intubated and sedated          Medications:     I have reviewed this patient's medication profile and medications during this hospitalization.           Physical Exam:   Vitals were reviewed  Temp: (!) 102  F (38.9  C) Temp src: Oral BP: 102/55 Pulse: 80   Resp: 25 SpO2: 92 % O2 Device: Mechanical Ventilator      Exam limited by positive covid infection.  Constitutional - critically ill male, intubated and sedated, with multiple infusions running            Data Reviewed:     CMP  Recent Labs   Lab 11/11/21  0744 11/11/21  0356 11/11/21  0348 11/10/21  2311 11/10/21  0436 11/10/21  0433 11/09/21  0422 11/09/21  0326 11/08/21  0810 11/08/21  0427 11/07/21  0744 11/07/21  0507 11/06/21  1749 11/06/21  1746   NA  --   --  146*  --   --  148*  --  146*  --  145*   < > 141  --  139   POTASSIUM  --   --  4.5  --   --  4.8  --  4.4  --  4.1   < > 4.0  --  4.2   CHLORIDE  --    --  115*  --   --  116*  --  116*  --  113*   < > 109  --  107   CO2  --   --  29  --   --  30  --  26  --  26   < > 28  --  26   ANIONGAP  --   --  2*  --   --  2*  --  4  --  6   < > 4  --  6   * 127* 124* 129*   < > 124*   < > 129*   < > 106*   < > 105*   < > 171*   BUN  --   --  51*  --   --  49*  --  45*  --  48*   < > 42*  --  44*   CR  --   --  1.30*  --   --  1.27*  --  1.21  --  1.16   < > 1.15  --  1.30*   GFRESTIMATED  --   --  67  --   --  69  --  73  --  77   < > 78  --  67   VIPUL  --   --  8.4*  --   --  9.0  --  8.6  --  8.6   < > 8.1*  --  8.0*   MAG  --   --  2.1  --   --  2.3  --  2.5*  --  2.5*   < > 2.6*  --  2.6*   PHOS  --   --   --   --   --  4.7*  --  3.9  --  3.7  --   --   --  3.2   PROTTOTAL  --   --   --   --   --   --   --   --   --   --   --  6.4*  --   --    ALBUMIN  --   --   --   --   --   --   --   --   --   --   --  2.4*  --   --    BILITOTAL  --   --   --   --   --   --   --   --   --   --   --  0.4  --   --    ALKPHOS  --   --   --   --   --   --   --   --   --   --   --  82  --   --    AST  --   --   --   --   --   --   --   --   --   --   --  100*  --   --    ALT  --   --   --   --   --   --   --   --   --   --   --  98*  --   --     < > = values in this interval not displayed.     CBC  Recent Labs   Lab 11/11/21  4998 11/10/21  0433 11/09/21  0326 11/08/21  0427   WBC 14.3* 14.2* 14.1* 15.4*   RBC 4.30* 4.70 4.83 4.94   HGB 12.0* 13.1* 13.3 13.6   HCT 39.5* 43.5 43.6 43.7   MCV 92 93 90 89   MCH 27.9 27.9 27.5 27.5   MCHC 30.4* 30.1* 30.5* 31.1*   RDW 15.6* 15.9* 16.0* 15.9*   * 191 279 311   Arterial Blood Gas  Recent Labs   Lab 11/11/21  0349 11/10/21  0847 11/09/21  2146 11/09/21  1627   PH 7.37 7.32* 7.34* 7.36   PCO2 52* 57* 53* 51*   PO2 108* 93 110* 74*   HCO3 30* 29* 29* 29*   O2PER 100 70 70 80

## 2021-11-11 NOTE — PROGRESS NOTES
"SPIRITUAL HEALTH SERVICES  PALLIATIVE SPIRITUAL ASSESSMENT   [facility] ([place]) 4A    PRIMARY FOCUS:     Goals of care    Emotional distress    Support for coping    Care conference with patient Yesika Schafer's family via telephone: mother Geri and brothers Brad and Kirit.     Family received medical update and asked questions for clarification. They are particularly attentive to Brandon's wishes and, bearing in mind that \"his kids are his world\", they do not want to engage in interventions that would extend Brandon's life without hope of recovery. They hope that team will be able to tell them if we reach a point when Brandon is \"no longer making progress\".     Family are mutually supportive and supportive of Brandon. Brandon's [employer] benjam[employer] is important to him, per family, and they are appreciative of ongoing prayer for him.    Intervention: Reviewed documentation. Offered spiritual and emotional support through listening presence and meaning-based discussion of goals of care.    PLAN: I will follow for spiritual support while Palliative Care is consulted.    Kate Richardson  Palliative Care Consult Service   Pager [phone]  [facility] Inpatient Team Consult pager [phone] (M-F 8-4:30)  After-hours Answering Service [phone]       "

## 2021-11-11 NOTE — PLAN OF CARE
Rass -3/-4, patient sedated. W/D in BUE consistently. Pupils 2, equal and sluggish. SR. Tmax 102.2, tylenol given and pan culture obtained per order;   SBP < 170 and MAP >65 with Levo - patient had MAP drop to low 40s after turn and suction, Levo started and patient was stabilized, still required .02 of Levo MD notified and FWF increased;   CMV settings: 80%, 450, rate 30, PEEP 14. SPO2 goal >88% Veletri running through vent tubing @ 20 ng/kg/min. Moderate amount of creamy/ red streaked secretion to ETT, Nares draining moderate amt tan secretions, Lung Sounds coarse;  Flores changed for culture, WDL UOP;   TF infusing @ 30ml/hr goal with 200Q2 FWF; L radial arterial line and R triple lumen PICC.  Gtts: fentanyl@ 250 mcg/hr, ketamine@ 100 mg/hr, versed@ 14mg/hr, precedex@ 0.7 mcg/kg/hr.     Plan: Continue to monitor ABGs, wean vent settings as tolerated. Care conference on today (11/11/21). For vital signs and complete assessments, please see documentation flowsheets.

## 2021-11-11 NOTE — PHARMACY-VANCOMYCIN DOSING SERVICE
"      Pharmacy Vancomycin Initial Note  Date of Service 2021  Patient's  1980  41 year old, male    Indication: [facility] Pneumonia    Current estimated CrCl = Estimated Creatinine Clearance: 110.5 mL/min (A) (based on SCr of 1.3 mg/dL (H)).    Creatinine for last 3 days  2021:  3:26 AM Creatinine 1.21 mg/dL  11/10/2021:  4:33 AM Creatinine 1.27 mg/dL  2021:  3:48 AM Creatinine 1.30 mg/dL    Recent Vancomycin Level(s) for last 3 days  No results found for requested labs within last 72 hours.      Vancomycin IV Administrations (past 72 hours)                   vancomycin (VANCOCIN) 2,500 mg in sodium chloride 0.9 % 500 mL intermittent infusion (mg) 2,500 mg New Bag 21 1155                Nephrotoxins and other renal medications (From now, onward)    Start     Dose/Rate Route Frequency Ordered Stop    21 1200  furosemide (LASIX) injection 20 mg         20 mg  over 1-3 Minutes Intravenous 2 TIMES DAILY 21 1033 21 0759    21 1130  piperacillin-tazobactam (ZOSYN) 4.5 g vial to attach to  mL bag        Note to Pharmacy: For [facility], [facility] and [facility]: For Zosyn-naive patients, use the \"Zosyn initial dose + extended infusion\" order panel.    4.5 g  over 30 Minutes Intravenous EVERY 6 HOURS 21 1119      21 1430  norepinephrine (LEVOPHED) 16 mg in  mL infusion MAX CONC CENTRAL LINE         0.01-0.6 mcg/kg/min × 161 kg (Dosing Weight)  1.5-90.6 mL/hr  Intravenous CONTINUOUS 21 1412            Contrast Orders - past 72 hours (72h ago, onward)            None                      Plan:  1. Start vancomycin  2500 mg IV once followed by 2500 mg IV q18h --- MRSA PCR resulted negative so Vancomycin discontinued today per MD  2. Vancomycin monitoring method: Trough (Method 2 = manual dose calculation)  3. Vancomycin therapeutic monitoring goal: 15-20 mg/L  4. Pharmacy will check vancomycin levels as appropriate in 1-3 Days.    5. Serum " creatinine levels will be ordered daily for the first week of therapy and at least twice weekly for subsequent weeks.      Chapincito Mike, PharmD, BCCCP

## 2021-11-11 NOTE — PLAN OF CARE
Major Shift Events: Pt remains sedated with Versed, Fentanyl, Precedex, and Ketamine. RASS -3/-4. W/D throughout. PERRL 2 mm. Tmax 102.4, Tylenol given, Abx started. Levo titrated to maintain MAP>65. CMV settings remain, 85%, 450, 30, 14. 1600 P/F 69, plan to reprone per MICU team. Moderate secretions noted via inline suction, creamy/red-streaked. TF @ goal via NJ, FWF maintained at 200mL  Q2H. Rectal pouch intact, liquid stool with digital stimulation, rectal tube ordered. Flores in place, Lasix given x1 with good effect. Skin grossly unchanged. umbilical hernia noted. PICC line WNL. PIV x3 WNL. ART line moved to L radial, waveform normal, brisk blood return.      Plan: Wean sedation and vent support as tolerated. Reprone for night.     For vital signs and complete assessments, please see documentation flowsheets.

## 2021-11-11 NOTE — CONSULTS
WO Nurse Inpatient Pressure Injury Assessment   Reason for consultation: Evaluate and treat Bl cheeks  New consult 11/11- tongue    ASSESSMENT  Pressure Injury: on Tongue , hospital acquired ,   This is a Medical Device Related Pressure Injury (MDRPI) due to ETT  Pressure Injury is Stage Mucosal   Contributing factor of the pressure injury: pressure and immobility  Status: initial assessment  Recommend provider order: None, at this time     Pressure Injury: on septum , hospital acquired ,   This is a Medical Device Related Pressure Injury (MDRPI) due to ETAD head gear was pushed against the area during proning  Pressure Injury is Stage Deep Tissue Pressure Injury (DTPI)   Contributing factor of the pressure injury: pressure and immobility  Status: follow up assessment and evolving  Recommend provider order: None, at this time      TREATMENT PLAN  Septum wound: Daily  to apply optifoam if proned- Dressing change every other day  Cleanse the wound with NS and pat dry.  Cut a piece of Comfeel 4x4 (#[id number]) and cover the area.  Massage the dressing in place for better adherence  Then place a layer of Mepilex foam dressing on top of comfeel for cushion  Ensure to place a piece of optifoam along the septal area and mold the Z-flow to avoid direct pressure while proning.      Tongue wounds: Every shift and PRN   Perform oral care per unit routine. Keep mouth moisturized. Reposition ETT tube Q 3-4 hrs, ensure to disengage the tongue from the tube to relive the pressure.    BL cheeks- Continue with mepilex foam dressing for prevention.    Orders Written  WOC Nurse follow-up plan:twice weekly  Nursing to notify the Provider(s) and re-consult the WOC Nurse if wound(s) deteriorates or new skin concern.    Patient History  According to provider note(s): Brandon Schafer is a 41 year old male with questionable PMH of remote VTE who was admitted on 11/3/2021 with ARDS 2/2 COVID pneumonia.    Objective Data  Containment of  urine/stool: Indwelling catheter    Current Diet/ Nutrition:  Orders Placed This Encounter      NPO for Medical/Clinical Reasons Except for: Meds      Output:   I/O last 3 completed shifts:  In: 4161.01 [I.V.:1241.01; NG/GT:2200]  Out: 4000 [Urine:4000]    Risk Assessment:   Sensory Perception: 2-->very limited  Moisture: 3-->occasionally moist  Activity: 1-->bedfast  Mobility: 1-->completely immobile  Nutrition: 3-->adequate  Friction and Shear: 2-->potential problem  Adin Score: 12      Labs:   Recent Labs   Lab 11/11/21  0348 11/08/21  0427 11/07/21  0507   ALBUMIN  --   --  2.4*   HGB 12.0*   < > 12.8*   WBC 14.3*   < > 15.6*    < > = values in this interval not displayed.       Physical Exam  Skin inspection: focused BL cheeks, mouth and nose  Patient is high risk for pressure injury development secondary to proning    Wound Location:  Septum      11/9 11/11      Date of last Photo 11/11  Wound History: Pt has been proning intermittently.  Measurements (length x width x depth, in cm) 0.5 cm x 0.6 cm  x  <0.1 cm   Wound Base:  100 % non-blanchable, erythema and purple- evolving  Tunneling N/A  Undermining N/A  Palpation of the wound bed: normal   Periwound skin: intact  Color: normal and consistent with surrounding tissue  Temperature: normal   Drainage:, none  Description of drainage: none  Odor: none  Pain: unable to assess due to  sedation ,       2. Tongue    Date of last Photo 11/9- unable to see the actual injury in the picture  Wound History: Pt has been proning intermittently. Edematous face and tongue. Currently able to reposition the ETT disengaging the tongue. Pt has been supined since 11/10 morning.  Measurements (length x width x depth, in cm) 1.1 cm x 0.6 cm  x  0.1 cm   Wound Base:  100 % mucosal  Tunneling N/A  Undermining N/A  Palpation of the wound bed: normal   Periwound skin: intact  Color: normal and consistent  with surrounding tissue  Temperature: normal   Drainage:, none  Description of drainage: none  Odor: none  Pain: unable to assess due to  sedation ,       Interventions  Current support surface: Standard  Low air loss mattress  Current off-loading measures: Pillows  Repositioning aid: Pillows  Visual inspection of wound(s) completed   Tube Securement: ETT head gear  Wound Care: was done per plan of care.  Supplies: floor stock and discussed with RN  Educated provided: plan of care and Off-loading pressure  Education provided to: nurse  Discussed importance of:repositioning every 2 hours, off-loading pressure to wound, off-loading mattress and moisture management  Discussed plan of care with Nurse    Miranda Aranda RN

## 2021-11-11 NOTE — CARE CONFERENCE
Care Conference    Care conference was held on 2021.  Conference was coordinated by Care Coordinator in the conference room    Attending the conference were: MICU (Dr. Betancourt, Dr. Rolle), Palliative Care (FARTUN Kingsley, Chaplain Love, CHRISTIAN Monsivais), ICU CHRISTIAN Flores, Family by phone (brothers Brad and Kirit, mother Mahsa)    Discussion/Outcomes/Follow-Up: Medical team provided an update of pt's status and changes over the past week. Family asked good questions about pt's prognosis, potential quality of life, and current status/treatments. Family expressed appreciation for care pt is receiving.     Plan for care conference again next week (Thursday or Friday).     DINH Haas, [facility]  ICU   formerly [facility]  Ph: [phone]  [phone]

## 2021-11-11 NOTE — PROGRESS NOTES
MEDICAL ICU PROGRESS NOTE  11/11/2021      Date of Service (when I saw the patient): 11/11/2021    ASSESSMENT: Brandon Schafer is a 41 year old male with questionable PMH of remote VTE who was admitted on 11/3/2021 with ARDS 2/2 COVID pneumonia.    CHANGES and MAJOR THINGS TODAY:   - Start Zosyn and Vanc  - Respiratory panel, MRSA swab  - Increase bowel regimen: Pink lady enema  - Continue diuresis: Lasix 20 mg IV PRN, goal net negative -500  - If oxygen saturation worsens, plan to re-pone   - BMP afternoon, adjust FWF as necessary  - Care conference 11/11     PLAN:    Neuro:  # Pain and sedation  - Fentanyl infusion  - Midazolam infusion, attempt to wean down first  - Ketamine infusion  - Oxycodone 20 mg Q4H  - Ativan 8 mg Q4H  - Seroquel 25mg, 25mg, 50mg   - RASS goal -2 while supine, -5 while proned   - Not currently planning on initiating NMB    Pulmonary:  # ARDS 2/2 COVID pneumonia  Intubated 11/2 prior to transport flight. Proned on 11/5-11/9.   - Lung protective ventilation with AC 30/450/+14  - No indication for NMB at this time  - Inhaled epoprostenol at 20 ng/kg/min  - Secretion clearance with duonebs q6 hours, and BID mucomyst, metaneb  - Infectious work-up and treatment as below  - Keep supine 11/11, however, no contraindications to proning     # ?COPD exacerbation  Wheezing on exam on 11/4. S/p azithromycin for 5d. Steroids per Covid protocol.     Cardiovascular:  # Hypotension  Transient, and in the setting of sedation. No prior TTE. Troponin peaked at 0.293. EKG with T wave inversion in lateral leads. Suspect troponin was 2/2 demand ischemia in setting of hypoxia.  - Norepinephrine gtt as needed     GI/Nutrition:  # At risk for malnutrition  - Nutrition consult for TF  - Recipe per RD   - Bowel regimen: scheduled Miralax, and scheduled senna; scheduled bisacodyl prn senna and Miralax, Pink lady enema 11/11    # Coffee ground material from OG (11/4), resolved  - PPI  BID    Renal/Fluids/Electrolytes:  # Non-oliguric STEFAN- Improved  Unclear baseline, has not seen doctor in 20 years. Cr 2.5 on admission, now improved.   - Continue diuresis with furosemide 20 mg, reassess Q12H   - Goal net negative 500 ml     # Hypernatremia  - FWF to 200ml q2h   - BMP recheck afternoon, adjust as needed    Endocrine:  # Stress and steroid induced hyperglycemia  - Medium resistance sliding scale insulin     ID:  # COVID pneumonia  Symptoms (headache, SOB, fever, cough, diarrhea) 1 week prior to presentation. Not COVID vaccinated. Tested positive on presentation on 11/2. S/p tocilizumab 11/4.  - Dexamethasone 6 mg daily (total 10 days)  - Deferring remdesivir given STEFAN    # Fever  # Possible Superimposed Bacterial Pneumonia  Pt with worsening oxygen saturations, fevers, and persistent leukocytosis. Given his prolonged hospitalization, will plan to start broad abx.   - BCx2, MRSA, Respiratory panel, UC  - CXR  - Start Zosyn + Vanc    Abx:  - Zosyn 11/11-*  - Vanc 11/11-*      Hematology:    # Coagulopathy 2/2 COVID  US BLE negative for DVT.  - lovenox ppx     MSK  # Septum wound , tongue wound 2/2 Proning   - WOC following    General Cares/Prophylaxis:    DVT Prophylaxis: Lovenox ppx  GI Prophylaxis: PPI  Family Communication: Brother Brad. Care conference   Code Status: DNR    Lines/tubes/drains:  - PIV x2  - Right PICC  - Arterial line left radial  - OG  - Flores  - ETT    Disposition:  - Medical ICU     Patient seen and findings/plan discussed with medical ICU staff, Dr. Rolle.    Jovan Betancourt  [facility] [place]  Internal Medicine, PGY3    ====================================  INTERVAL HISTORY:   Patient spiked fever overnight and was pan-cultured. FiO2 also increased. Plan to start abx. Care conference today. No BM for several days, so plan on giving enema. FWF increased to 200 ml Q2H overnight as well.     OBJECTIVE:   1. VITAL SIGNS:   Temp:  [100  F (37.8  C)-102.2  F (39  C)] 102.2   F (39  C)  Pulse:  [] 84  Resp:  [18-41] 31  BP: ()/() 102/55  MAP:  [45 mmHg-120 mmHg] 95 mmHg  Arterial Line BP: ()/() 157/72  FiO2 (%):  [65 %-100 %] 85 %  SpO2:  [85 %-99 %] 96 %  Ventilation Mode: CMV/AC  (Continuous Mandatory Ventilation/ Assist Control)  FiO2 (%): 85 %  Rate Set (breaths/minute): 30 breaths/min  Tidal Volume Set (mL): 450 mL  PEEP (cm H2O): 14 cmH2O  Oxygen Concentration (%): 85 %  Resp: (!) 31    2. INTAKE/ OUTPUT:   I/O last 3 completed shifts:  In: 4161.01 [I.V.:1241.01; NG/GT:2200]  Out: 4000 [Urine:4000]    3. PHYSICAL EXAMINATION:  General: intubated and sedated  HEENT: Mucous membranes moist  Neuro: Goal RASS -3   Pulm/Resp: Bilateral coarse breath sounds  CV: RRR, S1/S2 without m/r/g; pedal pulses 2+ with trace LE edema  Abdomen: Obese,  non-tender; chelo-umbilical hernia that is soft and compressible   : Flores catheter in place, urine yellow and clear  Incisions/Skin: No rashes noted    4. LABS:   Arterial Blood Gases   Recent Labs   Lab 11/11/21  0349 11/10/21  0847 11/09/21  2146 11/09/21  1627   PH 7.37 7.32* 7.34* 7.36   PCO2 52* 57* 53* 51*   PO2 108* 93 110* 74*   HCO3 30* 29* 29* 29*     Complete Blood Count   Recent Labs   Lab 11/11/21  0348 11/10/21  0433 11/09/21  0326 11/08/21  0427   WBC 14.3* 14.2* 14.1* 15.4*   HGB 12.0* 13.1* 13.3 13.6   * 191 279 311     Basic Metabolic Panel  Recent Labs   Lab 11/11/21  0744 11/11/21  0356 11/11/21  0348 11/10/21  2311 11/10/21  0436 11/10/21  0433 11/09/21 0422 11/09/21  0326 11/08/21  0810 11/08/21  0427   NA  --   --  146*  --   --  148*  --  146*  --  145*   POTASSIUM  --   --  4.5  --   --  4.8  --  4.4  --  4.1   CHLORIDE  --   --  115*  --   --  116*  --  116*  --  113*   CO2  --   --  29  --   --  30  --  26  --  26   BUN  --   --  51*  --   --  49*  --  45*  --  48*   CR  --   --  1.30*  --   --  1.27*  --  1.21  --  1.16   * 127* 124* 129*   < > 124*   < > 129*   < > 106*    <  > = values in this interval not displayed.     Liver Function Tests  Recent Labs   Lab 11/07/21  0507   *   ALT 98*   ALKPHOS 82   BILITOTAL 0.4   ALBUMIN 2.4*     5. RADIOLOGY:   No results found for this or any previous visit (from the past 24 hour(s)).

## 2021-11-12 LAB
ANION GAP SERPL CALCULATED.3IONS-SCNC: 4 MMOL/L (ref 3–14)
BACTERIA UR CULT: ABNORMAL
BASE EXCESS BLDA CALC-SCNC: 1.7 MMOL/L (ref -9–1.8)
BASE EXCESS BLDA CALC-SCNC: 2.5 MMOL/L (ref -9–1.8)
BASE EXCESS BLDA CALC-SCNC: 2.7 MMOL/L (ref -9–1.8)
BUN SERPL-MCNC: 48 MG/DL (ref 7–30)
CALCIUM SERPL-MCNC: 8.4 MG/DL (ref 8.5–10.1)
CHLORIDE BLD-SCNC: 112 MMOL/L (ref 94–109)
CO2 SERPL-SCNC: 28 MMOL/L (ref 20–32)
CREAT SERPL-MCNC: 1.07 MG/DL (ref 0.66–1.25)
ERYTHROCYTE [DISTWIDTH] IN BLOOD BY AUTOMATED COUNT: 15.4 % (ref 10–15)
GFR SERPL CREATININE-BSD FRML MDRD: 85 ML/MIN/1.73M2
GLUCOSE BLD-MCNC: 146 MG/DL (ref 70–99)
GLUCOSE BLDC GLUCOMTR-MCNC: 129 MG/DL (ref 70–99)
GLUCOSE BLDC GLUCOMTR-MCNC: 135 MG/DL (ref 70–99)
GLUCOSE BLDC GLUCOMTR-MCNC: 147 MG/DL (ref 70–99)
GLUCOSE BLDC GLUCOMTR-MCNC: 152 MG/DL (ref 70–99)
GLUCOSE BLDC GLUCOMTR-MCNC: 159 MG/DL (ref 70–99)
GLUCOSE BLDC GLUCOMTR-MCNC: 181 MG/DL (ref 70–99)
HCO3 BLD-SCNC: 29 MMOL/L (ref 21–28)
HCO3 BLD-SCNC: 29 MMOL/L (ref 21–28)
HCO3 BLD-SCNC: 30 MMOL/L (ref 21–28)
HCT VFR BLD AUTO: 38.4 % (ref 40–53)
HGB BLD-MCNC: 11.7 G/DL (ref 13.3–17.7)
MAGNESIUM SERPL-MCNC: 2.1 MG/DL (ref 1.6–2.3)
MCH RBC QN AUTO: 27.7 PG (ref 26.5–33)
MCHC RBC AUTO-ENTMCNC: 30.5 G/DL (ref 31.5–36.5)
MCV RBC AUTO: 91 FL (ref 78–100)
O2/TOTAL GAS SETTING VFR VENT: 75 %
O2/TOTAL GAS SETTING VFR VENT: 75 %
O2/TOTAL GAS SETTING VFR VENT: 90 %
PCO2 BLD: 54 MM HG (ref 35–45)
PCO2 BLD: 56 MM HG (ref 35–45)
PCO2 BLD: 58 MM HG (ref 35–45)
PH BLD: 7.31 [PH] (ref 7.35–7.45)
PH BLD: 7.33 [PH] (ref 7.35–7.45)
PH BLD: 7.34 [PH] (ref 7.35–7.45)
PHOSPHATE SERPL-MCNC: 4 MG/DL (ref 2.5–4.5)
PLATELET # BLD AUTO: 122 10E3/UL (ref 150–450)
PO2 BLD: 136 MM HG (ref 80–105)
PO2 BLD: 92 MM HG (ref 80–105)
PO2 BLD: 99 MM HG (ref 80–105)
POTASSIUM BLD-SCNC: 3.9 MMOL/L (ref 3.4–5.3)
PROCALCITONIN SERPL-MCNC: 0.08 NG/ML
RBC # BLD AUTO: 4.22 10E6/UL (ref 4.4–5.9)
SODIUM SERPL-SCNC: 144 MMOL/L (ref 133–144)
WBC # BLD AUTO: 10.7 10E3/UL (ref 4–11)

## 2021-11-12 PROCEDURE — 82803 BLOOD GASES ANY COMBINATION: CPT | Performed by: STUDENT IN AN ORGANIZED HEALTH CARE EDUCATION/TRAINING PROGRAM

## 2021-11-12 PROCEDURE — 87305 ASPERGILLUS AG IA: CPT | Performed by: NURSE PRACTITIONER

## 2021-11-12 PROCEDURE — 250N000011 HC RX IP 250 OP 636: Performed by: NURSE PRACTITIONER

## 2021-11-12 PROCEDURE — 36592 COLLECT BLOOD FROM PICC: CPT | Performed by: STUDENT IN AN ORGANIZED HEALTH CARE EDUCATION/TRAINING PROGRAM

## 2021-11-12 PROCEDURE — 250N000011 HC RX IP 250 OP 636

## 2021-11-12 PROCEDURE — 250N000009 HC RX 250: Performed by: STUDENT IN AN ORGANIZED HEALTH CARE EDUCATION/TRAINING PROGRAM

## 2021-11-12 PROCEDURE — 80048 BASIC METABOLIC PNL TOTAL CA: CPT | Performed by: STUDENT IN AN ORGANIZED HEALTH CARE EDUCATION/TRAINING PROGRAM

## 2021-11-12 PROCEDURE — 87449 NOS EACH ORGANISM AG IA: CPT | Performed by: NURSE PRACTITIONER

## 2021-11-12 PROCEDURE — 93010 ELECTROCARDIOGRAM REPORT: CPT | Performed by: INTERNAL MEDICINE

## 2021-11-12 PROCEDURE — 94645 CONT INHLJ TX EACH ADDL HOUR: CPT

## 2021-11-12 PROCEDURE — 250N000013 HC RX MED GY IP 250 OP 250 PS 637: Performed by: STUDENT IN AN ORGANIZED HEALTH CARE EDUCATION/TRAINING PROGRAM

## 2021-11-12 PROCEDURE — C9113 INJ PANTOPRAZOLE SODIUM, VIA: HCPCS | Performed by: STUDENT IN AN ORGANIZED HEALTH CARE EDUCATION/TRAINING PROGRAM

## 2021-11-12 PROCEDURE — 82330 ASSAY OF CALCIUM: CPT | Performed by: STUDENT IN AN ORGANIZED HEALTH CARE EDUCATION/TRAINING PROGRAM

## 2021-11-12 PROCEDURE — 83735 ASSAY OF MAGNESIUM: CPT | Performed by: STUDENT IN AN ORGANIZED HEALTH CARE EDUCATION/TRAINING PROGRAM

## 2021-11-12 PROCEDURE — 94003 VENT MGMT INPAT SUBQ DAY: CPT

## 2021-11-12 PROCEDURE — 84484 ASSAY OF TROPONIN QUANT: CPT | Performed by: STUDENT IN AN ORGANIZED HEALTH CARE EDUCATION/TRAINING PROGRAM

## 2021-11-12 PROCEDURE — 250N000011 HC RX IP 250 OP 636: Performed by: STUDENT IN AN ORGANIZED HEALTH CARE EDUCATION/TRAINING PROGRAM

## 2021-11-12 PROCEDURE — 250N000013 HC RX MED GY IP 250 OP 250 PS 637

## 2021-11-12 PROCEDURE — 84100 ASSAY OF PHOSPHORUS: CPT | Performed by: STUDENT IN AN ORGANIZED HEALTH CARE EDUCATION/TRAINING PROGRAM

## 2021-11-12 PROCEDURE — 85027 COMPLETE CBC AUTOMATED: CPT | Performed by: STUDENT IN AN ORGANIZED HEALTH CARE EDUCATION/TRAINING PROGRAM

## 2021-11-12 PROCEDURE — 94640 AIRWAY INHALATION TREATMENT: CPT

## 2021-11-12 PROCEDURE — 36415 COLL VENOUS BLD VENIPUNCTURE: CPT | Performed by: NURSE PRACTITIONER

## 2021-11-12 PROCEDURE — 258N000003 HC RX IP 258 OP 636: Performed by: STUDENT IN AN ORGANIZED HEALTH CARE EDUCATION/TRAINING PROGRAM

## 2021-11-12 PROCEDURE — 200N000002 HC R&B ICU UMMC

## 2021-11-12 PROCEDURE — 99291 CRITICAL CARE FIRST HOUR: CPT | Performed by: STUDENT IN AN ORGANIZED HEALTH CARE EDUCATION/TRAINING PROGRAM

## 2021-11-12 PROCEDURE — 94640 AIRWAY INHALATION TREATMENT: CPT | Mod: 76

## 2021-11-12 PROCEDURE — 999N000157 HC STATISTIC RCP TIME EA 10 MIN: Mod: 76

## 2021-11-12 PROCEDURE — 84145 PROCALCITONIN (PCT): CPT | Performed by: NURSE PRACTITIONER

## 2021-11-12 PROCEDURE — 250N000009 HC RX 250

## 2021-11-12 RX ORDER — FUROSEMIDE 10 MG/ML
20 INJECTION INTRAMUSCULAR; INTRAVENOUS ONCE
Status: COMPLETED | OUTPATIENT
Start: 2021-11-12 | End: 2021-11-12

## 2021-11-12 RX ORDER — BISACODYL 10 MG
10 SUPPOSITORY, RECTAL RECTAL EVERY 8 HOURS PRN
Status: DISCONTINUED | OUTPATIENT
Start: 2021-11-12 | End: 2021-12-06 | Stop reason: HOSPADM

## 2021-11-12 RX ADMIN — DEXMEDETOMIDINE 0.7 MCG/KG/HR: 100 INJECTION, SOLUTION, CONCENTRATE INTRAVENOUS at 20:20

## 2021-11-12 RX ADMIN — Medication 2 PACKET: at 10:00

## 2021-11-12 RX ADMIN — Medication 100 MCG: at 04:02

## 2021-11-12 RX ADMIN — INSULIN ASPART 1 UNITS: 100 INJECTION, SOLUTION INTRAVENOUS; SUBCUTANEOUS at 19:54

## 2021-11-12 RX ADMIN — OXYCODONE HYDROCHLORIDE 20 MG: 10 TABLET ORAL at 16:11

## 2021-11-12 RX ADMIN — INSULIN ASPART 1 UNITS: 100 INJECTION, SOLUTION INTRAVENOUS; SUBCUTANEOUS at 11:45

## 2021-11-12 RX ADMIN — QUETIAPINE FUMARATE 25 MG: 25 TABLET ORAL at 14:54

## 2021-11-12 RX ADMIN — Medication 20 NG/KG/MIN: at 13:28

## 2021-11-12 RX ADMIN — ACETYLCYSTEINE 2 ML: 200 SOLUTION ORAL; RESPIRATORY (INHALATION) at 08:09

## 2021-11-12 RX ADMIN — OXYCODONE HYDROCHLORIDE 20 MG: 10 TABLET ORAL at 03:31

## 2021-11-12 RX ADMIN — PIPERACILLIN SODIUM AND TAZOBACTAM SODIUM 4.5 G: 4; .5 INJECTION, POWDER, LYOPHILIZED, FOR SOLUTION INTRAVENOUS at 11:40

## 2021-11-12 RX ADMIN — PANTOPRAZOLE SODIUM 40 MG: 40 INJECTION, POWDER, FOR SOLUTION INTRAVENOUS at 07:45

## 2021-11-12 RX ADMIN — Medication 11 MG/HR: at 17:56

## 2021-11-12 RX ADMIN — Medication 12 MG/HR: at 09:59

## 2021-11-12 RX ADMIN — PIPERACILLIN SODIUM AND TAZOBACTAM SODIUM 4.5 G: 4; .5 INJECTION, POWDER, LYOPHILIZED, FOR SOLUTION INTRAVENOUS at 00:11

## 2021-11-12 RX ADMIN — DEXAMETHASONE SODIUM PHOSPHATE 6 MG: 4 INJECTION, SOLUTION INTRA-ARTICULAR; INTRALESIONAL; INTRAMUSCULAR; INTRAVENOUS; SOFT TISSUE at 11:40

## 2021-11-12 RX ADMIN — DEXMEDETOMIDINE 0.7 MCG/KG/HR: 100 INJECTION, SOLUTION, CONCENTRATE INTRAVENOUS at 04:29

## 2021-11-12 RX ADMIN — DEXMEDETOMIDINE 0.7 MCG/KG/HR: 100 INJECTION, SOLUTION, CONCENTRATE INTRAVENOUS at 11:58

## 2021-11-12 RX ADMIN — DOCUSATE SODIUM 50 MG AND SENNOSIDES 8.6 MG 1 TABLET: 8.6; 5 TABLET, FILM COATED ORAL at 19:49

## 2021-11-12 RX ADMIN — INSULIN ASPART 1 UNITS: 100 INJECTION, SOLUTION INTRAVENOUS; SUBCUTANEOUS at 07:55

## 2021-11-12 RX ADMIN — LORAZEPAM 8 MG: 2 TABLET ORAL at 14:54

## 2021-11-12 RX ADMIN — QUETIAPINE FUMARATE 50 MG: 50 TABLET ORAL at 19:48

## 2021-11-12 RX ADMIN — Medication 2 PACKET: at 21:57

## 2021-11-12 RX ADMIN — OXYCODONE HYDROCHLORIDE 20 MG: 10 TABLET ORAL at 07:45

## 2021-11-12 RX ADMIN — DEXMEDETOMIDINE 0.7 MCG/KG/HR: 100 INJECTION, SOLUTION, CONCENTRATE INTRAVENOUS at 15:10

## 2021-11-12 RX ADMIN — Medication 20 NG/KG/MIN: at 03:29

## 2021-11-12 RX ADMIN — Medication 13 MG/HR: at 01:48

## 2021-11-12 RX ADMIN — ENOXAPARIN SODIUM 40 MG: 40 INJECTION SUBCUTANEOUS at 09:59

## 2021-11-12 RX ADMIN — LORAZEPAM 8 MG: 2 TABLET ORAL at 09:59

## 2021-11-12 RX ADMIN — Medication 2 PACKET: at 13:27

## 2021-11-12 RX ADMIN — Medication 100 MCG: at 00:00

## 2021-11-12 RX ADMIN — LORAZEPAM 8 MG: 2 TABLET ORAL at 05:54

## 2021-11-12 RX ADMIN — DEXMEDETOMIDINE 0.7 MCG/KG/HR: 100 INJECTION, SOLUTION, CONCENTRATE INTRAVENOUS at 07:54

## 2021-11-12 RX ADMIN — Medication 250 MCG/HR: at 07:57

## 2021-11-12 RX ADMIN — OXYCODONE HYDROCHLORIDE 20 MG: 10 TABLET ORAL at 19:49

## 2021-11-12 RX ADMIN — KETAMINE HYDROCHLORIDE 100 MG/HR: 100 INJECTION, SOLUTION, CONCENTRATE INTRAMUSCULAR; INTRAVENOUS at 23:03

## 2021-11-12 RX ADMIN — FUROSEMIDE 20 MG: 10 INJECTION, SOLUTION INTRAVENOUS at 14:54

## 2021-11-12 RX ADMIN — DEXMEDETOMIDINE 0.7 MCG/KG/HR: 100 INJECTION, SOLUTION, CONCENTRATE INTRAVENOUS at 17:56

## 2021-11-12 RX ADMIN — QUETIAPINE FUMARATE 25 MG: 25 TABLET ORAL at 07:45

## 2021-11-12 RX ADMIN — FUROSEMIDE 20 MG: 10 INJECTION, SOLUTION INTRAVENOUS at 03:31

## 2021-11-12 RX ADMIN — IPRATROPIUM BROMIDE AND ALBUTEROL SULFATE 3 ML: .5; 3 SOLUTION RESPIRATORY (INHALATION) at 20:57

## 2021-11-12 RX ADMIN — LORAZEPAM 8 MG: 2 TABLET ORAL at 21:57

## 2021-11-12 RX ADMIN — LORAZEPAM 8 MG: 2 TABLET ORAL at 17:07

## 2021-11-12 RX ADMIN — Medication 5 ML: at 07:46

## 2021-11-12 RX ADMIN — FUROSEMIDE 20 MG: 10 INJECTION, SOLUTION INTRAVENOUS at 21:56

## 2021-11-12 RX ADMIN — Medication 2 PACKET: at 07:46

## 2021-11-12 RX ADMIN — FUROSEMIDE 20 MG: 10 INJECTION, SOLUTION INTRAVENOUS at 19:48

## 2021-11-12 RX ADMIN — Medication 250 MCG/HR: at 17:07

## 2021-11-12 RX ADMIN — PIPERACILLIN SODIUM AND TAZOBACTAM SODIUM 4.5 G: 4; .5 INJECTION, POWDER, LYOPHILIZED, FOR SOLUTION INTRAVENOUS at 17:07

## 2021-11-12 RX ADMIN — DEXMEDETOMIDINE 0.7 MCG/KG/HR: 100 INJECTION, SOLUTION, CONCENTRATE INTRAVENOUS at 01:02

## 2021-11-12 RX ADMIN — LORAZEPAM 8 MG: 2 TABLET ORAL at 01:49

## 2021-11-12 RX ADMIN — Medication 100 MCG: at 21:03

## 2021-11-12 RX ADMIN — Medication 50 MG: at 00:36

## 2021-11-12 RX ADMIN — PIPERACILLIN SODIUM AND TAZOBACTAM SODIUM 4.5 G: 4; .5 INJECTION, POWDER, LYOPHILIZED, FOR SOLUTION INTRAVENOUS at 05:54

## 2021-11-12 RX ADMIN — OXYCODONE HYDROCHLORIDE 20 MG: 10 TABLET ORAL at 11:40

## 2021-11-12 RX ADMIN — IPRATROPIUM BROMIDE AND ALBUTEROL SULFATE 3 ML: .5; 3 SOLUTION RESPIRATORY (INHALATION) at 08:09

## 2021-11-12 RX ADMIN — INSULIN ASPART 1 UNITS: 100 INJECTION, SOLUTION INTRAVENOUS; SUBCUTANEOUS at 16:15

## 2021-11-12 RX ADMIN — Medication 2 PACKET: at 17:07

## 2021-11-12 RX ADMIN — OXYCODONE HYDROCHLORIDE 20 MG: 10 TABLET ORAL at 00:11

## 2021-11-12 ASSESSMENT — ACTIVITIES OF DAILY LIVING (ADL)
ADLS_ACUITY_SCORE: 25

## 2021-11-12 NOTE — PLAN OF CARE
Major Shift Events  Overnight pt remained in stable condition w/ issues of fevers, compensated respiratory acidosis, and  restraint order. Per previous RN, pt was febrile earlier in the day and pan cultures were collected. Tmax overnight = 100.2, fan and ice packs applied. @ 0000 & 0400 ABG resulted with compensated respiratory acidosis, MD informed and no change in plan at this time. Additionally @ 0400 when assessing the pts restraints I noticed that the restraint order had fallen off of my active orders (Order   @ 5359), restraints were immediatly removed and pt was placed in mitts for minimalizing risk of self extubation if pt becomes agitated (history of this during this admission). MD was informed of this situation, and a compass report was written. MD will follow up with day team about management of restraint orders daily if they would like to remain the plan going forward.     Neuro: Medically sedated, RASS -4/-5 (Versed, Fentanyl, Precedex, and Ketamine), withdraws to deep painful stimuli, PERRLA 2 mm, moves ex. Independently when agitated (turns)  Cv: Tmax 100.2, SR w/o ectopy, MAP > 65, SYS < 170, pulses +2  Resp: ETT @ 27 lip, CMV (75%, 450, 30, 14), LS: coarse/diminished, Moderate red streaked inline secretions. Copious nasal drainage.   GI/: RT w/ 50 mL out during shift, Flores w/ AUO (lasix 20 mg x2), NG @ 150, TF 30 mL/hr,  q2  Skin: Bilateral ear scabs, redness on cheeks from ETT lorenzana, umbilical hernia, ecchymosis     Drips: Fentanyl 250 mcg/hr, Veletri 20 ng/kg/min  Sedation: Precedex 0.7 mcg/kg/hr, Versed 13 mg/hr, Ketamine 100 mg/hr     Plan: Follow POC. Monitor closely, notify MD of acute changes.  For vital signs and complete assessments, please see documentation flowsheets.

## 2021-11-12 NOTE — PROGRESS NOTES
MEDICAL ICU PROGRESS NOTE  11/12/2021      Date of Service (when I saw the patient): 11/12/2021    ASSESSMENT: Brandon Schafer is a 41 year old male with questionable PMH of remote VTE who was admitted on 11/3/2021 with ARDS 2/2 COVID pneumonia. Now with CAUTI.     CHANGES and MAJOR THINGS TODAY:   Supine at 0900, repeat ABG at 1300 --> P/F 122 --> reprone  PPI to daily  Stop vancomycin  Stop mucomyst and metaneb     PLAN:     Neuro:  # Pain and sedation  - Fentanyl infusion  - Midazolam infusion, attempt to wean down first  - Ketamine infusion  - Oxycodone 20 mg Q4H  - Ativan 8 mg Q4H  - Seroquel 25mg, 25mg, 50mg   - RASS goal -2 while supine, -5 while proned   - Not currently planning on initiating NMB     Pulmonary:  # ARDS 2/2 COVID pneumonia  Intubated 11/2 prior to transport flight. Proned on 11/5-11/9.   - Lung protective ventilation with AC 30/450/+14/90%  - No indication for NMB at this time  - Inhaled epoprostenol at 20 ng/kg/min  - Secretion clearance with duonebs, stop mucomyst, and metaneb  - Infectious work-up and treatment as below=     # ?COPD exacerbation  Wheezing on exam on 11/4. S/p azithromycin for 5d. Steroids per Covid protocol.      Cardiovascular:  # Hypotension  Transient, and in the setting of sedation. No prior TTE. Troponin peaked at 0.293. EKG with T wave inversion in lateral leads. Suspect troponin was 2/2 demand ischemia in setting of hypoxia.  - Norepinephrine gtt as needed (off since 11/11 evening)     GI/Nutrition:  # At risk for malnutrition  - Nutrition consult for TF  - Recipe per RD   - Bowel regimen: scheduled Miralax and senna and prn Miralax     # Coffee ground material from OG (11/4), resolved  - Change PPI to daily     Renal/Fluids/Electrolytes:  # Non-oliguric STEFAN- Improved  Unclear baseline, has not seen doctor in 20 years. Cr 2.5 on admission, now improved.   - Continue diuresis with furosemide 20 mg, reassess Q12H   - Goal net negative 500 ml      # Hypernatremia  -  FWF to 200ml q2h     Endocrine:  # Stress and steroid induced hyperglycemia  - Medium resistance sliding scale insulin      ID:  # COVID pneumonia  Symptoms (headache, SOB, fever, cough, diarrhea) 1 week prior to presentation. Not COVID vaccinated. Tested positive on presentation on 11/2. S/p tocilizumab 11/4.  - Dexamethasone 6 mg daily (total 10 days)  - Deferring remdesivir given STEFAN     # Fever  # CAUTI  Pt with worsening oxygen saturations, fevers, and persistent leukocytosis. Given his prolonged hospitalization, was started on broad-spectrum antibiotics on 11/11. Urine culture grew >100k E coli. RVP and MRSA nares negative.   - Stop vancomycin  - Continue Zosyn  - Follow-up blood cultures (NGTD)  - Beta glucan and galactomannan pending     Abx:  - Zosyn 11/11-*  - Vanc 11/11-11/12      Hematology:    # Coagulopathy 2/2 COVID  US BLE negative for DVT.  - Enoxaparin ppx      MSK  # Septum wound, tongue wound 2/2 Proning   - WOC following     General Cares/Prophylaxis:    DVT Prophylaxis: Lovenox ppx (40 mg daily)  GI Prophylaxis: PPI  Family Communication: Brother Brad to be updated by resident this afternoon   Code Status: DNR     Lines/tubes/drains:  - PIV x3  - Right PICC  - Arterial line left radial  - NG  - Flores  - ETT  - Rectal tube     Disposition:  - Medical ICU    Patient seen and findings/plan discussed with medical ICU staff, Dr. Rolle.    Abril Laguerre, ACNP    ====================================  INTERVAL HISTORY:   Febrile overnight. No episodes of pulling at ETT overnight.     OBJECTIVE:   1. VITAL SIGNS:   Temp:  [97.5  F (36.4  C)-102.2  F (39  C)] 100.2  F (37.9  C)  Pulse:  [69-85] 72  Resp:  [20-45] 29  BP: (102)/(55) 102/55  MAP:  [51 mmHg-96 mmHg] 69 mmHg  Arterial Line BP: ()/(39-78) 107/54  FiO2 (%):  [75 %-100 %] 75 %  SpO2:  [84 %-98 %] 97 %  Ventilation Mode: CMV/AC  (Continuous Mandatory Ventilation/ Assist Control)  FiO2 (%): 75 %  Rate Set (breaths/minute): 30  breaths/min  Tidal Volume Set (mL): 450 mL  PEEP (cm H2O): 14 cmH2O  Oxygen Concentration (%): 90 %  Resp: 29    2. INTAKE/ OUTPUT:   I/O last 3 completed shifts:  In: 5746.24 [I.V.:2226.24; NG/GT:2800]  Out: 4420 [Urine:4170; Stool:250]    3. PHYSICAL EXAMINATION:  General: Sedate  HEENT: Mucous membranes moist, mild facial swelling  Neuro: RASS -5  Pulm/Resp: Coarse breath sounds in upper lobes  CV: RRR, S1/S2 without m/r/g; pedal pulses 2+ with non-pitting edema  Abdomen: Obese, soft, non-distended, non-tender  : Flores catheter in place, urine yellow and clear  Incisions/Skin: No rashes noted    4. LABS:   Arterial Blood Gases   Recent Labs   Lab 11/12/21 0339 11/12/21  0018 11/11/21  1605 11/11/21  0349   PH 7.33* 7.31* 7.29* 7.37   PCO2 56* 58* 62* 52*   PO2 99 136* 59* 108*   HCO3 30* 29* 29* 30*     Complete Blood Count   Recent Labs   Lab 11/12/21 0339 11/11/21  0348 11/10/21  0433 11/09/21  0326   WBC 10.7 14.3* 14.2* 14.1*   HGB 11.7* 12.0* 13.1* 13.3   * 143* 191 279     Basic Metabolic Panel  Recent Labs   Lab 11/12/21  0341 11/12/21  0339 11/12/21  0017 11/11/21  2043 11/11/21  1606 11/11/21  1442 11/11/21  0356 11/11/21  0348 11/10/21  0436 11/10/21  0433   NA  --  144  --   --   --  146*  --  146*  --  148*   POTASSIUM  --  3.9  --   --   --  4.3  --  4.5  --  4.8   CHLORIDE  --  112*  --   --   --  113*  --  115*  --  116*   CO2  --  28  --   --   --  29  --  29  --  30   BUN  --  48*  --   --   --  50*  --  51*  --  49*   CR  --  1.07  --   --   --  1.27*  --  1.30*  --  1.27*   * 146* 129* 138*   < > 196*   < > 124*   < > 124*    < > = values in this interval not displayed.     Liver Function Tests  Recent Labs   Lab 11/07/21  0507   *   ALT 98*   ALKPHOS 82   BILITOTAL 0.4   ALBUMIN 2.4*     5. RADIOLOGY:   Recent Results (from the past 24 hour(s))   XR Chest Port 1 View    Narrative    Chest one view portable semiupright    HISTORY: Increased oxygen requirement now  febrile    Present study: 11/9/2021    FINDINGS: Cardiac silhouette is nonenlarged. Endotracheal tube and  feeding tube in place. Right PICC with tip in the right atrium. Right  IJ line is been removed. Bilateral mixed interstitial and airspace  opacities slightly increased. Right apical bulla.      Impression    impression: Worsening interstitial and airspace opacities compatible  with COVID19 ARDS    SELVIN GONZALEZ MD         SYSTEM ID:  [id number]

## 2021-11-13 LAB
1,3 BETA GLUCAN SER-MCNC: <31 PG/ML
ANION GAP SERPL CALCULATED.3IONS-SCNC: 3 MMOL/L (ref 3–14)
BASE EXCESS BLDA CALC-SCNC: 4 MMOL/L (ref -9–1.8)
BASE EXCESS BLDA CALC-SCNC: 4.4 MMOL/L (ref -9–1.8)
BASE EXCESS BLDA CALC-SCNC: 4.5 MMOL/L (ref -9–1.8)
BUN SERPL-MCNC: 44 MG/DL (ref 7–30)
CA-I BLD-MCNC: 4.7 MG/DL (ref 4.4–5.2)
CALCIUM SERPL-MCNC: 8.2 MG/DL (ref 8.5–10.1)
CHLORIDE BLD-SCNC: 110 MMOL/L (ref 94–109)
CO2 SERPL-SCNC: 30 MMOL/L (ref 20–32)
CREAT SERPL-MCNC: 1.05 MG/DL (ref 0.66–1.25)
ERYTHROCYTE [DISTWIDTH] IN BLOOD BY AUTOMATED COUNT: 15.2 % (ref 10–15)
GALACTOMANNAN AG SERPL QL IA: NEGATIVE
GALACTOMANNAN AG SPEC IA-ACNC: 0.04
GFR SERPL CREATININE-BSD FRML MDRD: 87 ML/MIN/1.73M2
GLUCOSE BLD-MCNC: 146 MG/DL (ref 70–99)
GLUCOSE BLDC GLUCOMTR-MCNC: 116 MG/DL (ref 70–99)
GLUCOSE BLDC GLUCOMTR-MCNC: 129 MG/DL (ref 70–99)
GLUCOSE BLDC GLUCOMTR-MCNC: 135 MG/DL (ref 70–99)
GLUCOSE BLDC GLUCOMTR-MCNC: 138 MG/DL (ref 70–99)
GLUCOSE BLDC GLUCOMTR-MCNC: 138 MG/DL (ref 70–99)
GLUCOSE BLDC GLUCOMTR-MCNC: 153 MG/DL (ref 70–99)
HCO3 BLD-SCNC: 30 MMOL/L (ref 21–28)
HCO3 BLD-SCNC: 30 MMOL/L (ref 21–28)
HCO3 BLD-SCNC: 31 MMOL/L (ref 21–28)
HCT VFR BLD AUTO: 37.5 % (ref 40–53)
HGB BLD-MCNC: 11.4 G/DL (ref 13.3–17.7)
MAGNESIUM SERPL-MCNC: 2.1 MG/DL (ref 1.6–2.3)
MCH RBC QN AUTO: 27.5 PG (ref 26.5–33)
MCHC RBC AUTO-ENTMCNC: 30.4 G/DL (ref 31.5–36.5)
MCV RBC AUTO: 91 FL (ref 78–100)
O2/TOTAL GAS SETTING VFR VENT: 75 %
OBSERVATION IMP: NEGATIVE
PCO2 BLD: 51 MM HG (ref 35–45)
PCO2 BLD: 51 MM HG (ref 35–45)
PCO2 BLD: 53 MM HG (ref 35–45)
PH BLD: 7.37 [PH] (ref 7.35–7.45)
PH BLD: 7.39 [PH] (ref 7.35–7.45)
PH BLD: 7.39 [PH] (ref 7.35–7.45)
PLATELET # BLD AUTO: 124 10E3/UL (ref 150–450)
PO2 BLD: 111 MM HG (ref 80–105)
PO2 BLD: 75 MM HG (ref 80–105)
PO2 BLD: 82 MM HG (ref 80–105)
POTASSIUM BLD-SCNC: 3.8 MMOL/L (ref 3.4–5.3)
RBC # BLD AUTO: 4.14 10E6/UL (ref 4.4–5.9)
SODIUM SERPL-SCNC: 143 MMOL/L (ref 133–144)
SODIUM SERPL-SCNC: 144 MMOL/L (ref 133–144)
TROPONIN I SERPL-MCNC: 0.03 UG/L (ref 0–0.04)
WBC # BLD AUTO: 10.4 10E3/UL (ref 4–11)

## 2021-11-13 PROCEDURE — 250N000011 HC RX IP 250 OP 636: Performed by: STUDENT IN AN ORGANIZED HEALTH CARE EDUCATION/TRAINING PROGRAM

## 2021-11-13 PROCEDURE — 999N000157 HC STATISTIC RCP TIME EA 10 MIN

## 2021-11-13 PROCEDURE — 85027 COMPLETE CBC AUTOMATED: CPT | Performed by: STUDENT IN AN ORGANIZED HEALTH CARE EDUCATION/TRAINING PROGRAM

## 2021-11-13 PROCEDURE — 82803 BLOOD GASES ANY COMBINATION: CPT | Performed by: STUDENT IN AN ORGANIZED HEALTH CARE EDUCATION/TRAINING PROGRAM

## 2021-11-13 PROCEDURE — 94645 CONT INHLJ TX EACH ADDL HOUR: CPT

## 2021-11-13 PROCEDURE — 250N000009 HC RX 250: Performed by: STUDENT IN AN ORGANIZED HEALTH CARE EDUCATION/TRAINING PROGRAM

## 2021-11-13 PROCEDURE — 94640 AIRWAY INHALATION TREATMENT: CPT

## 2021-11-13 PROCEDURE — 250N000011 HC RX IP 250 OP 636: Performed by: NURSE PRACTITIONER

## 2021-11-13 PROCEDURE — C9113 INJ PANTOPRAZOLE SODIUM, VIA: HCPCS

## 2021-11-13 PROCEDURE — 99291 CRITICAL CARE FIRST HOUR: CPT | Performed by: STUDENT IN AN ORGANIZED HEALTH CARE EDUCATION/TRAINING PROGRAM

## 2021-11-13 PROCEDURE — 250N000013 HC RX MED GY IP 250 OP 250 PS 637

## 2021-11-13 PROCEDURE — 250N000013 HC RX MED GY IP 250 OP 250 PS 637: Performed by: STUDENT IN AN ORGANIZED HEALTH CARE EDUCATION/TRAINING PROGRAM

## 2021-11-13 PROCEDURE — 84295 ASSAY OF SERUM SODIUM: CPT | Performed by: STUDENT IN AN ORGANIZED HEALTH CARE EDUCATION/TRAINING PROGRAM

## 2021-11-13 PROCEDURE — 94003 VENT MGMT INPAT SUBQ DAY: CPT

## 2021-11-13 PROCEDURE — 93005 ELECTROCARDIOGRAM TRACING: CPT

## 2021-11-13 PROCEDURE — 250N000011 HC RX IP 250 OP 636

## 2021-11-13 PROCEDURE — 200N000002 HC R&B ICU UMMC

## 2021-11-13 PROCEDURE — 258N000003 HC RX IP 258 OP 636: Performed by: STUDENT IN AN ORGANIZED HEALTH CARE EDUCATION/TRAINING PROGRAM

## 2021-11-13 RX ORDER — FUROSEMIDE 10 MG/ML
20 INJECTION INTRAMUSCULAR; INTRAVENOUS 2 TIMES DAILY
Status: DISCONTINUED | OUTPATIENT
Start: 2021-11-13 | End: 2021-11-13

## 2021-11-13 RX ORDER — POTASSIUM CHLORIDE 1.5 G/1.58G
20 POWDER, FOR SOLUTION ORAL ONCE
Status: COMPLETED | OUTPATIENT
Start: 2021-11-13 | End: 2021-11-13

## 2021-11-13 RX ORDER — ALBUTEROL SULFATE 0.83 MG/ML
2.5 SOLUTION RESPIRATORY (INHALATION) EVERY 6 HOURS PRN
Status: DISCONTINUED | OUTPATIENT
Start: 2021-11-13 | End: 2021-12-06 | Stop reason: HOSPADM

## 2021-11-13 RX ORDER — FUROSEMIDE 10 MG/ML
20 INJECTION INTRAMUSCULAR; INTRAVENOUS ONCE
Status: COMPLETED | OUTPATIENT
Start: 2021-11-13 | End: 2021-11-13

## 2021-11-13 RX ADMIN — PIPERACILLIN SODIUM AND TAZOBACTAM SODIUM 4.5 G: 4; .5 INJECTION, POWDER, LYOPHILIZED, FOR SOLUTION INTRAVENOUS at 00:05

## 2021-11-13 RX ADMIN — Medication 20 NG/KG/MIN: at 22:21

## 2021-11-13 RX ADMIN — POTASSIUM CHLORIDE 20 MEQ: 1.5 POWDER, FOR SOLUTION ORAL at 02:24

## 2021-11-13 RX ADMIN — LORAZEPAM 8 MG: 2 TABLET ORAL at 10:06

## 2021-11-13 RX ADMIN — Medication 250 MCG/HR: at 02:37

## 2021-11-13 RX ADMIN — PIPERACILLIN SODIUM AND TAZOBACTAM SODIUM 4.5 G: 4; .5 INJECTION, POWDER, LYOPHILIZED, FOR SOLUTION INTRAVENOUS at 06:31

## 2021-11-13 RX ADMIN — INSULIN ASPART 1 UNITS: 100 INJECTION, SOLUTION INTRAVENOUS; SUBCUTANEOUS at 19:44

## 2021-11-13 RX ADMIN — Medication 10 MG/HR: at 02:24

## 2021-11-13 RX ADMIN — LORAZEPAM 8 MG: 2 TABLET ORAL at 18:07

## 2021-11-13 RX ADMIN — QUETIAPINE FUMARATE 50 MG: 50 TABLET ORAL at 19:45

## 2021-11-13 RX ADMIN — QUETIAPINE FUMARATE 25 MG: 25 TABLET ORAL at 14:13

## 2021-11-13 RX ADMIN — ENOXAPARIN SODIUM 40 MG: 40 INJECTION SUBCUTANEOUS at 10:07

## 2021-11-13 RX ADMIN — OXYCODONE HYDROCHLORIDE 20 MG: 10 TABLET ORAL at 00:05

## 2021-11-13 RX ADMIN — QUETIAPINE FUMARATE 25 MG: 25 TABLET ORAL at 07:43

## 2021-11-13 RX ADMIN — Medication 2 PACKET: at 14:13

## 2021-11-13 RX ADMIN — Medication 2 PACKET: at 12:03

## 2021-11-13 RX ADMIN — Medication 2 PACKET: at 18:08

## 2021-11-13 RX ADMIN — DEXMEDETOMIDINE 0.7 MCG/KG/HR: 100 INJECTION, SOLUTION, CONCENTRATE INTRAVENOUS at 21:57

## 2021-11-13 RX ADMIN — Medication 8 MG/HR: at 21:58

## 2021-11-13 RX ADMIN — OXYCODONE HYDROCHLORIDE 20 MG: 10 TABLET ORAL at 11:55

## 2021-11-13 RX ADMIN — Medication 20 NG/KG/MIN: at 00:12

## 2021-11-13 RX ADMIN — DEXMEDETOMIDINE 0.7 MCG/KG/HR: 100 INJECTION, SOLUTION, CONCENTRATE INTRAVENOUS at 08:25

## 2021-11-13 RX ADMIN — PANTOPRAZOLE SODIUM 40 MG: 40 INJECTION, POWDER, FOR SOLUTION INTRAVENOUS at 07:43

## 2021-11-13 RX ADMIN — Medication 10 MG/HR: at 10:49

## 2021-11-13 RX ADMIN — LORAZEPAM 8 MG: 2 TABLET ORAL at 14:13

## 2021-11-13 RX ADMIN — Medication 2 PACKET: at 08:06

## 2021-11-13 RX ADMIN — LORAZEPAM 8 MG: 2 TABLET ORAL at 22:23

## 2021-11-13 RX ADMIN — DEXMEDETOMIDINE 0.7 MCG/KG/HR: 100 INJECTION, SOLUTION, CONCENTRATE INTRAVENOUS at 15:49

## 2021-11-13 RX ADMIN — OXYCODONE HYDROCHLORIDE 20 MG: 10 TABLET ORAL at 04:01

## 2021-11-13 RX ADMIN — DOCUSATE SODIUM 50 MG AND SENNOSIDES 8.6 MG 2 TABLET: 8.6; 5 TABLET, FILM COATED ORAL at 07:43

## 2021-11-13 RX ADMIN — LORAZEPAM 8 MG: 2 TABLET ORAL at 02:24

## 2021-11-13 RX ADMIN — Medication 5 ML: at 07:42

## 2021-11-13 RX ADMIN — PIPERACILLIN SODIUM AND TAZOBACTAM SODIUM 4.5 G: 4; .5 INJECTION, POWDER, LYOPHILIZED, FOR SOLUTION INTRAVENOUS at 11:55

## 2021-11-13 RX ADMIN — OXYCODONE HYDROCHLORIDE 20 MG: 10 TABLET ORAL at 07:43

## 2021-11-13 RX ADMIN — IPRATROPIUM BROMIDE AND ALBUTEROL SULFATE 3 ML: .5; 3 SOLUTION RESPIRATORY (INHALATION) at 08:16

## 2021-11-13 RX ADMIN — FUROSEMIDE 20 MG: 10 INJECTION, SOLUTION INTRAVENOUS at 10:07

## 2021-11-13 RX ADMIN — DEXMEDETOMIDINE 0.7 MCG/KG/HR: 100 INJECTION, SOLUTION, CONCENTRATE INTRAVENOUS at 18:29

## 2021-11-13 RX ADMIN — Medication 2 PACKET: at 22:23

## 2021-11-13 RX ADMIN — LORAZEPAM 8 MG: 2 TABLET ORAL at 06:31

## 2021-11-13 RX ADMIN — PIPERACILLIN SODIUM AND TAZOBACTAM SODIUM 4.5 G: 4; .5 INJECTION, POWDER, LYOPHILIZED, FOR SOLUTION INTRAVENOUS at 18:07

## 2021-11-13 RX ADMIN — DOCUSATE SODIUM 50 MG AND SENNOSIDES 8.6 MG 2 TABLET: 8.6; 5 TABLET, FILM COATED ORAL at 19:45

## 2021-11-13 RX ADMIN — Medication 5 ML: at 11:56

## 2021-11-13 RX ADMIN — DEXMEDETOMIDINE 0.7 MCG/KG/HR: 100 INJECTION, SOLUTION, CONCENTRATE INTRAVENOUS at 00:59

## 2021-11-13 RX ADMIN — DEXMEDETOMIDINE 0.7 MCG/KG/HR: 100 INJECTION, SOLUTION, CONCENTRATE INTRAVENOUS at 12:03

## 2021-11-13 RX ADMIN — OXYCODONE HYDROCHLORIDE 20 MG: 10 TABLET ORAL at 15:50

## 2021-11-13 RX ADMIN — OXYCODONE HYDROCHLORIDE 20 MG: 10 TABLET ORAL at 19:45

## 2021-11-13 RX ADMIN — Medication 250 MCG/HR: at 22:52

## 2021-11-13 RX ADMIN — DEXMEDETOMIDINE 0.7 MCG/KG/HR: 100 INJECTION, SOLUTION, CONCENTRATE INTRAVENOUS at 04:01

## 2021-11-13 RX ADMIN — FUROSEMIDE 20 MG: 10 INJECTION, SOLUTION INTRAVENOUS at 15:49

## 2021-11-13 RX ADMIN — FUROSEMIDE 20 MG: 10 INJECTION, SOLUTION INTRAVENOUS at 22:23

## 2021-11-13 RX ADMIN — Medication 250 MCG/HR: at 12:44

## 2021-11-13 RX ADMIN — Medication 20 NG/KG/MIN: at 10:36

## 2021-11-13 ASSESSMENT — ACTIVITIES OF DAILY LIVING (ADL)
ADLS_ACUITY_SCORE: 25

## 2021-11-13 NOTE — PROVIDER NOTIFICATION
Notified MICU resident of telemetry changes, patient appears to be in Atrial Flutter with ST segment changes. Blood pressure stable. Order for STAT labs and 12 lead EKG received.

## 2021-11-13 NOTE — PLAN OF CARE
No significant changes overnight. Plan to supinate patient around 10am.    Neuro: RASS -4, intermittent +1 withdrawal to painful stimuli. Sedated on ketamine, versed, fentanyl, precedex drips +oxycodone/ativan/seroquel PO.  CV: sinus rhythm, MAP>65. Brief period of increased ectopy, apparent atrial flutter self resolved.Afebrile.  RR: intubated on CMV 30,450,14,75%, lungs coarse/diminished. Moderate creamy/blood tinged secretions.   GI: tube feed at 30ml/hr goal rate via NG, scant liquid stool output via rectal tube  : urinary catheter with adequate clear yellow output, lasix 20mg x 2  L/T/D: R.PICC, PIV x 3, L.Radial Art Line, NG, urinary catheter, rectal tube.    Continue current plan of care.

## 2021-11-13 NOTE — PROGRESS NOTES
MEDICAL ICU PROGRESS NOTE  11/13/2021      Date of Service (when I saw the patient): 11/13/2021    ASSESSMENT: Brandon Schafer is a 41 year old male with questionable PMH of remote VTE who was admitted on 11/3/2021 with ARDS 2/2 COVID pneumonia. Now with CAUTI and polymicrobial VAP.     CHANGES and MAJOR THINGS TODAY:   Stop Duonebs  Albuterol prn  Lasix 20 mg TID today  Decrease FWF to 150 mL q2 hours  Follow-up sensitivities on sputum  Sodium at 1400  Supine and check ABG 4 hours later, plan to reprone    PLAN:    Neuro:  # Pain and sedation  - Fentanyl infusion  - Midazolam infusion, slowly weaning  - Ketamine infusion  - Oxycodone 20 mg Q4H  - Ativan 8 mg Q4H  - Seroquel 25mg, 25mg, 50mg   - RASS goal -2 while supine, -5 while proned   - Not currently planning on initiating NMB     Pulmonary:  # ARDS 2/2 COVID pneumonia  Intubated 11/2 prior to transport flight. Prone positioning started 11/5.   - Lung protective ventilation with AC 30/450/+14/75% with PIP 33 and P/F 109 while prone this morning, improved to 148 when supine but will reprone  - No indication for NMB at this time  - Inhaled epoprostenol at 20 ng/kg/min  - No mucolytics needed at this point  - Infectious work-up and treatment as below    # ?COPD exacerbation  Wheezing on exam on 11/4. S/p azithromycin for 5d. Steroids per Covid protocol.   - Stop duonebs  - Start albuterol nebs prn     Cardiovascular:  # Hypotension  Transient, and in the setting of sedation. No prior TTE. Troponin peaked at 0.293. EKG with T wave inversion in lateral leads. Suspect troponin was 2/2 demand ischemia in setting of hypoxia.  - Norepinephrine gtt as needed (off since 11/11 evening)    # Atrial fibrillation  Episode of atrial fibrillation overnight 11/12, hemodynamically stable and resolved spontaneously. TWI on EKG, troponin negative     GI/Nutrition:  # At risk for malnutrition  - Nutrition consult for TF  - Bowel regimen: scheduled Miralax and senna and prn  Miralax     Renal/Fluids/Electrolytes:  # Non-oliguric STEFAN- improved  Unclear baseline, has not seen doctor in 20 years. Cr 2.5 on admission, now improved.   - Continue diuresis with furosemide 20 mg TID today, reassess tomorrow  - Goal net even      # Hypernatremia  - Decrease FWF to 150ml q2h     Endocrine:  # Stress and steroid induced hyperglycemia  - Medium resistance sliding scale insulin      ID:  # COVID pneumonia  Symptoms (headache, SOB, fever, cough, diarrhea) 1 week prior to presentation. Not COVID vaccinated. Tested positive on presentation on 11/2. S/p tocilizumab 11/4.  - Dexamethasone 6 mg daily (total 10 days)  - Deferring remdesivir given STEFAN     # Fever  # CAUTI  # VAP  Pt with worsening oxygen saturations, fevers, and persistent leukocytosis. Given his prolonged hospitalization, was started on broad-spectrum antibiotics on 11/11. Urine culture grew >100k pan-sensitive E coli. RVP and MRSA nares negative. Sputum grew E coli, S aureus, and GBS.  - Continue Zosyn while awaiting sensitivities on sputum  - Follow-up blood cultures (NGTD)  - Beta glucan pending  - Galactomannan negative     Abx:  - Zosyn 11/11-*  - Vanc 11/11-11/12      Hematology:    # Coagulopathy 2/2 COVID  US BLE negative for DVT.  - Enoxaparin ppx      MSK  # Septum wound, tongue wound 2/2 Proning   - WOC following     General Cares/Prophylaxis:    DVT Prophylaxis: Lovenox ppx (40 mg daily)  GI Prophylaxis: PPI  Family Communication: BrotherBrad, updated by phone at 15:20 pm  Code Status: DNR     Lines/tubes/drains:  - PIV x3  - Right PICC  - Arterial line left radial  - NG  - Flores  - ETT  - Rectal tube - remove     Disposition:  - Medical ICU    Patient seen and findings/plan discussed with medical ICU staff, Dr. Rolle.    Abril Laguerre, KELLIP    ====================================  INTERVAL HISTORY:   Brief episode of atrial fibrillation overnight. Hemodynamically stable. EKG with TWI and troponin negative. Converted  spontaneously. Remained prone overnight. Received 4 doses of 20 mg furosemide yesterday.    OBJECTIVE:   1. VITAL SIGNS:   Temp:  [98  F (36.7  C)-99.8  F (37.7  C)] 98  F (36.7  C)  Pulse:  [67-81] 74  Resp:  [24-30] 30  MAP:  [69 mmHg-98 mmHg] 85 mmHg  Arterial Line BP: (104-147)/(54-76) 119/68  FiO2 (%):  [75 %] 75 %  SpO2:  [91 %-100 %] 95 %  Ventilation Mode: CMV/AC  (Continuous Mandatory Ventilation/ Assist Control)  FiO2 (%): 75 %  Rate Set (breaths/minute): 30 breaths/min  Tidal Volume Set (mL): 450 mL  PEEP (cm H2O): 14 cmH2O  Oxygen Concentration (%): 75 %  Resp: 30    2. INTAKE/ OUTPUT:   I/O last 3 completed shifts:  In: 5165.8 [I.V.:1665.8; NG/GT:2810]  Out: 4330 [Urine:4280; Stool:50]    3. PHYSICAL EXAMINATION:  General: Sedate  HEENT: Mucous membranes dry, wound on tongue and base of nasal septum  Neuro: RASS -5, pupils 3 mm and reactive  Pulm/Resp: Clear breath sounds bilaterally without rhonchi, crackles or wheeze, breathing non-labored  CV: RRR, S1/S2 without m/r/g; pedal pulses 2+ with non-pitting edema  Abdomen: Soft, non-distended, non-tender  : Flores catheter in place, urine yellow and clear  Incisions/Skin: Wounds as above, no rashes noted    4. LABS:   Arterial Blood Gases   Recent Labs   Lab 11/13/21  0356 11/12/21  1324 11/12/21  0339 11/12/21  0018   PH 7.39 7.34* 7.33* 7.31*   PCO2 51* 54* 56* 58*   PO2 82 92 99 136*   HCO3 30* 29* 30* 29*     Complete Blood Count   Recent Labs   Lab 11/13/21  0357 11/12/21  0339 11/11/21  0348 11/10/21  0433   WBC 10.4 10.7 14.3* 14.2*   HGB 11.4* 11.7* 12.0* 13.1*   * 122* 143* 191     Basic Metabolic Panel  Recent Labs   Lab 11/13/21  0402 11/12/21  2354 11/12/21  2351 11/12/21  1951 11/12/21  0341 11/12/21  0339 11/11/21  1606 11/11/21  1442 11/11/21  0356 11/11/21  0348   NA  --   --  143  --   --  144  --  146*  --  146*   POTASSIUM  --   --  3.8  --   --  3.9  --  4.3  --  4.5   CHLORIDE  --   --  110*  --   --  112*  --  113*  --  115*    CO2  --   --  30  --   --  28  --  29  --  29   BUN  --   --  44*  --   --  48*  --  50*  --  51*   CR  --   --  1.05  --   --  1.07  --  1.27*  --  1.30*   * 138* 146* 159*   < > 146*   < > 196*   < > 124*    < > = values in this interval not displayed.     Liver Function Tests  Recent Labs   Lab 11/07/21  0507   *   ALT 98*   ALKPHOS 82   BILITOTAL 0.4   ALBUMIN 2.4*     5. RADIOLOGY:   No results found for this or any previous visit (from the past 24 hour(s)).

## 2021-11-13 NOTE — PLAN OF CARE
Major Shift Events:  Versed weaned from 13 to 11.  Fentanyl at 250.  Ketamine at 100. Precedex at 0.7.  Supine from 0900 to 1600.  Proned at 1630.  RASS goal met.  T Max 99.8.  Levo remains off.  PEEP 14, 75%.  Tube feeds at goal, rectal tube in place.  Stevenson in place.  E Coli in urine from culture prior to stevenson being changed to current one.  Wounds remain unchanged.  Family updated, girlfriend visited via Ipad.      Plan: Wean sedation and vent support as able.  For vital signs and complete assessments, please see documentation flowsheets.

## 2021-11-13 NOTE — PLAN OF CARE
Neuro: Sedated on versed, precedex, ketamine, fentanyl, and po ativan/oxycodone.  Withdraws from painful stimuli. RASS -4.    Cardiac: SR no ectopy. -140's/50-70's.   Respiratory: Sating >92% on CMV/AC RR 30 75% Fi02, PEEP 14, .  Supined at 1000 and re-proned at 1600.    GI/: Adequate urine output via stevenson, 20 mg lasix X2 today, third dose this evening. ~50 ml stool brown/liquid via rectal tube. Rectal tube removed and rectal pouch applied.   Diet/appetite: NJ tube with TF at 30 mL/hr, FWF decreased from 200 to 150 mL/hr.  Activity:  Assist of 2 with lift; repositioned every 2 hours.  Pain: CPOT 0; no signs of pain/discomfort   Skin: Pressure injury to septum, pink/red wound bed, optifoam applied  LDA's:  RUE triple lumen PICC, PIV X3, A-line, stevenson, rectal pouch, NJ    Plan: Continue with POC. Notify primary team with changes.

## 2021-11-14 LAB
ANION GAP SERPL CALCULATED.3IONS-SCNC: 3 MMOL/L (ref 3–14)
ANION GAP SERPL CALCULATED.3IONS-SCNC: 5 MMOL/L (ref 3–14)
ATRIAL RATE - MUSE: 75 BPM
BACTERIA SPT CULT: ABNORMAL
BASE EXCESS BLDA CALC-SCNC: 3.9 MMOL/L (ref -9–1.8)
BASE EXCESS BLDA CALC-SCNC: 4.5 MMOL/L (ref -9–1.8)
BASE EXCESS BLDA CALC-SCNC: 4.6 MMOL/L (ref -9–1.8)
BASE EXCESS BLDA CALC-SCNC: 4.6 MMOL/L (ref -9–1.8)
BUN SERPL-MCNC: 45 MG/DL (ref 7–30)
BUN SERPL-MCNC: 45 MG/DL (ref 7–30)
CALCIUM SERPL-MCNC: 8.3 MG/DL (ref 8.5–10.1)
CALCIUM SERPL-MCNC: 8.5 MG/DL (ref 8.5–10.1)
CHLORIDE BLD-SCNC: 110 MMOL/L (ref 94–109)
CHLORIDE BLD-SCNC: 111 MMOL/L (ref 94–109)
CO2 SERPL-SCNC: 28 MMOL/L (ref 20–32)
CO2 SERPL-SCNC: 31 MMOL/L (ref 20–32)
CREAT SERPL-MCNC: 1 MG/DL (ref 0.66–1.25)
CREAT SERPL-MCNC: 1.13 MG/DL (ref 0.66–1.25)
DIASTOLIC BLOOD PRESSURE - MUSE: NORMAL MMHG
ERYTHROCYTE [DISTWIDTH] IN BLOOD BY AUTOMATED COUNT: 15.4 % (ref 10–15)
GFR SERPL CREATININE-BSD FRML MDRD: 79 ML/MIN/1.73M2
GFR SERPL CREATININE-BSD FRML MDRD: >90 ML/MIN/1.73M2
GLUCOSE BLD-MCNC: 140 MG/DL (ref 70–99)
GLUCOSE BLD-MCNC: 151 MG/DL (ref 70–99)
GLUCOSE BLDC GLUCOMTR-MCNC: 138 MG/DL (ref 70–99)
GLUCOSE BLDC GLUCOMTR-MCNC: 139 MG/DL (ref 70–99)
GLUCOSE BLDC GLUCOMTR-MCNC: 140 MG/DL (ref 70–99)
GLUCOSE BLDC GLUCOMTR-MCNC: 142 MG/DL (ref 70–99)
GLUCOSE BLDC GLUCOMTR-MCNC: 151 MG/DL (ref 70–99)
GLUCOSE BLDC GLUCOMTR-MCNC: 152 MG/DL (ref 70–99)
GLUCOSE BLDC GLUCOMTR-MCNC: 165 MG/DL (ref 70–99)
GRAM STAIN RESULT: ABNORMAL
GRAM STAIN RESULT: ABNORMAL
HCO3 BLD-SCNC: 30 MMOL/L (ref 21–28)
HCO3 BLD-SCNC: 31 MMOL/L (ref 21–28)
HCT VFR BLD AUTO: 37.6 % (ref 40–53)
HGB BLD-MCNC: 11.7 G/DL (ref 13.3–17.7)
INTERPRETATION ECG - MUSE: NORMAL
MAGNESIUM SERPL-MCNC: 2.2 MG/DL (ref 1.6–2.3)
MAGNESIUM SERPL-MCNC: 2.3 MG/DL (ref 1.6–2.3)
MCH RBC QN AUTO: 28.2 PG (ref 26.5–33)
MCHC RBC AUTO-ENTMCNC: 31.1 G/DL (ref 31.5–36.5)
MCV RBC AUTO: 91 FL (ref 78–100)
O2/TOTAL GAS SETTING VFR VENT: 75 %
P AXIS - MUSE: 31 DEGREES
PCO2 BLD: 48 MM HG (ref 35–45)
PCO2 BLD: 50 MM HG (ref 35–45)
PCO2 BLD: 50 MM HG (ref 35–45)
PCO2 BLD: 51 MM HG (ref 35–45)
PH BLD: 7.38 [PH] (ref 7.35–7.45)
PH BLD: 7.38 [PH] (ref 7.35–7.45)
PH BLD: 7.39 [PH] (ref 7.35–7.45)
PH BLD: 7.41 [PH] (ref 7.35–7.45)
PHOSPHATE SERPL-MCNC: 3.2 MG/DL (ref 2.5–4.5)
PLATELET # BLD AUTO: 131 10E3/UL (ref 150–450)
PO2 BLD: 110 MM HG (ref 80–105)
PO2 BLD: 88 MM HG (ref 80–105)
PO2 BLD: 91 MM HG (ref 80–105)
PO2 BLD: 92 MM HG (ref 80–105)
POTASSIUM BLD-SCNC: 3.1 MMOL/L (ref 3.4–5.3)
POTASSIUM BLD-SCNC: 3.4 MMOL/L (ref 3.4–5.3)
POTASSIUM BLD-SCNC: 3.5 MMOL/L (ref 3.4–5.3)
PR INTERVAL - MUSE: 152 MS
QRS DURATION - MUSE: 74 MS
QT - MUSE: 338 MS
QTC - MUSE: 377 MS
R AXIS - MUSE: -18 DEGREES
RBC # BLD AUTO: 4.15 10E6/UL (ref 4.4–5.9)
SODIUM SERPL-SCNC: 143 MMOL/L (ref 133–144)
SODIUM SERPL-SCNC: 145 MMOL/L (ref 133–144)
SYSTOLIC BLOOD PRESSURE - MUSE: NORMAL MMHG
T AXIS - MUSE: 51 DEGREES
VENTRICULAR RATE- MUSE: 75 BPM
WBC # BLD AUTO: 9.1 10E3/UL (ref 4–11)

## 2021-11-14 PROCEDURE — 85027 COMPLETE CBC AUTOMATED: CPT | Performed by: STUDENT IN AN ORGANIZED HEALTH CARE EDUCATION/TRAINING PROGRAM

## 2021-11-14 PROCEDURE — 250N000011 HC RX IP 250 OP 636: Performed by: NURSE PRACTITIONER

## 2021-11-14 PROCEDURE — 258N000003 HC RX IP 258 OP 636: Performed by: STUDENT IN AN ORGANIZED HEALTH CARE EDUCATION/TRAINING PROGRAM

## 2021-11-14 PROCEDURE — 250N000011 HC RX IP 250 OP 636: Performed by: STUDENT IN AN ORGANIZED HEALTH CARE EDUCATION/TRAINING PROGRAM

## 2021-11-14 PROCEDURE — 200N000002 HC R&B ICU UMMC

## 2021-11-14 PROCEDURE — 82803 BLOOD GASES ANY COMBINATION: CPT | Performed by: STUDENT IN AN ORGANIZED HEALTH CARE EDUCATION/TRAINING PROGRAM

## 2021-11-14 PROCEDURE — 999N000157 HC STATISTIC RCP TIME EA 10 MIN

## 2021-11-14 PROCEDURE — 84132 ASSAY OF SERUM POTASSIUM: CPT | Performed by: STUDENT IN AN ORGANIZED HEALTH CARE EDUCATION/TRAINING PROGRAM

## 2021-11-14 PROCEDURE — 84100 ASSAY OF PHOSPHORUS: CPT | Performed by: STUDENT IN AN ORGANIZED HEALTH CARE EDUCATION/TRAINING PROGRAM

## 2021-11-14 PROCEDURE — C9113 INJ PANTOPRAZOLE SODIUM, VIA: HCPCS

## 2021-11-14 PROCEDURE — 99291 CRITICAL CARE FIRST HOUR: CPT | Mod: GC | Performed by: STUDENT IN AN ORGANIZED HEALTH CARE EDUCATION/TRAINING PROGRAM

## 2021-11-14 PROCEDURE — 250N000011 HC RX IP 250 OP 636

## 2021-11-14 PROCEDURE — 80048 BASIC METABOLIC PNL TOTAL CA: CPT

## 2021-11-14 PROCEDURE — 83735 ASSAY OF MAGNESIUM: CPT | Performed by: STUDENT IN AN ORGANIZED HEALTH CARE EDUCATION/TRAINING PROGRAM

## 2021-11-14 PROCEDURE — 250N000013 HC RX MED GY IP 250 OP 250 PS 637

## 2021-11-14 PROCEDURE — 94003 VENT MGMT INPAT SUBQ DAY: CPT

## 2021-11-14 PROCEDURE — 250N000013 HC RX MED GY IP 250 OP 250 PS 637: Performed by: STUDENT IN AN ORGANIZED HEALTH CARE EDUCATION/TRAINING PROGRAM

## 2021-11-14 PROCEDURE — 94645 CONT INHLJ TX EACH ADDL HOUR: CPT

## 2021-11-14 PROCEDURE — 80048 BASIC METABOLIC PNL TOTAL CA: CPT | Performed by: STUDENT IN AN ORGANIZED HEALTH CARE EDUCATION/TRAINING PROGRAM

## 2021-11-14 PROCEDURE — 250N000009 HC RX 250: Performed by: STUDENT IN AN ORGANIZED HEALTH CARE EDUCATION/TRAINING PROGRAM

## 2021-11-14 RX ORDER — FUROSEMIDE 10 MG/ML
40 INJECTION INTRAMUSCULAR; INTRAVENOUS DAILY
Status: DISCONTINUED | OUTPATIENT
Start: 2021-11-14 | End: 2021-11-15

## 2021-11-14 RX ORDER — POTASSIUM CHLORIDE 1.5 G/1.58G
40 POWDER, FOR SOLUTION ORAL ONCE
Status: COMPLETED | OUTPATIENT
Start: 2021-11-14 | End: 2021-11-14

## 2021-11-14 RX ORDER — FUROSEMIDE 10 MG/ML
40 INJECTION INTRAMUSCULAR; INTRAVENOUS ONCE
Status: COMPLETED | OUTPATIENT
Start: 2021-11-14 | End: 2021-11-14

## 2021-11-14 RX ORDER — IPRATROPIUM BROMIDE AND ALBUTEROL SULFATE 2.5; .5 MG/3ML; MG/3ML
3 SOLUTION RESPIRATORY (INHALATION) EVERY 4 HOURS PRN
Status: DISCONTINUED | OUTPATIENT
Start: 2021-11-14 | End: 2021-12-06 | Stop reason: HOSPADM

## 2021-11-14 RX ORDER — CEFTRIAXONE 2 G/1
2 INJECTION, POWDER, FOR SOLUTION INTRAMUSCULAR; INTRAVENOUS EVERY 24 HOURS
Status: DISCONTINUED | OUTPATIENT
Start: 2021-11-14 | End: 2021-11-17

## 2021-11-14 RX ORDER — POTASSIUM CHLORIDE 29.8 MG/ML
20 INJECTION INTRAVENOUS ONCE
Status: COMPLETED | OUTPATIENT
Start: 2021-11-14 | End: 2021-11-14

## 2021-11-14 RX ADMIN — FUROSEMIDE 40 MG: 10 INJECTION, SOLUTION INTRAVENOUS at 15:59

## 2021-11-14 RX ADMIN — Medication 8 MG/HR: at 07:30

## 2021-11-14 RX ADMIN — KETAMINE HYDROCHLORIDE 80 MG/HR: 100 INJECTION, SOLUTION, CONCENTRATE INTRAMUSCULAR; INTRAVENOUS at 04:03

## 2021-11-14 RX ADMIN — INSULIN ASPART 1 UNITS: 100 INJECTION, SOLUTION INTRAVENOUS; SUBCUTANEOUS at 16:08

## 2021-11-14 RX ADMIN — PIPERACILLIN SODIUM AND TAZOBACTAM SODIUM 4.5 G: 4; .5 INJECTION, POWDER, LYOPHILIZED, FOR SOLUTION INTRAVENOUS at 06:01

## 2021-11-14 RX ADMIN — QUETIAPINE FUMARATE 25 MG: 25 TABLET ORAL at 07:29

## 2021-11-14 RX ADMIN — INSULIN ASPART 1 UNITS: 100 INJECTION, SOLUTION INTRAVENOUS; SUBCUTANEOUS at 20:22

## 2021-11-14 RX ADMIN — ENOXAPARIN SODIUM 40 MG: 40 INJECTION SUBCUTANEOUS at 09:19

## 2021-11-14 RX ADMIN — LORAZEPAM 8 MG: 2 TABLET ORAL at 13:51

## 2021-11-14 RX ADMIN — OXYCODONE HYDROCHLORIDE 20 MG: 10 TABLET ORAL at 23:39

## 2021-11-14 RX ADMIN — Medication 7 MG/HR: at 20:32

## 2021-11-14 RX ADMIN — DEXMEDETOMIDINE 0.7 MCG/KG/HR: 100 INJECTION, SOLUTION, CONCENTRATE INTRAVENOUS at 01:46

## 2021-11-14 RX ADMIN — OXYCODONE HYDROCHLORIDE 20 MG: 10 TABLET ORAL at 15:04

## 2021-11-14 RX ADMIN — Medication 2 PACKET: at 07:31

## 2021-11-14 RX ADMIN — POLYETHYLENE GLYCOL 3350 17 G: 17 POWDER, FOR SOLUTION ORAL at 07:29

## 2021-11-14 RX ADMIN — POTASSIUM CHLORIDE 40 MEQ: 1.5 POWDER, FOR SOLUTION ORAL at 06:01

## 2021-11-14 RX ADMIN — Medication 2 PACKET: at 13:51

## 2021-11-14 RX ADMIN — Medication 100 MCG: at 10:25

## 2021-11-14 RX ADMIN — DOCUSATE SODIUM 50 MG AND SENNOSIDES 8.6 MG 2 TABLET: 8.6; 5 TABLET, FILM COATED ORAL at 20:12

## 2021-11-14 RX ADMIN — DOCUSATE SODIUM 50 MG AND SENNOSIDES 8.6 MG 2 TABLET: 8.6; 5 TABLET, FILM COATED ORAL at 07:29

## 2021-11-14 RX ADMIN — DEXMEDETOMIDINE 0.7 MCG/KG/HR: 100 INJECTION, SOLUTION, CONCENTRATE INTRAVENOUS at 07:30

## 2021-11-14 RX ADMIN — Medication 20 NG/KG/MIN: at 18:14

## 2021-11-14 RX ADMIN — PIPERACILLIN SODIUM AND TAZOBACTAM SODIUM 4.5 G: 4; .5 INJECTION, POWDER, LYOPHILIZED, FOR SOLUTION INTRAVENOUS at 00:01

## 2021-11-14 RX ADMIN — Medication 5 ML: at 07:29

## 2021-11-14 RX ADMIN — POTASSIUM CHLORIDE 40 MEQ: 1.5 POWDER, FOR SOLUTION ORAL at 15:04

## 2021-11-14 RX ADMIN — Medication 2 PACKET: at 17:59

## 2021-11-14 RX ADMIN — PANTOPRAZOLE SODIUM 40 MG: 40 INJECTION, POWDER, FOR SOLUTION INTRAVENOUS at 07:30

## 2021-11-14 RX ADMIN — INSULIN ASPART 1 UNITS: 100 INJECTION, SOLUTION INTRAVENOUS; SUBCUTANEOUS at 23:32

## 2021-11-14 RX ADMIN — POTASSIUM CHLORIDE 20 MEQ: 29.8 INJECTION, SOLUTION INTRAVENOUS at 22:08

## 2021-11-14 RX ADMIN — DEXMEDETOMIDINE 0.7 MCG/KG/HR: 100 INJECTION, SOLUTION, CONCENTRATE INTRAVENOUS at 05:26

## 2021-11-14 RX ADMIN — OXYCODONE HYDROCHLORIDE 20 MG: 10 TABLET ORAL at 07:30

## 2021-11-14 RX ADMIN — LORAZEPAM 8 MG: 2 TABLET ORAL at 01:46

## 2021-11-14 RX ADMIN — LORAZEPAM 8 MG: 2 TABLET ORAL at 09:19

## 2021-11-14 RX ADMIN — LORAZEPAM 8 MG: 2 TABLET ORAL at 17:59

## 2021-11-14 RX ADMIN — OXYCODONE HYDROCHLORIDE 20 MG: 10 TABLET ORAL at 20:12

## 2021-11-14 RX ADMIN — DEXMEDETOMIDINE 0.7 MCG/KG/HR: 100 INJECTION, SOLUTION, CONCENTRATE INTRAVENOUS at 20:31

## 2021-11-14 RX ADMIN — Medication 20 NG/KG/MIN: at 08:21

## 2021-11-14 RX ADMIN — Medication 2 PACKET: at 23:39

## 2021-11-14 RX ADMIN — POLYETHYLENE GLYCOL 3350 17 G: 17 POWDER, FOR SOLUTION ORAL at 20:12

## 2021-11-14 RX ADMIN — CEFTRIAXONE SODIUM 2 G: 2 INJECTION, POWDER, FOR SOLUTION INTRAMUSCULAR; INTRAVENOUS at 11:04

## 2021-11-14 RX ADMIN — Medication 2 PACKET: at 10:26

## 2021-11-14 RX ADMIN — Medication 250 MCG/HR: at 08:21

## 2021-11-14 RX ADMIN — INSULIN ASPART 1 UNITS: 100 INJECTION, SOLUTION INTRAVENOUS; SUBCUTANEOUS at 07:34

## 2021-11-14 RX ADMIN — QUETIAPINE FUMARATE 50 MG: 50 TABLET ORAL at 20:13

## 2021-11-14 RX ADMIN — QUETIAPINE FUMARATE 25 MG: 25 TABLET ORAL at 13:51

## 2021-11-14 RX ADMIN — Medication 100 MCG: at 06:05

## 2021-11-14 RX ADMIN — OXYCODONE HYDROCHLORIDE 20 MG: 10 TABLET ORAL at 04:02

## 2021-11-14 RX ADMIN — FUROSEMIDE 40 MG: 10 INJECTION, SOLUTION INTRAVENOUS at 23:11

## 2021-11-14 RX ADMIN — OXYCODONE HYDROCHLORIDE 20 MG: 10 TABLET ORAL at 00:01

## 2021-11-14 RX ADMIN — LORAZEPAM 8 MG: 2 TABLET ORAL at 22:45

## 2021-11-14 RX ADMIN — Medication 250 MCG/HR: at 17:59

## 2021-11-14 RX ADMIN — INSULIN ASPART 1 UNITS: 100 INJECTION, SOLUTION INTRAVENOUS; SUBCUTANEOUS at 00:29

## 2021-11-14 RX ADMIN — FUROSEMIDE 40 MG: 10 INJECTION, SOLUTION INTRAVENOUS at 09:19

## 2021-11-14 RX ADMIN — OXYCODONE HYDROCHLORIDE 20 MG: 10 TABLET ORAL at 11:04

## 2021-11-14 RX ADMIN — LORAZEPAM 8 MG: 2 TABLET ORAL at 06:01

## 2021-11-14 ASSESSMENT — ACTIVITIES OF DAILY LIVING (ADL)
ADLS_ACUITY_SCORE: 25

## 2021-11-14 ASSESSMENT — MIFFLIN-ST. JEOR: SCORE: 2425.33

## 2021-11-14 NOTE — PLAN OF CARE
No significant changes overnight. Plan to supinate patient around 10am.     Neuro: RASS -4, +1 withdrawal to painful stimuli. Restless w/0600 head turn requiring versed/fentanyl PRN's. Sedated on ketamine, versed, fentanyl, precedex drips +oxycodone/ativan/seroquel PO.  CV: sinus rhythm, MAP>65. .Afebrile. Increasing 1+ edema.  RR: intubated on CMV 30,450,14,75%, lungs coarse/diminished. Moderate creamy/blood tinged secretions.   GI: tube feed at 30ml/hr goal rate via NG, -BM  : urinary catheter with adequate clear yellow output, lasix 20mg x 1  L/T/D: R.PICC, PIV x 3, L.Radial Art Line, NG, urinary catheter, rectal pouch.     Continue current plan of care No significant changes overnight. Plan to supinate patient around 10am.    Continue current plan of care

## 2021-11-14 NOTE — PROGRESS NOTES
MEDICAL ICU PROGRESS NOTE  11/14/2021      Date of Service (when I saw the patient): 11/14/2021    ASSESSMENT: Brandon Schafer is a 41 year old male with questionable PMH of remote VTE who was admitted on 11/3/2021 with ARDS 2/2 COVID pneumonia. Now with CAUTI and polymicrobial VAP.     CHANGES and MAJOR THINGS TODAY:   Duonebs prn  Lasix 40 mg IV today, redose as needed for goal net negative 1-2 L  Continue FWF to 150 mL q2 hours, BMP check BID  Narrow abx to Ceftriaxone   Supine and check ABG 4 hours later to determine need to re-prone     PLAN:    Neuro:  # Pain and sedation  - Fentanyl infusion  - Midazolam infusion, slowly weaning  - Ketamine infusion  - Oxycodone 20 mg Q4H  - Ativan 8 mg Q4H  - Seroquel 25mg, 25mg, 50mg   - RASS goal -2 while supine, -5 while proned   - Not currently planning on initiating NMB     Pulmonary:  # ARDS 2/2 COVID pneumonia  Intubated 11/2 prior to transport flight. Prone positioning started 11/5.   - Lung protective ventilation with AC 30/450/+14/75% with PIP 33 and P/F 109 while prone this morning, improved to 148 when supine but will reprone  - No indication for NMB at this time  - Inhaled epoprostenol at 20 ng/kg/min  - No mucolytics needed at this point  - Infectious work-up and treatment as below    # ?COPD exacerbation  Wheezing on exam on 11/4. S/p azithromycin for 5d. Steroids per Covid protocol.   - Stop duonebs  - Start albuterol nebs prn     Cardiovascular:  # Hypotension  Transient, and in the setting of sedation. No prior TTE. Troponin peaked at 0.293. EKG with T wave inversion in lateral leads. Suspect troponin was 2/2 demand ischemia in setting of hypoxia.  - Norepinephrine gtt as needed (off since 11/11 evening)    # Atrial fibrillation  Episode of atrial fibrillation overnight 11/12, hemodynamically stable and resolved spontaneously. TWI on EKG, troponin negative     GI/Nutrition:  # At risk for malnutrition  - Nutrition consult for TF  - Bowel regimen: scheduled  Miralax and senna and prn Miralax     Renal/Fluids/Electrolytes:  # Non-oliguric STEFAN- improved  Unclear baseline, has not seen doctor in 20 years. Cr 2.5 on admission, now improved.   - Continue diuresis with furosemide 40 mg today, reassess Q8H  - Goal net negative     # Hypernatremia  - FWF to 150ml q2h     Endocrine:  # Stress and steroid induced hyperglycemia  - Medium resistance sliding scale insulin      ID:  # COVID pneumonia  Symptoms (headache, SOB, fever, cough, diarrhea) 1 week prior to presentation. Not COVID vaccinated. Tested positive on presentation on 11/2. S/p tocilizumab 11/4.  - Dexamethasone 6 mg daily (total 10 days)  - Deferring remdesivir given STEFAN     # Fever  # CAUTI  # VAP  Pt with worsening oxygen saturations, fevers, and persistent leukocytosis. Given his prolonged hospitalization, was started on broad-spectrum antibiotics on 11/11. Urine culture grew >100k pan-sensitive E coli. RVP and MRSA nares negative. Sputum grew E coli, S aureus, and GBS.  - Narrow Zosyn to ceftriaxone, likely 7 day treatment  - Follow-up blood cultures (NGTD)  - Beta glucan negative  - Galactomannan negative     Abx:  - Ceftriaxone 11/14-*  - Zosyn 11/11-11/14  - Vanc 11/11-11/12      Hematology:    # Coagulopathy 2/2 COVID  US BLE negative for DVT.  - Enoxaparin ppx      MSK  # Septum wound, tongue wound 2/2 Proning   - WOC following     General Cares/Prophylaxis:    DVT Prophylaxis: Lovenox ppx (40 mg daily)  GI Prophylaxis: PPI  Family Communication: BrotherBrad, updated by phone at 15:20 pm  Code Status: DNR     Lines/tubes/drains:  - PIV x3  - Right PICC  - Arterial line left radial  - NG  - Flores  - ETT  - Rectal pouch      Disposition:  - Medical ICU    Patient seen and findings/plan discussed with medical ICU staff, Dr. Rolle.    Jovan Betancourt, PGY3    ====================================  INTERVAL HISTORY:   NAEO. Afebrile and off of pressors. Susceptibilities returned w/ pan- sensitivities.  Net-positive 1L.     OBJECTIVE:   1. VITAL SIGNS:   Temp:  [98.2  F (36.8  C)-99.4  F (37.4  C)] 98.5  F (36.9  C)  Pulse:  [71-85] 76  Resp:  [29-30] 29  MAP:  [65 mmHg-103 mmHg] 85 mmHg  Arterial Line BP: ()/(50-80) 126/68  FiO2 (%):  [75 %] 75 %  SpO2:  [96 %-99 %] 97 %  Ventilation Mode: CMV/AC  (Continuous Mandatory Ventilation/ Assist Control)  FiO2 (%): 75 %  Rate Set (breaths/minute): 30 breaths/min  Tidal Volume Set (mL): 450 mL  PEEP (cm H2O): 14 cmH2O  Oxygen Concentration (%): 75 %  Peak Inspiratory Pressure (cm H2O) (Drager Aimee): 30  Resp: 29    2. INTAKE/ OUTPUT:   I/O last 3 completed shifts:  In: 4866.45 [I.V.:1666.45; NG/GT:2480]  Out: 4030 [Urine:3980; Stool:50]    3. PHYSICAL EXAMINATION:  General: Sedate  HEENT: Mucous membranes dry, wound on tongue and base of nasal septum  Neuro: RASS -5, pupils 3 mm and reactive  Pulm/Resp: Clear breath sounds bilaterally without rhonchi, crackles or wheeze, breathing non-labored  CV: RRR, S1/S2 without m/r/g; pedal pulses 2+ with non-pitting edema  Abdomen: Soft, non-distended, non-tender  : Flores catheter in place, urine yellow and clear  Incisions/Skin: Wounds as above, no rashes noted    4. LABS:   Arterial Blood Gases   Recent Labs   Lab 11/14/21 0358 11/13/21  1556 11/13/21  1207 11/13/21  0356   PH 7.38 7.37 7.39 7.39   PCO2 51* 53* 51* 51*   PO2 91 75* 111* 82   HCO3 31* 30* 31* 30*     Complete Blood Count   Recent Labs   Lab 11/14/21 0358 11/13/21  0357 11/12/21  0339 11/11/21  0348   WBC 9.1 10.4 10.7 14.3*   HGB 11.7* 11.4* 11.7* 12.0*   * 124* 122* 143*     Basic Metabolic Panel  Recent Labs   Lab 11/14/21  0733 11/14/21  0402 11/14/21  0358 11/14/21  0010 11/13/21  1628 11/13/21  1431 11/12/21  2354 11/12/21  2351 11/12/21  0341 11/12/21  0339 11/11/21  1606 11/11/21  1442   NA  --   --  143  --   --  144  --  143  --  144  --  146*   POTASSIUM  --   --  3.4  --   --   --   --  3.8  --  3.9  --  4.3   CHLORIDE  --   --  110*   --   --   --   --  110*  --  112*  --  113*   CO2  --   --  28  --   --   --   --  30  --  28  --  29   BUN  --   --  45*  --   --   --   --  44*  --  48*  --  50*   CR  --   --  1.00  --   --   --   --  1.05  --  1.07  --  1.27*   * 138* 140* 142*   < >  --    < > 146*   < > 146*   < > 196*    < > = values in this interval not displayed.     Liver Function Tests  No lab results found in last 7 days.  5. RADIOLOGY:   No results found for this or any previous visit (from the past 24 hour(s)).

## 2021-11-15 ENCOUNTER — APPOINTMENT (OUTPATIENT)
Dept: GENERAL RADIOLOGY | Facility: CLINIC | Age: 41
End: 2021-11-15
Attending: NURSE PRACTITIONER
Payer: MEDICAID

## 2021-11-15 LAB
ABO/RH(D): NORMAL
ANION GAP SERPL CALCULATED.3IONS-SCNC: 5 MMOL/L (ref 3–14)
ANION GAP SERPL CALCULATED.3IONS-SCNC: 6 MMOL/L (ref 3–14)
ANION GAP SERPL CALCULATED.3IONS-SCNC: 6 MMOL/L (ref 3–14)
ANTIBODY SCREEN: NEGATIVE
BASE EXCESS BLDA CALC-SCNC: 4.3 MMOL/L (ref -9–1.8)
BASE EXCESS BLDA CALC-SCNC: 4.3 MMOL/L (ref -9–1.8)
BASE EXCESS BLDA CALC-SCNC: 5.1 MMOL/L (ref -9–1.8)
BASE EXCESS BLDA CALC-SCNC: 5.3 MMOL/L (ref -9–1.8)
BASE EXCESS BLDA CALC-SCNC: 7.7 MMOL/L (ref -9–1.8)
BUN SERPL-MCNC: 41 MG/DL (ref 7–30)
BUN SERPL-MCNC: 41 MG/DL (ref 7–30)
BUN SERPL-MCNC: 43 MG/DL (ref 7–30)
CALCIUM SERPL-MCNC: 8.4 MG/DL (ref 8.5–10.1)
CALCIUM SERPL-MCNC: 8.7 MG/DL (ref 8.5–10.1)
CALCIUM SERPL-MCNC: 8.7 MG/DL (ref 8.5–10.1)
CHLORIDE BLD-SCNC: 105 MMOL/L (ref 94–109)
CHLORIDE BLD-SCNC: 108 MMOL/L (ref 94–109)
CHLORIDE BLD-SCNC: 111 MMOL/L (ref 94–109)
CO2 SERPL-SCNC: 29 MMOL/L (ref 20–32)
CO2 SERPL-SCNC: 30 MMOL/L (ref 20–32)
CO2 SERPL-SCNC: 32 MMOL/L (ref 20–32)
CREAT SERPL-MCNC: 1 MG/DL (ref 0.66–1.25)
CREAT SERPL-MCNC: 1.1 MG/DL (ref 0.66–1.25)
CREAT SERPL-MCNC: 1.17 MG/DL (ref 0.66–1.25)
ERYTHROCYTE [DISTWIDTH] IN BLOOD BY AUTOMATED COUNT: 15.4 % (ref 10–15)
GFR SERPL CREATININE-BSD FRML MDRD: 76 ML/MIN/1.73M2
GFR SERPL CREATININE-BSD FRML MDRD: 82 ML/MIN/1.73M2
GFR SERPL CREATININE-BSD FRML MDRD: >90 ML/MIN/1.73M2
GLUCOSE BLD-MCNC: 136 MG/DL (ref 70–99)
GLUCOSE BLD-MCNC: 156 MG/DL (ref 70–99)
GLUCOSE BLD-MCNC: 176 MG/DL (ref 70–99)
GLUCOSE BLDC GLUCOMTR-MCNC: 129 MG/DL (ref 70–99)
GLUCOSE BLDC GLUCOMTR-MCNC: 130 MG/DL (ref 70–99)
GLUCOSE BLDC GLUCOMTR-MCNC: 131 MG/DL (ref 70–99)
GLUCOSE BLDC GLUCOMTR-MCNC: 143 MG/DL (ref 70–99)
GLUCOSE BLDC GLUCOMTR-MCNC: 147 MG/DL (ref 70–99)
GLUCOSE BLDC GLUCOMTR-MCNC: 156 MG/DL (ref 70–99)
HCO3 BLD-SCNC: 31 MMOL/L (ref 21–28)
HCO3 BLD-SCNC: 31 MMOL/L (ref 21–28)
HCO3 BLD-SCNC: 32 MMOL/L (ref 21–28)
HCO3 BLD-SCNC: 32 MMOL/L (ref 21–28)
HCO3 BLD-SCNC: 34 MMOL/L (ref 21–28)
HCT VFR BLD AUTO: 36.2 % (ref 40–53)
HGB BLD-MCNC: 11.2 G/DL (ref 13.3–17.7)
MAGNESIUM SERPL-MCNC: 2.1 MG/DL (ref 1.6–2.3)
MCH RBC QN AUTO: 28.1 PG (ref 26.5–33)
MCHC RBC AUTO-ENTMCNC: 30.9 G/DL (ref 31.5–36.5)
MCV RBC AUTO: 91 FL (ref 78–100)
O2/TOTAL GAS SETTING VFR VENT: 60 %
O2/TOTAL GAS SETTING VFR VENT: 75 %
O2/TOTAL GAS SETTING VFR VENT: 75 %
OXYHGB MFR BLD: 94 % (ref 92–100)
PCO2 BLD: 52 MM HG (ref 35–45)
PCO2 BLD: 52 MM HG (ref 35–45)
PCO2 BLD: 54 MM HG (ref 35–45)
PCO2 BLD: 55 MM HG (ref 35–45)
PCO2 BLD: 56 MM HG (ref 35–45)
PH BLD: 7.37 [PH] (ref 7.35–7.45)
PH BLD: 7.38 [PH] (ref 7.35–7.45)
PH BLD: 7.41 [PH] (ref 7.35–7.45)
PHOSPHATE SERPL-MCNC: 3.5 MG/DL (ref 2.5–4.5)
PLATELET # BLD AUTO: 147 10E3/UL (ref 150–450)
PO2 BLD: 74 MM HG (ref 80–105)
PO2 BLD: 92 MM HG (ref 80–105)
PO2 BLD: 96 MM HG (ref 80–105)
PO2 BLD: 96 MM HG (ref 80–105)
PO2 BLD: 97 MM HG (ref 80–105)
POTASSIUM BLD-SCNC: 3.1 MMOL/L (ref 3.4–5.3)
POTASSIUM BLD-SCNC: 3.1 MMOL/L (ref 3.4–5.3)
POTASSIUM BLD-SCNC: 3.3 MMOL/L (ref 3.4–5.3)
POTASSIUM BLD-SCNC: 3.6 MMOL/L (ref 3.4–5.3)
RBC # BLD AUTO: 3.99 10E6/UL (ref 4.4–5.9)
SODIUM SERPL-SCNC: 143 MMOL/L (ref 133–144)
SODIUM SERPL-SCNC: 144 MMOL/L (ref 133–144)
SODIUM SERPL-SCNC: 145 MMOL/L (ref 133–144)
SPECIMEN EXPIRATION DATE: NORMAL
WBC # BLD AUTO: 9.1 10E3/UL (ref 4–11)

## 2021-11-15 PROCEDURE — 85027 COMPLETE CBC AUTOMATED: CPT | Performed by: STUDENT IN AN ORGANIZED HEALTH CARE EDUCATION/TRAINING PROGRAM

## 2021-11-15 PROCEDURE — 82805 BLOOD GASES W/O2 SATURATION: CPT | Performed by: STUDENT IN AN ORGANIZED HEALTH CARE EDUCATION/TRAINING PROGRAM

## 2021-11-15 PROCEDURE — 250N000011 HC RX IP 250 OP 636: Performed by: STUDENT IN AN ORGANIZED HEALTH CARE EDUCATION/TRAINING PROGRAM

## 2021-11-15 PROCEDURE — C9113 INJ PANTOPRAZOLE SODIUM, VIA: HCPCS

## 2021-11-15 PROCEDURE — 250N000013 HC RX MED GY IP 250 OP 250 PS 637: Performed by: STUDENT IN AN ORGANIZED HEALTH CARE EDUCATION/TRAINING PROGRAM

## 2021-11-15 PROCEDURE — G0463 HOSPITAL OUTPT CLINIC VISIT: HCPCS

## 2021-11-15 PROCEDURE — 84132 ASSAY OF SERUM POTASSIUM: CPT | Performed by: STUDENT IN AN ORGANIZED HEALTH CARE EDUCATION/TRAINING PROGRAM

## 2021-11-15 PROCEDURE — 80048 BASIC METABOLIC PNL TOTAL CA: CPT | Performed by: STUDENT IN AN ORGANIZED HEALTH CARE EDUCATION/TRAINING PROGRAM

## 2021-11-15 PROCEDURE — 999N000157 HC STATISTIC RCP TIME EA 10 MIN

## 2021-11-15 PROCEDURE — 94003 VENT MGMT INPAT SUBQ DAY: CPT

## 2021-11-15 PROCEDURE — 258N000003 HC RX IP 258 OP 636: Performed by: STUDENT IN AN ORGANIZED HEALTH CARE EDUCATION/TRAINING PROGRAM

## 2021-11-15 PROCEDURE — 71045 X-RAY EXAM CHEST 1 VIEW: CPT | Mod: 26 | Performed by: RADIOLOGY

## 2021-11-15 PROCEDURE — 250N000011 HC RX IP 250 OP 636

## 2021-11-15 PROCEDURE — 86900 BLOOD TYPING SEROLOGIC ABO: CPT | Performed by: STUDENT IN AN ORGANIZED HEALTH CARE EDUCATION/TRAINING PROGRAM

## 2021-11-15 PROCEDURE — 250N000013 HC RX MED GY IP 250 OP 250 PS 637

## 2021-11-15 PROCEDURE — 82803 BLOOD GASES ANY COMBINATION: CPT | Performed by: NURSE PRACTITIONER

## 2021-11-15 PROCEDURE — 200N000002 HC R&B ICU UMMC

## 2021-11-15 PROCEDURE — 82803 BLOOD GASES ANY COMBINATION: CPT | Performed by: STUDENT IN AN ORGANIZED HEALTH CARE EDUCATION/TRAINING PROGRAM

## 2021-11-15 PROCEDURE — 84100 ASSAY OF PHOSPHORUS: CPT | Performed by: STUDENT IN AN ORGANIZED HEALTH CARE EDUCATION/TRAINING PROGRAM

## 2021-11-15 PROCEDURE — 250N000009 HC RX 250: Performed by: STUDENT IN AN ORGANIZED HEALTH CARE EDUCATION/TRAINING PROGRAM

## 2021-11-15 PROCEDURE — 250N000013 HC RX MED GY IP 250 OP 250 PS 637: Performed by: NURSE PRACTITIONER

## 2021-11-15 PROCEDURE — 250N000011 HC RX IP 250 OP 636: Performed by: NURSE PRACTITIONER

## 2021-11-15 PROCEDURE — 94645 CONT INHLJ TX EACH ADDL HOUR: CPT

## 2021-11-15 PROCEDURE — 71045 X-RAY EXAM CHEST 1 VIEW: CPT

## 2021-11-15 PROCEDURE — 82803 BLOOD GASES ANY COMBINATION: CPT

## 2021-11-15 PROCEDURE — 83735 ASSAY OF MAGNESIUM: CPT | Performed by: STUDENT IN AN ORGANIZED HEALTH CARE EDUCATION/TRAINING PROGRAM

## 2021-11-15 PROCEDURE — 80048 BASIC METABOLIC PNL TOTAL CA: CPT | Performed by: NURSE PRACTITIONER

## 2021-11-15 PROCEDURE — 999N000015 HC STATISTIC ARTERIAL MONITORING DAILY

## 2021-11-15 RX ORDER — FUROSEMIDE 10 MG/ML
40 INJECTION INTRAMUSCULAR; INTRAVENOUS 2 TIMES DAILY
Status: DISCONTINUED | OUTPATIENT
Start: 2021-11-15 | End: 2021-11-17

## 2021-11-15 RX ORDER — POTASSIUM CHLORIDE 29.8 MG/ML
20 INJECTION INTRAVENOUS ONCE
Status: COMPLETED | OUTPATIENT
Start: 2021-11-15 | End: 2021-11-15

## 2021-11-15 RX ORDER — AMOXICILLIN 250 MG
2 CAPSULE ORAL 2 TIMES DAILY
Status: DISCONTINUED | OUTPATIENT
Start: 2021-11-15 | End: 2021-11-18

## 2021-11-15 RX ORDER — POLYETHYLENE GLYCOL 3350 17 G/17G
17 POWDER, FOR SOLUTION ORAL 2 TIMES DAILY
Status: DISCONTINUED | OUTPATIENT
Start: 2021-11-15 | End: 2021-11-18

## 2021-11-15 RX ORDER — POTASSIUM CHLORIDE 29.8 MG/ML
20 INJECTION INTRAVENOUS
Status: COMPLETED | OUTPATIENT
Start: 2021-11-15 | End: 2021-11-15

## 2021-11-15 RX ORDER — POTASSIUM CHLORIDE 20MEQ/15ML
20 LIQUID (ML) ORAL 2 TIMES DAILY
Status: DISCONTINUED | OUTPATIENT
Start: 2021-11-15 | End: 2021-11-16

## 2021-11-15 RX ORDER — MIDAZOLAM HCL IN 0.9 % NACL/PF 1 MG/ML
1-16 PLASTIC BAG, INJECTION (ML) INTRAVENOUS CONTINUOUS
Status: DISCONTINUED | OUTPATIENT
Start: 2021-11-15 | End: 2021-11-17

## 2021-11-15 RX ORDER — METOLAZONE 5 MG/1
5 TABLET ORAL ONCE
Status: COMPLETED | OUTPATIENT
Start: 2021-11-15 | End: 2021-11-15

## 2021-11-15 RX ADMIN — Medication 4 MG/HR: at 10:27

## 2021-11-15 RX ADMIN — QUETIAPINE FUMARATE 25 MG: 25 TABLET ORAL at 13:52

## 2021-11-15 RX ADMIN — ACETAMINOPHEN 650 MG: 325 TABLET, FILM COATED ORAL at 23:07

## 2021-11-15 RX ADMIN — QUETIAPINE FUMARATE 50 MG: 50 TABLET ORAL at 19:56

## 2021-11-15 RX ADMIN — Medication 2 PACKET: at 17:39

## 2021-11-15 RX ADMIN — DEXMEDETOMIDINE 0.7 MCG/KG/HR: 100 INJECTION, SOLUTION, CONCENTRATE INTRAVENOUS at 04:09

## 2021-11-15 RX ADMIN — Medication 2 PACKET: at 13:52

## 2021-11-15 RX ADMIN — Medication 10 NG/KG/MIN: at 14:59

## 2021-11-15 RX ADMIN — CEFTRIAXONE SODIUM 2 G: 2 INJECTION, POWDER, FOR SOLUTION INTRAMUSCULAR; INTRAVENOUS at 12:07

## 2021-11-15 RX ADMIN — ENOXAPARIN SODIUM 40 MG: 40 INJECTION SUBCUTANEOUS at 09:44

## 2021-11-15 RX ADMIN — POLYETHYLENE GLYCOL 3350 17 G: 17 POWDER, FOR SOLUTION ORAL at 07:55

## 2021-11-15 RX ADMIN — OXYCODONE HYDROCHLORIDE 20 MG: 10 TABLET ORAL at 12:07

## 2021-11-15 RX ADMIN — DOCUSATE SODIUM 50 MG AND SENNOSIDES 8.6 MG 2 TABLET: 8.6; 5 TABLET, FILM COATED ORAL at 19:55

## 2021-11-15 RX ADMIN — INSULIN ASPART 1 UNITS: 100 INJECTION, SOLUTION INTRAVENOUS; SUBCUTANEOUS at 04:14

## 2021-11-15 RX ADMIN — Medication 5 ML: at 12:07

## 2021-11-15 RX ADMIN — OXYCODONE HYDROCHLORIDE 20 MG: 10 TABLET ORAL at 04:10

## 2021-11-15 RX ADMIN — Medication 100 MCG: at 18:28

## 2021-11-15 RX ADMIN — POTASSIUM CHLORIDE 20 MEQ: 29.8 INJECTION, SOLUTION INTRAVENOUS at 16:11

## 2021-11-15 RX ADMIN — OXYCODONE HYDROCHLORIDE 20 MG: 10 TABLET ORAL at 19:56

## 2021-11-15 RX ADMIN — QUETIAPINE FUMARATE 25 MG: 25 TABLET ORAL at 07:55

## 2021-11-15 RX ADMIN — METOLAZONE 5 MG: 5 TABLET ORAL at 06:52

## 2021-11-15 RX ADMIN — LORAZEPAM 8 MG: 2 TABLET ORAL at 09:44

## 2021-11-15 RX ADMIN — Medication 2 PACKET: at 07:53

## 2021-11-15 RX ADMIN — LORAZEPAM 8 MG: 2 TABLET ORAL at 23:07

## 2021-11-15 RX ADMIN — POTASSIUM CHLORIDE 20 MEQ: 29.8 INJECTION, SOLUTION INTRAVENOUS at 06:10

## 2021-11-15 RX ADMIN — DEXMEDETOMIDINE 0.7 MCG/KG/HR: 100 INJECTION, SOLUTION, CONCENTRATE INTRAVENOUS at 07:54

## 2021-11-15 RX ADMIN — POTASSIUM CHLORIDE 20 MEQ: 29.8 INJECTION, SOLUTION INTRAVENOUS at 09:02

## 2021-11-15 RX ADMIN — INSULIN ASPART 1 UNITS: 100 INJECTION, SOLUTION INTRAVENOUS; SUBCUTANEOUS at 20:17

## 2021-11-15 RX ADMIN — DOCUSATE SODIUM 50 MG AND SENNOSIDES 8.6 MG 2 TABLET: 8.6; 5 TABLET, FILM COATED ORAL at 07:55

## 2021-11-15 RX ADMIN — Medication 100 MCG: at 09:59

## 2021-11-15 RX ADMIN — POTASSIUM CHLORIDE 20 MEQ: 20 SOLUTION ORAL at 19:57

## 2021-11-15 RX ADMIN — OXYCODONE HYDROCHLORIDE 20 MG: 10 TABLET ORAL at 07:55

## 2021-11-15 RX ADMIN — Medication 20 NG/KG/MIN: at 04:19

## 2021-11-15 RX ADMIN — POLYETHYLENE GLYCOL 3350 17 G: 17 POWDER, FOR SOLUTION ORAL at 20:04

## 2021-11-15 RX ADMIN — POTASSIUM CHLORIDE 20 MEQ: 29.8 INJECTION, SOLUTION INTRAVENOUS at 05:01

## 2021-11-15 RX ADMIN — Medication 5 ML: at 07:56

## 2021-11-15 RX ADMIN — Medication 200 MCG/HR: at 03:45

## 2021-11-15 RX ADMIN — OXYCODONE HYDROCHLORIDE 20 MG: 10 TABLET ORAL at 16:11

## 2021-11-15 RX ADMIN — FUROSEMIDE 40 MG: 10 INJECTION, SOLUTION INTRAVENOUS at 19:56

## 2021-11-15 RX ADMIN — FUROSEMIDE 40 MG: 10 INJECTION, SOLUTION INTRAVENOUS at 07:55

## 2021-11-15 RX ADMIN — LORAZEPAM 8 MG: 2 TABLET ORAL at 06:10

## 2021-11-15 RX ADMIN — INSULIN ASPART 1 UNITS: 100 INJECTION, SOLUTION INTRAVENOUS; SUBCUTANEOUS at 07:52

## 2021-11-15 RX ADMIN — LORAZEPAM 8 MG: 2 TABLET ORAL at 02:25

## 2021-11-15 RX ADMIN — Medication 150 MCG/HR: at 18:30

## 2021-11-15 RX ADMIN — LORAZEPAM 8 MG: 2 TABLET ORAL at 17:39

## 2021-11-15 RX ADMIN — Medication 2 PACKET: at 10:18

## 2021-11-15 RX ADMIN — Medication 2 PACKET: at 23:08

## 2021-11-15 RX ADMIN — POTASSIUM CHLORIDE 20 MEQ: 29.8 INJECTION, SOLUTION INTRAVENOUS at 17:39

## 2021-11-15 RX ADMIN — POTASSIUM CHLORIDE 20 MEQ: 29.8 INJECTION, SOLUTION INTRAVENOUS at 15:00

## 2021-11-15 RX ADMIN — PANTOPRAZOLE SODIUM 40 MG: 40 INJECTION, POWDER, FOR SOLUTION INTRAVENOUS at 07:55

## 2021-11-15 RX ADMIN — POTASSIUM CHLORIDE 20 MEQ: 29.8 INJECTION, SOLUTION INTRAVENOUS at 10:17

## 2021-11-15 RX ADMIN — LORAZEPAM 8 MG: 2 TABLET ORAL at 13:52

## 2021-11-15 ASSESSMENT — ACTIVITIES OF DAILY LIVING (ADL)
ADLS_ACUITY_SCORE: 17
ADLS_ACUITY_SCORE: 27
ADLS_ACUITY_SCORE: 17
ADLS_ACUITY_SCORE: 17
ADLS_ACUITY_SCORE: 27
ADLS_ACUITY_SCORE: 17
ADLS_ACUITY_SCORE: 27
ADLS_ACUITY_SCORE: 17
ADLS_ACUITY_SCORE: 27
ADLS_ACUITY_SCORE: 27
ADLS_ACUITY_SCORE: 17
DEPENDENT_IADLS:: INDEPENDENT
ADLS_ACUITY_SCORE: 17
ADLS_ACUITY_SCORE: 27
ADLS_ACUITY_SCORE: 25
ADLS_ACUITY_SCORE: 25
ADLS_ACUITY_SCORE: 17
ADLS_ACUITY_SCORE: 25
ADLS_ACUITY_SCORE: 17
ADLS_ACUITY_SCORE: 17
ADLS_ACUITY_SCORE: 27
ADLS_ACUITY_SCORE: 17
ADLS_ACUITY_SCORE: 17

## 2021-11-15 ASSESSMENT — MIFFLIN-ST. JEOR: SCORE: 2448.01

## 2021-11-15 NOTE — CONSULTS
Care Management Initial Consult    General Information  Assessment completed with: Family, Brad-brother  Type of CM/SW Visit: Initial Assessment    Primary Care Provider verified and updated as needed: Yes   Readmission within the last 30 days: no previous admission in last 30 days         Advance Care Planning: Advance Care Planning Reviewed: no concerns identified,questions answered          Communication Assessment  Patient's communication style: spoken language (English or Bilingual)    Hearing Difficulty or Deaf: no   Wear Glasses or Blind: no    Cognitive  Cognitive/Neuro/Behavioral: .WDL except  Level of Consciousness: unresponsive,sedated  Arousal Level: arouses to pain,arouses to vigorous stimulation (Squeezes eyes shut)  Orientation: other (see comments) (SUE)  Mood/Behavior: behavior appropriate to situation  Best Language:  (SUE)  Speech: endotracheal tube    Living Environment:   People in home: child(rosey), dependent,significant other  2 children 9, 3 and SO  Current living Arrangements: house      Able to return to prior arrangements: yes       Family/Social Support:  Care provided by: self  Provides care for: child(rosey)  Marital Status: Single  Significant Other,Sibling(s),Parent(s)          Description of Support System: Supportive,Involved    Support Assessment: Adequate family and caregiver support,Adequate social supports    Current Resources:   Patient receiving home care services: No     Community Resources: None  Equipment currently used at home: none  Supplies currently used at home: None    Employment/Financial:  Employment Status: employed full-time        Financial Concerns: No concerns identified           Lifestyle & Psychosocial Needs:  Social Determinants of Health     Tobacco Use: High Risk     Smoking Tobacco Use: Current Every Day Smoker     Smokeless Tobacco Use: Never Used   Alcohol Use: Not on file   Financial Resource Strain: Not on file   Food Insecurity: Not on file    Transportation Needs: Not on file   Physical Activity: Not on file   Stress: Not on file   Social Connections: Not on file   Intimate Partner Violence: Not on file   Depression: Not on file   Housing Stability: Not on file       Functional Status:  Prior to admission patient needed assistance:   Dependent ADLs:: Independent  Dependent IADLs:: Independent  Assesssment of Functional Status: Not at baseline with ADL Functioning,Not at baseline with mobility    Mental Health Status:  Mental Health Status: No Current Concerns       Chemical Dependency Status:  Chemical Dependency Status: Past Concern  Chemical Dependency Management: Previous treatment          Values/Beliefs:  Spiritual, Cultural Beliefs, Orthodoxy Practices, Values that affect care: yes               Additional Information:  Talked to patients brother Brad to set up care conferences and get initial assessment. Patient lives with his children (9,3) and his SO in a home and works full time. He is independent with all his cares at baseline. His father  recently of heart disease. Family has been very involved in his care since admission. There was some question that he would want to be a DNR/DNI and would not want to be hospitalized for a long period of time on a vent, per his brother. Brad stated that he just wants to honor his wishes. Team has had care conferences to discuss goals of care, and patient is to remain restorative care per family, and will continue to have weekly care conferences and team stated they will be upfront with family if patients prognosis is poor. RNCC/SW will continue to check in for needs.     Kelsey Gonzalez RN Care Coordinator   MICU/SICU  [phone]         Kelsey Gonzalez RN

## 2021-11-15 NOTE — PROGRESS NOTES
MEDICAL ICU PROGRESS NOTE  11/15/2021      Date of Service (when I saw the patient): 11/15/2021    ASSESSMENT: Brandon Schafer is a 41 year old male with questionable PMH of remote VTE who was admitted on 11/3/2021 with ARDS 2/2 COVID pneumonia. Now with CAUTI and polymicrobial VAP.     CHANGES and MAJOR THINGS TODAY:     Metolazone X 5mg   Lasix 40 mg IV BID - goal net negative 1 to 2 L   FWF to 150 mL q2 hours, BMP check BID  Wean flolan to half strength   Supine and check ABG 4 hours later to determine need to re-prone     PLAN:    Neuro:  # Pain and sedation  - Fentanyl infusion  - Midazolam infusion, slowly weaning  - Ketamine infusion weaned to off    - Precedex-> wean to off, add propofol if needed   - Oxycodone 20 mg Q4H  - Ativan 8 mg Q4H  - Seroquel 25mg, 25mg, 50mg   - RASS goal -2 while supine, -5 while proned   - Not currently planning on initiating NMB     Pulmonary:  # ARDS 2/2 COVID pneumonia  Intubated 11/2 prior to transport flight. Prone positioning started 11/5.   - Lung protective ventilation with AC 30/450/+14/75% P/F 128 while prone this morning  - Supine , will remain supine  - Inhaled epoprostenol weaned 20 --> 10 ng/kg/min  - No mucolytics needed at this point  - Infectious work-up and treatment as below    # ?COPD exacerbation  Wheezing on exam on 11/4. S/p azithromycin for 5d. Steroids per Covid protocol.   - Continue albuterol nebs prn     Cardiovascular:  # Hypotension  Transient, and in the setting of sedation. No prior TTE. Troponin peaked at 0.293. EKG with T wave inversion in lateral leads. Suspect troponin was 2/2 demand ischemia in setting of hypoxia.  - Norepinephrine gtt as needed (off since 11/11 evening)    # Atrial fibrillation  Episode of atrial fibrillation overnight 11/12, hemodynamically stable and resolved spontaneously. TWI on EKG, troponin negative     GI/Nutrition:  # At risk for malnutrition  # Constipation   - Nutrition consult for TF  - Bowel regimen:  scheduled Miralax and senna and prn Miralax     Renal/Fluids/Electrolytes:  # Non-oliguric STEFAN- improved  # Hypokalemia, in setting of ongoing diuresis   Unclear baseline, has not seen doctor in 20 years. Cr 2.5 on admission, now improved.   - Continue diuresis with furosemide 40 mg BID and metolazone  - Goal net negative 1L   - scheduled potassium 20meq BID      # Hypernatremia  - FWF 200ml q2h-> 150ml q 2H  - Repeat BMP @ 1400     Endocrine:  # Stress and steroid induced hyperglycemia  - Medium resistance sliding scale insulin      ID:  # COVID pneumonia  Symptoms (headache, SOB, fever, cough, diarrhea) 1 week prior to presentation. Not COVID vaccinated. Tested positive on presentation on 11/2. S/p tocilizumab 11/4.  - Dexamethasone 6 mg daily (total 10 days)  - Deferring remdesivir given STEFAN and limited evidence for benefit      # Fever  # CAUTI  # VAP  Pt with worsening oxygen saturations, fevers, and persistent leukocytosis. Given his prolonged hospitalization, was started on broad-spectrum antibiotics on 11/11. Urine culture grew >100k pan-sensitive E coli. RVP and MRSA nares negative. Sputum grew E coli, S aureus, and GBS.  - Narrow Zosyn to ceftriaxone, plan for 7 day treatment  - Follow-up blood cultures (NGTD)  - Beta glucan negative  - Galactomannan negative     Abx:  - Ceftriaxone 11/14-*  - Zosyn 11/11-11/14  - Vanc 11/11-11/12      Hematology:    # Coagulopathy 2/2 COVID  US BLE negative for DVT.  - Enoxaparin ppx      MSK  # Septum wound, tongue wound 2/2 Proning   - WOC following     General Cares/Prophylaxis:    DVT Prophylaxis: Lovenox ppx (40 mg daily)  GI Prophylaxis: PPI  Family Communication: BrotherBrad, updated by phone at 15:20 pm  Code Status: DNR      Lines/tubes/drains:  - PIV x3  - Right PICC  - Arterial line left radial  - NG  - Flores  - ETT  - Rectal pouch      Disposition:  - Medical ICU    Patient seen and findings/plan discussed with medical ICU staff, Dr. Mcaiej OLIVA  Pool, CNP    ====================================  INTERVAL HISTORY:   NAEO. Afebrile and off of pressors. Susceptibilities returned w/ pan- sensitivities. Net-positive 1000mL in last 24H    OBJECTIVE:   1. VITAL SIGNS:   Temp:  [98.5  F (36.9  C)-99.1  F (37.3  C)] 99.1  F (37.3  C)  Pulse:  [69-84] 75  Resp:  [29-30] 29  BP: (112)/(66) 112/66  MAP:  [69 mmHg-100 mmHg] 81 mmHg  Arterial Line BP: ()/(54-76) 91/68  FiO2 (%):  [75 %] 75 %  SpO2:  [95 %-100 %] 95 %  Ventilation Mode: CMV/AC  (Continuous Mandatory Ventilation/ Assist Control)  FiO2 (%): 75 %  Rate Set (breaths/minute): 30 breaths/min  Tidal Volume Set (mL): 450 mL  PEEP (cm H2O): 14 cmH2O  Oxygen Concentration (%): 75 %  Resp: 29    2. INTAKE/ OUTPUT:   I/O last 3 completed shifts:  In: 4836.93 [I.V.:1581.93; NG/GT:2535]  Out: 3995 [Urine:3995]    3. PHYSICAL EXAMINATION:  General: Sedated, intubated, proned   HEENT: Mucous membranes dry, wound on tongue and base of nasal septum  Neuro: RASS -5, pupils 3 mm and reactive  Pulm/Resp: Coarse breath sounds bilaterally without rhonchi, crackles or wheeze, breathing non-labored  CV: RRR, S1/S2 without m/r/g; pedal pulses 2+ with non-pitting edema  Abdomen: Obese, soft, non-distended, non-tender  : Flores catheter in place, urine yellow and clear  Incisions/Skin: Wounds as above, no rashes noted    4. LABS:   Arterial Blood Gases   Recent Labs   Lab 11/15/21  0352 11/15/21  0100 11/14/21 2055 11/14/21  1559   PH 7.37 7.38 7.38 7.39   PCO2 52* 52* 50* 50*   PO2 96 97 88 92   HCO3 31* 31* 30* 30*     Complete Blood Count   Recent Labs   Lab 11/15/21  0353 11/14/21  0358 11/13/21  0357 11/12/21  0339   WBC 9.1 9.1 10.4 10.7   HGB 11.2* 11.7* 11.4* 11.7*   * 131* 124* 122*     Basic Metabolic Panel  Recent Labs   Lab 11/15/21  0355 11/15/21  0353 11/14/21  2329 11/14/21  2055 11/14/21  1959 11/14/21  1607 11/14/21  1349 11/14/21  0402 11/14/21  0358 11/13/21  1628 11/13/21  1431 11/12/21  2354  11/12/21  2351   NA  --  145*  --   --   --   --  145*  --  143  --  144  --  143   POTASSIUM  --  3.3*  --  3.5  --   --  3.1*  --  3.4  --   --   --  3.8   CHLORIDE  --  111*  --   --   --   --  111*  --  110*  --   --   --  110*   CO2  --  29  --   --   --   --  31  --  28  --   --   --  30   BUN  --  43*  --   --   --   --  45*  --  45*  --   --   --  44*   CR  --  1.00  --   --   --   --  1.13  --  1.00  --   --   --  1.05   * 156* 152*  --  165*   < > 151*   < > 140*   < >  --    < > 146*    < > = values in this interval not displayed.     Liver Function Tests  No lab results found in last 7 days.  5. RADIOLOGY:   Recent Results (from the past 24 hour(s))   XR Chest Port 1 View    Narrative    Portable chest    INDICATION: COVID ARDS    COMPARISON: 11/11/2021    FINDINGS: Patchy opacities in the lungs appear slightly increased in  the left perihilar region, otherwise similar to slightly decreased  throughout the remainder of the left lung an slightly decreased in the  right lung.  Endotracheal tube tip is approximately 5.3 cm above the becca. Heart  size upper normal.      Impression    IMPRESSION: Intubated. Waxing and waning  COVID ARDS.    GREGORY BECKWITH MD         SYSTEM ID:  [id number]

## 2021-11-15 NOTE — PLAN OF CARE
Major Shift Events: Pt remains sedated with Versed and Fentanyl. RASS -2/-3. Follows commands, very weak. PERRL 2 mm. Afebrile, Abx narrowed. MAP>65 with no exogenous assitance. CMV settings remain, 60%, 450, 30, 14. 1500 P/F 153, no plan to reprone per MICU team. Moderate secretions noted via inline suction, creamy/red-streaked. TF @ goal via NJ, FWF decreased to 150mL  Q2H. Rectal pouch intact, no OP. Flores in place, Lasix ordered BID with good effect. Skin grossly unchanged. umbilical hernia noted. PICC line WNL. PIV x3 WNL. ART WNL in L radial, waveform normal, brisk blood return.      Plan: Wean sedation and vent support as tolerated. Continue goals of care.     For vital signs and complete assessments, please see documentation flowsheets.

## 2021-11-15 NOTE — PLAN OF CARE
Major Shift Events:  Versed at 7.  Dex at 0.7.  Fentanyl at 250.  Ketamine at 80.  RASS goal met.  Pupils 2, round, sluggish.  Flexion to pain.  Tmax 99.1.  Vent settings unchanged.  Supined for 6hrs, well tolerated.  PF ratios of 146 and 122.  Tube feeds at goal, no BM.  Lasix 40 X 2.  Positive 0.5L for day.  Plan: Wean sedation and vent support as able.  Supine in AM.    For vital signs and complete assessments, please see documentation flowsheets.

## 2021-11-15 NOTE — PLAN OF CARE
Major Shift Events: Given 40mg of Furosamide x1 and metolazone x1.   Neuro: RASS:-5. Pt w/d in all extremities. B/L pupals 2mm sluggish. Sedation decreased to 0.7 dex, 150 fentanyl, 4 versed, 0 ketamine. When pt asked to open eyes eye lids seen moving, but not opened.   Resp: ET @ 27 lips.  tv 30 RR 14 peep 75% Copious nasal secretions flowing into nasal wound. Sectioned with red montalvo. Epoprostenol 20 ng/kg/min.   CV: NSR with one 8 beat run of afib around 2000.   : Flores in place. Average UO was between 75 to 400 mL/hr.   GI: NJ @ 105 cm. TF @ 30 mL/hr w/ q 2 hour 200 mL flushes. No BM since 11/13. Rectal pouch in place. Given prn miralax.    Skin/Endo: Nasal wound. See LDAs.   Plan: Supinate in AM. More furosamide.    For vital signs and complete assessments, please see documentation flowsheets.       ABG  Time 2055 0116 0355   PH 7.38 7.38 7.37   PaCo2 50 52 52   PaO2 88 97 96   FiO2 75 75 75   P/F ratio 117 129 128   Plateau pressure 19->19. (Measured by RT).

## 2021-11-15 NOTE — CONSULTS
[facility] Nurse Inpatient Pressure Injury Assessment   Reason for consultation: Evaluate and treat Bl cheeks  New consult 11/11- tongue    ASSESSMENT  Pressure Injury: on Tongue , hospital acquired ,   This is a Medical Device Related Pressure Injury (MDRPI) due to ETT  Pressure Injury is Stage Mucosal   Contributing factor of the pressure injury: pressure and immobility  Status: healing and follow up   Recommend provider order: None, at this time     Pressure Injury: on septum , hospital acquired ,   This is a Medical Device Related Pressure Injury (MDRPI) due to ETAD head gear was pushed against the area during proning  Pressure Injury is Stage 2   Contributing factor of the pressure injury: pressure and immobility  Status: follow up assessment  Recommend provider order: None, at this time      TREATMENT PLAN  Septum wound: Daily  to apply optifoam if proned- Dressing change every other day  Cleanse the wound with NS and pat dry.  Cut a piece of Comfeel 4x4 (#[id number]) and cover the area.  Massage the dressing in place for better adherence  Then place a layer of Mepilex foam dressing on top of comfeel for cushion  Ensure to place a piece of optifoam along the septal area and mold the Z-flow to avoid direct pressure while proning.      Tongue wounds: Every shift and PRN   Perform oral care per unit routine. Keep mouth moisturized. Reposition ETT tube Q 3-4 hrs, ensure to disengage the tongue from the tube to relive the pressure.    BL cheeks- Continue with mepilex foam dressing for prevention.    Orders Written  WO Nurse follow-up plan:twice weekly  Nursing to notify the Provider(s) and re-consult the WO Nurse if wound(s) deteriorates or new skin concern.    Patient History  According to provider note(s): Brandon Schafer is a 41 year old male with questionable PMH of remote VTE who was admitted on 11/3/2021 with ARDS 2/2 COVID pneumonia.    Objective Data  Containment of urine/stool: Indwelling catheter    Current Diet/  Nutrition:  Orders Placed This Encounter      NPO for Medical/Clinical Reasons Except for: Meds      Output:   I/O last 3 completed shifts:  In: 5069.15 [I.V.:1414.15; NG/GT:2935]  Out: 4175 [Urine:4175]    Risk Assessment:   Sensory Perception: 2-->very limited  Moisture: 4-->rarely moist  Activity: 1-->bedfast  Mobility: 1-->completely immobile  Nutrition: 3-->adequate  Friction and Shear: 1-->problem  Adin Score: 12      Labs:   Recent Labs   Lab 11/15/21  0353   HGB 11.2*   WBC 9.1       Physical Exam  Skin inspection: focused BL cheeks, mouth and nose  Patient is high risk for pressure injury development secondary to proning    Wound Location:  Septum          11/9                                                                              11/11                                                            11/15      Date of last Photo 11/11  Wound History: Pt has been proning intermittently.  Measurements (length x width x depth, in cm) 0.7 cm x 0.5 cm  x  <0.1 cm   Wound Base:  100 % dermis  Tunneling N/A  Undermining N/A  Palpation of the wound bed: normal   Periwound skin: intact  Color: normal and consistent with surrounding tissue  Temperature: normal   Drainage:, none  Description of drainage: none  Odor: none  Pain: unable to assess due to  sedation ,       2. Tongue    Date of last Photo 11/9- unable to see the actual injury in the picture  Wound History: Pt has been proning intermittently. Edematous face and tongue. Currently able to reposition the ETT disengaging the tongue. Pt has been supined since 11/10 morning.  Measurements (length x width x depth, in cm) 1.1 cm x 0.6 cm  x  0.1 cm   Wound Base:  100 % mucosal  Tunneling N/A  Undermining N/A  Palpation of the wound bed: normal   Periwound skin: intact  Color: normal and consistent with surrounding tissue  Temperature: normal   Drainage:, none  Description of drainage: none  Odor: none  Pain: unable to assess due to  sedation ,        Interventions  Current support surface: Standard  Low air loss mattress  Current off-loading measures: Pillows  Repositioning aid: Pillows  Visual inspection of wound(s) completed   Tube Securement: ETT head gear  Wound Care: was done per plan of care.  Supplies: floor stock and discussed with RN  Educated provided: plan of care and Off-loading pressure  Education provided to: nurse  Discussed importance of:repositioning every 2 hours, off-loading pressure to wound, off-loading mattress and moisture management  Discussed plan of care with Nurse    Miranda Aranda RN

## 2021-11-16 LAB
ANION GAP SERPL CALCULATED.3IONS-SCNC: 6 MMOL/L (ref 3–14)
ANION GAP SERPL CALCULATED.3IONS-SCNC: 6 MMOL/L (ref 3–14)
BACTERIA BLD CULT: NO GROWTH
BACTERIA BLD CULT: NO GROWTH
BASE EXCESS BLDA CALC-SCNC: 6.8 MMOL/L (ref -9–1.8)
BASE EXCESS BLDA CALC-SCNC: 8.1 MMOL/L (ref -9–1.8)
BASE EXCESS BLDA CALC-SCNC: 8.2 MMOL/L (ref -9–1.8)
BASE EXCESS BLDA CALC-SCNC: 8.5 MMOL/L (ref -9–1.8)
BASE EXCESS BLDA CALC-SCNC: 8.6 MMOL/L (ref -9–1.8)
BUN SERPL-MCNC: 38 MG/DL (ref 7–30)
BUN SERPL-MCNC: 40 MG/DL (ref 7–30)
CALCIUM SERPL-MCNC: 8.6 MG/DL (ref 8.5–10.1)
CALCIUM SERPL-MCNC: 9.1 MG/DL (ref 8.5–10.1)
CHLORIDE BLD-SCNC: 106 MMOL/L (ref 94–109)
CHLORIDE BLD-SCNC: 106 MMOL/L (ref 94–109)
CO2 SERPL-SCNC: 31 MMOL/L (ref 20–32)
CO2 SERPL-SCNC: 32 MMOL/L (ref 20–32)
CREAT SERPL-MCNC: 1.01 MG/DL (ref 0.66–1.25)
CREAT SERPL-MCNC: 1.03 MG/DL (ref 0.66–1.25)
ERYTHROCYTE [DISTWIDTH] IN BLOOD BY AUTOMATED COUNT: 15.5 % (ref 10–15)
GFR SERPL CREATININE-BSD FRML MDRD: 89 ML/MIN/1.73M2
GFR SERPL CREATININE-BSD FRML MDRD: >90 ML/MIN/1.73M2
GLUCOSE BLD-MCNC: 153 MG/DL (ref 70–99)
GLUCOSE BLD-MCNC: 157 MG/DL (ref 70–99)
GLUCOSE BLDC GLUCOMTR-MCNC: 136 MG/DL (ref 70–99)
GLUCOSE BLDC GLUCOMTR-MCNC: 147 MG/DL (ref 70–99)
GLUCOSE BLDC GLUCOMTR-MCNC: 148 MG/DL (ref 70–99)
GLUCOSE BLDC GLUCOMTR-MCNC: 154 MG/DL (ref 70–99)
GLUCOSE BLDC GLUCOMTR-MCNC: 161 MG/DL (ref 70–99)
HCO3 BLD-SCNC: 34 MMOL/L (ref 21–28)
HCO3 BLD-SCNC: 35 MMOL/L (ref 21–28)
HCO3 BLD-SCNC: 36 MMOL/L (ref 21–28)
HCT VFR BLD AUTO: 35.1 % (ref 40–53)
HGB BLD-MCNC: 10.9 G/DL (ref 13.3–17.7)
MAGNESIUM SERPL-MCNC: 2.2 MG/DL (ref 1.6–2.3)
MCH RBC QN AUTO: 28 PG (ref 26.5–33)
MCHC RBC AUTO-ENTMCNC: 31.1 G/DL (ref 31.5–36.5)
MCV RBC AUTO: 90 FL (ref 78–100)
O2/TOTAL GAS SETTING VFR VENT: 60 %
O2/TOTAL GAS SETTING VFR VENT: 70 %
O2/TOTAL GAS SETTING VFR VENT: 70 %
O2/TOTAL GAS SETTING VFR VENT: 80 %
O2/TOTAL GAS SETTING VFR VENT: 80 %
PCO2 BLD: 55 MM HG (ref 35–45)
PCO2 BLD: 58 MM HG (ref 35–45)
PCO2 BLD: 61 MM HG (ref 35–45)
PCO2 BLD: 63 MM HG (ref 35–45)
PCO2 BLD: 68 MM HG (ref 35–45)
PH BLD: 7.32 [PH] (ref 7.35–7.45)
PH BLD: 7.36 [PH] (ref 7.35–7.45)
PH BLD: 7.37 [PH] (ref 7.35–7.45)
PH BLD: 7.39 [PH] (ref 7.35–7.45)
PH BLD: 7.41 [PH] (ref 7.35–7.45)
PHOSPHATE SERPL-MCNC: 4 MG/DL (ref 2.5–4.5)
PLATELET # BLD AUTO: 152 10E3/UL (ref 150–450)
PO2 BLD: 130 MM HG (ref 80–105)
PO2 BLD: 64 MM HG (ref 80–105)
PO2 BLD: 79 MM HG (ref 80–105)
PO2 BLD: 90 MM HG (ref 80–105)
PO2 BLD: 96 MM HG (ref 80–105)
POTASSIUM BLD-SCNC: 3.1 MMOL/L (ref 3.4–5.3)
POTASSIUM BLD-SCNC: 3.4 MMOL/L (ref 3.4–5.3)
RBC # BLD AUTO: 3.89 10E6/UL (ref 4.4–5.9)
SODIUM SERPL-SCNC: 143 MMOL/L (ref 133–144)
SODIUM SERPL-SCNC: 144 MMOL/L (ref 133–144)
TRIGL SERPL-MCNC: 589 MG/DL
WBC # BLD AUTO: 8.5 10E3/UL (ref 4–11)

## 2021-11-16 PROCEDURE — 250N000013 HC RX MED GY IP 250 OP 250 PS 637: Performed by: STUDENT IN AN ORGANIZED HEALTH CARE EDUCATION/TRAINING PROGRAM

## 2021-11-16 PROCEDURE — 250N000011 HC RX IP 250 OP 636

## 2021-11-16 PROCEDURE — 250N000011 HC RX IP 250 OP 636: Performed by: STUDENT IN AN ORGANIZED HEALTH CARE EDUCATION/TRAINING PROGRAM

## 2021-11-16 PROCEDURE — 80048 BASIC METABOLIC PNL TOTAL CA: CPT | Performed by: STUDENT IN AN ORGANIZED HEALTH CARE EDUCATION/TRAINING PROGRAM

## 2021-11-16 PROCEDURE — 94644 CONT INHLJ TX 1ST HOUR: CPT

## 2021-11-16 PROCEDURE — C9113 INJ PANTOPRAZOLE SODIUM, VIA: HCPCS

## 2021-11-16 PROCEDURE — 36592 COLLECT BLOOD FROM PICC: CPT | Performed by: STUDENT IN AN ORGANIZED HEALTH CARE EDUCATION/TRAINING PROGRAM

## 2021-11-16 PROCEDURE — 82803 BLOOD GASES ANY COMBINATION: CPT | Performed by: STUDENT IN AN ORGANIZED HEALTH CARE EDUCATION/TRAINING PROGRAM

## 2021-11-16 PROCEDURE — 250N000013 HC RX MED GY IP 250 OP 250 PS 637: Performed by: NURSE PRACTITIONER

## 2021-11-16 PROCEDURE — 250N000011 HC RX IP 250 OP 636: Performed by: NURSE PRACTITIONER

## 2021-11-16 PROCEDURE — 200N000002 HC R&B ICU UMMC

## 2021-11-16 PROCEDURE — 84100 ASSAY OF PHOSPHORUS: CPT | Performed by: STUDENT IN AN ORGANIZED HEALTH CARE EDUCATION/TRAINING PROGRAM

## 2021-11-16 PROCEDURE — 83735 ASSAY OF MAGNESIUM: CPT | Performed by: STUDENT IN AN ORGANIZED HEALTH CARE EDUCATION/TRAINING PROGRAM

## 2021-11-16 PROCEDURE — 99291 CRITICAL CARE FIRST HOUR: CPT | Mod: GC | Performed by: INTERNAL MEDICINE

## 2021-11-16 PROCEDURE — 94003 VENT MGMT INPAT SUBQ DAY: CPT

## 2021-11-16 PROCEDURE — 84478 ASSAY OF TRIGLYCERIDES: CPT | Performed by: STUDENT IN AN ORGANIZED HEALTH CARE EDUCATION/TRAINING PROGRAM

## 2021-11-16 PROCEDURE — 85027 COMPLETE CBC AUTOMATED: CPT | Performed by: STUDENT IN AN ORGANIZED HEALTH CARE EDUCATION/TRAINING PROGRAM

## 2021-11-16 PROCEDURE — 999N000157 HC STATISTIC RCP TIME EA 10 MIN

## 2021-11-16 PROCEDURE — 250N000013 HC RX MED GY IP 250 OP 250 PS 637

## 2021-11-16 PROCEDURE — 94645 CONT INHLJ TX EACH ADDL HOUR: CPT

## 2021-11-16 RX ORDER — POTASSIUM CHLORIDE 1.5 G/1.58G
40 POWDER, FOR SOLUTION ORAL 2 TIMES DAILY
Status: DISCONTINUED | OUTPATIENT
Start: 2021-11-16 | End: 2021-11-16

## 2021-11-16 RX ORDER — PROPOFOL 10 MG/ML
INJECTION, EMULSION INTRAVENOUS
Status: COMPLETED
Start: 2021-11-16 | End: 2021-11-16

## 2021-11-16 RX ORDER — POTASSIUM CHLORIDE 29.8 MG/ML
20 INJECTION INTRAVENOUS
Status: COMPLETED | OUTPATIENT
Start: 2021-11-16 | End: 2021-11-16

## 2021-11-16 RX ORDER — PROPOFOL 10 MG/ML
INJECTION, EMULSION INTRAVENOUS
Status: COMPLETED
Start: 2021-11-16 | End: 2021-11-17

## 2021-11-16 RX ORDER — FENTANYL CITRATE 50 UG/ML
INJECTION, SOLUTION INTRAMUSCULAR; INTRAVENOUS
Status: DISCONTINUED
Start: 2021-11-16 | End: 2021-11-16 | Stop reason: WASHOUT

## 2021-11-16 RX ORDER — PROPOFOL 10 MG/ML
5-30 INJECTION, EMULSION INTRAVENOUS CONTINUOUS
Status: DISCONTINUED | OUTPATIENT
Start: 2021-11-16 | End: 2021-11-20

## 2021-11-16 RX ORDER — POTASSIUM CHLORIDE 1.5 G/1.58G
40 POWDER, FOR SOLUTION ORAL 3 TIMES DAILY
Status: DISCONTINUED | OUTPATIENT
Start: 2021-11-16 | End: 2021-11-17

## 2021-11-16 RX ADMIN — Medication 150 MCG: at 20:31

## 2021-11-16 RX ADMIN — PANTOPRAZOLE SODIUM 40 MG: 40 INJECTION, POWDER, FOR SOLUTION INTRAVENOUS at 07:44

## 2021-11-16 RX ADMIN — OXYCODONE HYDROCHLORIDE 20 MG: 10 TABLET ORAL at 01:12

## 2021-11-16 RX ADMIN — OXYCODONE HYDROCHLORIDE 20 MG: 10 TABLET ORAL at 07:44

## 2021-11-16 RX ADMIN — Medication 5 ML: at 07:43

## 2021-11-16 RX ADMIN — Medication 100 MCG: at 14:45

## 2021-11-16 RX ADMIN — CEFTRIAXONE SODIUM 2 G: 2 INJECTION, POWDER, FOR SOLUTION INTRAMUSCULAR; INTRAVENOUS at 11:30

## 2021-11-16 RX ADMIN — Medication 100 MCG: at 13:12

## 2021-11-16 RX ADMIN — POLYETHYLENE GLYCOL 3350 17 G: 17 POWDER, FOR SOLUTION ORAL at 19:58

## 2021-11-16 RX ADMIN — LORAZEPAM 8 MG: 2 TABLET ORAL at 13:44

## 2021-11-16 RX ADMIN — OXYCODONE HYDROCHLORIDE 20 MG: 10 TABLET ORAL at 15:46

## 2021-11-16 RX ADMIN — Medication 100 MCG: at 21:55

## 2021-11-16 RX ADMIN — QUETIAPINE FUMARATE 25 MG: 25 TABLET ORAL at 07:44

## 2021-11-16 RX ADMIN — QUETIAPINE FUMARATE 25 MG: 25 TABLET ORAL at 13:45

## 2021-11-16 RX ADMIN — Medication 200 MCG/HR: at 20:26

## 2021-11-16 RX ADMIN — ENOXAPARIN SODIUM 40 MG: 40 INJECTION SUBCUTANEOUS at 09:35

## 2021-11-16 RX ADMIN — POTASSIUM CHLORIDE 40 MEQ: 1.5 POWDER, FOR SOLUTION ORAL at 08:06

## 2021-11-16 RX ADMIN — Medication 150 MCG/HR: at 09:34

## 2021-11-16 RX ADMIN — INSULIN ASPART 1 UNITS: 100 INJECTION, SOLUTION INTRAVENOUS; SUBCUTANEOUS at 08:04

## 2021-11-16 RX ADMIN — INSULIN ASPART 1 UNITS: 100 INJECTION, SOLUTION INTRAVENOUS; SUBCUTANEOUS at 19:58

## 2021-11-16 RX ADMIN — OXYCODONE HYDROCHLORIDE 20 MG: 10 TABLET ORAL at 19:57

## 2021-11-16 RX ADMIN — Medication 150 MCG: at 23:47

## 2021-11-16 RX ADMIN — LORAZEPAM 8 MG: 2 TABLET ORAL at 01:12

## 2021-11-16 RX ADMIN — POTASSIUM CHLORIDE 20 MEQ: 29.8 INJECTION, SOLUTION INTRAVENOUS at 17:34

## 2021-11-16 RX ADMIN — OXYCODONE HYDROCHLORIDE 20 MG: 10 TABLET ORAL at 11:30

## 2021-11-16 RX ADMIN — Medication 9 MG/HR: at 02:27

## 2021-11-16 RX ADMIN — LORAZEPAM 8 MG: 2 TABLET ORAL at 09:34

## 2021-11-16 RX ADMIN — FUROSEMIDE 40 MG: 10 INJECTION, SOLUTION INTRAVENOUS at 07:43

## 2021-11-16 RX ADMIN — POTASSIUM CHLORIDE 20 MEQ: 29.8 INJECTION, SOLUTION INTRAVENOUS at 02:33

## 2021-11-16 RX ADMIN — Medication 2 PACKET: at 22:10

## 2021-11-16 RX ADMIN — PROPOFOL 10 MG: 10 INJECTION, EMULSION INTRAVENOUS at 20:36

## 2021-11-16 RX ADMIN — Medication 10 NG/KG/MIN: at 17:38

## 2021-11-16 RX ADMIN — INSULIN ASPART 1 UNITS: 100 INJECTION, SOLUTION INTRAVENOUS; SUBCUTANEOUS at 11:30

## 2021-11-16 RX ADMIN — LORAZEPAM 8 MG: 2 TABLET ORAL at 05:42

## 2021-11-16 RX ADMIN — Medication 100 MCG: at 18:58

## 2021-11-16 RX ADMIN — DOCUSATE SODIUM 50 MG AND SENNOSIDES 8.6 MG 2 TABLET: 8.6; 5 TABLET, FILM COATED ORAL at 19:57

## 2021-11-16 RX ADMIN — Medication 10 NG/KG/MIN: at 02:28

## 2021-11-16 RX ADMIN — Medication 100 MCG: at 09:42

## 2021-11-16 RX ADMIN — Medication 10 MG: at 20:36

## 2021-11-16 RX ADMIN — POTASSIUM CHLORIDE 20 MEQ: 29.8 INJECTION, SOLUTION INTRAVENOUS at 01:18

## 2021-11-16 RX ADMIN — QUETIAPINE FUMARATE 50 MG: 50 TABLET ORAL at 19:57

## 2021-11-16 RX ADMIN — PROPOFOL 15 MCG/KG/MIN: 10 INJECTION, EMULSION INTRAVENOUS at 20:52

## 2021-11-16 RX ADMIN — Medication 9 MG/HR: at 11:46

## 2021-11-16 RX ADMIN — POTASSIUM CHLORIDE 20 MEQ: 29.8 INJECTION, SOLUTION INTRAVENOUS at 08:36

## 2021-11-16 RX ADMIN — POTASSIUM CHLORIDE 20 MEQ: 29.8 INJECTION, SOLUTION INTRAVENOUS at 07:46

## 2021-11-16 RX ADMIN — POTASSIUM CHLORIDE 40 MEQ: 1.5 POWDER, FOR SOLUTION ORAL at 19:57

## 2021-11-16 RX ADMIN — Medication 100 MCG: at 08:21

## 2021-11-16 RX ADMIN — INSULIN ASPART 1 UNITS: 100 INJECTION, SOLUTION INTRAVENOUS; SUBCUTANEOUS at 15:46

## 2021-11-16 RX ADMIN — Medication 2 PACKET: at 11:30

## 2021-11-16 RX ADMIN — LORAZEPAM 8 MG: 2 TABLET ORAL at 22:09

## 2021-11-16 RX ADMIN — POTASSIUM CHLORIDE 20 MEQ: 29.8 INJECTION, SOLUTION INTRAVENOUS at 16:56

## 2021-11-16 RX ADMIN — FUROSEMIDE 40 MG: 10 INJECTION, SOLUTION INTRAVENOUS at 19:57

## 2021-11-16 RX ADMIN — Medication 100 MCG: at 17:58

## 2021-11-16 RX ADMIN — Medication 50 MCG: at 19:25

## 2021-11-16 RX ADMIN — Medication 2 PACKET: at 17:34

## 2021-11-16 RX ADMIN — POTASSIUM CHLORIDE 40 MEQ: 1.5 POWDER, FOR SOLUTION ORAL at 13:44

## 2021-11-16 RX ADMIN — Medication 2 PACKET: at 13:44

## 2021-11-16 RX ADMIN — Medication 12 MG/HR: at 19:40

## 2021-11-16 RX ADMIN — POLYETHYLENE GLYCOL 3350 17 G: 17 POWDER, FOR SOLUTION ORAL at 07:43

## 2021-11-16 RX ADMIN — Medication 2 PACKET: at 07:44

## 2021-11-16 RX ADMIN — OXYCODONE HYDROCHLORIDE 20 MG: 10 TABLET ORAL at 05:43

## 2021-11-16 RX ADMIN — DOCUSATE SODIUM 50 MG AND SENNOSIDES 8.6 MG 2 TABLET: 8.6; 5 TABLET, FILM COATED ORAL at 07:43

## 2021-11-16 RX ADMIN — LORAZEPAM 8 MG: 2 TABLET ORAL at 17:34

## 2021-11-16 ASSESSMENT — ACTIVITIES OF DAILY LIVING (ADL)
ADLS_ACUITY_SCORE: 17
ADLS_ACUITY_SCORE: 19
ADLS_ACUITY_SCORE: 19
ADLS_ACUITY_SCORE: 21
ADLS_ACUITY_SCORE: 17
ADLS_ACUITY_SCORE: 21
ADLS_ACUITY_SCORE: 17
ADLS_ACUITY_SCORE: 19
ADLS_ACUITY_SCORE: 17
ADLS_ACUITY_SCORE: 19
ADLS_ACUITY_SCORE: 17
ADLS_ACUITY_SCORE: 21
ADLS_ACUITY_SCORE: 17
ADLS_ACUITY_SCORE: 19
ADLS_ACUITY_SCORE: 21
ADLS_ACUITY_SCORE: 21
ADLS_ACUITY_SCORE: 17
ADLS_ACUITY_SCORE: 21

## 2021-11-16 ASSESSMENT — MIFFLIN-ST. JEOR: SCORE: 2433

## 2021-11-16 NOTE — PROGRESS NOTES
CLINICAL NUTRITION SERVICES - REASSESSMENT NOTE     Nutrition Prescription    Malnutrition Status:    Unable to determine due to unable to perform all aspects for NFPE    Recommendations already ordered by Registered Dietitian (RD):  --NEW TF regimen: Osmolite 1.5 Tripp @ goal of 45ml/hr (1080ml/day) +10 Prosource will provide: 2020 kcals (25 kcal/kg IBW), 177 g PRO (2.2 g/kg IBW), 822 ml free H20, 219 g CHO, and 0 g fiber daily.    Future/Additional Recommendations:  --Monitor K+, Phos with change to standard TF formula.  --Weight trends.      EVALUATION OF THE PROGRESS TOWARD GOALS   Diet: NPO  Nutrition Support: Novasource Renal @ 30 ml/hr (720 ml) + 10 Prosource provides 1840 kcal (23 kcal/kg IBW), 175 g pro (2.2 g/kg IBW), 132 g CHO, 516 ml free water, and 0 g fiber daily, 1540 mg Phos daily.   Intake: average daily TF intake x 6 days + Prosource = 715 ml volume, 1830 kcal (~23 kcal/kg IBW), 175 g pro (2.2 g/kg IBW)     NEW FINDINGS   Weight: down since admission, cannot rule out fluid shifts. Driest weight 149.2 kg on 11/14. Dosing weight remains appropriate  Labs: K+ 3.1 (L), BUN 40 (H), Cr 1.03 (WNL), Phos 4.0 (WNL)  Meds: nephronex, lasix BID, medium sliding scale insulin, oxycodone, miralax BID, KCl, prosource 2 packets 5x/day, senna-docusate BID, fentanyl, versed  GI: 0-250 ml stool output/day  Resp: intubated, COVID+  Skin: WOCN 11/15:   Pressure Injury: on Tongue , hospital acquired ,   This is a Medical Device Related Pressure Injury (MDRPI) due to ETT  Pressure Injury is Stage Mucosal   Contributing factor of the pressure injury: pressure and immobility  Status: healing and follow up      Pressure Injury: on septum , hospital acquired ,   This is a Medical Device Related Pressure Injury (MDRPI) due to ETAD head gear was pushed against the area during proning  Pressure Injury is Stage 2   Contributing factor of the pressure injury: pressure and immobility  Status: follow up assessment    MALNUTRITION  %  Intake: No decreased intake noted  % Weight Loss: difficult to assess 2/2 fluid shifts  Subcutaneous Fat Loss: Unable to assess COVID+  Muscle Loss: Unable to assess COVID+  Fluid Accumulation/Edema: None noted  Malnutrition Diagnosis: Unable to determine due to unable to perform all aspects for NFPE    Previous Goals   Total avg nutritional intake to meet a minimum of 22 kcal/kg and 2 g PRO/kg daily (per dosing wt 81 kg IBW).  Evaluation: Met    Previous Nutrition Diagnosis  Inadequate protein-energy intake related to inadequate enteral nutrition infusions as evidenced by average daily TF intake x 7 days providing 1451 kcal (18 kcal/kg IBW or 80% low-end needs), 149 g pro (1.8 g/kg IBW or 90% low-end needs).  Evaluation: Improving    CURRENT NUTRITION DIAGNOSIS  Inadequate oral intake related to mechanical ventilation inhibiting ability to take nutrition PO as evidenced by NPO status, requiring enteral nutrition to meet 100% of needs.       INTERVENTIONS  Implementation  Collaboration with other providers  Enteral Nutrition - modified as above 2/2 persistent hypokalemia    Goals  Total avg nutritional intake to meet a minimum of 22 kcal/kg and 2 g PRO/kg daily (per dosing wt 81 kg IBW).    Monitoring/Evaluation  Progress toward goals will be monitored and evaluated per protocol.    Shawanda Powell, MS, RD, LD, [facility]  MICU pager: [phone]  ASCOM: [id number]

## 2021-11-16 NOTE — PLAN OF CARE
Major Shift Events: Pt remains sedated with Versed 10 and Fentanyl 200. RASS -2/-3. Follows commands intermittently, very weak. PERRL 2 mm. Afebrile, Abx narrowed. MAP>65 with no exogenous assitance. CMV settings remain, 70%, 450, 30, 14. 1230 P/F 112, reproned @ 1800. Moderate secretions noted via inline suction, creamy/red-streaked, copious nasal secretions via red montalvo. TF @ goal via NJ formulation changed, FWF 150mL  Q2H. Large liquid BM x3, rectal pouch changed. Flores in place, Lasix ordered BID with good effect. Skin grossly unchanged, optifoam in place prior to prone. Umbilical hernia noted. PICC line WNL. PIV x3 WNL. ART WNL in L radial, waveform normal, brisk blood return.      Plan: Wean sedation and vent support as tolerated. Care conference Thursday.     For vital signs and complete assessments, please see documentation flowsheets.

## 2021-11-16 NOTE — PLAN OF CARE
Nights  HARMEET  Pt sedated well overnight.  Pt withdraws to pain.  Inconsistant with following commands. Versed 9mg/hr, Fent 150mcg/hr.   Thick brown sputum moderate. Pt ABG has chronic low PaO2 65-75.  Flolan restarted at 0100 and Vent CMV 80%/30/450/14.  MICU team aware.  Urine 200-500cc/hr very good output.  K+ 3.1 replaced frequently overnight per protocol.  200cc Liquid stool in rectal pouch.    P:  Repeat ABG.  Resp measures.  Update MICU team with changes.

## 2021-11-17 ENCOUNTER — APPOINTMENT (OUTPATIENT)
Dept: GENERAL RADIOLOGY | Facility: CLINIC | Age: 41
End: 2021-11-17
Attending: STUDENT IN AN ORGANIZED HEALTH CARE EDUCATION/TRAINING PROGRAM
Payer: MEDICAID

## 2021-11-17 ENCOUNTER — APPOINTMENT (OUTPATIENT)
Dept: GENERAL RADIOLOGY | Facility: CLINIC | Age: 41
End: 2021-11-17
Attending: NURSE PRACTITIONER
Payer: MEDICAID

## 2021-11-17 LAB
ALBUMIN UR-MCNC: 20 MG/DL
ANION GAP SERPL CALCULATED.3IONS-SCNC: 4 MMOL/L (ref 3–14)
ANION GAP SERPL CALCULATED.3IONS-SCNC: 5 MMOL/L (ref 3–14)
APPEARANCE UR: CLEAR
BACTERIA #/AREA URNS HPF: ABNORMAL /HPF
BASE EXCESS BLDA CALC-SCNC: 10.2 MMOL/L (ref -9–1.8)
BASE EXCESS BLDA CALC-SCNC: 6.2 MMOL/L (ref -9–1.8)
BASE EXCESS BLDA CALC-SCNC: 6.9 MMOL/L (ref -9–1.8)
BASE EXCESS BLDA CALC-SCNC: 7.1 MMOL/L (ref -9–1.8)
BASE EXCESS BLDA CALC-SCNC: 7.3 MMOL/L (ref -9–1.8)
BILIRUB UR QL STRIP: NEGATIVE
BUN SERPL-MCNC: 38 MG/DL (ref 7–30)
BUN SERPL-MCNC: 41 MG/DL (ref 7–30)
CALCIUM SERPL-MCNC: 9 MG/DL (ref 8.5–10.1)
CALCIUM SERPL-MCNC: 9.4 MG/DL (ref 8.5–10.1)
CHLORIDE BLD-SCNC: 104 MMOL/L (ref 94–109)
CHLORIDE BLD-SCNC: 106 MMOL/L (ref 94–109)
CO2 SERPL-SCNC: 32 MMOL/L (ref 20–32)
CO2 SERPL-SCNC: 33 MMOL/L (ref 20–32)
COLOR UR AUTO: ABNORMAL
CREAT SERPL-MCNC: 1.05 MG/DL (ref 0.66–1.25)
CREAT SERPL-MCNC: 1.09 MG/DL (ref 0.66–1.25)
CRP SERPL-MCNC: 59 MG/L (ref 0–8)
ERYTHROCYTE [DISTWIDTH] IN BLOOD BY AUTOMATED COUNT: 15.9 % (ref 10–15)
GFR SERPL CREATININE-BSD FRML MDRD: 83 ML/MIN/1.73M2
GFR SERPL CREATININE-BSD FRML MDRD: 87 ML/MIN/1.73M2
GLUCOSE BLD-MCNC: 194 MG/DL (ref 70–99)
GLUCOSE BLD-MCNC: 239 MG/DL (ref 70–99)
GLUCOSE BLDC GLUCOMTR-MCNC: 175 MG/DL (ref 70–99)
GLUCOSE BLDC GLUCOMTR-MCNC: 190 MG/DL (ref 70–99)
GLUCOSE BLDC GLUCOMTR-MCNC: 191 MG/DL (ref 70–99)
GLUCOSE BLDC GLUCOMTR-MCNC: 191 MG/DL (ref 70–99)
GLUCOSE BLDC GLUCOMTR-MCNC: 196 MG/DL (ref 70–99)
GLUCOSE BLDC GLUCOMTR-MCNC: 224 MG/DL (ref 70–99)
GLUCOSE UR STRIP-MCNC: NEGATIVE MG/DL
HCO3 BLD-SCNC: 35 MMOL/L (ref 21–28)
HCO3 BLD-SCNC: 37 MMOL/L (ref 21–28)
HCT VFR BLD AUTO: 37 % (ref 40–53)
HGB BLD-MCNC: 11.3 G/DL (ref 13.3–17.7)
HGB UR QL STRIP: ABNORMAL
HYALINE CASTS: 3 /LPF
KETONES UR STRIP-MCNC: NEGATIVE MG/DL
LEUKOCYTE ESTERASE UR QL STRIP: NEGATIVE
MAGNESIUM SERPL-MCNC: 2.4 MG/DL (ref 1.6–2.3)
MCH RBC QN AUTO: 27.9 PG (ref 26.5–33)
MCHC RBC AUTO-ENTMCNC: 30.5 G/DL (ref 31.5–36.5)
MCV RBC AUTO: 91 FL (ref 78–100)
MRSA DNA SPEC QL NAA+PROBE: NEGATIVE
MUCOUS THREADS #/AREA URNS LPF: PRESENT /LPF
NITRATE UR QL: NEGATIVE
O2/TOTAL GAS SETTING VFR VENT: 50 %
O2/TOTAL GAS SETTING VFR VENT: 50 %
O2/TOTAL GAS SETTING VFR VENT: 65 %
O2/TOTAL GAS SETTING VFR VENT: 65 %
O2/TOTAL GAS SETTING VFR VENT: 90 %
PCO2 BLD: 64 MM HG (ref 35–45)
PCO2 BLD: 67 MM HG (ref 35–45)
PCO2 BLD: 73 MM HG (ref 35–45)
PH BLD: 7.29 [PH] (ref 7.35–7.45)
PH BLD: 7.33 [PH] (ref 7.35–7.45)
PH BLD: 7.34 [PH] (ref 7.35–7.45)
PH BLD: 7.34 [PH] (ref 7.35–7.45)
PH BLD: 7.37 [PH] (ref 7.35–7.45)
PH UR STRIP: 5.5 [PH] (ref 5–7)
PHOSPHATE SERPL-MCNC: 4.6 MG/DL (ref 2.5–4.5)
PLATELET # BLD AUTO: 189 10E3/UL (ref 150–450)
PO2 BLD: 107 MM HG (ref 80–105)
PO2 BLD: 66 MM HG (ref 80–105)
PO2 BLD: 67 MM HG (ref 80–105)
PO2 BLD: 84 MM HG (ref 80–105)
PO2 BLD: 90 MM HG (ref 80–105)
POTASSIUM BLD-SCNC: 3.6 MMOL/L (ref 3.4–5.3)
POTASSIUM BLD-SCNC: 3.8 MMOL/L (ref 3.4–5.3)
PROCALCITONIN SERPL-MCNC: 0.24 NG/ML
RBC # BLD AUTO: 4.05 10E6/UL (ref 4.4–5.9)
RBC URINE: >182 /HPF
SA TARGET DNA: POSITIVE
SODIUM SERPL-SCNC: 142 MMOL/L (ref 133–144)
SODIUM SERPL-SCNC: 142 MMOL/L (ref 133–144)
SP GR UR STRIP: 1.02 (ref 1–1.03)
UROBILINOGEN UR STRIP-MCNC: NORMAL MG/DL
WBC # BLD AUTO: 13.4 10E3/UL (ref 4–11)
WBC URINE: <1 /HPF

## 2021-11-17 PROCEDURE — 85027 COMPLETE CBC AUTOMATED: CPT | Performed by: STUDENT IN AN ORGANIZED HEALTH CARE EDUCATION/TRAINING PROGRAM

## 2021-11-17 PROCEDURE — 250N000013 HC RX MED GY IP 250 OP 250 PS 637

## 2021-11-17 PROCEDURE — 250N000011 HC RX IP 250 OP 636

## 2021-11-17 PROCEDURE — 71045 X-RAY EXAM CHEST 1 VIEW: CPT | Mod: 26 | Performed by: RADIOLOGY

## 2021-11-17 PROCEDURE — 250N000012 HC RX MED GY IP 250 OP 636 PS 637: Performed by: NURSE PRACTITIONER

## 2021-11-17 PROCEDURE — 87040 BLOOD CULTURE FOR BACTERIA: CPT | Performed by: STUDENT IN AN ORGANIZED HEALTH CARE EDUCATION/TRAINING PROGRAM

## 2021-11-17 PROCEDURE — 80048 BASIC METABOLIC PNL TOTAL CA: CPT | Performed by: STUDENT IN AN ORGANIZED HEALTH CARE EDUCATION/TRAINING PROGRAM

## 2021-11-17 PROCEDURE — 250N000011 HC RX IP 250 OP 636: Performed by: STUDENT IN AN ORGANIZED HEALTH CARE EDUCATION/TRAINING PROGRAM

## 2021-11-17 PROCEDURE — 250N000013 HC RX MED GY IP 250 OP 250 PS 637: Performed by: STUDENT IN AN ORGANIZED HEALTH CARE EDUCATION/TRAINING PROGRAM

## 2021-11-17 PROCEDURE — 99291 CRITICAL CARE FIRST HOUR: CPT | Performed by: INTERNAL MEDICINE

## 2021-11-17 PROCEDURE — 94645 CONT INHLJ TX EACH ADDL HOUR: CPT

## 2021-11-17 PROCEDURE — 71045 X-RAY EXAM CHEST 1 VIEW: CPT | Mod: 77

## 2021-11-17 PROCEDURE — 36592 COLLECT BLOOD FROM PICC: CPT | Performed by: STUDENT IN AN ORGANIZED HEALTH CARE EDUCATION/TRAINING PROGRAM

## 2021-11-17 PROCEDURE — 82803 BLOOD GASES ANY COMBINATION: CPT | Performed by: STUDENT IN AN ORGANIZED HEALTH CARE EDUCATION/TRAINING PROGRAM

## 2021-11-17 PROCEDURE — 36415 COLL VENOUS BLD VENIPUNCTURE: CPT | Performed by: STUDENT IN AN ORGANIZED HEALTH CARE EDUCATION/TRAINING PROGRAM

## 2021-11-17 PROCEDURE — 87205 SMEAR GRAM STAIN: CPT | Performed by: NURSE PRACTITIONER

## 2021-11-17 PROCEDURE — 200N000002 HC R&B ICU UMMC

## 2021-11-17 PROCEDURE — 250N000013 HC RX MED GY IP 250 OP 250 PS 637: Performed by: NURSE PRACTITIONER

## 2021-11-17 PROCEDURE — 81001 URINALYSIS AUTO W/SCOPE: CPT | Performed by: NURSE PRACTITIONER

## 2021-11-17 PROCEDURE — 83735 ASSAY OF MAGNESIUM: CPT | Performed by: STUDENT IN AN ORGANIZED HEALTH CARE EDUCATION/TRAINING PROGRAM

## 2021-11-17 PROCEDURE — 71045 X-RAY EXAM CHEST 1 VIEW: CPT

## 2021-11-17 PROCEDURE — 999N000015 HC STATISTIC ARTERIAL MONITORING DAILY

## 2021-11-17 PROCEDURE — 80048 BASIC METABOLIC PNL TOTAL CA: CPT | Performed by: NURSE PRACTITIONER

## 2021-11-17 PROCEDURE — 87641 MR-STAPH DNA AMP PROBE: CPT | Performed by: NURSE PRACTITIONER

## 2021-11-17 PROCEDURE — 94003 VENT MGMT INPAT SUBQ DAY: CPT

## 2021-11-17 PROCEDURE — 86140 C-REACTIVE PROTEIN: CPT | Performed by: NURSE PRACTITIONER

## 2021-11-17 PROCEDURE — 84145 PROCALCITONIN (PCT): CPT | Performed by: NURSE PRACTITIONER

## 2021-11-17 PROCEDURE — 84100 ASSAY OF PHOSPHORUS: CPT | Performed by: STUDENT IN AN ORGANIZED HEALTH CARE EDUCATION/TRAINING PROGRAM

## 2021-11-17 PROCEDURE — 999N000065 XR CHEST PORT 1 VIEW

## 2021-11-17 PROCEDURE — 82803 BLOOD GASES ANY COMBINATION: CPT | Performed by: NURSE PRACTITIONER

## 2021-11-17 PROCEDURE — 999N000157 HC STATISTIC RCP TIME EA 10 MIN

## 2021-11-17 PROCEDURE — 250N000011 HC RX IP 250 OP 636: Performed by: NURSE PRACTITIONER

## 2021-11-17 PROCEDURE — C9113 INJ PANTOPRAZOLE SODIUM, VIA: HCPCS

## 2021-11-17 PROCEDURE — 82803 BLOOD GASES ANY COMBINATION: CPT

## 2021-11-17 RX ORDER — FUROSEMIDE 10 MG/ML
40 INJECTION INTRAMUSCULAR; INTRAVENOUS ONCE
Status: COMPLETED | OUTPATIENT
Start: 2021-11-17 | End: 2021-11-17

## 2021-11-17 RX ORDER — POTASSIUM CHLORIDE 1.5 G/1.58G
20 POWDER, FOR SOLUTION ORAL ONCE
Status: COMPLETED | OUTPATIENT
Start: 2021-11-17 | End: 2021-11-17

## 2021-11-17 RX ORDER — MIDAZOLAM HCL IN 0.9 % NACL/PF 1 MG/ML
1-8 PLASTIC BAG, INJECTION (ML) INTRAVENOUS CONTINUOUS
Status: DISCONTINUED | OUTPATIENT
Start: 2021-11-17 | End: 2021-11-26

## 2021-11-17 RX ORDER — HYDROMORPHONE HYDROCHLORIDE 4 MG/1
8 TABLET ORAL EVERY 4 HOURS
Status: DISCONTINUED | OUTPATIENT
Start: 2021-11-17 | End: 2021-11-30

## 2021-11-17 RX ORDER — PIPERACILLIN SODIUM, TAZOBACTAM SODIUM 4; .5 G/20ML; G/20ML
4.5 INJECTION, POWDER, LYOPHILIZED, FOR SOLUTION INTRAVENOUS EVERY 6 HOURS
Status: DISPENSED | OUTPATIENT
Start: 2021-11-17 | End: 2021-11-18

## 2021-11-17 RX ORDER — FUROSEMIDE 10 MG/ML
40 INJECTION INTRAMUSCULAR; INTRAVENOUS DAILY
Status: DISCONTINUED | OUTPATIENT
Start: 2021-11-17 | End: 2021-11-17

## 2021-11-17 RX ORDER — POTASSIUM CHLORIDE 1.5 G/1.58G
40 POWDER, FOR SOLUTION ORAL 2 TIMES DAILY
Status: DISCONTINUED | OUTPATIENT
Start: 2021-11-17 | End: 2021-11-18

## 2021-11-17 RX ORDER — METOLAZONE 5 MG/1
5 TABLET ORAL ONCE
Status: COMPLETED | OUTPATIENT
Start: 2021-11-17 | End: 2021-11-17

## 2021-11-17 RX ORDER — LORAZEPAM 2 MG/1
12 TABLET ORAL EVERY 4 HOURS
Status: DISCONTINUED | OUTPATIENT
Start: 2021-11-17 | End: 2021-11-30

## 2021-11-17 RX ORDER — FUROSEMIDE 10 MG/ML
40 INJECTION INTRAMUSCULAR; INTRAVENOUS 2 TIMES DAILY
Status: DISCONTINUED | OUTPATIENT
Start: 2021-11-17 | End: 2021-11-17

## 2021-11-17 RX ORDER — FUROSEMIDE 10 MG/ML
40 INJECTION INTRAMUSCULAR; INTRAVENOUS 2 TIMES DAILY
Status: DISCONTINUED | OUTPATIENT
Start: 2021-11-17 | End: 2021-11-21

## 2021-11-17 RX ADMIN — PROPOFOL 30 MCG/KG/MIN: 10 INJECTION, EMULSION INTRAVENOUS at 03:38

## 2021-11-17 RX ADMIN — PIPERACILLIN SODIUM AND TAZOBACTAM SODIUM 4.5 G: 4; .5 INJECTION, POWDER, LYOPHILIZED, FOR SOLUTION INTRAVENOUS at 16:11

## 2021-11-17 RX ADMIN — QUETIAPINE FUMARATE 75 MG: 50 TABLET ORAL at 14:15

## 2021-11-17 RX ADMIN — LORAZEPAM 8 MG: 2 TABLET ORAL at 02:16

## 2021-11-17 RX ADMIN — INSULIN GLARGINE 5 UNITS: 100 INJECTION, SOLUTION SUBCUTANEOUS at 21:54

## 2021-11-17 RX ADMIN — POTASSIUM CHLORIDE 40 MEQ: 1.5 POWDER, FOR SOLUTION ORAL at 20:07

## 2021-11-17 RX ADMIN — INSULIN ASPART 1 UNITS: 100 INJECTION, SOLUTION INTRAVENOUS; SUBCUTANEOUS at 00:18

## 2021-11-17 RX ADMIN — PROPOFOL 30 MCG/KG/MIN: 10 INJECTION, EMULSION INTRAVENOUS at 00:22

## 2021-11-17 RX ADMIN — LORAZEPAM 12 MG: 2 TABLET ORAL at 17:48

## 2021-11-17 RX ADMIN — ACETAMINOPHEN 650 MG: 325 TABLET, FILM COATED ORAL at 11:45

## 2021-11-17 RX ADMIN — INSULIN ASPART 2 UNITS: 100 INJECTION, SOLUTION INTRAVENOUS; SUBCUTANEOUS at 11:35

## 2021-11-17 RX ADMIN — METOLAZONE 5 MG: 5 TABLET ORAL at 07:38

## 2021-11-17 RX ADMIN — Medication 150 MCG: at 19:48

## 2021-11-17 RX ADMIN — POTASSIUM CHLORIDE 20 MEQ: 1.5 POWDER, FOR SOLUTION ORAL at 17:48

## 2021-11-17 RX ADMIN — Medication 250 MCG/HR: at 06:21

## 2021-11-17 RX ADMIN — HYDROMORPHONE HYDROCHLORIDE 8 MG: 4 TABLET ORAL at 16:11

## 2021-11-17 RX ADMIN — FUROSEMIDE 40 MG: 10 INJECTION, SOLUTION INTRAVENOUS at 07:38

## 2021-11-17 RX ADMIN — FUROSEMIDE 40 MG: 10 INJECTION, SOLUTION INTRAVENOUS at 22:44

## 2021-11-17 RX ADMIN — OXYCODONE HYDROCHLORIDE 20 MG: 10 TABLET ORAL at 07:37

## 2021-11-17 RX ADMIN — POLYETHYLENE GLYCOL 3350 17 G: 17 POWDER, FOR SOLUTION ORAL at 07:39

## 2021-11-17 RX ADMIN — INSULIN ASPART 2 UNITS: 100 INJECTION, SOLUTION INTRAVENOUS; SUBCUTANEOUS at 20:17

## 2021-11-17 RX ADMIN — POTASSIUM CHLORIDE 40 MEQ: 1.5 POWDER, FOR SOLUTION ORAL at 07:38

## 2021-11-17 RX ADMIN — PROPOFOL 35 MCG/KG/MIN: 10 INJECTION, EMULSION INTRAVENOUS at 08:55

## 2021-11-17 RX ADMIN — PROPOFOL 30 MCG/KG/MIN: 10 INJECTION, EMULSION INTRAVENOUS at 06:24

## 2021-11-17 RX ADMIN — Medication 2 PACKET: at 07:39

## 2021-11-17 RX ADMIN — HYDROMORPHONE HYDROCHLORIDE 8 MG: 4 TABLET ORAL at 23:56

## 2021-11-17 RX ADMIN — Medication 2 PACKET: at 17:48

## 2021-11-17 RX ADMIN — HYDROMORPHONE HYDROCHLORIDE 8 MG: 4 TABLET ORAL at 20:07

## 2021-11-17 RX ADMIN — Medication 5 ML: at 07:39

## 2021-11-17 RX ADMIN — PANTOPRAZOLE SODIUM 40 MG: 40 INJECTION, POWDER, FOR SOLUTION INTRAVENOUS at 07:38

## 2021-11-17 RX ADMIN — OXYCODONE HYDROCHLORIDE 20 MG: 10 TABLET ORAL at 03:56

## 2021-11-17 RX ADMIN — Medication 100 MCG: at 10:10

## 2021-11-17 RX ADMIN — Medication 13 MG/HR: at 18:30

## 2021-11-17 RX ADMIN — Medication 2 PACKET: at 10:46

## 2021-11-17 RX ADMIN — Medication 100 MCG: at 03:53

## 2021-11-17 RX ADMIN — Medication 100 MCG: at 06:36

## 2021-11-17 RX ADMIN — LORAZEPAM 12 MG: 2 TABLET ORAL at 14:15

## 2021-11-17 RX ADMIN — PIPERACILLIN SODIUM AND TAZOBACTAM SODIUM 4.5 G: 4; .5 INJECTION, POWDER, LYOPHILIZED, FOR SOLUTION INTRAVENOUS at 21:53

## 2021-11-17 RX ADMIN — Medication 20 NG/KG/MIN: at 22:44

## 2021-11-17 RX ADMIN — METOLAZONE 5 MG: 5 TABLET ORAL at 17:51

## 2021-11-17 RX ADMIN — POTASSIUM CHLORIDE 20 MEQ: 1.5 POWDER, FOR SOLUTION ORAL at 06:20

## 2021-11-17 RX ADMIN — OXYCODONE HYDROCHLORIDE 20 MG: 10 TABLET ORAL at 00:18

## 2021-11-17 RX ADMIN — CEFTRIAXONE SODIUM 2 G: 2 INJECTION, POWDER, FOR SOLUTION INTRAMUSCULAR; INTRAVENOUS at 11:35

## 2021-11-17 RX ADMIN — Medication 2 PACKET: at 14:16

## 2021-11-17 RX ADMIN — LORAZEPAM 8 MG: 2 TABLET ORAL at 06:24

## 2021-11-17 RX ADMIN — INSULIN ASPART 2 UNITS: 100 INJECTION, SOLUTION INTRAVENOUS; SUBCUTANEOUS at 03:56

## 2021-11-17 RX ADMIN — Medication 13 MG/HR: at 03:38

## 2021-11-17 RX ADMIN — QUETIAPINE FUMARATE 75 MG: 50 TABLET ORAL at 07:38

## 2021-11-17 RX ADMIN — LORAZEPAM 12 MG: 2 TABLET ORAL at 21:54

## 2021-11-17 RX ADMIN — FUROSEMIDE 40 MG: 10 INJECTION, SOLUTION INTRAVENOUS at 16:28

## 2021-11-17 RX ADMIN — LORAZEPAM 12 MG: 2 TABLET ORAL at 10:47

## 2021-11-17 RX ADMIN — Medication 100 MCG: at 01:52

## 2021-11-17 RX ADMIN — PROPOFOL 30 MCG/KG/MIN: 10 INJECTION, EMULSION INTRAVENOUS at 12:00

## 2021-11-17 RX ADMIN — Medication 200 MCG/HR: at 16:47

## 2021-11-17 RX ADMIN — ENOXAPARIN SODIUM 40 MG: 40 INJECTION SUBCUTANEOUS at 09:48

## 2021-11-17 RX ADMIN — DOCUSATE SODIUM 50 MG AND SENNOSIDES 8.6 MG 2 TABLET: 8.6; 5 TABLET, FILM COATED ORAL at 07:38

## 2021-11-17 RX ADMIN — Medication 20 NG/KG/MIN: at 11:59

## 2021-11-17 RX ADMIN — Medication 2 PACKET: at 21:54

## 2021-11-17 RX ADMIN — INSULIN ASPART 2 UNITS: 100 INJECTION, SOLUTION INTRAVENOUS; SUBCUTANEOUS at 07:42

## 2021-11-17 RX ADMIN — HYDROMORPHONE HYDROCHLORIDE 8 MG: 4 TABLET ORAL at 11:35

## 2021-11-17 RX ADMIN — QUETIAPINE FUMARATE 75 MG: 50 TABLET ORAL at 20:07

## 2021-11-17 RX ADMIN — INSULIN ASPART 2 UNITS: 100 INJECTION, SOLUTION INTRAVENOUS; SUBCUTANEOUS at 16:11

## 2021-11-17 ASSESSMENT — ACTIVITIES OF DAILY LIVING (ADL)
ADLS_ACUITY_SCORE: 19
ADLS_ACUITY_SCORE: 21
ADLS_ACUITY_SCORE: 19
ADLS_ACUITY_SCORE: 21
ADLS_ACUITY_SCORE: 19
ADLS_ACUITY_SCORE: 19
ADLS_ACUITY_SCORE: 21
ADLS_ACUITY_SCORE: 19
ADLS_ACUITY_SCORE: 21
ADLS_ACUITY_SCORE: 19
ADLS_ACUITY_SCORE: 21
ADLS_ACUITY_SCORE: 19

## 2021-11-17 ASSESSMENT — ENCOUNTER SYMPTOMS: FEVER: 1

## 2021-11-17 ASSESSMENT — MIFFLIN-ST. JEOR: SCORE: 2415

## 2021-11-17 NOTE — PROGRESS NOTES
Brief Pulmonary Fellow Note    Paged by radiology regarding possible R apical PTX. Reviewed film, repeated stat expiratory and inspiratory films. Upon review of these films, comparison to films from 11/15, and discussion with Dr. Harrison it appears that this lucency was present on 11/15 and is stable in size at this time. The patient currently is on a PEEP of 14 w/ Peaks of 30-31 and plateau pressures <30. Bedside ultrasound notable for sliding signs on the R in multiple locations, but I was unable to appreciate a R apical PTX on said study. Patient notably has been hemodynamically stable, his oxygenation has been improving today, and his airway pressures have not significantly worsened or changed from this AM. Based on the stability of this lucency on serial imaging and lack of clinical decompensation would not intervene on said finding at this time. Discussed with Dr. Harrison who is in agreement with plan.    Ernesto Mike MD

## 2021-11-17 NOTE — PROGRESS NOTES
Major Shift Events: Pt remains sedated with Versed, Propofol, and Fentanyl. RASS -4/-5. Does not follow commands, occasional withdraw/flexion to pain. PERRL 2 mm. Tmax 101.4, pancultured, ABX adjusted. MAP>65 with no exogenous assitance. CMV settings adjusted, 50%, 530, 26, 18. 1600 P/F 132, no plan to reprone at this time. Moderate secretions noted via inline suction, creamy/red-streaked, copious nasal secretions via red montalvo. TF @ goal via NJ, FWF 150mL  Q2H. Liquid BM, rectal pouch in place. Flores in place, Lasix ordered BID with good effect. Skin grossly unchanged, optifoam in place prior to prone. Umbilical hernia noted. PICC line WNL. PIV x3 WNL. ART WNL in L radial, waveform normal, brisk blood return.      Plan: Wean sedation and vent support as tolerated. Care conference Thursday.     For vital signs and complete assessments, please see documentation flowsheets.

## 2021-11-17 NOTE — PROGRESS NOTES
Care Management Follow Up    Length of Stay (days): 14    Expected Discharge Date: 2021     Concerns to be Addressed: care coordination/care conferences,discharge planning     Patient plan of care discussed at interdisciplinary rounds: Yes    Anticipated Discharge Disposition: Home Care,Transitional Care     Anticipated Discharge Services: None  Anticipated Discharge DME: None    Patient/family educated on Medicare website which has current facility and service quality ratings: yes  Education Provided on the Discharge Plan:    Patient/Family in Agreement with the Plan: yes    Referrals Placed by CM/SW:    Private pay costs discussed: Not applicable    Additional Information:  Talked to patients brother Brad to set up care conference for  for 2pm, Brad agreed to that time and has all the call in information. Brad stated that his and the patients father's  was today and they have been together supporting each other for the loss of his father and for Brandon's illness. Writer offered support and answered questions Brad had.     Kelsey Gonzalez RN Care Coordinator   [facility]  [phone]         Kelsey Gonzalez, RN

## 2021-11-17 NOTE — PROGRESS NOTES
MEDICAL ICU PROGRESS NOTE  11/17/2021      Date of Service (when I saw the patient): 11/17/2021    ASSESSMENT: Brandon Schafer is a 41 year old male with questionable PMH of remote VTE who was admitted on 11/3/2021 with ARDS 2/2 COVID pneumonia. Now with CAUTI and polymicrobial VAP.     CHANGES and MAJOR THINGS TODAY:   Duiresis goal net negative 1 to 2 L   flolan half strength   Supinate this am   Increase PO ativan  Increase Scheduled seroquel   Vent adjustments for vent dyssynchrony   Repeat blood, urine, sputum cx     PLAN:    Neuro:  # Pain and sedation  - Fentanyl infusion  - Midazolam infusion, not tolerating wean at this time   - Maintain low dose propofol, trend TG daily   - Oxycodone 20 mg Q4H-> dilaudid 8mg q 4H  - Ativan 8 mg Q4H-> 12mg Q4H  - Seroquel 25mg, 25mg, 50mg-> 75 TID  - RASS goal -4 to -5  - Consider NMB if worsening vent mechanics     Pulmonary:  # ARDS 2/2 COVID pneumonia  Intubated 11/2 prior to transport flight. Prone positioning started 11/5.   - Lung protective ventilation with AC 30/450/+14/75% P/F 100 while prone this morning  - CMV 26/530/18 65%  - Inhaled epoprostenol 10 ng/kg/min-> increase to full strength  - Supinate this am, CXR  - Repeat ABG with   - No mucolytics needed at this point  - Infectious work-up and treatment as below    # ?COPD exacerbation  Wheezing on exam on 11/4. S/p azithromycin for 5d. Steroids per Covid protocol.   - Continue albuterol nebs prn     Cardiovascular:  # Hypotension, resolved  Transient, and in the setting of sedation. No prior TTE. Troponin peaked at 0.293. EKG with T wave inversion in lateral leads. Suspect troponin was 2/2 demand ischemia in setting of hypoxia.    # Atrial fibrillation  Episode of atrial fibrillation overnight 11/12, hemodynamically stable and resolved spontaneously. TWI on EKG, troponin negative     GI/Nutrition:  # At risk for malnutrition  # Constipation, improving  - Nutrition consult for TF  - Tolerating TF at goal    - Bowel regimen: scheduled Miralax and senna and prn Miralax     Renal/Fluids/Electrolytes:  # Non-oliguric STEFAN- improved  # Hypokalemia, in setting of ongoing diuresis   Unclear baseline, has not seen doctor in 20 years. Cr 2.5 on admission, now improved.   - Continue diuresis with furosemide 40 mg and metolazone 5mg   - Goal net negative 1L   - scheduled potassium 40meq BID  - Repeat BMP 1600     # Hypernatremia, resolved  - decreaseFWF 150ml q 2H-> 75ml q 2H, stable sodium     Endocrine:  # Stress and steroid induced hyperglycemia  - Medium resistance sliding scale insulin      ID:  # COVID pneumonia  Symptoms (headache, SOB, fever, cough, diarrhea) 1 week prior to presentation. Not COVID vaccinated. Tested positive on presentation on 11/2. S/p tocilizumab 11/4.  - Dexamethasone 6 mg daily (total 10 days)  - Deferring remdesivir given STEFAN and limited evidence for benefit      # Fever, tmax 101.3 11/17  # CAUTI  # VAP  Pt with worsening oxygen saturations, fevers, and persistent leukocytosis. Given his prolonged hospitalization, was started on broad-spectrum antibiotics on 11/11. Urine culture grew >100k pan-sensitive E coli. RVP and MRSA nares negative. Sputum grew E coli, S aureus, and GBS. Leukocytosis had resolved, now with increased WBC this am 13.4 (8.5)  - Continue ceftriaxone, plan for 7 day treatment, completes 11/20  - Repeat blood, urine, and sputum cultures  - Plan to observe off abx   - Follow-up blood cultures (NGTD)  - Trend PCT, CRP  - Beta glucan negative  - Galactomannan negative     Abx:  - Ceftriaxone 11/14-*  - Zosyn 11/11-11/14  - Vanc 11/11-11/12      Hematology:    # Coagulopathy 2/2 COVID  US BLE negative for DVT.  - Enoxaparin ppx      MSK  # Septum wound, tongue wound 2/2 Proning   - WOC following     General Cares/Prophylaxis:    DVT Prophylaxis: Lovenox ppx (40 mg daily)  GI Prophylaxis: PPI  Family Communication: BrotherBrad, updated by phone  Code Status: DNR       Lines/tubes/drains:  - PIV x3  - Right PICC  - Arterial line left radial  - NG  - Flores  - ETT  - Rectal pouch      Disposition:  - Medical ICU    Patient seen and findings/plan discussed with medical ICU staff, Dr. Robert Hoffman MD      ====================================  INTERVAL HISTORY:   Notes reviewed. Breakthrough agitation with head turn overnight, tachycardia and desaturations. TG elevated > 500.     OBJECTIVE:   1. VITAL SIGNS:   Temp:  [97.7  F (36.5  C)-99.6  F (37.6  C)] 97.7  F (36.5  C)  Pulse:  [] 120  Resp:  [13-42] 18  MAP:  [69 mmHg-137 mmHg] 98 mmHg  Arterial Line BP: (104-230)/(54-99) 139/77  FiO2 (%):  [70 %-100 %] 100 %  SpO2:  [83 %-96 %] 95 %  Ventilation Mode: CMV/AC  (Continuous Mandatory Ventilation/ Assist Control)  FiO2 (%): 100 %  Rate Set (breaths/minute): 30 breaths/min  Tidal Volume Set (mL): 450 mL  PEEP (cm H2O): 14 cmH2O  Oxygen Concentration (%): 100 %  Resp: 18    2. INTAKE/ OUTPUT:   I/O last 3 completed shifts:  In: 3961.38 [I.V.:751.38; NG/GT:2490]  Out: 5235 [Urine:4835; Stool:400]    3. PHYSICAL EXAMINATION:  General: Sedated, intubated, proned   HEENT: Mucous membranes dry, wound on tongue and base of nasal septum  Neuro: RASS -5, pupils 3 mm and reactive  Pulm/Resp: Coarse breath sounds bilaterally without rhonchi, crackles or wheeze, breathing non-labored  CV: RRR, S1/S2 without m/r/g; pedal pulses 2+ with non-pitting edema  Abdomen: Obese, soft, non-distended, non-tender  : Flores catheter in place, urine yellow and clear  Incisions/Skin: Wounds as above, no rashes noted    4. LABS:   Arterial Blood Gases   Recent Labs   Lab 11/17/21  0351 11/16/21  1853 11/16/21  1225 11/16/21  1126   PH 7.29* 7.32* 7.37 7.36   PCO2 73* 68* 61* 63*   PO2 90 90 79* 130*   HCO3 35* 35* 36* 35*     Complete Blood Count   Recent Labs   Lab 11/17/21  0351 11/16/21  0441 11/15/21  0353 11/14/21  0358   WBC 13.4* 8.5 9.1 9.1   HGB 11.3* 10.9* 11.2* 11.7*     152 147* 131*     Basic Metabolic Panel  Recent Labs   Lab 11/17/21  0355 11/17/21  0351 11/17/21  0017 11/16/21  1949 11/16/21  1546 11/16/21  1543 11/16/21  0641 11/16/21  0441 11/15/21  2325 11/15/21  2318   NA  --  142  --   --   --  144  --  143  --  143   POTASSIUM  --  3.8  --   --   --  3.4  --  3.1*  --  3.1*   CHLORIDE  --  106  --   --   --  106  --  106  --  105   CO2  --  32  --   --   --  32  --  31  --  32   BUN  --  38*  --   --   --  38*  --  40*  --  41*   CR  --  1.05  --   --   --  1.01  --  1.03  --  1.10   * 194* 175* 154*   < > 153*   < > 157*   < > 176*    < > = values in this interval not displayed.     Liver Function Tests  No lab results found in last 7 days.  5. RADIOLOGY:   No results found for this or any previous visit (from the past 24 hour(s)).

## 2021-11-17 NOTE — PROGRESS NOTES
Patient proned and head turn due. Patient broke through sedation and started thrashing. Attempting to lift self off the bed. Patient is very large and hard to restrain. ETT held throughout entire episode. RN able to get patient sedated again with help of 3 other RN's to hold patient down.     Attempted to speak with provider that was called to bedside for more sedation orders. Reiterated how patient has broken through sedation prior to this episode and how it is not only difficult to restrain patient, but incredibly dangerous while patient is proned. Provider shrugged his shoulders, rolled his eyes, and went back to charting.     Will continue to monitor

## 2021-11-17 NOTE — PROGRESS NOTES
MEDICAL ICU PROGRESS NOTE  11/16/2021      Date of Service (when I saw the patient): 11/16/2021    ASSESSMENT: Brandon Schafer is a 41 year old male with questionable PMH of remote VTE who was admitted on 11/3/2021 with ARDS 2/2 COVID pneumonia. Now with CAUTI and polymicrobial VAP.     CHANGES and MAJOR THINGS TODAY:   Lasix 40 mg IV BID - goal net negative 1 to 2 L   flolan half strength   Prone for P/F 120    PLAN:    Neuro:  # Pain and sedation  - Fentanyl infusion  - Midazolam infusion, slowly weaning  - Oxycodone 20 mg Q4H  - Ativan 8 mg Q4H  - Seroquel 25mg, 25mg, 50mg   - RASS goal -2 while supine, -5 while proned   - Not currently planning on initiating NMB     Pulmonary:  # ARDS 2/2 COVID pneumonia  Intubated 11/2 prior to transport flight. Prone positioning started 11/5.   - Lung protective ventilation with AC 30/450/+14/75% P/F 120 while supine this morning  - Supine , prone 11/16  - Inhaled epoprostenol 10 ng/kg/min  - No mucolytics needed at this point  - Infectious work-up and treatment as below    # ?COPD exacerbation  Wheezing on exam on 11/4. S/p azithromycin for 5d. Steroids per Covid protocol.   - Continue albuterol nebs prn     Cardiovascular:  # Hypotension, resolved  Transient, and in the setting of sedation. No prior TTE. Troponin peaked at 0.293. EKG with T wave inversion in lateral leads. Suspect troponin was 2/2 demand ischemia in setting of hypoxia.    # Atrial fibrillation  Episode of atrial fibrillation overnight 11/12, hemodynamically stable and resolved spontaneously. TWI on EKG, troponin negative     GI/Nutrition:  # At risk for malnutrition  # Constipation   - Nutrition consult for TF  - Bowel regimen: scheduled Miralax and senna and prn Miralax     Renal/Fluids/Electrolytes:  # Non-oliguric STEFAN- improved  # Hypokalemia, in setting of ongoing diuresis   Unclear baseline, has not seen doctor in 20 years. Cr 2.5 on admission, now improved.   - Continue diuresis with furosemide  40 mg BID  - Goal net negative 1L   - scheduled potassium 40meq TID      # Hypernatremia, resolved  - cont FWF 150ml q 2H, stable sodium     Endocrine:  # Stress and steroid induced hyperglycemia  - Medium resistance sliding scale insulin      ID:  # COVID pneumonia  Symptoms (headache, SOB, fever, cough, diarrhea) 1 week prior to presentation. Not COVID vaccinated. Tested positive on presentation on 11/2. S/p tocilizumab 11/4.  - Dexamethasone 6 mg daily (total 10 days)  - Deferring remdesivir given STEFAN and limited evidence for benefit      # Fever  # CAUTI  # VAP  Pt with worsening oxygen saturations, fevers, and persistent leukocytosis. Given his prolonged hospitalization, was started on broad-spectrum antibiotics on 11/11. Urine culture grew >100k pan-sensitive E coli. RVP and MRSA nares negative. Sputum grew E coli, S aureus, and GBS.  - Narrow Zosyn to ceftriaxone, plan for 7 day treatment  - Follow-up blood cultures (NGTD)  - Beta glucan negative  - Galactomannan negative     Abx:  - Ceftriaxone 11/14-*  - Zosyn 11/11-11/14  - Vanc 11/11-11/12      Hematology:    # Coagulopathy 2/2 COVID  US BLE negative for DVT.  - Enoxaparin ppx      MSK  # Septum wound, tongue wound 2/2 Proning   - WOC following     General Cares/Prophylaxis:    DVT Prophylaxis: Lovenox ppx (40 mg daily)  GI Prophylaxis: PPI  Family Communication: BrotherBrad, updated by phone  Code Status: DNR      Lines/tubes/drains:  - PIV x3  - Right PICC  - Arterial line left radial  - NG  - Flores  - ETT  - Rectal pouch      Disposition:  - Medical ICU    Patient seen and findings/plan discussed with medical ICU staff, Dr. Robert Pacheco MD  Internal Medicine, PGY-3    ====================================  INTERVAL HISTORY:   Notes reviewed. Flolan resumed last night. Hunched over today. Requiring repositioning, P/F remained < 150 despite repositioning and flolan. Prone this PM.    OBJECTIVE:   1. VITAL SIGNS:   Temp:  [98.5  F  (36.9  C)-99.5  F (37.5  C)] 99.1  F (37.3  C)  Pulse:  [] 118  Resp:  [6-42] 33  MAP:  [69 mmHg-137 mmHg] 98 mmHg  Arterial Line BP: (104-230)/(54-99) 148/75  FiO2 (%):  [60 %-80 %] 80 %  SpO2:  [83 %-96 %] 93 %  Ventilation Mode: CMV/AC  (Continuous Mandatory Ventilation/ Assist Control)  FiO2 (%): (S) 80 % (Increased with proning)  Rate Set (breaths/minute): 30 breaths/min  Tidal Volume Set (mL): 450 mL  PEEP (cm H2O): 14 cmH2O  Oxygen Concentration (%): 70 %  Resp: (!) 33    2. INTAKE/ OUTPUT:   I/O last 3 completed shifts:  In: 3919 [I.V.:719; NG/GT:2480]  Out: 5430 [Urine:5230; Stool:200]    3. PHYSICAL EXAMINATION:  General: Sedated, intubated, proned   HEENT: Mucous membranes dry, wound on tongue and base of nasal septum  Neuro: RASS -5, pupils 3 mm and reactive  Pulm/Resp: Coarse breath sounds bilaterally without rhonchi, crackles or wheeze, breathing non-labored  CV: RRR, S1/S2 without m/r/g; pedal pulses 2+ with non-pitting edema  Abdomen: Obese, soft, non-distended, non-tender  : Flores catheter in place, urine yellow and clear  Incisions/Skin: Wounds as above, no rashes noted    4. LABS:   Arterial Blood Gases   Recent Labs   Lab 11/16/21  1853 11/16/21  1225 11/16/21  1126 11/16/21  0759   PH 7.32* 7.37 7.36 7.39   PCO2 68* 61* 63* 58*   PO2 90 79* 130* 96   HCO3 35* 36* 35* 35*     Complete Blood Count   Recent Labs   Lab 11/16/21  0441 11/15/21  0353 11/14/21  0358 11/13/21  0357   WBC 8.5 9.1 9.1 10.4   HGB 10.9* 11.2* 11.7* 11.4*    147* 131* 124*     Basic Metabolic Panel  Recent Labs   Lab 11/16/21  1546 11/16/21  1543 11/16/21  1128 11/16/21  0803 11/16/21  0641 11/16/21  0441 11/15/21  2325 11/15/21  2318 11/15/21  1610 11/15/21  1349   NA  --  144  --   --   --  143  --  143  --  144   POTASSIUM  --  3.4  --   --   --  3.1*  --  3.1*  --  3.1*   CHLORIDE  --  106  --   --   --  106  --  105  --  108   CO2  --  32  --   --   --  31  --  32  --  30   BUN  --  38*  --   --   --  40*   --  41*  --  41*   CR  --  1.01  --   --   --  1.03  --  1.10  --  1.17   * 153* 161* 148*   < > 157*   < > 176*   < > 136*    < > = values in this interval not displayed.     Liver Function Tests  No lab results found in last 7 days.  5. RADIOLOGY:   No results found for this or any previous visit (from the past 24 hour(s)).

## 2021-11-17 NOTE — PLAN OF CARE
Major Shift Events  Overnight pt remained in stable condition with issues of agitation and tachycardia. Day RN had pronated pt @ 1800, and increased sedation. However, during the reposition with RT @ 2000 pt became highly agitated requiring 4 RN's and RT to stabilize pt and airway. MICU MD was informed and ordered propofol bolus and continuous to obtain RASS -4 to -5. This sedation level was achieved by increasing the versed dosage as well as on boarding propofol. After this the pt remained arousable to deep stimulation. Additionally addressed the issue of tachycardia with the MICU MD at this time relaying concern for tachycardia (-130's), MD is aware and no plan updates at this time.    Neuro: Medically sedated, RASS -4, intermittently following commands, PERRL 2mm sluggish, moves ex. Independently when agitated  Cv: ST (110-130), SYS < 170, MAP > 65, pulses +2, afebrile  Resp: ETT @ 27 lip, CMV (80%, 450, 30, 14), LS: coarse/dim, SpO2 > 92%  GI/: RP w/ 200 ml out, stevenson w/ auo, NJ @ 105 nare, TF 30 ml/hr,  q2  Skin: Septum PI, L & R Ear PI, umbilical hernia, generalized ecchymosis      Drips: Fentanyl 250, Veletri 10  Sedation: Versed 13, Propofol 35     Plan: Follow POC. Monitor closely, notify MD of acute changes.  For vital signs and complete assessments, please see documentation flowsheets.

## 2021-11-18 LAB
ANION GAP SERPL CALCULATED.3IONS-SCNC: 5 MMOL/L (ref 3–14)
ATRIAL RATE - MUSE: 108 BPM
BASE EXCESS BLDA CALC-SCNC: 13.3 MMOL/L (ref -9–1.8)
BUN SERPL-MCNC: 42 MG/DL (ref 7–30)
C DIFF TOX B STL QL: NEGATIVE
CALCIUM SERPL-MCNC: 9 MG/DL (ref 8.5–10.1)
CHLORIDE BLD-SCNC: 100 MMOL/L (ref 94–109)
CO2 SERPL-SCNC: 35 MMOL/L (ref 20–32)
CREAT SERPL-MCNC: 1.29 MG/DL (ref 0.66–1.25)
DIASTOLIC BLOOD PRESSURE - MUSE: NORMAL MMHG
ERYTHROCYTE [DISTWIDTH] IN BLOOD BY AUTOMATED COUNT: 15.9 % (ref 10–15)
GFR SERPL CREATININE-BSD FRML MDRD: 67 ML/MIN/1.73M2
GLUCOSE BLD-MCNC: 241 MG/DL (ref 70–99)
GLUCOSE BLDC GLUCOMTR-MCNC: 178 MG/DL (ref 70–99)
GLUCOSE BLDC GLUCOMTR-MCNC: 197 MG/DL (ref 70–99)
GLUCOSE BLDC GLUCOMTR-MCNC: 220 MG/DL (ref 70–99)
GLUCOSE BLDC GLUCOMTR-MCNC: 226 MG/DL (ref 70–99)
GLUCOSE BLDC GLUCOMTR-MCNC: 230 MG/DL (ref 70–99)
GLUCOSE BLDC GLUCOMTR-MCNC: 245 MG/DL (ref 70–99)
HCO3 BLD-SCNC: 40 MMOL/L (ref 21–28)
HCT VFR BLD AUTO: 32.3 % (ref 40–53)
HGB BLD-MCNC: 10.2 G/DL (ref 13.3–17.7)
HOLD SPECIMEN: NORMAL
INTERPRETATION ECG - MUSE: NORMAL
MAGNESIUM SERPL-MCNC: 2.2 MG/DL (ref 1.6–2.3)
MCH RBC QN AUTO: 28.3 PG (ref 26.5–33)
MCHC RBC AUTO-ENTMCNC: 31.6 G/DL (ref 31.5–36.5)
MCV RBC AUTO: 90 FL (ref 78–100)
O2/TOTAL GAS SETTING VFR VENT: 50 %
P AXIS - MUSE: 42 DEGREES
PCO2 BLD: 62 MM HG (ref 35–45)
PH BLD: 7.42 [PH] (ref 7.35–7.45)
PHOSPHATE SERPL-MCNC: 3.3 MG/DL (ref 2.5–4.5)
PLATELET # BLD AUTO: 170 10E3/UL (ref 150–450)
PO2 BLD: 76 MM HG (ref 80–105)
POTASSIUM BLD-SCNC: 2.8 MMOL/L (ref 3.4–5.3)
POTASSIUM BLD-SCNC: 2.8 MMOL/L (ref 3.4–5.3)
PR INTERVAL - MUSE: 132 MS
QRS DURATION - MUSE: 84 MS
QT - MUSE: 358 MS
QTC - MUSE: 479 MS
R AXIS - MUSE: 10 DEGREES
RBC # BLD AUTO: 3.61 10E6/UL (ref 4.4–5.9)
SODIUM SERPL-SCNC: 140 MMOL/L (ref 133–144)
SYSTOLIC BLOOD PRESSURE - MUSE: NORMAL MMHG
T AXIS - MUSE: 138 DEGREES
TRIGL SERPL-MCNC: 408 MG/DL
VENTRICULAR RATE- MUSE: 108 BPM
WBC # BLD AUTO: 11.3 10E3/UL (ref 4–11)

## 2021-11-18 PROCEDURE — 84478 ASSAY OF TRIGLYCERIDES: CPT | Performed by: NURSE PRACTITIONER

## 2021-11-18 PROCEDURE — 200N000002 HC R&B ICU UMMC

## 2021-11-18 PROCEDURE — 83735 ASSAY OF MAGNESIUM: CPT | Performed by: STUDENT IN AN ORGANIZED HEALTH CARE EDUCATION/TRAINING PROGRAM

## 2021-11-18 PROCEDURE — 250N000011 HC RX IP 250 OP 636

## 2021-11-18 PROCEDURE — 82803 BLOOD GASES ANY COMBINATION: CPT | Performed by: STUDENT IN AN ORGANIZED HEALTH CARE EDUCATION/TRAINING PROGRAM

## 2021-11-18 PROCEDURE — C9113 INJ PANTOPRAZOLE SODIUM, VIA: HCPCS

## 2021-11-18 PROCEDURE — 85014 HEMATOCRIT: CPT | Performed by: STUDENT IN AN ORGANIZED HEALTH CARE EDUCATION/TRAINING PROGRAM

## 2021-11-18 PROCEDURE — 250N000013 HC RX MED GY IP 250 OP 250 PS 637: Performed by: STUDENT IN AN ORGANIZED HEALTH CARE EDUCATION/TRAINING PROGRAM

## 2021-11-18 PROCEDURE — 250N000011 HC RX IP 250 OP 636: Performed by: STUDENT IN AN ORGANIZED HEALTH CARE EDUCATION/TRAINING PROGRAM

## 2021-11-18 PROCEDURE — 84132 ASSAY OF SERUM POTASSIUM: CPT | Performed by: STUDENT IN AN ORGANIZED HEALTH CARE EDUCATION/TRAINING PROGRAM

## 2021-11-18 PROCEDURE — 84100 ASSAY OF PHOSPHORUS: CPT | Performed by: STUDENT IN AN ORGANIZED HEALTH CARE EDUCATION/TRAINING PROGRAM

## 2021-11-18 PROCEDURE — 94003 VENT MGMT INPAT SUBQ DAY: CPT

## 2021-11-18 PROCEDURE — 999N000157 HC STATISTIC RCP TIME EA 10 MIN

## 2021-11-18 PROCEDURE — G0463 HOSPITAL OUTPT CLINIC VISIT: HCPCS

## 2021-11-18 PROCEDURE — 87040 BLOOD CULTURE FOR BACTERIA: CPT | Performed by: STUDENT IN AN ORGANIZED HEALTH CARE EDUCATION/TRAINING PROGRAM

## 2021-11-18 PROCEDURE — 80048 BASIC METABOLIC PNL TOTAL CA: CPT | Performed by: STUDENT IN AN ORGANIZED HEALTH CARE EDUCATION/TRAINING PROGRAM

## 2021-11-18 PROCEDURE — 93010 ELECTROCARDIOGRAM REPORT: CPT | Performed by: INTERNAL MEDICINE

## 2021-11-18 PROCEDURE — 250N000013 HC RX MED GY IP 250 OP 250 PS 637

## 2021-11-18 PROCEDURE — 258N000003 HC RX IP 258 OP 636: Performed by: STUDENT IN AN ORGANIZED HEALTH CARE EDUCATION/TRAINING PROGRAM

## 2021-11-18 PROCEDURE — 94645 CONT INHLJ TX EACH ADDL HOUR: CPT

## 2021-11-18 PROCEDURE — 36415 COLL VENOUS BLD VENIPUNCTURE: CPT | Performed by: STUDENT IN AN ORGANIZED HEALTH CARE EDUCATION/TRAINING PROGRAM

## 2021-11-18 PROCEDURE — 250N000011 HC RX IP 250 OP 636: Performed by: NURSE PRACTITIONER

## 2021-11-18 PROCEDURE — 87493 C DIFF AMPLIFIED PROBE: CPT

## 2021-11-18 PROCEDURE — 250N000013 HC RX MED GY IP 250 OP 250 PS 637: Performed by: NURSE PRACTITIONER

## 2021-11-18 PROCEDURE — 99291 CRITICAL CARE FIRST HOUR: CPT | Mod: GC | Performed by: INTERNAL MEDICINE

## 2021-11-18 PROCEDURE — 93005 ELECTROCARDIOGRAM TRACING: CPT

## 2021-11-18 RX ORDER — POLYETHYLENE GLYCOL 3350 17 G/17G
17 POWDER, FOR SOLUTION ORAL 2 TIMES DAILY PRN
Status: DISCONTINUED | OUTPATIENT
Start: 2021-11-18 | End: 2021-12-06 | Stop reason: HOSPADM

## 2021-11-18 RX ORDER — POTASSIUM CHLORIDE 1.5 G/1.58G
40 POWDER, FOR SOLUTION ORAL ONCE
Status: COMPLETED | OUTPATIENT
Start: 2021-11-18 | End: 2021-11-18

## 2021-11-18 RX ORDER — AMOXICILLIN 250 MG
2 CAPSULE ORAL 2 TIMES DAILY PRN
Status: DISCONTINUED | OUTPATIENT
Start: 2021-11-18 | End: 2021-12-06 | Stop reason: HOSPADM

## 2021-11-18 RX ORDER — POTASSIUM CHLORIDE 1.5 G/1.58G
20 POWDER, FOR SOLUTION ORAL ONCE
Status: DISCONTINUED | OUTPATIENT
Start: 2021-11-18 | End: 2021-11-18

## 2021-11-18 RX ADMIN — Medication 5 ML: at 07:37

## 2021-11-18 RX ADMIN — INSULIN ASPART 1 UNITS: 100 INJECTION, SOLUTION INTRAVENOUS; SUBCUTANEOUS at 00:01

## 2021-11-18 RX ADMIN — INSULIN ASPART 3 UNITS: 100 INJECTION, SOLUTION INTRAVENOUS; SUBCUTANEOUS at 22:08

## 2021-11-18 RX ADMIN — ACETAMINOPHEN 650 MG: 325 TABLET, FILM COATED ORAL at 04:25

## 2021-11-18 RX ADMIN — INSULIN ASPART 2 UNITS: 100 INJECTION, SOLUTION INTRAVENOUS; SUBCUTANEOUS at 07:37

## 2021-11-18 RX ADMIN — PANTOPRAZOLE SODIUM 40 MG: 40 INJECTION, POWDER, FOR SOLUTION INTRAVENOUS at 07:38

## 2021-11-18 RX ADMIN — HYDROMORPHONE HYDROCHLORIDE 8 MG: 4 TABLET ORAL at 20:22

## 2021-11-18 RX ADMIN — POTASSIUM CHLORIDE 40 MEQ: 1.5 POWDER, FOR SOLUTION ORAL at 06:05

## 2021-11-18 RX ADMIN — QUETIAPINE FUMARATE 75 MG: 50 TABLET ORAL at 14:02

## 2021-11-18 RX ADMIN — POTASSIUM CHLORIDE 40 MEQ: 1.5 POWDER, FOR SOLUTION ORAL at 20:22

## 2021-11-18 RX ADMIN — HYDROMORPHONE HYDROCHLORIDE 8 MG: 4 TABLET ORAL at 15:45

## 2021-11-18 RX ADMIN — HYDROMORPHONE HYDROCHLORIDE 8 MG: 4 TABLET ORAL at 12:43

## 2021-11-18 RX ADMIN — Medication 2 PACKET: at 13:54

## 2021-11-18 RX ADMIN — POTASSIUM CHLORIDE 40 MEQ: 1.5 POWDER, FOR SOLUTION ORAL at 07:37

## 2021-11-18 RX ADMIN — HYDROMORPHONE HYDROCHLORIDE 8 MG: 4 TABLET ORAL at 07:38

## 2021-11-18 RX ADMIN — Medication 2 PACKET: at 07:38

## 2021-11-18 RX ADMIN — Medication 200 MCG/HR: at 16:44

## 2021-11-18 RX ADMIN — INSULIN GLARGINE 5 UNITS: 100 INJECTION, SOLUTION SUBCUTANEOUS at 22:09

## 2021-11-18 RX ADMIN — Medication 200 MCG/HR: at 04:25

## 2021-11-18 RX ADMIN — Medication 100 MCG: at 20:35

## 2021-11-18 RX ADMIN — QUETIAPINE FUMARATE 75 MG: 50 TABLET ORAL at 20:22

## 2021-11-18 RX ADMIN — LORAZEPAM 12 MG: 2 TABLET ORAL at 06:05

## 2021-11-18 RX ADMIN — INSULIN ASPART 2 UNITS: 100 INJECTION, SOLUTION INTRAVENOUS; SUBCUTANEOUS at 04:28

## 2021-11-18 RX ADMIN — INSULIN ASPART 2 UNITS: 100 INJECTION, SOLUTION INTRAVENOUS; SUBCUTANEOUS at 12:48

## 2021-11-18 RX ADMIN — QUETIAPINE FUMARATE 75 MG: 50 TABLET ORAL at 07:37

## 2021-11-18 RX ADMIN — Medication 2 PACKET: at 10:28

## 2021-11-18 RX ADMIN — LORAZEPAM 12 MG: 2 TABLET ORAL at 18:05

## 2021-11-18 RX ADMIN — SODIUM CHLORIDE, POTASSIUM CHLORIDE, SODIUM LACTATE AND CALCIUM CHLORIDE 500 ML: 600; 310; 30; 20 INJECTION, SOLUTION INTRAVENOUS at 22:06

## 2021-11-18 RX ADMIN — SODIUM CHLORIDE, POTASSIUM CHLORIDE, SODIUM LACTATE AND CALCIUM CHLORIDE 500 ML: 600; 310; 30; 20 INJECTION, SOLUTION INTRAVENOUS at 20:51

## 2021-11-18 RX ADMIN — Medication 12 MG/HR: at 09:08

## 2021-11-18 RX ADMIN — Medication 12 MG/HR: at 16:48

## 2021-11-18 RX ADMIN — LORAZEPAM 12 MG: 2 TABLET ORAL at 02:02

## 2021-11-18 RX ADMIN — PIPERACILLIN SODIUM AND TAZOBACTAM SODIUM 4.5 G: 4; .5 INJECTION, POWDER, LYOPHILIZED, FOR SOLUTION INTRAVENOUS at 15:45

## 2021-11-18 RX ADMIN — INSULIN ASPART 2 UNITS: 100 INJECTION, SOLUTION INTRAVENOUS; SUBCUTANEOUS at 15:51

## 2021-11-18 RX ADMIN — LORAZEPAM 12 MG: 2 TABLET ORAL at 14:06

## 2021-11-18 RX ADMIN — Medication 2 PACKET: at 18:06

## 2021-11-18 RX ADMIN — PIPERACILLIN SODIUM AND TAZOBACTAM SODIUM 4.5 G: 4; .5 INJECTION, POWDER, LYOPHILIZED, FOR SOLUTION INTRAVENOUS at 22:06

## 2021-11-18 RX ADMIN — PIPERACILLIN SODIUM AND TAZOBACTAM SODIUM 4.5 G: 4; .5 INJECTION, POWDER, LYOPHILIZED, FOR SOLUTION INTRAVENOUS at 04:07

## 2021-11-18 RX ADMIN — LORAZEPAM 12 MG: 2 TABLET ORAL at 22:08

## 2021-11-18 RX ADMIN — PIPERACILLIN SODIUM AND TAZOBACTAM SODIUM 4.5 G: 4; .5 INJECTION, POWDER, LYOPHILIZED, FOR SOLUTION INTRAVENOUS at 10:20

## 2021-11-18 RX ADMIN — LORAZEPAM 12 MG: 2 TABLET ORAL at 10:20

## 2021-11-18 RX ADMIN — ENOXAPARIN SODIUM 40 MG: 40 INJECTION SUBCUTANEOUS at 10:20

## 2021-11-18 RX ADMIN — Medication 12 MG/HR: at 02:03

## 2021-11-18 RX ADMIN — HYDROMORPHONE HYDROCHLORIDE 8 MG: 4 TABLET ORAL at 04:07

## 2021-11-18 ASSESSMENT — ACTIVITIES OF DAILY LIVING (ADL)
ADLS_ACUITY_SCORE: 19

## 2021-11-18 NOTE — PLAN OF CARE
Major Shift Events: Pt remained tachycardic all night in the 100's. Tmax 101.5. BP stable, although did get low into the 50's for MAPs last evening that lasted about 20 min and then came out of it spontaneously. Remained supine all shift, ABG's have essentially been unchanged. FiO2 50%, PEEP 16. Small amount of tan thick secretions. RASS -4 to -5. Weaned off propofol gtt at 2300, Versed is at 12mg/hr from 13mg/hr. Not withdrawing to painful stimuli. TF at goal. FWF 150cc q2. Lasix 40mg given last evening with good response. Small amount of liquid stool overnight.     Plan: Monitor ABG's, wean sedation and vent as able.     For vital signs and complete assessments, please see documentation flowsheets.

## 2021-11-18 NOTE — PROGRESS NOTES
MEDICAL ICU PROGRESS NOTE  11/18/2021      Date of Service (when I saw the patient): 11/18/2021    ASSESSMENT: Brandon Schafer is a 41 year old male with questionable PMH of remote VTE who was admitted on 11/3/2021 with ARDS 2/2 COVID pneumonia. Now with CAUTI and polymicrobial VAP.     CHANGES and MAJOR THINGS TODAY:   Hold diuresis  Stop sche K replacement  C diff, blood cultures  Final day of antibiotics, no new source identified    PLAN:    Neuro:  # Pain and sedation  - Fentanyl infusion  - Midazolam infusion, not tolerating wean at this time    - dilaudid 8mg q 4H  - Ativan 12mg Q4H  - Seroquel 75 mg TID  - RASS goal -4 to -5  - Consider NMB if worsening vent mechanics     Pulmonary:  # ARDS 2/2 COVID pneumonia  Intubated 11/2 prior to transport flight. Prone positioning started 11/5.   - P/f 152, maintain supine --- no benefit from prone  - reduce epoprostenol 50% to 10  - Infectious work-up and treatment as below    Ventilation Mode: CMV/AC  (Continuous Mandatory Ventilation/ Assist Control)  FiO2 (%): 50 %  Rate Set (breaths/minute): 26 breaths/min  Tidal Volume Set (mL): 530 mL  PEEP (cm H2O): 16 cmH2O  Oxygen Concentration (%): 50 %  Resp: 26      # ?COPD exacerbation  Wheezing on exam on 11/4. S/p azithromycin for 5d. Steroids per Covid protocol.   - Continue albuterol nebs prn     Cardiovascular:  # Hypotension, resolved  Transient, and in the setting of sedation. No prior TTE. Troponin peaked at 0.293. EKG with T wave inversion in lateral leads. Suspect troponin was 2/2 demand ischemia in setting of hypoxia.    # Atrial fibrillation  Episode of atrial fibrillation overnight 11/12, hemodynamically stable and resolved spontaneously. TWI on EKG, troponin negative     GI/Nutrition:  # At risk for malnutrition  # Constipation, improving  - Nutrition consult for TF  - Tolerating TF at goal   - Bowel regimen: scheduled Miralax and senna and prn Miralax     Renal/Fluids/Electrolytes:  # Non-oliguric STEFAN,  resolved  # Hypokalemia, in setting of ongoing diuresis   Unclear baseline, has not seen doctor in 20 years. Cr 2.5 on admission, now improved. 11/18 with rising bicarb, BUN, Cr...decreased UOP consistent with overdiuresis.    - hold diuretics 11/18  - stop ricardo K replacement  - Repeat potassium 1600     # Hypernatremia, resolved  - decreaseFWF 150ml q 2H-> 75ml q 2H, stable sodium     Endocrine:  # Stress and steroid induced hyperglycemia  - Medium resistance sliding scale insulin      ID:  # COVID pneumonia  Symptoms (headache, SOB, fever, cough, diarrhea) 1 week prior to presentation. Not COVID vaccinated. Tested positive on presentation on 11/2. S/p tocilizumab 11/4.  - Dexamethasone 6 mg daily (total 10 days)  - Deferring remdesivir given STEFAN and limited evidence for benefit      # Fever, tmax 101.3 11/17  # CAUTI  # VAP  Pt with worsening oxygen saturations, fevers, and persistent leukocytosis. Given his prolonged hospitalization, was started on broad-spectrum antibiotics on 11/11. Urine culture grew >100k pan-sensitive E coli. RVP and MRSA nares negative. Sputum grew E coli, S aureus, and GBS. Sputum culture 11/17 pending, blood cultures 11/17 pending. Continues to have fevers as of 11/18, redraw blood cultures and test stool for Cdiff. Of note, CXR 11/17 without clear/focal infiltrate.   - repeat blood cultures 11/18  - c diff for liquid stool 11/18  - Continue zosyn, plan for 7 day treatment, completes 11/18     Abx:  - Zosyn 11/11-11/14; 11/17 - *  - Ceftriaxone 11/14-11/17  - Vanc 11/11-11/12      Hematology:    # Coagulopathy 2/2 COVID  US BLE negative for DVT.  - Enoxaparin ppx      MSK  # Septum wound, tongue wound 2/2 Proning   - WOC following     General Cares/Prophylaxis:    DVT Prophylaxis: Lovenox ppx (40 mg daily)  GI Prophylaxis: PPI  Family Communication: BrotherBrad, updated by phone  Code Status: DNR      Lines/tubes/drains:  - PIV x3  - Right PICC  - Arterial line left radial  - NG  -  Flores  - ETT  - Rectal pouch      Disposition:  - Medical ICU    Patient seen and findings/plan discussed with medical ICU staff, Dr. Robert Pacheco MD      ====================================  INTERVAL HISTORY:   Notes reviewed. No further episodes of agitation. CXR with possible R apical pneumo, unchanged imaging from prior.     Fever overnight 101. No other vitals changes    Rising BUN, Cr, bicarb. Decreased UOP. Now down >10 kg since admission.    OBJECTIVE:   1. VITAL SIGNS:   Temp:  [99.3  F (37.4  C)-101.5  F (38.6  C)] 100.1  F (37.8  C)  Pulse:  [] 99  Resp:  [11-34] 26  MAP:  [57 mmHg-104 mmHg] 81 mmHg  Arterial Line BP: ()/(43-93) 138/60  FiO2 (%):  [50 %-60 %] 50 %  SpO2:  [83 %-100 %] 100 %  Ventilation Mode: CMV/AC  (Continuous Mandatory Ventilation/ Assist Control)  FiO2 (%): 50 %  Rate Set (breaths/minute): 26 breaths/min  Tidal Volume Set (mL): 530 mL  PEEP (cm H2O): 16 cmH2O  Oxygen Concentration (%): 50 %  Resp: 26    2. INTAKE/ OUTPUT:   I/O last 3 completed shifts:  In: 4759.92 [I.V.:1309.92; NG/GT:2730]  Out: 5150 [Urine:5000; Stool:150]    3. PHYSICAL EXAMINATION:  General: Sedated, intubated, proned   HEENT: Mucous membranes dry, wound on tongue and base of nasal septum  Neuro: PERRL, anicteric sclera  Pulm/Resp: Coarse breath sounds bilaterally without rhonchi, crackles or wheeze, breathing non-labored  CV: tachycardic, S1/S2 without m/r/g; pedal pulses 2+ with trace edema  Abdomen: Obese, soft, non-distended, non-tender  : Flores catheter in place, urine yellow and clear  Incisions/Skin: Wounds as above, no rashes noted    4. LABS:   Arterial Blood Gases   Recent Labs   Lab 11/18/21  0413 11/17/21  2244 11/17/21  1606 11/17/21  1131   PH 7.42 7.37 7.34* 7.34*   PCO2 62* 64* 64* 64*   PO2 76* 67* 66* 107*   HCO3 40* 37* 35* 35*     Complete Blood Count   Recent Labs   Lab 11/18/21  0412 11/17/21  0351 11/16/21  0441 11/15/21  0353   WBC 11.3* 13.4* 8.5 9.1    HGB 10.2* 11.3* 10.9* 11.2*    189 152 147*     Basic Metabolic Panel  Recent Labs   Lab 11/18/21  0736 11/18/21  0428 11/18/21  0417 11/18/21  0001 11/17/21  1609 11/17/21  1606 11/17/21  0355 11/17/21  0351 11/16/21  1546 11/16/21  1543   NA  --  140  --   --   --  142  --  142  --  144   POTASSIUM  --  2.8*  --   --   --  3.6  --  3.8  --  3.4   CHLORIDE  --  100  --   --   --  104  --  106  --  106   CO2  --  35*  --   --   --  33*  --  32  --  32   BUN  --  42*  --   --   --  41*  --  38*  --  38*   CR  --  1.29*  --   --   --  1.09  --  1.05  --  1.01   * 241* 197* 178*   < > 239*   < > 194*   < > 153*    < > = values in this interval not displayed.     Liver Function Tests  No lab results found in last 7 days.  5. RADIOLOGY:   Recent Results (from the past 24 hour(s))   XR Chest Port 1 View    Narrative    Exam: XR CHEST PORT 1 VIEW, 11/17/2021 3:51 PM    Indication: Check ETT depth after supinating    Comparison: 11/15/2021 and 11/11/2021 chest x-ray    Findings:   Portable, semiupright view of the chest. Right PICC and endotracheal  tube are stable in position. Enteric tube terminates below the field  of view. Midline the trachea. Stable cardiac silhouette. Diffuse,  mixed interstitial and airspace opacities are stable. No significant  pleural effusion. Questionable right apical pneumothorax.      Impression    Impression:     1. Possible right apical pneumothorax. Recommend expiratory radiograph  for possible confirmation.   2. Stable position of support devices.  3. Stable diffuse mixed opacities.     I have personally reviewed the examination and initial interpretation  and I agree with the findings.    GREGORY BECKWITH MD         SYSTEM ID:  [id number]   XR Chest Port 1 View    Narrative    EXAMINATION:  XR CHEST PORT 1 VIEW 11/17/2021 5:39 PM.    COMPARISON: 11/17/2021.    HISTORY:  concern for pneumothorax, attempt expiratory phase image    FINDINGS: Portable AP view of the chest.  Endotracheal tube tip  projects over mid trachea. Enteric tube tip projects over the stomach.  Right PICC tip projects over the right atrium. Cardiomediastinal  silhouette is obscured. Pulmonary vasculature is indistinct. Small  left pleural effusion. Patchy bilateral pulmonary opacities. No left  pneumothorax. There is a round right apical lucency, similar to prior  exam. No definite pleural line is identified. The upper abdomen is  within normal limits.      Impression    IMPRESSION:   1. Right rounded apical lucency, may represent pneumatocele or lell  likely pneumothorax.  If high clinical concern, consider CT chest.  2. No significant change in the mixed bilateral pulmonary opacities.  Small right pleural effusion.    I have personally reviewed the examination and initial interpretation  and I agree with the findings.    DIMPLE GARZON MD         SYSTEM ID:  [id number]   XR Chest Port 1 View    Narrative    EXAMINATION:  XR CHEST PORT 1 VIEW 11/17/2021 5:39 PM.    COMPARISON: 11/17/2021.    HISTORY:  Inspiratory film, likely R apical PTX    FINDINGS: Endotracheal tube tip projects over the mid trachea. Right  PICC tip projects over the right atrium. Cardiomediastinal silhouette  is stable. There is no significant change in the mixed patchy  pulmonary opacities are bilateral lungs. Small right pleural effusion.  Right apical lucencies, similar to prior exam may represent  pneumatocele, bleb or pneumothorax. Upper abdomen is stable.      Impression    IMPRESSION:   1. There is no significant change in the ovoid right apical lucency  which may represent a pneumatocele/bleb or pneumothorax.  2. Unchanged right pleural effusion and bilateral pulmonary opacities.    I have personally reviewed the examination and initial interpretation  and I agree with the findings.    DIMPLE GARZON MD         SYSTEM ID:  [id number]

## 2021-11-18 NOTE — PLAN OF CARE
Major Shift Events: Pt remains sedated with Versed, and Fentanyl. Propofol stopped overnight. RASS -4/-5. Does not follow commands, will withdraw/flex to pain. PERRL 2 mm. Tmax 100.4, ABX adjusted. MAP>65 with no exogenous assitance. CMV settings maintained, 50%, 530, 26, 18. No plan to reprone at this time. Moderate secretions noted via inline suction, creamy/red-streaked, copious nasal secretions via red montalvo. TF @ goal via NJ, FWF 150mL Q2H. Copious liquid BM, rectal tube placed. Flores in place, Lasix order held. Skin grossly unchanged, optifoam in place prior to prone. Umbilical hernia noted. PICC line WNL. PIV x3 WNL. ART WNL in L radial, waveform normal, brisk blood return.      Plan: Wean sedation and vent support as tolerated. Care conference every Thursday.     For vital signs and complete assessments, please see documentation flowsheets.

## 2021-11-18 NOTE — CONSULTS
WO Nurse Inpatient Pressure Injury Assessment   Reason for consultation: Evaluate and treat Bl cheeks  New consult 11/11- tongue    ASSESSMENT  Pressure Injury: on Tongue , hospital acquired ,   This is a Medical Device Related Pressure Injury (MDRPI) due to ETT  Pressure Injury is Stage Mucosal   Contributing factor of the pressure injury: pressure and immobility  Status: healing and follow up   Recommend provider order: None, at this time     Pressure Injury: on septum , hospital acquired ,   This is a Medical Device Related Pressure Injury (MDRPI) due to ETAD head gear was pushed against the area during proning  Pressure Injury is Stage 2 -evolving   Contributing factor of the pressure injury: pressure and immobility  Status: follow up assessment, evolving   Recommend provider order: None, at this time      TREATMENT PLAN  Septum wound: Daily  to apply optifoam if proned- Dressing change every other day  Cleanse the wound with NS and pat dry.  Cut a piece of Comfeel 4x4 (#[id number]) and cover the area.  Massage the dressing in place for better adherence  Then place a layer of Mepilex foam dressing on top of comfeel for cushion  Ensure to place a piece of optifoam along the septal area and mold the Z-flow to avoid direct pressure while proning.      Tongue wounds: Every shift and PRN   Perform oral care per unit routine. Keep mouth moisturized. Reposition ETT tube Q 3-4 hrs, ensure to disengage the tongue from the tube to relive the pressure.    BL cheeks- Continue with mepilex foam dressing for prevention.    Orders Written  WOC Nurse follow-up plan:twice weekly  Nursing to notify the Provider(s) and re-consult the WOC Nurse if wound(s) deteriorates or new skin concern.    Patient History  According to provider note(s): Brandon Schafer is a 41 year old male with questionable PMH of remote VTE who was admitted on 11/3/2021 with ARDS 2/2 COVID pneumonia.    Objective Data  Containment of urine/stool: Indwelling  catheter    Current Diet/ Nutrition:  Orders Placed This Encounter      NPO for Medical/Clinical Reasons Except for: Meds      Output:   I/O last 3 completed shifts:  In: 4759.92 [I.V.:1309.92; NG/GT:2730]  Out: 5150 [Urine:5000; Stool:150]    Risk Assessment:   Sensory Perception: 1-->completely limited  Moisture: 3-->occasionally moist  Activity: 1-->bedfast  Mobility: 1-->completely immobile  Nutrition: 3-->adequate  Friction and Shear: 1-->problem  Adin Score: 10      Labs:   Recent Labs   Lab 11/18/21  0412 11/17/21  0351   HGB 10.2* 11.3*   WBC 11.3* 13.4*   CRP  --  59.0*       Physical Exam  Skin inspection: focused BL cheeks, mouth and nose  Patient is high risk for pressure injury development secondary to proning    Wound Location:  Septum          11/9                                                                              11/11                                                            11/15      Date of last Photo 11/11  Wound History: Pt has been proning intermittently. Wound evolving on 11/18 assessment- he was proned on 11/16 and supined since 11/17  Measurements (length x width x depth, in cm) 0.7 cm x 0.5 cm  x  <0.1 cm   Wound Base:  100 % dermis- evolving  Tunneling N/A  Undermining N/A  Palpation of the wound bed: normal   Periwound skin: intact  Color: normal and consistent with surrounding tissue  Temperature: normal   Drainage:, none  Description of drainage: none  Odor: none  Pain: unable to assess due to  sedation ,       2. Tongue    Date of last Photo 11/9- unable to see the actual injury in the picture  Wound History: Pt has been proning intermittently. Edematous face and tongue. Currently able to reposition the ETT disengaging the tongue. Pt has been supined since 11/10 morning.  Measurements (length x width x depth, in cm) 1.1 cm x 0.8 cm  x  0.1 cm   Wound Base:  100 % mucosal  Tunneling N/A  Undermining N/A  Palpation of the wound bed: normal   Periwound skin: intact  Color:  normal and consistent with surrounding tissue  Temperature: normal   Drainage:, none  Description of drainage: none  Odor: none  Pain: unable to assess due to  sedation ,       Interventions  Current support surface: Standard  Low air loss mattress  Current off-loading measures: Pillows  Repositioning aid: Pillows  Visual inspection of wound(s) completed   Tube Securement: ETT head gear  Wound Care: was done per plan of care.  Supplies: floor stock and discussed with RN  Educated provided: plan of care and Off-loading pressure  Education provided to: nurse  Discussed importance of:repositioning every 2 hours, off-loading pressure to wound, off-loading mattress and moisture management  Discussed plan of care with Nurse    Miranda Aranda RN

## 2021-11-19 ENCOUNTER — APPOINTMENT (OUTPATIENT)
Dept: CARDIOLOGY | Facility: CLINIC | Age: 41
End: 2021-11-19
Attending: STUDENT IN AN ORGANIZED HEALTH CARE EDUCATION/TRAINING PROGRAM
Payer: MEDICAID

## 2021-11-19 LAB
ANION GAP SERPL CALCULATED.3IONS-SCNC: 2 MMOL/L (ref 3–14)
BASE EXCESS BLDA CALC-SCNC: 11.3 MMOL/L (ref -9–1.8)
BASE EXCESS BLDA CALC-SCNC: 12.7 MMOL/L (ref -9–1.8)
BUN SERPL-MCNC: 44 MG/DL (ref 7–30)
CALCIUM SERPL-MCNC: 8.6 MG/DL (ref 8.5–10.1)
CHLORIDE BLD-SCNC: 101 MMOL/L (ref 94–109)
CO2 SERPL-SCNC: 37 MMOL/L (ref 20–32)
CREAT SERPL-MCNC: 1.04 MG/DL (ref 0.66–1.25)
CRP SERPL-MCNC: 180 MG/L (ref 0–8)
ERYTHROCYTE [DISTWIDTH] IN BLOOD BY AUTOMATED COUNT: 16 % (ref 10–15)
GFR SERPL CREATININE-BSD FRML MDRD: 88 ML/MIN/1.73M2
GLUCOSE BLD-MCNC: 259 MG/DL (ref 70–99)
GLUCOSE BLDC GLUCOMTR-MCNC: 180 MG/DL (ref 70–99)
GLUCOSE BLDC GLUCOMTR-MCNC: 211 MG/DL (ref 70–99)
GLUCOSE BLDC GLUCOMTR-MCNC: 212 MG/DL (ref 70–99)
GLUCOSE BLDC GLUCOMTR-MCNC: 214 MG/DL (ref 70–99)
GLUCOSE BLDC GLUCOMTR-MCNC: 256 MG/DL (ref 70–99)
GLUCOSE BLDC GLUCOMTR-MCNC: 279 MG/DL (ref 70–99)
HCO3 BLD-SCNC: 37 MMOL/L (ref 21–28)
HCO3 BLD-SCNC: 38 MMOL/L (ref 21–28)
HCT VFR BLD AUTO: 29.7 % (ref 40–53)
HGB BLD-MCNC: 9.1 G/DL (ref 13.3–17.7)
LVEF ECHO: NORMAL
MAGNESIUM SERPL-MCNC: 2.7 MG/DL (ref 1.6–2.3)
MCH RBC QN AUTO: 27.4 PG (ref 26.5–33)
MCHC RBC AUTO-ENTMCNC: 30.6 G/DL (ref 31.5–36.5)
MCV RBC AUTO: 90 FL (ref 78–100)
O2/TOTAL GAS SETTING VFR VENT: 100 %
O2/TOTAL GAS SETTING VFR VENT: 50 %
PCO2 BLD: 53 MM HG (ref 35–45)
PCO2 BLD: 54 MM HG (ref 35–45)
PH BLD: 7.45 [PH] (ref 7.35–7.45)
PH BLD: 7.46 [PH] (ref 7.35–7.45)
PHOSPHATE SERPL-MCNC: 2.9 MG/DL (ref 2.5–4.5)
PLATELET # BLD AUTO: 157 10E3/UL (ref 150–450)
PO2 BLD: 225 MM HG (ref 80–105)
PO2 BLD: 70 MM HG (ref 80–105)
POTASSIUM BLD-SCNC: 2.6 MMOL/L (ref 3.4–5.3)
POTASSIUM BLD-SCNC: 2.8 MMOL/L (ref 3.4–5.3)
POTASSIUM BLD-SCNC: 2.9 MMOL/L (ref 3.4–5.3)
PROCALCITONIN SERPL-MCNC: 0.28 NG/ML
RBC # BLD AUTO: 3.32 10E6/UL (ref 4.4–5.9)
SODIUM SERPL-SCNC: 140 MMOL/L (ref 133–144)
TRIGL SERPL-MCNC: 333 MG/DL
WBC # BLD AUTO: 8.7 10E3/UL (ref 4–11)

## 2021-11-19 PROCEDURE — 36592 COLLECT BLOOD FROM PICC: CPT | Performed by: STUDENT IN AN ORGANIZED HEALTH CARE EDUCATION/TRAINING PROGRAM

## 2021-11-19 PROCEDURE — 250N000011 HC RX IP 250 OP 636: Performed by: STUDENT IN AN ORGANIZED HEALTH CARE EDUCATION/TRAINING PROGRAM

## 2021-11-19 PROCEDURE — 250N000011 HC RX IP 250 OP 636

## 2021-11-19 PROCEDURE — 250N000011 HC RX IP 250 OP 636: Performed by: NURSE PRACTITIONER

## 2021-11-19 PROCEDURE — 99233 SBSQ HOSP IP/OBS HIGH 50: CPT | Performed by: NURSE PRACTITIONER

## 2021-11-19 PROCEDURE — 200N000002 HC R&B ICU UMMC

## 2021-11-19 PROCEDURE — 82803 BLOOD GASES ANY COMBINATION: CPT | Performed by: STUDENT IN AN ORGANIZED HEALTH CARE EDUCATION/TRAINING PROGRAM

## 2021-11-19 PROCEDURE — 94003 VENT MGMT INPAT SUBQ DAY: CPT

## 2021-11-19 PROCEDURE — 80048 BASIC METABOLIC PNL TOTAL CA: CPT | Performed by: STUDENT IN AN ORGANIZED HEALTH CARE EDUCATION/TRAINING PROGRAM

## 2021-11-19 PROCEDURE — 258N000003 HC RX IP 258 OP 636: Performed by: STUDENT IN AN ORGANIZED HEALTH CARE EDUCATION/TRAINING PROGRAM

## 2021-11-19 PROCEDURE — 93306 TTE W/DOPPLER COMPLETE: CPT | Mod: 26 | Performed by: INTERNAL MEDICINE

## 2021-11-19 PROCEDURE — 250N000013 HC RX MED GY IP 250 OP 250 PS 637: Performed by: STUDENT IN AN ORGANIZED HEALTH CARE EDUCATION/TRAINING PROGRAM

## 2021-11-19 PROCEDURE — 84478 ASSAY OF TRIGLYCERIDES: CPT | Performed by: NURSE PRACTITIONER

## 2021-11-19 PROCEDURE — 255N000002 HC RX 255 OP 636: Performed by: INTERNAL MEDICINE

## 2021-11-19 PROCEDURE — 250N000012 HC RX MED GY IP 250 OP 636 PS 637: Performed by: STUDENT IN AN ORGANIZED HEALTH CARE EDUCATION/TRAINING PROGRAM

## 2021-11-19 PROCEDURE — 99291 CRITICAL CARE FIRST HOUR: CPT | Mod: GC | Performed by: INTERNAL MEDICINE

## 2021-11-19 PROCEDURE — 250N000013 HC RX MED GY IP 250 OP 250 PS 637

## 2021-11-19 PROCEDURE — 999N000208 ECHOCARDIOGRAM COMPLETE

## 2021-11-19 PROCEDURE — 84132 ASSAY OF SERUM POTASSIUM: CPT | Performed by: STUDENT IN AN ORGANIZED HEALTH CARE EDUCATION/TRAINING PROGRAM

## 2021-11-19 PROCEDURE — 250N000013 HC RX MED GY IP 250 OP 250 PS 637: Performed by: NURSE PRACTITIONER

## 2021-11-19 PROCEDURE — 99356 PR PROLONGED SERV,INPATIENT,1ST HR: CPT | Performed by: NURSE PRACTITIONER

## 2021-11-19 PROCEDURE — 83735 ASSAY OF MAGNESIUM: CPT | Performed by: STUDENT IN AN ORGANIZED HEALTH CARE EDUCATION/TRAINING PROGRAM

## 2021-11-19 PROCEDURE — 85027 COMPLETE CBC AUTOMATED: CPT | Performed by: STUDENT IN AN ORGANIZED HEALTH CARE EDUCATION/TRAINING PROGRAM

## 2021-11-19 PROCEDURE — 999N000157 HC STATISTIC RCP TIME EA 10 MIN

## 2021-11-19 PROCEDURE — C9113 INJ PANTOPRAZOLE SODIUM, VIA: HCPCS

## 2021-11-19 PROCEDURE — 86140 C-REACTIVE PROTEIN: CPT | Performed by: STUDENT IN AN ORGANIZED HEALTH CARE EDUCATION/TRAINING PROGRAM

## 2021-11-19 PROCEDURE — 84100 ASSAY OF PHOSPHORUS: CPT | Performed by: STUDENT IN AN ORGANIZED HEALTH CARE EDUCATION/TRAINING PROGRAM

## 2021-11-19 PROCEDURE — 84145 PROCALCITONIN (PCT): CPT | Performed by: STUDENT IN AN ORGANIZED HEALTH CARE EDUCATION/TRAINING PROGRAM

## 2021-11-19 PROCEDURE — 999N000015 HC STATISTIC ARTERIAL MONITORING DAILY

## 2021-11-19 PROCEDURE — 94645 CONT INHLJ TX EACH ADDL HOUR: CPT

## 2021-11-19 RX ORDER — POTASSIUM CHLORIDE 20MEQ/15ML
40 LIQUID (ML) ORAL ONCE
Status: COMPLETED | OUTPATIENT
Start: 2021-11-19 | End: 2021-11-19

## 2021-11-19 RX ORDER — POTASSIUM CHLORIDE 29.8 MG/ML
20 INJECTION INTRAVENOUS
Status: COMPLETED | OUTPATIENT
Start: 2021-11-19 | End: 2021-11-19

## 2021-11-19 RX ORDER — CEFTRIAXONE 2 G/1
2 INJECTION, POWDER, FOR SOLUTION INTRAMUSCULAR; INTRAVENOUS EVERY 24 HOURS
Status: DISCONTINUED | OUTPATIENT
Start: 2021-11-19 | End: 2021-11-24

## 2021-11-19 RX ORDER — QUETIAPINE FUMARATE 100 MG/1
100 TABLET, FILM COATED ORAL 3 TIMES DAILY
Status: DISCONTINUED | OUTPATIENT
Start: 2021-11-19 | End: 2021-11-21

## 2021-11-19 RX ADMIN — PANTOPRAZOLE SODIUM 40 MG: 40 INJECTION, POWDER, FOR SOLUTION INTRAVENOUS at 08:16

## 2021-11-19 RX ADMIN — CEFTRIAXONE SODIUM 2 G: 2 INJECTION, POWDER, FOR SOLUTION INTRAMUSCULAR; INTRAVENOUS at 10:02

## 2021-11-19 RX ADMIN — INSULIN ASPART 2 UNITS: 100 INJECTION, SOLUTION INTRAVENOUS; SUBCUTANEOUS at 12:05

## 2021-11-19 RX ADMIN — Medication 100 MCG: at 10:18

## 2021-11-19 RX ADMIN — LORAZEPAM 12 MG: 2 TABLET ORAL at 17:06

## 2021-11-19 RX ADMIN — HYDROMORPHONE HYDROCHLORIDE 8 MG: 4 TABLET ORAL at 00:11

## 2021-11-19 RX ADMIN — Medication 10 NG/KG/MIN: at 05:33

## 2021-11-19 RX ADMIN — POTASSIUM CHLORIDE 20 MEQ: 29.8 INJECTION, SOLUTION INTRAVENOUS at 13:36

## 2021-11-19 RX ADMIN — LORAZEPAM 12 MG: 2 TABLET ORAL at 10:44

## 2021-11-19 RX ADMIN — Medication 12 MG/HR: at 08:33

## 2021-11-19 RX ADMIN — INSULIN ASPART 2 UNITS: 100 INJECTION, SOLUTION INTRAVENOUS; SUBCUTANEOUS at 20:33

## 2021-11-19 RX ADMIN — Medication 2 PACKET: at 13:38

## 2021-11-19 RX ADMIN — LORAZEPAM 12 MG: 2 TABLET ORAL at 02:13

## 2021-11-19 RX ADMIN — Medication 12 MG/HR: at 00:32

## 2021-11-19 RX ADMIN — ENOXAPARIN SODIUM 40 MG: 40 INJECTION SUBCUTANEOUS at 10:03

## 2021-11-19 RX ADMIN — LORAZEPAM 12 MG: 2 TABLET ORAL at 13:38

## 2021-11-19 RX ADMIN — Medication 200 MCG/HR: at 13:46

## 2021-11-19 RX ADMIN — Medication 13 MG/HR: at 20:23

## 2021-11-19 RX ADMIN — INSULIN ASPART 2 UNITS: 100 INJECTION, SOLUTION INTRAVENOUS; SUBCUTANEOUS at 08:57

## 2021-11-19 RX ADMIN — POTASSIUM CHLORIDE 20 MEQ: 29.8 INJECTION, SOLUTION INTRAVENOUS at 06:38

## 2021-11-19 RX ADMIN — Medication 2 PACKET: at 21:40

## 2021-11-19 RX ADMIN — Medication 13 MG/HR: at 14:46

## 2021-11-19 RX ADMIN — SODIUM CHLORIDE, POTASSIUM CHLORIDE, SODIUM LACTATE AND CALCIUM CHLORIDE 1000 ML: 600; 310; 30; 20 INJECTION, SOLUTION INTRAVENOUS at 08:54

## 2021-11-19 RX ADMIN — HYDROMORPHONE HYDROCHLORIDE 8 MG: 4 TABLET ORAL at 11:56

## 2021-11-19 RX ADMIN — Medication 2 PACKET: at 18:28

## 2021-11-19 RX ADMIN — HYDROMORPHONE HYDROCHLORIDE 8 MG: 4 TABLET ORAL at 08:16

## 2021-11-19 RX ADMIN — INSULIN ASPART 3 UNITS: 100 INJECTION, SOLUTION INTRAVENOUS; SUBCUTANEOUS at 00:19

## 2021-11-19 RX ADMIN — HYDROMORPHONE HYDROCHLORIDE 8 MG: 4 TABLET ORAL at 04:02

## 2021-11-19 RX ADMIN — Medication 200 MCG/HR: at 04:02

## 2021-11-19 RX ADMIN — POTASSIUM CHLORIDE 20 MEQ: 29.8 INJECTION, SOLUTION INTRAVENOUS at 05:31

## 2021-11-19 RX ADMIN — Medication 100 MCG: at 05:33

## 2021-11-19 RX ADMIN — Medication 2 PACKET: at 08:59

## 2021-11-19 RX ADMIN — QUETIAPINE FUMARATE 100 MG: 100 TABLET ORAL at 20:22

## 2021-11-19 RX ADMIN — LORAZEPAM 12 MG: 2 TABLET ORAL at 05:49

## 2021-11-19 RX ADMIN — POTASSIUM CHLORIDE 20 MEQ: 29.8 INJECTION, SOLUTION INTRAVENOUS at 11:54

## 2021-11-19 RX ADMIN — Medication 100 MCG: at 12:37

## 2021-11-19 RX ADMIN — POTASSIUM CHLORIDE 40 MEQ: 20 SOLUTION ORAL at 08:19

## 2021-11-19 RX ADMIN — HYDROMORPHONE HYDROCHLORIDE 8 MG: 4 TABLET ORAL at 15:41

## 2021-11-19 RX ADMIN — QUETIAPINE FUMARATE 100 MG: 100 TABLET ORAL at 14:43

## 2021-11-19 RX ADMIN — INSULIN GLARGINE 5 UNITS: 100 INJECTION, SOLUTION SUBCUTANEOUS at 17:06

## 2021-11-19 RX ADMIN — QUETIAPINE FUMARATE 75 MG: 50 TABLET ORAL at 08:16

## 2021-11-19 RX ADMIN — POTASSIUM CHLORIDE 40 MEQ: 40 SOLUTION ORAL at 20:23

## 2021-11-19 RX ADMIN — Medication 100 MCG: at 08:37

## 2021-11-19 RX ADMIN — POTASSIUM CHLORIDE 20 MEQ: 29.8 INJECTION, SOLUTION INTRAVENOUS at 14:44

## 2021-11-19 RX ADMIN — INSULIN ASPART 1 UNITS: 100 INJECTION, SOLUTION INTRAVENOUS; SUBCUTANEOUS at 15:51

## 2021-11-19 RX ADMIN — LORAZEPAM 12 MG: 2 TABLET ORAL at 21:47

## 2021-11-19 RX ADMIN — HYDROMORPHONE HYDROCHLORIDE 8 MG: 4 TABLET ORAL at 20:22

## 2021-11-19 RX ADMIN — HUMAN ALBUMIN MICROSPHERES AND PERFLUTREN 5 ML: 10; .22 INJECTION, SOLUTION INTRAVENOUS at 09:51

## 2021-11-19 RX ADMIN — Medication 5 ML: at 08:16

## 2021-11-19 RX ADMIN — Medication 2 PACKET: at 10:44

## 2021-11-19 RX ADMIN — INSULIN ASPART 3 UNITS: 100 INJECTION, SOLUTION INTRAVENOUS; SUBCUTANEOUS at 04:15

## 2021-11-19 ASSESSMENT — ACTIVITIES OF DAILY LIVING (ADL)
ADLS_ACUITY_SCORE: 19

## 2021-11-19 ASSESSMENT — MIFFLIN-ST. JEOR: SCORE: 2461

## 2021-11-19 NOTE — PROGRESS NOTES
[facility]  Palliative Care Daily Progress Note       Recommendations & Counseling       Family would like to continue full restorative medical efforts for at least another week. They are mindful that Brandon was resistant to going on the vent, but hopeful that since he remains to only have one organ system involved (lungs) he will make a meaningful recovery. They believe he would be willing to undergo some of the additional burden of medical treatment for the possibility of surviving and being able to see his kids again.       If complications occur and prognosis becomes less optimistic, family would want to re-evaluate treatment plan. They are unsure at this time if they would proceed with a trach if Brandon is not able to wean off vent before that is medically necessary.       Palliative  and Palliative SW are also involved for ongoing emotional and spiritual support.      Thank you for the opportunity to be involved in the care of this patient. Please reach out with questions or concerns.     JUANCHO Murray CNP  Palliative Care Consult Team  Pager: [phone]    [facility] Inpatient Team Consult pager [phone] (M-F 8-4:30)  After-hours Answering Service [phone]   Palliative Clinic: [phone]     70 minutes spent. This includes participating in a provider pre-conference and then family meeting from [phone], where we discussed Brandon's covid infection, gave a medical update, and discussed goals of care.       Assessments          Brandon Schafer is a 41 year old male with COVID 19 infection, who was admitted with hypoxic respiratory failure, intubated and sedated.       Today, the patient was seen for:  COVID 19 infection  Hypoxic respiratory failure requiring mechanical ventilation    Prognosis, Goals, or Advance Care Planning was addressed today with: Yes.    Mood, coping, and/or meaning in the context of serious illness were addressed today: Yes.             Interval History:     Family care conference held today with patient's mother Mahsa and two brothers Brad and Kirit. MICU gave a medical update stating that Brandon's lung remain very sick, and he has not shown meaningful progress in the past two and a half weeks, but has also not shown signs of worsening. Family's questions were answered. Ultimately team and family agreed to continue current cares and hold another care conference in a week to re-evaluate prognosis and need for a trach.       Key Palliative Symptoms:  We are not helping to manage these symptoms currently in this patient.           Review of Systems:     Unable to discuss with patient, as he is intubated and sedated          Medications:     I have reviewed this patient's medication profile and medications during this hospitalization.           Physical Exam:   Vitals were reviewed  Temp: 98.1  F (36.7  C) Temp src: Oral BP: 113/55 Pulse: 82   Resp: (!) 34 SpO2: 100 % O2 Device: Mechanical Ventilator      Exam limited by positive covid infection.  Constitutional - critically ill male, intubated and sedated, with multiple infusions running            Data Reviewed:     CMP  Recent Labs   Lab 11/19/21  1205 11/19/21  1017 11/19/21  0857 11/19/21  0413 11/19/21  0408 11/18/21  1550 11/18/21  1308 11/18/21  0736 11/18/21  0428 11/17/21  1609 11/17/21  1606 11/17/21  0355 11/17/21  0351 11/16/21  0641 11/16/21  0441   NA  --   --   --   --  140  --   --   --  140  --  142  --  142   < > 143   POTASSIUM  --  2.8*  --   --  2.6*  --  2.8*  --  2.8*  --  3.6  --  3.8   < > 3.1*   CHLORIDE  --   --   --   --  101  --   --   --  100  --  104  --  106   < > 106   CO2  --   --   --   --  37*  --   --   --  35*  --  33*  --  32   < > 31   ANIONGAP  --   --   --   --  2*  --   --   --  5  --  5  --  4   < > 6   *  --  211* 256* 259*   < >  --    < > 241*   < > 239*   < > 194*   < > 157*   BUN  --   --   --   --  44*  --   --   --  42*  --  41*  --   38*   < > 40*   CR  --   --   --   --  1.04  --   --   --  1.29*  --  1.09  --  1.05   < > 1.03   GFRESTIMATED  --   --   --   --  88  --   --   --  67  --  83  --  87   < > 89   VIPUL  --   --   --   --  8.6  --   --   --  9.0  --  9.0  --  9.4   < > 8.6   MAG  --   --   --   --  2.7*  --   --   --  2.2  --   --   --  2.4*  --  2.2   PHOS  --   --   --   --  2.9  --   --   --  3.3  --   --   --  4.6*  --  4.0    < > = values in this interval not displayed.     CBC  Recent Labs   Lab 11/19/21 0408 11/18/21 0412 11/17/21  0351 11/16/21  0441   WBC 8.7 11.3* 13.4* 8.5   RBC 3.32* 3.61* 4.05* 3.89*   HGB 9.1* 10.2* 11.3* 10.9*   HCT 29.7* 32.3* 37.0* 35.1*   MCV 90 90 91 90   MCH 27.4 28.3 27.9 28.0   MCHC 30.6* 31.6 30.5* 31.1*   RDW 16.0* 15.9* 15.9* 15.5*    170 189 152   Arterial Blood Gas  Recent Labs   Lab 11/19/21 0408 11/18/21 0413 11/17/21  2244 11/17/21  1606   PH 7.46* 7.42 7.37 7.34*   PCO2 54* 62* 64* 64*   PO2 70* 76* 67* 66*   HCO3 38* 40* 37* 35*   O2PER 50 50 50 50

## 2021-11-19 NOTE — PROGRESS NOTES
MEDICAL ICU PROGRESS NOTE  11/19/2021      Date of Service (when I saw the patient): 11/19/2021    ASSESSMENT: Brandon Schafer is a 41 year old male with questionable PMH of remote VTE who was admitted on 11/3/2021 with ARDS 2/2 COVID pneumonia. Now with CAUTI and polymicrobial VAP.     CHANGES and MAJOR THINGS TODAY:   - stop epoprostenol  - increase seroquel to 100 mg TID  - additional potassium 40 mEq with recheck 1300    PLAN:    Neuro:  # Pain and sedation  - Fentanyl infusion  - Midazolam infusion, not tolerating wean at this time    - dilaudid 8mg q 4H  - Ativan 12mg Q4H  - Seroquel 100 mg TID  - RASS goal -4 to -5  - Consider NMB if worsening vent mechanics     Pulmonary:  # ARDS 2/2 COVID pneumonia  Intubated 11/2 prior to transport flight. Prone positioning started 11/5.   - P/f 140, maintain supine --- no benefit from prone  - stop epoprostenol   - Infectious work-up and treatment as below    Ventilation Mode: CMV/AC  (Continuous Mandatory Ventilation/ Assist Control)  FiO2 (%): 50 %  Rate Set (breaths/minute): 26 breaths/min  Tidal Volume Set (mL): 530 mL  PEEP (cm H2O): 16 cmH2O  Oxygen Concentration (%): 50 % (Increased to 60% for desaturation, switched back to 50%)  Peak Inspiratory Pressure (cm H2O) (Drager Aimee): 32  Resp: (!) 34      # ?COPD exacerbation  Wheezing on exam on 11/4. S/p azithromycin for 5d. Steroids per Covid protocol.   - Continue albuterol nebs prn     Cardiovascular:  # Hypotension, resolved  Transient, and in the setting of sedation. No prior TTE. Troponin peaked at 0.293. EKG with T wave inversion in lateral leads. Suspect troponin was 2/2 demand ischemia in setting of hypoxia.    # Atrial fibrillation  Episode of atrial fibrillation overnight 11/12, hemodynamically stable and resolved spontaneously. TWI on EKG, troponin negative     GI/Nutrition:  # At risk for malnutrition  # Constipation, improving  - Nutrition consult for TF  - Tolerating TF at goal   - Bowel regimen:  scheduled Miralax and senna and prn Miralax     Renal/Fluids/Electrolytes:  # Non-oliguric STEFAN, resolved  # Hypokalemia, in setting of ongoing diuresis   Unclear baseline, has not seen doctor in 20 years. Cr 2.5 on admission, now improved. 11/18 with rising bicarb, BUN, Cr...decreased UOP consistent with overdiuresis.    - holding diuretics     # Hypernatremia, resolved  - decreaseFWF 150ml q 2H-> 75ml q 2H, stable sodium     Endocrine:  # Stress and steroid induced hyperglycemia  - Medium resistance sliding scale insulin      ID:  # COVID pneumonia  Symptoms (headache, SOB, fever, cough, diarrhea) 1 week prior to presentation. Not COVID vaccinated. Tested positive on presentation on 11/2. S/p tocilizumab 11/4.  - Dexamethasone 6 mg daily (total 10 days)  - Deferring remdesivir given STEFAN and limited evidence for benefit      # Fever, tmax 101.3 11/17  # CAUTI  # VAP  Pt with worsening oxygen saturations, fevers, and persistent leukocytosis. Given his prolonged hospitalization, was started on broad-spectrum antibiotics on 11/11. Urine culture grew >100k pan-sensitive E coli. RVP and MRSA nares negative. Sputum grew E coli, S aureus, and GBS. Sputum culture 11/17 pending, blood cultures 11/17 pending. Continues to have fevers as of 11/18, redraw blood cultures and test stool for Cdiff. Of note, CXR 11/17 without clear/focal infiltrate.   - repeat blood cultures 11/18  - c diff for liquid stool 11/18  - off antibiotics 11/19     Abx:  - Zosyn 11/11-11/14; 11/17 - 11/18  - Ceftriaxone 11/14-11/17  - Vanc 11/11-11/12      Hematology:    # Coagulopathy 2/2 COVID  US BLE negative for DVT.  - Enoxaparin ppx      MSK  # Septum wound, tongue wound 2/2 Proning   - WOC following     General Cares/Prophylaxis:    DVT Prophylaxis: Lovenox ppx (40 mg daily)  GI Prophylaxis: PPI  Family Communication: BrotherBrad, updated by phone  Code Status: DNR      Lines/tubes/drains:  - PIV x3  - Right PICC  - Arterial line left radial  -  NG  - Flores  - ETT  - Rectal pouch      Disposition:  - Medical ICU    Patient seen and findings/plan discussed with medical ICU staff, Dr. Robert Pacheco MD      ====================================  INTERVAL HISTORY:   Notes reviewed. Afebrile overnight. Requiring sedation boluses today. Cultures continue to be negative.      OBJECTIVE:   1. VITAL SIGNS:   Temp:  [98.1  F (36.7  C)-100.2  F (37.9  C)] 98.1  F (36.7  C)  Pulse:  [71-93] 82  Resp:  [26-34] 34  BP: (113)/(55) 113/55  MAP:  [52 mmHg-93 mmHg] 68 mmHg  Arterial Line BP: ()/(40-70) 105/50  FiO2 (%):  [50 %] 50 %  SpO2:  [91 %-100 %] 100 %  Ventilation Mode: CMV/AC  (Continuous Mandatory Ventilation/ Assist Control)  FiO2 (%): 50 %  Rate Set (breaths/minute): 26 breaths/min  Tidal Volume Set (mL): 530 mL  PEEP (cm H2O): 16 cmH2O  Oxygen Concentration (%): 50 % (Increased to 60% for desaturation, switched back to 50%)  Peak Inspiratory Pressure (cm H2O) (Drager Aimee): 32  Resp: (!) 34    2. INTAKE/ OUTPUT:   I/O last 3 completed shifts:  In: 6242.4 [I.V.:3027.4; NG/GT:2180]  Out: 4365 [Urine:2640; Stool:1725]    3. PHYSICAL EXAMINATION:  General: Sedated, intubated, supine  HEENT: Mucous membranes dry, wound on tongue and base of nasal septum  Neuro: sedated  Pulm/Resp: Coarse breath sounds bilaterally without rhonchi, crackles or wheeze, breathing non-labored  CV: tachycardic, S1/S2 without m/r/g;  Abdomen: Obese, soft, non-distended, non-tender  : Flores catheter in place, urine yellow and clear  Incisions/Skin: Wounds as above, no rashes noted    4. LABS:   Arterial Blood Gases   Recent Labs   Lab 11/19/21  0408 11/18/21  0413 11/17/21  2244 11/17/21  1606   PH 7.46* 7.42 7.37 7.34*   PCO2 54* 62* 64* 64*   PO2 70* 76* 67* 66*   HCO3 38* 40* 37* 35*     Complete Blood Count   Recent Labs   Lab 11/19/21  0408 11/18/21  0412 11/17/21  0351 11/16/21  0441   WBC 8.7 11.3* 13.4* 8.5   HGB 9.1* 10.2* 11.3* 10.9*    170 189  152     Basic Metabolic Panel  Recent Labs   Lab 21  1205 21  1017 21  0857 21  0413 21  0408 21  1550 21  1308 21  0736 21  0428 21  1609 21  1606 21  0355 21  0351   NA  --   --   --   --  140  --   --   --  140  --  142  --  142   POTASSIUM  --  2.8*  --   --  2.6*  --  2.8*  --  2.8*  --  3.6  --  3.8   CHLORIDE  --   --   --   --  101  --   --   --  100  --  104  --  106   CO2  --   --   --   --  37*  --   --   --  35*  --  33*  --  32   BUN  --   --   --   --  44*  --   --   --  42*  --  41*  --  38*   CR  --   --   --   --  1.04  --   --   --  1.29*  --  1.09  --  1.05   *  --  211* 256* 259*   < >  --    < > 241*   < > 239*   < > 194*    < > = values in this interval not displayed.     Liver Function Tests  No lab results found in last 7 days.  5. RADIOLOGY:   Recent Results (from the past 24 hour(s))   Echocardiogram Complete   Result Value    LVEF  55-60%    [facility]    [id number]  [id number]  [id number]  [id number]^CHRISTY PINTO^AYAH^MIMI     [facility] [place]  Echocardiography Laboratory  [address] [place] [id number]     Name: GUY REINOSO  MRN: [id number]  : 1980  Study Date: 2021 09:36 AM  Age: 41 yrs  Gender: Male  Patient [facility]: [facility]  Reason For Study: Hypoxia, Respiratory failure  Ordering Physician: AYAH HARRISON JR.  Referring Physician: OCTAVIO ABARCA  Performed By: Rubi Luna JO     BSA: 2.6 m2  Height: 72 in  Weight: 326 lb  HR: 83  BP: 113/55 mmHg  ______________________________________________________________________________  Procedure  Echocardiogram with two-dimensional, color and spectral Doppler performed.  Contrast Optison. Optison (NDC #3855-2723-72) given intravenously. Patient was  given 5 ml mixture of 3 ml Optison and 6 ml saline. 4 ml  wasted.  ______________________________________________________________________________  Interpretation Summary  Global and regional left ventricular function is normal with an EF of 55-60%.  Global right ventricular function is normal.  No significant valvular abnormalities present.  The mean RA pressure cannot be estimated given the patient is on a ventilator.  No pericardial effusion is present.     ______________________________________________________________________________  Left Ventricle  Left ventricular size is normal. Left ventricular wall thickness is normal.  Global and regional left ventricular function is normal with an EF of 55-60%.  Left ventricular diastolic function is normal.     Right Ventricle  The right ventricle is normal size. Global right ventricular function is  normal.     Atria  Both atria appear normal.     Mitral Valve  The mitral valve is normal.     Aortic Valve  Aortic valve is normal in structure and function.     Tricuspid Valve  The tricuspid valve is normal. The peak velocity of the tricuspid regurgitant  jet is not obtainable. Pulmonary artery systolic pressure cannot be assessed.     Pulmonic Valve  The valve leaflets are not well visualized. On Doppler interrogation, there is  no significant stenosis or regurgitation.     Vessels  The aorta root is normal. Dilation of the inferior vena cava is present with  abnormal respiratory variation in diameter. Unable to assess mean RA pressure  given the patient is on a ventilator.     Pericardium  No pericardial effusion is present.     Miscellaneous  No significant valvular abnormalities present.     Compared to Previous Study  Previous study not available for comparison.  ______________________________________________________________________________  MMode/2D Measurements & Calculations  asc Aorta Diam: 2.9 cm  LVOT diam: 2.0 cm  LVOT area: 3.1 cm2     Doppler Measurements & Calculations  MV E max christine: 80.0 cm/sec  MV A max christine:  64.6 cm/sec  MV E/A: 1.2  MV dec slope: 354.0 cm/sec2  E/E' avg: 10.7  Lateral E/e': 9.0  Medial E/e': 12.3     ______________________________________________________________________________  Report approved by: Enrrique Daniels 11/19/2021 11:37 AM

## 2021-11-19 NOTE — PLAN OF CARE
Major Shift Events:  MAPs decreased to 45-55 at start of shift, 1L LR bolus given with return to baseline.  Potassium critically low at 2.6, replaced via protocol and MICU team notified.      Plan: Continue current plan of care.  Recheck K at 1000.    For vital signs and complete assessments, please see documentation flowsheets.      Lydia Ballesteros RN

## 2021-11-19 NOTE — CARE CONFERENCE
Care Conference    Care conference was held on 2021.  Conference was coordinated by Care Coordinator in the conference room    Attending the conference were: MICU (Dr. Pacheco, Dr. Jones), Palliative Care (FARTUN Kingsley, CHRISTIAN Vasquez), ICU CHRISTIAN Flores & Ana, Family by phone (brothers Brad and Kirit, mother Mahsa)    Discussion/Outcomes/Follow-Up: MICU team provided a medical update to the family including changes in the last day. Family asked many relevant questions related to pt's current status and treatments including long term prognosis. Pt and team agreed to meet again in one week to discuss pt's progress and implications for long term recovery. Pt's family will discuss acceptability of potential tracheostomy and pt's long term goals.     Plan for care conference next Friday.     DINH Haas, [facility]  ICU   [facility]  Ph: [phone]  [phone]

## 2021-11-19 NOTE — PROGRESS NOTES
PALLIATIVE CARE SOCIAL WORK Progress Note   [facility] [address] [facility]    REFERRAL SOURCE: Palliative care; weekly care conference    PCSW participated in weekly medical update care conference. On phone during conference were pt's two brothers Brad and Kirit and mom Mahsa.    Medical team provided update to family; family continues to demonstrate understanding of Brandon's illness and guarded prognosis. Asking insightful and appropriate questions.    Plan: PCSW will continue to follow while palliative team involved; will continue to participate in care conferences and assess support needs.    Christina Milian [facility]  Palliative Care   Pager [phone]    [facility] Inpatient Team Consult pager [phone] (M-F 8-4:30)  After-hours Answering Service [phone]

## 2021-11-19 NOTE — PLAN OF CARE
Major Shift Events: Fentanyl & versed boluses utilized due to increased sedation needs, scheduled Seroquel dosage increased . MAP goal unable to be met at beginning of shift. 1L LR bolus given with good MAP response. K check after NOC replacement 2.8- MD contacted- 100 meq replacement given. Recheck-2.9, MD contacted. 40 meq given.   Plan: Decrease sedation as tolerated.     For vital signs and complete assessments, please see documentation flowsheets.

## 2021-11-20 LAB
ANION GAP SERPL CALCULATED.3IONS-SCNC: 3 MMOL/L (ref 3–14)
BASE EXCESS BLDA CALC-SCNC: 11 MMOL/L (ref -9–1.8)
BASE EXCESS BLDA CALC-SCNC: 11.3 MMOL/L (ref -9–1.8)
BASE EXCESS BLDA CALC-SCNC: 11.7 MMOL/L (ref -9–1.8)
BUN SERPL-MCNC: 33 MG/DL (ref 7–30)
CALCIUM SERPL-MCNC: 8.6 MG/DL (ref 8.5–10.1)
CHLORIDE BLD-SCNC: 102 MMOL/L (ref 94–109)
CO2 SERPL-SCNC: 36 MMOL/L (ref 20–32)
CREAT SERPL-MCNC: 0.89 MG/DL (ref 0.66–1.25)
ERYTHROCYTE [DISTWIDTH] IN BLOOD BY AUTOMATED COUNT: 15.9 % (ref 10–15)
GFR SERPL CREATININE-BSD FRML MDRD: >90 ML/MIN/1.73M2
GLUCOSE BLD-MCNC: 228 MG/DL (ref 70–99)
GLUCOSE BLDC GLUCOMTR-MCNC: 150 MG/DL (ref 70–99)
GLUCOSE BLDC GLUCOMTR-MCNC: 154 MG/DL (ref 70–99)
GLUCOSE BLDC GLUCOMTR-MCNC: 164 MG/DL (ref 70–99)
GLUCOSE BLDC GLUCOMTR-MCNC: 175 MG/DL (ref 70–99)
GLUCOSE BLDC GLUCOMTR-MCNC: 191 MG/DL (ref 70–99)
GLUCOSE BLDC GLUCOMTR-MCNC: 196 MG/DL (ref 70–99)
GLUCOSE BLDC GLUCOMTR-MCNC: 198 MG/DL (ref 70–99)
HCO3 BLD-SCNC: 37 MMOL/L (ref 21–28)
HCT VFR BLD AUTO: 30.5 % (ref 40–53)
HGB BLD-MCNC: 9.4 G/DL (ref 13.3–17.7)
MAGNESIUM SERPL-MCNC: 2.6 MG/DL (ref 1.6–2.3)
MCH RBC QN AUTO: 28 PG (ref 26.5–33)
MCHC RBC AUTO-ENTMCNC: 30.8 G/DL (ref 31.5–36.5)
MCV RBC AUTO: 91 FL (ref 78–100)
O2/TOTAL GAS SETTING VFR VENT: 50 %
O2/TOTAL GAS SETTING VFR VENT: 50 %
O2/TOTAL GAS SETTING VFR VENT: 75 %
PCO2 BLD: 53 MM HG (ref 35–45)
PCO2 BLD: 54 MM HG (ref 35–45)
PCO2 BLD: 55 MM HG (ref 35–45)
PH BLD: 7.44 [PH] (ref 7.35–7.45)
PH BLD: 7.44 [PH] (ref 7.35–7.45)
PH BLD: 7.45 [PH] (ref 7.35–7.45)
PHOSPHATE SERPL-MCNC: 3.3 MG/DL (ref 2.5–4.5)
PLATELET # BLD AUTO: 160 10E3/UL (ref 150–450)
PO2 BLD: 168 MM HG (ref 80–105)
PO2 BLD: 66 MM HG (ref 80–105)
PO2 BLD: 99 MM HG (ref 80–105)
POTASSIUM BLD-SCNC: 2.8 MMOL/L (ref 3.4–5.3)
POTASSIUM BLD-SCNC: 3.3 MMOL/L (ref 3.4–5.3)
POTASSIUM BLD-SCNC: 3.4 MMOL/L (ref 3.4–5.3)
RBC # BLD AUTO: 3.36 10E6/UL (ref 4.4–5.9)
SODIUM SERPL-SCNC: 141 MMOL/L (ref 133–144)
TRIGL SERPL-MCNC: 337 MG/DL
WBC # BLD AUTO: 6.6 10E3/UL (ref 4–11)

## 2021-11-20 PROCEDURE — 85027 COMPLETE CBC AUTOMATED: CPT | Performed by: STUDENT IN AN ORGANIZED HEALTH CARE EDUCATION/TRAINING PROGRAM

## 2021-11-20 PROCEDURE — 82803 BLOOD GASES ANY COMBINATION: CPT | Performed by: STUDENT IN AN ORGANIZED HEALTH CARE EDUCATION/TRAINING PROGRAM

## 2021-11-20 PROCEDURE — 99291 CRITICAL CARE FIRST HOUR: CPT | Mod: GC | Performed by: INTERNAL MEDICINE

## 2021-11-20 PROCEDURE — 84132 ASSAY OF SERUM POTASSIUM: CPT | Performed by: STUDENT IN AN ORGANIZED HEALTH CARE EDUCATION/TRAINING PROGRAM

## 2021-11-20 PROCEDURE — 999N000157 HC STATISTIC RCP TIME EA 10 MIN

## 2021-11-20 PROCEDURE — 84132 ASSAY OF SERUM POTASSIUM: CPT | Performed by: SURGERY

## 2021-11-20 PROCEDURE — 83735 ASSAY OF MAGNESIUM: CPT | Performed by: STUDENT IN AN ORGANIZED HEALTH CARE EDUCATION/TRAINING PROGRAM

## 2021-11-20 PROCEDURE — 250N000011 HC RX IP 250 OP 636: Performed by: STUDENT IN AN ORGANIZED HEALTH CARE EDUCATION/TRAINING PROGRAM

## 2021-11-20 PROCEDURE — C9113 INJ PANTOPRAZOLE SODIUM, VIA: HCPCS

## 2021-11-20 PROCEDURE — 250N000013 HC RX MED GY IP 250 OP 250 PS 637: Performed by: STUDENT IN AN ORGANIZED HEALTH CARE EDUCATION/TRAINING PROGRAM

## 2021-11-20 PROCEDURE — 94003 VENT MGMT INPAT SUBQ DAY: CPT

## 2021-11-20 PROCEDURE — 80048 BASIC METABOLIC PNL TOTAL CA: CPT | Performed by: STUDENT IN AN ORGANIZED HEALTH CARE EDUCATION/TRAINING PROGRAM

## 2021-11-20 PROCEDURE — 84100 ASSAY OF PHOSPHORUS: CPT | Performed by: STUDENT IN AN ORGANIZED HEALTH CARE EDUCATION/TRAINING PROGRAM

## 2021-11-20 PROCEDURE — 250N000011 HC RX IP 250 OP 636: Performed by: NURSE PRACTITIONER

## 2021-11-20 PROCEDURE — 94644 CONT INHLJ TX 1ST HOUR: CPT

## 2021-11-20 PROCEDURE — 200N000002 HC R&B ICU UMMC

## 2021-11-20 PROCEDURE — 94645 CONT INHLJ TX EACH ADDL HOUR: CPT

## 2021-11-20 PROCEDURE — 84478 ASSAY OF TRIGLYCERIDES: CPT | Performed by: NURSE PRACTITIONER

## 2021-11-20 PROCEDURE — 250N000013 HC RX MED GY IP 250 OP 250 PS 637

## 2021-11-20 PROCEDURE — 250N000013 HC RX MED GY IP 250 OP 250 PS 637: Performed by: NURSE PRACTITIONER

## 2021-11-20 PROCEDURE — 250N000011 HC RX IP 250 OP 636

## 2021-11-20 PROCEDURE — 999N000015 HC STATISTIC ARTERIAL MONITORING DAILY

## 2021-11-20 RX ORDER — POTASSIUM CHLORIDE 20MEQ/15ML
40 LIQUID (ML) ORAL 2 TIMES DAILY
Status: DISCONTINUED | OUTPATIENT
Start: 2021-11-20 | End: 2021-11-21

## 2021-11-20 RX ORDER — POTASSIUM CHLORIDE 1.5 G/1.58G
40 POWDER, FOR SOLUTION ORAL ONCE
Status: COMPLETED | OUTPATIENT
Start: 2021-11-20 | End: 2021-11-20

## 2021-11-20 RX ORDER — POTASSIUM CHLORIDE 20MEQ/15ML
40 LIQUID (ML) ORAL ONCE
Status: COMPLETED | OUTPATIENT
Start: 2021-11-20 | End: 2021-11-20

## 2021-11-20 RX ORDER — POTASSIUM CHLORIDE 1.5 G/1.58G
20 POWDER, FOR SOLUTION ORAL ONCE
Status: COMPLETED | OUTPATIENT
Start: 2021-11-20 | End: 2021-11-20

## 2021-11-20 RX ADMIN — Medication 13 MG/HR: at 19:25

## 2021-11-20 RX ADMIN — Medication 13 MG/HR: at 05:44

## 2021-11-20 RX ADMIN — HYDROMORPHONE HYDROCHLORIDE 8 MG: 4 TABLET ORAL at 00:59

## 2021-11-20 RX ADMIN — POTASSIUM CHLORIDE 40 MEQ: 40 SOLUTION ORAL at 14:51

## 2021-11-20 RX ADMIN — INSULIN ASPART 1 UNITS: 100 INJECTION, SOLUTION INTRAVENOUS; SUBCUTANEOUS at 12:19

## 2021-11-20 RX ADMIN — Medication 150 MCG: at 16:00

## 2021-11-20 RX ADMIN — POTASSIUM CHLORIDE 40 MEQ: 40 SOLUTION ORAL at 09:15

## 2021-11-20 RX ADMIN — HYDROMORPHONE HYDROCHLORIDE 8 MG: 4 TABLET ORAL at 23:18

## 2021-11-20 RX ADMIN — INSULIN ASPART 2 UNITS: 100 INJECTION, SOLUTION INTRAVENOUS; SUBCUTANEOUS at 16:50

## 2021-11-20 RX ADMIN — Medication 2 PACKET: at 17:48

## 2021-11-20 RX ADMIN — Medication 13 MG/HR: at 13:14

## 2021-11-20 RX ADMIN — Medication 2 PACKET: at 21:14

## 2021-11-20 RX ADMIN — Medication 150 MCG: at 17:52

## 2021-11-20 RX ADMIN — POTASSIUM CHLORIDE 20 MEQ: 1.5 POWDER, FOR SOLUTION ORAL at 09:17

## 2021-11-20 RX ADMIN — QUETIAPINE FUMARATE 100 MG: 100 TABLET ORAL at 19:58

## 2021-11-20 RX ADMIN — Medication 5 ML: at 12:18

## 2021-11-20 RX ADMIN — HYDROMORPHONE HYDROCHLORIDE 8 MG: 4 TABLET ORAL at 12:18

## 2021-11-20 RX ADMIN — QUETIAPINE FUMARATE 100 MG: 100 TABLET ORAL at 13:57

## 2021-11-20 RX ADMIN — LORAZEPAM 12 MG: 2 TABLET ORAL at 01:00

## 2021-11-20 RX ADMIN — Medication 150 MCG: at 17:00

## 2021-11-20 RX ADMIN — INSULIN ASPART 1 UNITS: 100 INJECTION, SOLUTION INTRAVENOUS; SUBCUTANEOUS at 08:45

## 2021-11-20 RX ADMIN — HYDROMORPHONE HYDROCHLORIDE 8 MG: 4 TABLET ORAL at 16:43

## 2021-11-20 RX ADMIN — Medication 2 PACKET: at 14:06

## 2021-11-20 RX ADMIN — LORAZEPAM 12 MG: 2 TABLET ORAL at 05:44

## 2021-11-20 RX ADMIN — ENOXAPARIN SODIUM 40 MG: 40 INJECTION SUBCUTANEOUS at 09:34

## 2021-11-20 RX ADMIN — LORAZEPAM 12 MG: 2 TABLET ORAL at 09:34

## 2021-11-20 RX ADMIN — HYDROMORPHONE HYDROCHLORIDE 8 MG: 4 TABLET ORAL at 04:04

## 2021-11-20 RX ADMIN — Medication 2 PACKET: at 09:17

## 2021-11-20 RX ADMIN — Medication 300 MCG/HR: at 16:59

## 2021-11-20 RX ADMIN — Medication 150 MCG: at 00:44

## 2021-11-20 RX ADMIN — CEFTRIAXONE SODIUM 2 G: 2 INJECTION, POWDER, FOR SOLUTION INTRAMUSCULAR; INTRAVENOUS at 09:16

## 2021-11-20 RX ADMIN — INSULIN ASPART 1 UNITS: 100 INJECTION, SOLUTION INTRAVENOUS; SUBCUTANEOUS at 20:05

## 2021-11-20 RX ADMIN — POTASSIUM CHLORIDE 40 MEQ: 40 SOLUTION ORAL at 19:57

## 2021-11-20 RX ADMIN — LORAZEPAM 12 MG: 2 TABLET ORAL at 21:15

## 2021-11-20 RX ADMIN — LORAZEPAM 12 MG: 2 TABLET ORAL at 17:46

## 2021-11-20 RX ADMIN — Medication 10 NG/KG/MIN: at 04:32

## 2021-11-20 RX ADMIN — POTASSIUM CHLORIDE 40 MEQ: 40 SOLUTION ORAL at 19:58

## 2021-11-20 RX ADMIN — INSULIN ASPART 1 UNITS: 100 INJECTION, SOLUTION INTRAVENOUS; SUBCUTANEOUS at 23:32

## 2021-11-20 RX ADMIN — INSULIN ASPART 2 UNITS: 100 INJECTION, SOLUTION INTRAVENOUS; SUBCUTANEOUS at 04:02

## 2021-11-20 RX ADMIN — LORAZEPAM 12 MG: 2 TABLET ORAL at 13:57

## 2021-11-20 RX ADMIN — Medication 150 MCG: at 08:24

## 2021-11-20 RX ADMIN — HYDROMORPHONE HYDROCHLORIDE 8 MG: 4 TABLET ORAL at 09:15

## 2021-11-20 RX ADMIN — PANTOPRAZOLE SODIUM 40 MG: 40 INJECTION, POWDER, FOR SOLUTION INTRAVENOUS at 09:16

## 2021-11-20 RX ADMIN — QUETIAPINE FUMARATE 100 MG: 100 TABLET ORAL at 09:16

## 2021-11-20 RX ADMIN — Medication 200 MCG/HR: at 00:59

## 2021-11-20 RX ADMIN — POTASSIUM CHLORIDE 40 MEQ: 1.5 POWDER, FOR SOLUTION ORAL at 05:59

## 2021-11-20 RX ADMIN — HYDROMORPHONE HYDROCHLORIDE 8 MG: 4 TABLET ORAL at 19:58

## 2021-11-20 RX ADMIN — INSULIN ASPART 2 UNITS: 100 INJECTION, SOLUTION INTRAVENOUS; SUBCUTANEOUS at 00:53

## 2021-11-20 RX ADMIN — Medication 2 PACKET: at 12:19

## 2021-11-20 ASSESSMENT — ACTIVITIES OF DAILY LIVING (ADL)
ADLS_ACUITY_SCORE: 19

## 2021-11-20 ASSESSMENT — MIFFLIN-ST. JEOR: SCORE: 2433

## 2021-11-20 NOTE — PLAN OF CARE
Fentanyl bolused x1, adequately sedated withdraws to all extremeties, pupils sluggish; Afebrile, MAP goal met without intervention, NSR; Vent settings unchanged, MICU MD notified of 0115 pO2 of 66 on 50% FiO2 and no Veletri - 0430 pO2 168 on 75%, 0600 pO2 99 on 50% FiO2 just before starting veletri, then 0600 pO2 w/ veletri restarted; Flores with adequate UOP; NJ with TF @ 45ml/hr and 75mL FWF, rectal tube with WDL output, trending down; BG ~200 on Q4 check

## 2021-11-20 NOTE — PROGRESS NOTES
MEDICAL ICU PROGRESS NOTE  11/20/2021      Date of Service (when I saw the patient): 11/20/2021    ASSESSMENT: Brandon Schafer is a 41 year old male with questionable PMH of remote VTE who was admitted on 11/3/2021 with ARDS 2/2 COVID pneumonia. Now with CAUTI and polymicrobial VAP.     CHANGES and MAJOR THINGS TODAY:   - stop epoprostenol   - schedule potassium 40 mEq BID    PLAN:    Neuro:  # Pain and sedation  - Fentanyl infusion  - Midazolam infusion, not tolerating wean at this time    - dilaudid 8mg q 4H  - Ativan 12mg Q4H  - Seroquel 100 mg TID  - RASS goal -4 to -5  - Consider NMB if worsening vent mechanics     Pulmonary:  # ARDS 2/2 COVID pneumonia  Intubated 11/2 prior to transport flight. Prone positioning started 11/5.   - maintain supine --- no benefit from prone  - stop epoprostenol to better follow pulmonary mechanics  - Infectious work-up and treatment as below    Ventilation Mode: CMV/AC  (Continuous Mandatory Ventilation/ Assist Control)  FiO2 (%): 50 %  Rate Set (breaths/minute): 26 breaths/min  Tidal Volume Set (mL): 530 mL  PEEP (cm H2O): 16 cmH2O  Oxygen Concentration (%): 75 %  Peak Inspiratory Pressure (cm H2O) (Drager Iamee): 32  Resp: 26      # ?COPD exacerbation  Wheezing on exam on 11/4. S/p azithromycin for 5d. Steroids per Covid protocol.   - Continue albuterol nebs prn     Cardiovascular:  # Hypotension, resolved  Transient, and in the setting of sedation. No prior TTE. Troponin peaked at 0.293. EKG with T wave inversion in lateral leads. Suspect troponin was 2/2 demand ischemia in setting of hypoxia.    # Atrial fibrillation  Episode of atrial fibrillation overnight 11/12, hemodynamically stable and resolved spontaneously. TWI on EKG, troponin negative     GI/Nutrition:  # At risk for malnutrition  # Constipation, improving  - Nutrition consult for TF  - Tolerating TF at goal   - Bowel regimen: scheduled Miralax and senna and prn Miralax     Renal/Fluids/Electrolytes:  #  Non-oliguric STEFAN, resolved  # Hypokalemia, in setting of ongoing diuresis   Unclear baseline, has not seen doctor in 20 years. Cr 2.5 on admission, now improved. 11/18 with rising bicarb, BUN, Cr...decreased UOP consistent with overdiuresis.    - holding diuretics     # Hypernatremia, resolved  - decreaseFWF 150ml q 2H-> 75ml q 2H, stable sodium     Endocrine:  # Stress and steroid induced hyperglycemia  - Medium resistance sliding scale insulin      ID:  # COVID pneumonia  Symptoms (headache, SOB, fever, cough, diarrhea) 1 week prior to presentation. Not COVID vaccinated. Tested positive on presentation on 11/2. S/p tocilizumab 11/4.  - Dexamethasone 6 mg daily (total 10 days)  - Deferring remdesivir given STEFAN and limited evidence for benefit      # Fever, tmax 101.3 11/17  # CAUTI  # VAP  Pt with worsening oxygen saturations, fevers, and persistent leukocytosis. Given his prolonged hospitalization, was started on broad-spectrum antibiotics on 11/11. Urine culture grew >100k pan-sensitive E coli. RVP and MRSA nares negative. Sputum grew E coli, S aureus, and GBS. Sputum culture 11/17 with 1+ Ecoli, blood cultures 11/17 pending. Continues to have fevers as of 11/18, redraw blood cultures and test stool for Cdiff. Of note, CXR 11/17 without clear/focal infiltrate.   - Continue CTX for additional 7 day course given persistent Ecoli on 11/17 sputum (11/19 - 11/25)     Abx:  - Ceftriaxone 11/14-11/17; 11/19 -   - Zosyn 11/11-11/14; 11/17 - 11/18  - Vanc 11/11-11/12      Hematology:    # Coagulopathy 2/2 COVID  US BLE negative for DVT.  - Enoxaparin ppx      MSK  # Septum wound, tongue wound 2/2 Proning   - WOC following     General Cares/Prophylaxis:    DVT Prophylaxis: Lovenox ppx (40 mg daily)  GI Prophylaxis: PPI  Family Communication: Brad Short, updated by phone  Code Status: DNR      Lines/tubes/drains:  - PIV x3  - Right PICC  - Arterial line left radial  - NG  - Flores  - ETT  - Rectal pouch       Disposition:  - Medical ICU    Patient seen and findings/plan discussed with medical ICU staff, Dr. Robert Pacheco MD      ====================================  INTERVAL HISTORY:   Notes reviewed. Afebrile overnight. P/f ratio 132 off epoprostenol, 198 on half dose epoprostenol.     OBJECTIVE:   1. VITAL SIGNS:   Temp:  [97.4  F (36.3  C)-98.9  F (37.2  C)] 98.9  F (37.2  C)  Pulse:  [70-90] 85  Resp:  [18-34] 26  BP: (113)/(55) 113/55  MAP:  [64 mmHg-103 mmHg] 92 mmHg  Arterial Line BP: ()/(46-79) 153/69  FiO2 (%):  [50 %-100 %] 50 %  SpO2:  [91 %-100 %] 100 %  Ventilation Mode: CMV/AC  (Continuous Mandatory Ventilation/ Assist Control)  FiO2 (%): 50 %  Rate Set (breaths/minute): 26 breaths/min  Tidal Volume Set (mL): 530 mL  PEEP (cm H2O): 16 cmH2O  Oxygen Concentration (%): 75 %  Peak Inspiratory Pressure (cm H2O) (Drager Aimee): 32  Resp: 26    2. INTAKE/ OUTPUT:   I/O last 3 completed shifts:  In: 3847.38 [I.V.:1812.38; NG/GT:1045]  Out: 4025 [Urine:2650; Stool:1375]    3. PHYSICAL EXAMINATION:  General: Sedated, intubated, supine  HEENT: Mucous membranes dry, wound on tongue and base of nasal septum  Neuro: sedated  Pulm/Resp: Coarse breath sounds bilaterally without rhonchi, crackles or wheeze, breathing non-labored  CV: tachycardic, S1/S2 without m/r/g;  Abdomen: Obese, soft, non-distended, non-tender  : Flores catheter in place, urine yellow and clear  Incisions/Skin: Wounds as above, no rashes noted    4. LABS:   Arterial Blood Gases   Recent Labs   Lab 11/20/21  0608 11/20/21  0430 11/20/21  0115 11/19/21  2144   PH 7.45 7.44 7.44 7.45   PCO2 53* 54* 55* 53*   PO2 99 168* 66* 225*   HCO3 37* 37* 37* 37*     Complete Blood Count   Recent Labs   Lab 11/20/21  0429 11/19/21  0408 11/18/21  0412 11/17/21  0351   WBC 6.6 8.7 11.3* 13.4*   HGB 9.4* 9.1* 10.2* 11.3*    157 170 189     Basic Metabolic Panel  Recent Labs   Lab 11/20/21 0429 11/20/21  0402 11/20/21  0053  21  1601 21  1205 21  1017 21  0413 21  0408 21  0736 21  0428 21  1609 21  1606     --   --   --   --   --   --   --  140  --  140  --  142   POTASSIUM 2.8*  --   --   --  2.9*  --  2.8*  --  2.6*   < > 2.8*  --  3.6   CHLORIDE 102  --   --   --   --   --   --   --  101  --  100  --  104   CO2 36*  --   --   --   --   --   --   --  37*  --  35*  --  33*   BUN 33*  --   --   --   --   --   --   --  44*  --  42*  --  41*   CR 0.89  --   --   --   --   --   --   --  1.04  --  1.29*  --  1.09   * 191* 198* 214*  --    < >  --    < > 259*   < > 241*   < > 239*    < > = values in this interval not displayed.     Liver Function Tests  No lab results found in last 7 days.  5. RADIOLOGY:   Recent Results (from the past 24 hour(s))   Echocardiogram Complete   Result Value    LVEF  55-60%    Narrative    [id number]  [id number]  [id number]  [id number]^CHRISTY PINTO^AYAH^MIMI     [facility]  Echocardiography Laboratory  [address] [place] [id number]     Name: GUY REINOSO  MRN: [id number]  : 1980  Study Date: 2021 09:36 AM  Age: 41 yrs  Gender: Male  Patient Location: [facility]  Reason For Study: Hypoxia, Respiratory failure  Ordering Physician: AYAH HARRISON JR.  Referring Physician: OCTAVIO ABARCA  Performed By: Rubi Luna JO     BSA: 2.6 m2  Height: 72 in  Weight: 326 lb  HR: 83  BP: 113/55 mmHg  ______________________________________________________________________________  Procedure  Echocardiogram with two-dimensional, color and spectral Doppler performed.  Contrast Optison. Optison (NDC #7615-3494-55) given intravenously. Patient was  given 5 ml mixture of 3 ml Optison and 6 ml saline. 4 ml wasted.  ______________________________________________________________________________  Interpretation Summary  Global and regional left ventricular function is normal with an EF of  55-60%.  Global right ventricular function is normal.  No significant valvular abnormalities present.  The mean RA pressure cannot be estimated given the patient is on a ventilator.  No pericardial effusion is present.     ______________________________________________________________________________  Left Ventricle  Left ventricular size is normal. Left ventricular wall thickness is normal.  Global and regional left ventricular function is normal with an EF of 55-60%.  Left ventricular diastolic function is normal.     Right Ventricle  The right ventricle is normal size. Global right ventricular function is  normal.     Atria  Both atria appear normal.     Mitral Valve  The mitral valve is normal.     Aortic Valve  Aortic valve is normal in structure and function.     Tricuspid Valve  The tricuspid valve is normal. The peak velocity of the tricuspid regurgitant  jet is not obtainable. Pulmonary artery systolic pressure cannot be assessed.     Pulmonic Valve  The valve leaflets are not well visualized. On Doppler interrogation, there is  no significant stenosis or regurgitation.     Vessels  The aorta root is normal. Dilation of the inferior vena cava is present with  abnormal respiratory variation in diameter. Unable to assess mean RA pressure  given the patient is on a ventilator.     Pericardium  No pericardial effusion is present.     Miscellaneous  No significant valvular abnormalities present.     Compared to Previous Study  Previous study not available for comparison.  ______________________________________________________________________________  MMode/2D Measurements & Calculations  asc Aorta Diam: 2.9 cm  LVOT diam: 2.0 cm  LVOT area: 3.1 cm2     Doppler Measurements & Calculations  MV E max christine: 80.0 cm/sec  MV A max christine: 64.6 cm/sec  MV E/A: 1.2  MV dec slope: 354.0 cm/sec2  E/E' avg: 10.7  Lateral E/e': 9.0  Medial E/e': 12.3      ______________________________________________________________________________  Report approved by: Enrrique Daniels 11/19/2021 11:37 AM

## 2021-11-21 LAB
ANION GAP SERPL CALCULATED.3IONS-SCNC: 3 MMOL/L (ref 3–14)
BACTERIA SPT CULT: ABNORMAL
BASE EXCESS BLDA CALC-SCNC: 9.2 MMOL/L (ref -9–1.8)
BUN SERPL-MCNC: 34 MG/DL (ref 7–30)
CALCIUM SERPL-MCNC: 8.8 MG/DL (ref 8.5–10.1)
CHLORIDE BLD-SCNC: 106 MMOL/L (ref 94–109)
CO2 SERPL-SCNC: 33 MMOL/L (ref 20–32)
CREAT SERPL-MCNC: 0.85 MG/DL (ref 0.66–1.25)
ERYTHROCYTE [DISTWIDTH] IN BLOOD BY AUTOMATED COUNT: 16 % (ref 10–15)
GFR SERPL CREATININE-BSD FRML MDRD: >90 ML/MIN/1.73M2
GLUCOSE BLD-MCNC: 226 MG/DL (ref 70–99)
GLUCOSE BLDC GLUCOMTR-MCNC: 146 MG/DL (ref 70–99)
GLUCOSE BLDC GLUCOMTR-MCNC: 151 MG/DL (ref 70–99)
GLUCOSE BLDC GLUCOMTR-MCNC: 165 MG/DL (ref 70–99)
GLUCOSE BLDC GLUCOMTR-MCNC: 173 MG/DL (ref 70–99)
GLUCOSE BLDC GLUCOMTR-MCNC: 203 MG/DL (ref 70–99)
GRAM STAIN RESULT: ABNORMAL
GRAM STAIN RESULT: ABNORMAL
HCO3 BLD-SCNC: 35 MMOL/L (ref 21–28)
HCT VFR BLD AUTO: 29.9 % (ref 40–53)
HGB BLD-MCNC: 9.1 G/DL (ref 13.3–17.7)
MAGNESIUM SERPL-MCNC: 2.6 MG/DL (ref 1.6–2.3)
MCH RBC QN AUTO: 27.4 PG (ref 26.5–33)
MCHC RBC AUTO-ENTMCNC: 30.4 G/DL (ref 31.5–36.5)
MCV RBC AUTO: 90 FL (ref 78–100)
O2/TOTAL GAS SETTING VFR VENT: 50 %
PCO2 BLD: 52 MM HG (ref 35–45)
PH BLD: 7.43 [PH] (ref 7.35–7.45)
PLATELET # BLD AUTO: 173 10E3/UL (ref 150–450)
PO2 BLD: 98 MM HG (ref 80–105)
POTASSIUM BLD-SCNC: 3.6 MMOL/L (ref 3.4–5.3)
POTASSIUM BLD-SCNC: 3.7 MMOL/L (ref 3.4–5.3)
RBC # BLD AUTO: 3.32 10E6/UL (ref 4.4–5.9)
SODIUM SERPL-SCNC: 142 MMOL/L (ref 133–144)
TRIGL SERPL-MCNC: 336 MG/DL
WBC # BLD AUTO: 6.7 10E3/UL (ref 4–11)

## 2021-11-21 PROCEDURE — 84478 ASSAY OF TRIGLYCERIDES: CPT | Performed by: NURSE PRACTITIONER

## 2021-11-21 PROCEDURE — 250N000011 HC RX IP 250 OP 636: Performed by: NURSE PRACTITIONER

## 2021-11-21 PROCEDURE — 200N000002 HC R&B ICU UMMC

## 2021-11-21 PROCEDURE — C9113 INJ PANTOPRAZOLE SODIUM, VIA: HCPCS

## 2021-11-21 PROCEDURE — 250N000013 HC RX MED GY IP 250 OP 250 PS 637: Performed by: STUDENT IN AN ORGANIZED HEALTH CARE EDUCATION/TRAINING PROGRAM

## 2021-11-21 PROCEDURE — 85027 COMPLETE CBC AUTOMATED: CPT | Performed by: STUDENT IN AN ORGANIZED HEALTH CARE EDUCATION/TRAINING PROGRAM

## 2021-11-21 PROCEDURE — 250N000011 HC RX IP 250 OP 636

## 2021-11-21 PROCEDURE — 250N000011 HC RX IP 250 OP 636: Performed by: STUDENT IN AN ORGANIZED HEALTH CARE EDUCATION/TRAINING PROGRAM

## 2021-11-21 PROCEDURE — 94003 VENT MGMT INPAT SUBQ DAY: CPT

## 2021-11-21 PROCEDURE — 250N000013 HC RX MED GY IP 250 OP 250 PS 637: Performed by: SURGERY

## 2021-11-21 PROCEDURE — 99291 CRITICAL CARE FIRST HOUR: CPT | Mod: GC | Performed by: INTERNAL MEDICINE

## 2021-11-21 PROCEDURE — 999N000015 HC STATISTIC ARTERIAL MONITORING DAILY

## 2021-11-21 PROCEDURE — 250N000013 HC RX MED GY IP 250 OP 250 PS 637: Performed by: NURSE PRACTITIONER

## 2021-11-21 PROCEDURE — 80048 BASIC METABOLIC PNL TOTAL CA: CPT | Performed by: STUDENT IN AN ORGANIZED HEALTH CARE EDUCATION/TRAINING PROGRAM

## 2021-11-21 PROCEDURE — 999N000157 HC STATISTIC RCP TIME EA 10 MIN

## 2021-11-21 PROCEDURE — 83735 ASSAY OF MAGNESIUM: CPT | Performed by: STUDENT IN AN ORGANIZED HEALTH CARE EDUCATION/TRAINING PROGRAM

## 2021-11-21 PROCEDURE — 82803 BLOOD GASES ANY COMBINATION: CPT | Performed by: STUDENT IN AN ORGANIZED HEALTH CARE EDUCATION/TRAINING PROGRAM

## 2021-11-21 PROCEDURE — 250N000013 HC RX MED GY IP 250 OP 250 PS 637

## 2021-11-21 RX ORDER — QUETIAPINE FUMARATE 100 MG/1
100 TABLET, FILM COATED ORAL EVERY 8 HOURS
Status: DISCONTINUED | OUTPATIENT
Start: 2021-11-21 | End: 2021-11-24

## 2021-11-21 RX ORDER — POTASSIUM CHLORIDE 20MEQ/15ML
20 LIQUID (ML) ORAL ONCE
Status: COMPLETED | OUTPATIENT
Start: 2021-11-21 | End: 2021-11-21

## 2021-11-21 RX ADMIN — ENOXAPARIN SODIUM 40 MG: 40 INJECTION SUBCUTANEOUS at 10:34

## 2021-11-21 RX ADMIN — HYDROMORPHONE HYDROCHLORIDE 8 MG: 4 TABLET ORAL at 08:27

## 2021-11-21 RX ADMIN — INSULIN ASPART 1 UNITS: 100 INJECTION, SOLUTION INTRAVENOUS; SUBCUTANEOUS at 16:50

## 2021-11-21 RX ADMIN — Medication 2 PACKET: at 21:19

## 2021-11-21 RX ADMIN — POTASSIUM CHLORIDE 20 MEQ: 20 SOLUTION ORAL at 06:08

## 2021-11-21 RX ADMIN — Medication 2 PACKET: at 10:36

## 2021-11-21 RX ADMIN — Medication 13 MG/HR: at 10:35

## 2021-11-21 RX ADMIN — Medication 250 MCG/HR: at 11:26

## 2021-11-21 RX ADMIN — Medication 2 PACKET: at 08:29

## 2021-11-21 RX ADMIN — QUETIAPINE FUMARATE 100 MG: 100 TABLET ORAL at 08:27

## 2021-11-21 RX ADMIN — HYDROMORPHONE HYDROCHLORIDE 8 MG: 4 TABLET ORAL at 16:47

## 2021-11-21 RX ADMIN — LORAZEPAM 12 MG: 2 TABLET ORAL at 14:45

## 2021-11-21 RX ADMIN — Medication 200 MCG/HR: at 19:47

## 2021-11-21 RX ADMIN — Medication 150 MCG: at 14:55

## 2021-11-21 RX ADMIN — QUETIAPINE FUMARATE 100 MG: 100 TABLET ORAL at 20:33

## 2021-11-21 RX ADMIN — HYDROMORPHONE HYDROCHLORIDE 8 MG: 4 TABLET ORAL at 03:56

## 2021-11-21 RX ADMIN — Medication 150 MCG: at 16:51

## 2021-11-21 RX ADMIN — LORAZEPAM 12 MG: 2 TABLET ORAL at 10:34

## 2021-11-21 RX ADMIN — HYDROMORPHONE HYDROCHLORIDE 8 MG: 4 TABLET ORAL at 12:33

## 2021-11-21 RX ADMIN — HYDROMORPHONE HYDROCHLORIDE 8 MG: 4 TABLET ORAL at 20:33

## 2021-11-21 RX ADMIN — Medication 13 MG/HR: at 03:14

## 2021-11-21 RX ADMIN — Medication 250 MCG/HR: at 03:05

## 2021-11-21 RX ADMIN — Medication 5 ML: at 08:33

## 2021-11-21 RX ADMIN — Medication 13 MG/HR: at 22:48

## 2021-11-21 RX ADMIN — CEFTRIAXONE SODIUM 2 G: 2 INJECTION, POWDER, FOR SOLUTION INTRAMUSCULAR; INTRAVENOUS at 08:27

## 2021-11-21 RX ADMIN — Medication 2 PACKET: at 18:30

## 2021-11-21 RX ADMIN — INSULIN ASPART 1 UNITS: 100 INJECTION, SOLUTION INTRAVENOUS; SUBCUTANEOUS at 03:55

## 2021-11-21 RX ADMIN — Medication 13 MG/HR: at 16:47

## 2021-11-21 RX ADMIN — LORAZEPAM 12 MG: 2 TABLET ORAL at 01:38

## 2021-11-21 RX ADMIN — INSULIN ASPART 2 UNITS: 100 INJECTION, SOLUTION INTRAVENOUS; SUBCUTANEOUS at 20:42

## 2021-11-21 RX ADMIN — PANTOPRAZOLE SODIUM 40 MG: 40 INJECTION, POWDER, FOR SOLUTION INTRAVENOUS at 08:27

## 2021-11-21 RX ADMIN — INSULIN ASPART 1 UNITS: 100 INJECTION, SOLUTION INTRAVENOUS; SUBCUTANEOUS at 12:38

## 2021-11-21 RX ADMIN — LORAZEPAM 12 MG: 2 TABLET ORAL at 18:30

## 2021-11-21 RX ADMIN — Medication 2 PACKET: at 14:45

## 2021-11-21 RX ADMIN — LORAZEPAM 12 MG: 2 TABLET ORAL at 06:08

## 2021-11-21 RX ADMIN — POTASSIUM CHLORIDE 20 MEQ: 40 SOLUTION ORAL at 00:40

## 2021-11-21 RX ADMIN — INSULIN ASPART 1 UNITS: 100 INJECTION, SOLUTION INTRAVENOUS; SUBCUTANEOUS at 08:35

## 2021-11-21 RX ADMIN — QUETIAPINE FUMARATE 100 MG: 100 TABLET ORAL at 14:45

## 2021-11-21 ASSESSMENT — ACTIVITIES OF DAILY LIVING (ADL)
ADLS_ACUITY_SCORE: 19
ADLS_ACUITY_SCORE: 21
ADLS_ACUITY_SCORE: 19
ADLS_ACUITY_SCORE: 19
ADLS_ACUITY_SCORE: 21
ADLS_ACUITY_SCORE: 19
ADLS_ACUITY_SCORE: 21
ADLS_ACUITY_SCORE: 21
ADLS_ACUITY_SCORE: 19
ADLS_ACUITY_SCORE: 21
ADLS_ACUITY_SCORE: 19
ADLS_ACUITY_SCORE: 21
ADLS_ACUITY_SCORE: 19
ADLS_ACUITY_SCORE: 21
ADLS_ACUITY_SCORE: 19
ADLS_ACUITY_SCORE: 21

## 2021-11-21 NOTE — PROGRESS NOTES
MEDICAL ICU PROGRESS NOTE  11/21/2021      Date of Service (when I saw the patient): 11/21/2021    ASSESSMENT: Brandon Schafer is a 41 year old male with questionable PMH of remote VTE who was admitted on 11/3/2021 with ARDS 2/2 COVID pneumonia. Now with CAUTI and polymicrobial VAP.     CHANGES and MAJOR THINGS TODAY:   - wean iv sedation as able    PLAN:    Neuro:  # Pain and sedation  - Fentanyl infusion  - Midazolam infusion, not tolerating wean at this time    - dilaudid 8mg q 4H  - Ativan 12mg Q4H  - Seroquel 100 mg TID  - RASS goal -4 to -5  - Consider NMB if worsening vent mechanics     Pulmonary:  # ARDS 2/2 COVID pneumonia  Intubated 11/2 prior to transport flight. Prone positioning started 11/5.   - maintain supine --- no benefit from prone  - stop epoprostenol to better follow pulmonary mechanics  - Infectious work-up and treatment as below    Ventilation Mode: CMV/AC  (Continuous Mandatory Ventilation/ Assist Control)  FiO2 (%): 50 %  Rate Set (breaths/minute): 26 breaths/min  Tidal Volume Set (mL): 530 mL  PEEP (cm H2O): 16 cmH2O  Oxygen Concentration (%): 5 %  Peak Inspiratory Pressure (cm H2O) (Drager Aimee): 0  Resp: 28      # ?COPD exacerbation  Wheezing on exam on 11/4. S/p azithromycin for 5d. Steroids per Covid protocol.   - Continue albuterol nebs prn     Cardiovascular:  # Hypotension, resolved  Transient, and in the setting of sedation. No prior TTE. Troponin peaked at 0.293. EKG with T wave inversion in lateral leads. Suspect troponin was 2/2 demand ischemia in setting of hypoxia.    # Atrial fibrillation  Episode of atrial fibrillation overnight 11/12, hemodynamically stable and resolved spontaneously. TWI on EKG, troponin negative     GI/Nutrition:  # At risk for malnutrition  # Constipation, improving  - Nutrition consult for TF  - Tolerating TF at goal   - Bowel regimen: scheduled Miralax and senna and prn Miralax     Renal/Fluids/Electrolytes:  # Non-oliguric STEFAN, resolved  #  Hypokalemia, in setting of ongoing diuresis   Unclear baseline, has not seen doctor in 20 years. Cr 2.5 on admission, now improved. 11/18 with rising bicarb, BUN, Cr...decreased UOP consistent with overdiuresis.    - holding diuretics     # Hypernatremia, resolved  - decreaseFWF 150ml q 2H-> 75ml q 2H, stable sodium     Endocrine:  # Stress and steroid induced hyperglycemia  - Medium resistance sliding scale insulin      ID:  # COVID pneumonia  Symptoms (headache, SOB, fever, cough, diarrhea) 1 week prior to presentation. Not COVID vaccinated. Tested positive on presentation on 11/2. S/p tocilizumab 11/4.  - Dexamethasone 6 mg daily (total 10 days)     # Fever, tmax 101.3 11/17  # CAUTI  # VAP  Pt with worsening oxygen saturations, fevers, and persistent leukocytosis. Given his prolonged hospitalization, was started on broad-spectrum antibiotics on 11/11. Urine culture grew >100k pan-sensitive E coli. RVP and MRSA nares negative. Sputum grew E coli, S aureus, and GBS. Sputum culture 11/17 with 1+ Ecoli, blood cultures 11/17 pending. Continues to have fevers as of 11/18, redraw blood cultures and test stool for Cdiff. Of note, CXR 11/17 without clear/focal infiltrate.   - Continue CTX for additional 7 day course given persistent Ecoli on 11/17 sputum (11/19 - 11/25)     Abx:  - Ceftriaxone 11/14-11/17; 11/19 -   - Zosyn 11/11-11/14; 11/17 - 11/18  - Vanc 11/11-11/12      Hematology:    # Coagulopathy 2/2 COVID  US BLE negative for DVT.  - Enoxaparin ppx      MSK  # Septum wound, tongue wound 2/2 Proning   - WOC following     General Cares/Prophylaxis:    DVT Prophylaxis: Lovenox ppx (40 mg daily)  GI Prophylaxis: PPI  Family Communication: BrotherBrad, updated by phone  Code Status: DNR      Lines/tubes/drains:  - PIV x3  - Arterial line left radial  - NG  - Flores  - ETT  - Rectal pouch      Disposition:  - Medical ICU    Patient seen and findings/plan discussed with medical ICU staff, Dr. Robert Catherine  MD Tara      ====================================  INTERVAL HISTORY:   Notes reviewed. Afebrile overnight. P/f ratio 196. Net negative 500 without diuretics.    OBJECTIVE:   1. VITAL SIGNS:   Temp:  [98.5  F (36.9  C)-99.1  F (37.3  C)] 98.8  F (37.1  C)  Pulse:  [] 83  Resp:  [26-31] 28  MAP:  [68 mmHg-93 mmHg] 74 mmHg  Arterial Line BP: ()/(52-76) 118/54  FiO2 (%):  [50 %] 50 %  SpO2:  [94 %-100 %] 100 %  Ventilation Mode: CMV/AC  (Continuous Mandatory Ventilation/ Assist Control)  FiO2 (%): 50 %  Rate Set (breaths/minute): 26 breaths/min  Tidal Volume Set (mL): 530 mL  PEEP (cm H2O): 16 cmH2O  Oxygen Concentration (%): 5 %  Peak Inspiratory Pressure (cm H2O) (Drager Aimee): 0  Resp: 28    2. INTAKE/ OUTPUT:   I/O last 3 completed shifts:  In: 2398.2 [I.V.:613.2; NG/GT:975]  Out: 2440 [Urine:2440]    3. PHYSICAL EXAMINATION:  General: Sedated, intubated, supine  HEENT: Mucous membranes dry, wound on tongue and base of nasal septum  Neuro: sedated  Pulm/Resp: Coarse breath sounds bilaterally without rhonchi, crackles or wheeze, breathing non-labored  CV: tachycardic, S1/S2 without m/r/g;  Abdomen: Obese, soft, non-distended, non-tender  : Flores catheter in place, urine yellow and clear  Incisions/Skin: Wounds as above, no rashes noted    4. LABS:   Arterial Blood Gases   Recent Labs   Lab 11/21/21  0637 11/20/21  0608 11/20/21  0430 11/20/21  0115   PH 7.43 7.45 7.44 7.44   PCO2 52* 53* 54* 55*   PO2 98 99 168* 66*   HCO3 35* 37* 37* 37*     Complete Blood Count   Recent Labs   Lab 11/21/21  0353 11/20/21  0429 11/19/21  0408 11/18/21  0412   WBC 6.7 6.6 8.7 11.3*   HGB 9.1* 9.4* 9.1* 10.2*    160 157 170     Basic Metabolic Panel  Recent Labs   Lab 11/21/21  1650 11/21/21  1238 11/21/21  0835 11/21/21  0353 11/20/21  2331 11/20/21  2325 11/20/21  2003 11/20/21  1824 11/20/21  1649 11/20/21  1405 11/20/21  0936 11/20/21  0429 11/19/21  0413 11/19/21  0408 11/18/21  0736 11/18/21  0428    NA  --   --   --  142  --   --   --   --   --   --   --  141  --  140  --  140   POTASSIUM  --   --   --  3.7  --  3.6  --  3.3*  --  3.4  --  2.8*   < > 2.6*   < > 2.8*   CHLORIDE  --   --   --  106  --   --   --   --   --   --   --  102  --  101  --  100   CO2  --   --   --  33*  --   --   --   --   --   --   --  36*  --  37*  --  35*   BUN  --   --   --  34*  --   --   --   --   --   --   --  33*  --  44*  --  42*   CR  --   --   --  0.85  --   --   --   --   --   --   --  0.89  --  1.04  --  1.29*   * 146* 151* 226*   < >  --    < >  --    < >  --    < > 228*   < > 259*   < > 241*    < > = values in this interval not displayed.     Liver Function Tests  No lab results found in last 7 days.  5. RADIOLOGY:   No results found for this or any previous visit (from the past 24 hour(s)).

## 2021-11-21 NOTE — PLAN OF CARE
See assessment for detailed information.     Neuro: pt remains intubated and sedated on vent. Facial grimacing and withdrawals from painful stimuli. (B) soft wrist restraints intact to prevent pt from pulling out invasive lines and tubes.  Cardiac: Pt NSR to ST per monitor depending on stimulation and oral care. Pt on no vasoactive gtts at this time.   Pulmonary: 50% FiO2 PEEP 16. Lungs coarse throughout. Copious amounts of thick creamy secretions removed via ETT and orally. Oral care provided every 4 hours.   GI: Pt remains on tube feedings at 45 ml/hr goal. No vomiting noted. Fecal pouch intact.   : Flores intact with /hr. Dark yellow cloudy urine observed.   Skin: Perineum red with breakdown noted around ears, septum of nose, and redness underneath commercial ETT lorenzana (B) cheeks.   IV: Pt with 2 PIVs with Fentanyl, Versed, and  IVFs infusing.      Plan: Cont. POC and notify MD of any changes or concerns.

## 2021-11-21 NOTE — PLAN OF CARE
Neuro: Sedated, intubated. Opens eyes to pain occasionally, withdraws and grimaces to pain. Needed increased sedation in afternoon, hard to keep sedated when doing big turns, cleaning up stool. Needed increased rates and boluses.  Cardiac: SR-ST at times. Tmax 99.6. VSS.       Respiratory: SPO2 >92 on 50% FIO2. Required 100% FIO2 when cleaning stool and turning.  GI/: Adequate urine output, yellow/cloudy per stevenson. Rectal tube came out when coughing during a turn. Pouch placed.   Diet/appetite: Tolerating TF @45. 75ml flush q2.  Activity:  Turn and reposition q2 , and PRN, pressure points padded and checked, heels floated.   Pain: CPOT 0-5, Fent and Versed gtts, Dilaudid and ativan PO  Skin: No new deficits noted.  LDA's: PIVx2, PICC removed per order, stevenson, rectal pouch.      Plan: Continue with POC. Notify primary team with changes. Pt stable, not tolerating big turns well, needed increased sedation for bed bath and placing rectal pouch. No calls from family today.

## 2021-11-22 LAB
ANION GAP SERPL CALCULATED.3IONS-SCNC: 4 MMOL/L (ref 3–14)
BACTERIA BLD CULT: NO GROWTH
BACTERIA BLD CULT: NO GROWTH
BASE EXCESS BLDA CALC-SCNC: 8.5 MMOL/L (ref -9–1.8)
BASE EXCESS BLDA CALC-SCNC: 8.6 MMOL/L (ref -9–1.8)
BUN SERPL-MCNC: 30 MG/DL (ref 7–30)
CALCIUM SERPL-MCNC: 8.8 MG/DL (ref 8.5–10.1)
CHLORIDE BLD-SCNC: 106 MMOL/L (ref 94–109)
CO2 SERPL-SCNC: 33 MMOL/L (ref 20–32)
CREAT SERPL-MCNC: 0.88 MG/DL (ref 0.66–1.25)
ERYTHROCYTE [DISTWIDTH] IN BLOOD BY AUTOMATED COUNT: 16 % (ref 10–15)
GFR SERPL CREATININE-BSD FRML MDRD: >90 ML/MIN/1.73M2
GLUCOSE BLD-MCNC: 191 MG/DL (ref 70–99)
GLUCOSE BLDC GLUCOMTR-MCNC: 125 MG/DL (ref 70–99)
GLUCOSE BLDC GLUCOMTR-MCNC: 154 MG/DL (ref 70–99)
GLUCOSE BLDC GLUCOMTR-MCNC: 159 MG/DL (ref 70–99)
GLUCOSE BLDC GLUCOMTR-MCNC: 168 MG/DL (ref 70–99)
GLUCOSE BLDC GLUCOMTR-MCNC: 178 MG/DL (ref 70–99)
GLUCOSE BLDC GLUCOMTR-MCNC: 181 MG/DL (ref 70–99)
HCO3 BLD-SCNC: 33 MMOL/L (ref 21–28)
HCO3 BLD-SCNC: 35 MMOL/L (ref 21–28)
HCT VFR BLD AUTO: 28.7 % (ref 40–53)
HGB BLD-MCNC: 8.8 G/DL (ref 13.3–17.7)
MAGNESIUM SERPL-MCNC: 2.5 MG/DL (ref 1.6–2.3)
MAGNESIUM SERPL-MCNC: 2.6 MG/DL (ref 1.6–2.3)
MCH RBC QN AUTO: 27.8 PG (ref 26.5–33)
MCHC RBC AUTO-ENTMCNC: 30.7 G/DL (ref 31.5–36.5)
MCV RBC AUTO: 91 FL (ref 78–100)
O2/TOTAL GAS SETTING VFR VENT: 40 %
O2/TOTAL GAS SETTING VFR VENT: 45 %
PCO2 BLD: 48 MM HG (ref 35–45)
PCO2 BLD: 53 MM HG (ref 35–45)
PH BLD: 7.42 [PH] (ref 7.35–7.45)
PH BLD: 7.46 [PH] (ref 7.35–7.45)
PLATELET # BLD AUTO: 163 10E3/UL (ref 150–450)
PO2 BLD: 70 MM HG (ref 80–105)
PO2 BLD: 71 MM HG (ref 80–105)
POTASSIUM BLD-SCNC: 3.4 MMOL/L (ref 3.4–5.3)
RBC # BLD AUTO: 3.17 10E6/UL (ref 4.4–5.9)
SODIUM SERPL-SCNC: 143 MMOL/L (ref 133–144)
TRIGL SERPL-MCNC: 303 MG/DL
WBC # BLD AUTO: 6.6 10E3/UL (ref 4–11)

## 2021-11-22 PROCEDURE — 84478 ASSAY OF TRIGLYCERIDES: CPT | Performed by: NURSE PRACTITIONER

## 2021-11-22 PROCEDURE — 250N000009 HC RX 250: Performed by: STUDENT IN AN ORGANIZED HEALTH CARE EDUCATION/TRAINING PROGRAM

## 2021-11-22 PROCEDURE — 250N000011 HC RX IP 250 OP 636: Performed by: STUDENT IN AN ORGANIZED HEALTH CARE EDUCATION/TRAINING PROGRAM

## 2021-11-22 PROCEDURE — 999N000015 HC STATISTIC ARTERIAL MONITORING DAILY

## 2021-11-22 PROCEDURE — 82803 BLOOD GASES ANY COMBINATION: CPT | Performed by: STUDENT IN AN ORGANIZED HEALTH CARE EDUCATION/TRAINING PROGRAM

## 2021-11-22 PROCEDURE — 250N000011 HC RX IP 250 OP 636

## 2021-11-22 PROCEDURE — 250N000013 HC RX MED GY IP 250 OP 250 PS 637: Performed by: STUDENT IN AN ORGANIZED HEALTH CARE EDUCATION/TRAINING PROGRAM

## 2021-11-22 PROCEDURE — 99291 CRITICAL CARE FIRST HOUR: CPT | Mod: GC | Performed by: INTERNAL MEDICINE

## 2021-11-22 PROCEDURE — 250N000011 HC RX IP 250 OP 636: Performed by: NURSE PRACTITIONER

## 2021-11-22 PROCEDURE — 250N000013 HC RX MED GY IP 250 OP 250 PS 637

## 2021-11-22 PROCEDURE — 85027 COMPLETE CBC AUTOMATED: CPT | Performed by: STUDENT IN AN ORGANIZED HEALTH CARE EDUCATION/TRAINING PROGRAM

## 2021-11-22 PROCEDURE — G0463 HOSPITAL OUTPT CLINIC VISIT: HCPCS

## 2021-11-22 PROCEDURE — 999N000157 HC STATISTIC RCP TIME EA 10 MIN

## 2021-11-22 PROCEDURE — 94003 VENT MGMT INPAT SUBQ DAY: CPT

## 2021-11-22 PROCEDURE — 258N000003 HC RX IP 258 OP 636: Performed by: STUDENT IN AN ORGANIZED HEALTH CARE EDUCATION/TRAINING PROGRAM

## 2021-11-22 PROCEDURE — 80048 BASIC METABOLIC PNL TOTAL CA: CPT | Performed by: STUDENT IN AN ORGANIZED HEALTH CARE EDUCATION/TRAINING PROGRAM

## 2021-11-22 PROCEDURE — 250N000013 HC RX MED GY IP 250 OP 250 PS 637: Performed by: NURSE PRACTITIONER

## 2021-11-22 PROCEDURE — 83735 ASSAY OF MAGNESIUM: CPT | Performed by: STUDENT IN AN ORGANIZED HEALTH CARE EDUCATION/TRAINING PROGRAM

## 2021-11-22 PROCEDURE — 200N000002 HC R&B ICU UMMC

## 2021-11-22 RX ORDER — POTASSIUM CHLORIDE 1.5 G/1.58G
40 POWDER, FOR SOLUTION ORAL ONCE
Status: COMPLETED | OUTPATIENT
Start: 2021-11-22 | End: 2021-11-22

## 2021-11-22 RX ORDER — DEXMEDETOMIDINE HYDROCHLORIDE 4 UG/ML
0.2-0.7 INJECTION, SOLUTION INTRAVENOUS CONTINUOUS
Status: DISCONTINUED | OUTPATIENT
Start: 2021-11-22 | End: 2021-11-26

## 2021-11-22 RX ADMIN — Medication 10 MG/HR: at 14:18

## 2021-11-22 RX ADMIN — DEXMEDETOMIDINE 0.3 MCG/KG/HR: 100 INJECTION, SOLUTION, CONCENTRATE INTRAVENOUS at 15:32

## 2021-11-22 RX ADMIN — DEXMEDETOMIDINE 0.3 MCG/KG/HR: 100 INJECTION, SOLUTION, CONCENTRATE INTRAVENOUS at 11:53

## 2021-11-22 RX ADMIN — LORAZEPAM 12 MG: 2 TABLET ORAL at 09:04

## 2021-11-22 RX ADMIN — Medication 2 PACKET: at 11:34

## 2021-11-22 RX ADMIN — HYDROMORPHONE HYDROCHLORIDE 8 MG: 4 TABLET ORAL at 09:04

## 2021-11-22 RX ADMIN — Medication 10 MG/HR: at 14:36

## 2021-11-22 RX ADMIN — Medication 2 PACKET: at 09:06

## 2021-11-22 RX ADMIN — INSULIN ASPART 1 UNITS: 100 INJECTION, SOLUTION INTRAVENOUS; SUBCUTANEOUS at 21:44

## 2021-11-22 RX ADMIN — LORAZEPAM 12 MG: 2 TABLET ORAL at 03:54

## 2021-11-22 RX ADMIN — INSULIN ASPART 1 UNITS: 100 INJECTION, SOLUTION INTRAVENOUS; SUBCUTANEOUS at 00:10

## 2021-11-22 RX ADMIN — INSULIN ASPART 1 UNITS: 100 INJECTION, SOLUTION INTRAVENOUS; SUBCUTANEOUS at 04:28

## 2021-11-22 RX ADMIN — HYDROMORPHONE HYDROCHLORIDE 8 MG: 4 TABLET ORAL at 11:55

## 2021-11-22 RX ADMIN — DEXMEDETOMIDINE 0.3 MCG/KG/HR: 100 INJECTION, SOLUTION, CONCENTRATE INTRAVENOUS at 19:06

## 2021-11-22 RX ADMIN — QUETIAPINE FUMARATE 100 MG: 100 TABLET ORAL at 11:55

## 2021-11-22 RX ADMIN — HYDROMORPHONE HYDROCHLORIDE 8 MG: 4 TABLET ORAL at 03:54

## 2021-11-22 RX ADMIN — ENOXAPARIN SODIUM 70 MG: 80 INJECTION SUBCUTANEOUS at 21:38

## 2021-11-22 RX ADMIN — Medication 2 PACKET: at 14:19

## 2021-11-22 RX ADMIN — Medication 50 MCG: at 09:38

## 2021-11-22 RX ADMIN — LORAZEPAM 12 MG: 2 TABLET ORAL at 21:38

## 2021-11-22 RX ADMIN — Medication 10 MG/HR: at 07:08

## 2021-11-22 RX ADMIN — DEXMEDETOMIDINE 0.5 MCG/KG/HR: 100 INJECTION, SOLUTION, CONCENTRATE INTRAVENOUS at 14:18

## 2021-11-22 RX ADMIN — HYDROMORPHONE HYDROCHLORIDE 8 MG: 4 TABLET ORAL at 00:03

## 2021-11-22 RX ADMIN — LORAZEPAM 12 MG: 2 TABLET ORAL at 00:03

## 2021-11-22 RX ADMIN — Medication 40 MG: at 09:04

## 2021-11-22 RX ADMIN — HYDROMORPHONE HYDROCHLORIDE 8 MG: 4 TABLET ORAL at 21:39

## 2021-11-22 RX ADMIN — QUETIAPINE FUMARATE 100 MG: 100 TABLET ORAL at 21:39

## 2021-11-22 RX ADMIN — INSULIN ASPART 1 UNITS: 100 INJECTION, SOLUTION INTRAVENOUS; SUBCUTANEOUS at 09:42

## 2021-11-22 RX ADMIN — Medication 9 MG/HR: at 15:32

## 2021-11-22 RX ADMIN — CEFTRIAXONE SODIUM 2 G: 2 INJECTION, POWDER, FOR SOLUTION INTRAMUSCULAR; INTRAVENOUS at 09:05

## 2021-11-22 RX ADMIN — HYDROMORPHONE HYDROCHLORIDE 8 MG: 4 TABLET ORAL at 17:01

## 2021-11-22 RX ADMIN — QUETIAPINE FUMARATE 100 MG: 100 TABLET ORAL at 03:54

## 2021-11-22 RX ADMIN — Medication 2 PACKET: at 21:40

## 2021-11-22 RX ADMIN — Medication 150 MCG/HR: at 09:15

## 2021-11-22 RX ADMIN — POTASSIUM CHLORIDE 40 MEQ: 1.5 POWDER, FOR SOLUTION ORAL at 05:59

## 2021-11-22 RX ADMIN — LORAZEPAM 12 MG: 2 TABLET ORAL at 17:01

## 2021-11-22 RX ADMIN — ENOXAPARIN SODIUM 40 MG: 40 INJECTION SUBCUTANEOUS at 09:06

## 2021-11-22 RX ADMIN — LORAZEPAM 12 MG: 2 TABLET ORAL at 11:55

## 2021-11-22 RX ADMIN — Medication 2 PACKET: at 17:01

## 2021-11-22 RX ADMIN — INSULIN ASPART 1 UNITS: 100 INJECTION, SOLUTION INTRAVENOUS; SUBCUTANEOUS at 17:07

## 2021-11-22 RX ADMIN — Medication 5 ML: at 09:05

## 2021-11-22 ASSESSMENT — ACTIVITIES OF DAILY LIVING (ADL)
ADLS_ACUITY_SCORE: 21
ADLS_ACUITY_SCORE: 19
ADLS_ACUITY_SCORE: 21
ADLS_ACUITY_SCORE: 21
ADLS_ACUITY_SCORE: 19
ADLS_ACUITY_SCORE: 23
ADLS_ACUITY_SCORE: 21
ADLS_ACUITY_SCORE: 21

## 2021-11-22 ASSESSMENT — MIFFLIN-ST. JEOR: SCORE: 2443

## 2021-11-22 NOTE — PLAN OF CARE
Major Shift Events: Sedated on Versed and Fentanyl. Q4h Dilaudid and Ativan. Does not follow commands and grimaces to vigorous stimulation. Sinus rhythm and BP WNL. Vent FiO2 weaned to 40%. Tube feeds continue at goal. Flores removed at 0600 and has not voided yet. Rectal pouch no output. Leaking around it. Sofie area red and excoriated. K replaced. Next recheck today at 1000.    Plan: Continue with current POC.    For vital signs and complete assessments, please see documentation flowsheets.

## 2021-11-22 NOTE — PLAN OF CARE
Neuro: Sedated, Fent, Versed IV and ativan/dilaudid via NJT, unable to be weaned this shift, Will grimace to noxious stim, no following commands. Not ROSALES   Cardiac: SR. VSS. Afebrile.     Respiratory: SPO2 >93% on vent, 50% PEEP 16  GI/: Adequate urine output. BM 11/20/21, fecal pouch in place  Diet/appetite:  NPO/TF @ goal, free water 75/2hr.  Activity:  Assist of 2-3, turn and repo q2/PRN  Pain: CPOT 0-4  Skin: No new deficits noted.  LDA's: PIV, Ayde, stevenson, rectal pouch     Plan: Continue with POC, wean sedation as tolerated. Notify primary team with changes. Updated mother with POC, all questions answered, Set up ipad for family.

## 2021-11-22 NOTE — CONSULTS
WO Nurse Inpatient Pressure Injury Assessment   Reason for consultation: Evaluate and treat Bl cheeks  New consult 11/11- tongue    ASSESSMENT  Pressure Injury: on Tongue  , hospital acquired ,   This is a Medical Device Related Pressure Injury (MDRPI) due to ETT  Pressure Injury is Stage Mucosal   Contributing factor of the pressure injury: pressure and immobility  Status: healing and follow up   Recommend provider order: None, at this time     Pressure Injury: on septum , hospital acquired ,   This is a Medical Device Related Pressure Injury (MDRPI) due to ETAD head gear was pushed against the area during proning  Pressure Injury is Stage 2 -evolving   Contributing factor of the pressure injury: pressure and immobility  Status: follow up assessment, evolving, currently supine  Recommend provider order: None, at this time      TREATMENT PLAN  Septum wound: Daily  to apply optifoam if proned- Dressing change every other day  Cleanse the wound with NS and pat dry.  Cut a piece of Comfeel 4x4 (#[id number]) and cover the area.  Massage the dressing in place for better adherence  Then place a layer of Mepilex foam dressing on top of comfeel for cushion  Ensure to place a piece of optifoam along the septal area and mold the Z-flow to avoid direct pressure while proning.      Tongue wounds: Every shift and PRN   Perform oral care per unit routine. Keep mouth moisturized. Reposition ETT tube Q 3-4 hrs, ensure to disengage the tongue from the tube to relive the pressure.    BL cheeks- Continue with mepilex foam dressing for prevention.    Orders Written  WOC Nurse follow-up plan:twice weekly  Nursing to notify the Provider(s) and re-consult the WO Nurse if wound(s) deteriorates or new skin concern.    Patient History  According to provider note(s): Brandon Schafer is a 41 year old male with questionable PMH of remote VTE who was admitted on 11/3/2021 with ARDS 2/2 COVID pneumonia.    Objective Data  Containment of urine/stool:  Indwelling catheter    Current Diet/ Nutrition:  Orders Placed This Encounter      NPO for Medical/Clinical Reasons Except for: Meds      Output:   I/O last 3 completed shifts:  In: 3362 [I.V.:872; NG/GT:1410]  Out: 2195 [Urine:2195]    Risk Assessment:   Sensory Perception: 1-->completely limited  Moisture: 3-->occasionally moist  Activity: 1-->bedfast  Mobility: 1-->completely immobile  Nutrition: 3-->adequate  Friction and Shear: 1-->problem  Adin Score: 10      Labs:   Recent Labs   Lab 11/22/21  0426 11/20/21  0429 11/19/21  0408   HGB 8.8*   < > 9.1*   WBC 6.6   < > 8.7   CRP  --   --  180.0*    < > = values in this interval not displayed.       Physical Exam  Skin inspection: focused BL cheeks, mouth and nose  Patient is high risk for pressure injury development secondary to  proning    Wound Location:  Septum                  11/9                                                                              11/11                                                            11/15                                                                11/22      Date of last Photo 11/11  Wound History: Pt has been proning intermittently. Wound evolving on 11/18 assessment- he was proned on 11/16 and supined since 11/17  Measurements (length x width x depth, in cm) 0.7 cm x 1 cm  x  0.1 cm   Wound Base:  100 % dermis- somewhat dusky- evolving  Tunneling N/A  Undermining N/A  Palpation of the wound bed: normal   Periwound skin: intact  Color: normal and consistent with surrounding tissue  Temperature: normal   Drainage:, none  Description of drainage: none  Odor: none  Pain: unable to assess due to  sedation ,       2. Tongue      Date of last Photo 11/9- unable to see the actual injury in the picture  Wound History: Pt has been proning intermittently. Edematous face and tongue. Currently able to reposition the ETT disengaging the tongue. Pt has been supined since 11/10 morning.  Measurements (length x width x depth, in  cm) 0.5 cm x 0.8 cm  x  0.0 cm   Wound Base:  100 % mucosal  Tunneling N/A  Undermining N/A  Palpation of the wound bed: normal   Periwound skin: intact  Color: normal and consistent with surrounding tissue  Temperature: normal   Drainage:, none  Description of drainage: none  Odor: none  Pain: unable to assess due to  sedation ,       Interventions  Current support surface: Standard  Low air loss mattress  Current off-loading measures: Pillows  Repositioning aid: Pillows  Visual inspection of wound(s) completed   Tube Securement: ETT head gear  Wound Care: was done per plan of care.  Supplies: floor stock and discussed with RN  Educated provided: plan of care and Off-loading pressure  Education provided to: nurse  Discussed importance of:repositioning every 2 hours, off-loading pressure to wound, off-loading mattress and moisture management  Discussed plan of care with Nurse    Miranda Aranda RN

## 2021-11-23 LAB
ANION GAP SERPL CALCULATED.3IONS-SCNC: 5 MMOL/L (ref 3–14)
BACTERIA BLD CULT: NO GROWTH
BACTERIA BLD CULT: NO GROWTH
BASE EXCESS BLDA CALC-SCNC: 8.3 MMOL/L (ref -9–1.8)
BUN SERPL-MCNC: 30 MG/DL (ref 7–30)
CALCIUM SERPL-MCNC: 9 MG/DL (ref 8.5–10.1)
CHLORIDE BLD-SCNC: 108 MMOL/L (ref 94–109)
CO2 SERPL-SCNC: 30 MMOL/L (ref 20–32)
CREAT SERPL-MCNC: 0.93 MG/DL (ref 0.66–1.25)
ERYTHROCYTE [DISTWIDTH] IN BLOOD BY AUTOMATED COUNT: 15.9 % (ref 10–15)
GFR SERPL CREATININE-BSD FRML MDRD: >90 ML/MIN/1.73M2
GLUCOSE BLD-MCNC: 174 MG/DL (ref 70–99)
GLUCOSE BLDC GLUCOMTR-MCNC: 130 MG/DL (ref 70–99)
GLUCOSE BLDC GLUCOMTR-MCNC: 144 MG/DL (ref 70–99)
GLUCOSE BLDC GLUCOMTR-MCNC: 147 MG/DL (ref 70–99)
GLUCOSE BLDC GLUCOMTR-MCNC: 148 MG/DL (ref 70–99)
GLUCOSE BLDC GLUCOMTR-MCNC: 149 MG/DL (ref 70–99)
GLUCOSE BLDC GLUCOMTR-MCNC: 159 MG/DL (ref 70–99)
GLUCOSE BLDC GLUCOMTR-MCNC: 160 MG/DL (ref 70–99)
HCO3 BLD-SCNC: 33 MMOL/L (ref 21–28)
HCT VFR BLD AUTO: 29.4 % (ref 40–53)
HGB BLD-MCNC: 9.1 G/DL (ref 13.3–17.7)
MAGNESIUM SERPL-MCNC: 2.6 MG/DL (ref 1.6–2.3)
MCH RBC QN AUTO: 27.7 PG (ref 26.5–33)
MCHC RBC AUTO-ENTMCNC: 31 G/DL (ref 31.5–36.5)
MCV RBC AUTO: 89 FL (ref 78–100)
O2/TOTAL GAS SETTING VFR VENT: 45 %
PCO2 BLD: 47 MM HG (ref 35–45)
PH BLD: 7.46 [PH] (ref 7.35–7.45)
PHOSPHATE SERPL-MCNC: 4.3 MG/DL (ref 2.5–4.5)
PLATELET # BLD AUTO: 171 10E3/UL (ref 150–450)
PO2 BLD: 66 MM HG (ref 80–105)
POTASSIUM BLD-SCNC: 3.7 MMOL/L (ref 3.4–5.3)
RBC # BLD AUTO: 3.29 10E6/UL (ref 4.4–5.9)
SODIUM SERPL-SCNC: 143 MMOL/L (ref 133–144)
TRIGL SERPL-MCNC: 285 MG/DL
WBC # BLD AUTO: 7.3 10E3/UL (ref 4–11)

## 2021-11-23 PROCEDURE — 83735 ASSAY OF MAGNESIUM: CPT | Performed by: STUDENT IN AN ORGANIZED HEALTH CARE EDUCATION/TRAINING PROGRAM

## 2021-11-23 PROCEDURE — 200N000002 HC R&B ICU UMMC

## 2021-11-23 PROCEDURE — 250N000011 HC RX IP 250 OP 636: Performed by: STUDENT IN AN ORGANIZED HEALTH CARE EDUCATION/TRAINING PROGRAM

## 2021-11-23 PROCEDURE — 82803 BLOOD GASES ANY COMBINATION: CPT | Performed by: STUDENT IN AN ORGANIZED HEALTH CARE EDUCATION/TRAINING PROGRAM

## 2021-11-23 PROCEDURE — 85027 COMPLETE CBC AUTOMATED: CPT | Performed by: STUDENT IN AN ORGANIZED HEALTH CARE EDUCATION/TRAINING PROGRAM

## 2021-11-23 PROCEDURE — 258N000003 HC RX IP 258 OP 636: Performed by: STUDENT IN AN ORGANIZED HEALTH CARE EDUCATION/TRAINING PROGRAM

## 2021-11-23 PROCEDURE — 84100 ASSAY OF PHOSPHORUS: CPT | Performed by: STUDENT IN AN ORGANIZED HEALTH CARE EDUCATION/TRAINING PROGRAM

## 2021-11-23 PROCEDURE — 999N000157 HC STATISTIC RCP TIME EA 10 MIN

## 2021-11-23 PROCEDURE — 94003 VENT MGMT INPAT SUBQ DAY: CPT

## 2021-11-23 PROCEDURE — 36415 COLL VENOUS BLD VENIPUNCTURE: CPT | Performed by: STUDENT IN AN ORGANIZED HEALTH CARE EDUCATION/TRAINING PROGRAM

## 2021-11-23 PROCEDURE — 999N000015 HC STATISTIC ARTERIAL MONITORING DAILY

## 2021-11-23 PROCEDURE — 250N000013 HC RX MED GY IP 250 OP 250 PS 637: Performed by: NURSE PRACTITIONER

## 2021-11-23 PROCEDURE — 80048 BASIC METABOLIC PNL TOTAL CA: CPT | Performed by: STUDENT IN AN ORGANIZED HEALTH CARE EDUCATION/TRAINING PROGRAM

## 2021-11-23 PROCEDURE — 84478 ASSAY OF TRIGLYCERIDES: CPT | Performed by: NURSE PRACTITIONER

## 2021-11-23 PROCEDURE — 99291 CRITICAL CARE FIRST HOUR: CPT | Mod: GC | Performed by: INTERNAL MEDICINE

## 2021-11-23 PROCEDURE — 250N000013 HC RX MED GY IP 250 OP 250 PS 637: Performed by: STUDENT IN AN ORGANIZED HEALTH CARE EDUCATION/TRAINING PROGRAM

## 2021-11-23 PROCEDURE — 250N000009 HC RX 250: Performed by: STUDENT IN AN ORGANIZED HEALTH CARE EDUCATION/TRAINING PROGRAM

## 2021-11-23 PROCEDURE — 250N000013 HC RX MED GY IP 250 OP 250 PS 637

## 2021-11-23 RX ORDER — LORAZEPAM 2 MG/ML
0.5 INJECTION INTRAMUSCULAR ONCE
Status: COMPLETED | OUTPATIENT
Start: 2021-11-23 | End: 2021-11-23

## 2021-11-23 RX ORDER — POTASSIUM CHLORIDE 20MEQ/15ML
20 LIQUID (ML) ORAL ONCE
Status: COMPLETED | OUTPATIENT
Start: 2021-11-23 | End: 2021-11-23

## 2021-11-23 RX ORDER — FENTANYL CITRATE 50 UG/ML
50 INJECTION, SOLUTION INTRAMUSCULAR; INTRAVENOUS ONCE
Status: DISCONTINUED | OUTPATIENT
Start: 2021-11-23 | End: 2021-11-29

## 2021-11-23 RX ADMIN — ENOXAPARIN SODIUM 70 MG: 80 INJECTION SUBCUTANEOUS at 08:28

## 2021-11-23 RX ADMIN — INSULIN ASPART 1 UNITS: 100 INJECTION, SOLUTION INTRAVENOUS; SUBCUTANEOUS at 00:59

## 2021-11-23 RX ADMIN — HYDROMORPHONE HYDROCHLORIDE 8 MG: 4 TABLET ORAL at 20:37

## 2021-11-23 RX ADMIN — Medication 2 PACKET: at 22:33

## 2021-11-23 RX ADMIN — Medication 5 ML: at 08:28

## 2021-11-23 RX ADMIN — LORAZEPAM 12 MG: 2 TABLET ORAL at 04:36

## 2021-11-23 RX ADMIN — HYDROMORPHONE HYDROCHLORIDE 8 MG: 4 TABLET ORAL at 11:30

## 2021-11-23 RX ADMIN — QUETIAPINE FUMARATE 100 MG: 100 TABLET ORAL at 11:30

## 2021-11-23 RX ADMIN — LORAZEPAM 0.5 MG: 2 INJECTION INTRAMUSCULAR; INTRAVENOUS at 23:11

## 2021-11-23 RX ADMIN — Medication 2 PACKET: at 08:29

## 2021-11-23 RX ADMIN — QUETIAPINE FUMARATE 100 MG: 100 TABLET ORAL at 04:36

## 2021-11-23 RX ADMIN — HYDROMORPHONE HYDROCHLORIDE 8 MG: 4 TABLET ORAL at 23:15

## 2021-11-23 RX ADMIN — INSULIN ASPART 1 UNITS: 100 INJECTION, SOLUTION INTRAVENOUS; SUBCUTANEOUS at 23:43

## 2021-11-23 RX ADMIN — LORAZEPAM 12 MG: 2 TABLET ORAL at 00:53

## 2021-11-23 RX ADMIN — Medication 2 PACKET: at 18:17

## 2021-11-23 RX ADMIN — HYDROMORPHONE HYDROCHLORIDE 8 MG: 4 TABLET ORAL at 08:28

## 2021-11-23 RX ADMIN — QUETIAPINE FUMARATE 100 MG: 100 TABLET ORAL at 20:37

## 2021-11-23 RX ADMIN — LORAZEPAM 12 MG: 2 TABLET ORAL at 20:37

## 2021-11-23 RX ADMIN — Medication 40 MG: at 08:28

## 2021-11-23 RX ADMIN — Medication 2 PACKET: at 14:55

## 2021-11-23 RX ADMIN — POTASSIUM CHLORIDE 20 MEQ: 20 SOLUTION ORAL at 06:54

## 2021-11-23 RX ADMIN — Medication 2 PACKET: at 12:53

## 2021-11-23 RX ADMIN — LORAZEPAM 12 MG: 2 TABLET ORAL at 16:09

## 2021-11-23 RX ADMIN — Medication 200 MCG/HR: at 22:59

## 2021-11-23 RX ADMIN — INSULIN ASPART 1 UNITS: 100 INJECTION, SOLUTION INTRAVENOUS; SUBCUTANEOUS at 16:23

## 2021-11-23 RX ADMIN — LORAZEPAM 12 MG: 2 TABLET ORAL at 11:29

## 2021-11-23 RX ADMIN — HYDROMORPHONE HYDROCHLORIDE 8 MG: 4 TABLET ORAL at 04:36

## 2021-11-23 RX ADMIN — Medication 5 MG/HR: at 13:29

## 2021-11-23 RX ADMIN — INSULIN ASPART 1 UNITS: 100 INJECTION, SOLUTION INTRAVENOUS; SUBCUTANEOUS at 20:37

## 2021-11-23 RX ADMIN — HYDROMORPHONE HYDROCHLORIDE 8 MG: 4 TABLET ORAL at 16:09

## 2021-11-23 RX ADMIN — INSULIN ASPART 1 UNITS: 100 INJECTION, SOLUTION INTRAVENOUS; SUBCUTANEOUS at 08:40

## 2021-11-23 RX ADMIN — Medication 8 MG/HR: at 00:53

## 2021-11-23 RX ADMIN — HYDROMORPHONE HYDROCHLORIDE 8 MG: 4 TABLET ORAL at 00:53

## 2021-11-23 RX ADMIN — INSULIN ASPART 1 UNITS: 100 INJECTION, SOLUTION INTRAVENOUS; SUBCUTANEOUS at 04:36

## 2021-11-23 RX ADMIN — Medication 25 MCG: at 22:55

## 2021-11-23 RX ADMIN — LORAZEPAM 12 MG: 2 TABLET ORAL at 23:15

## 2021-11-23 RX ADMIN — Medication 125 MCG/HR: at 16:09

## 2021-11-23 RX ADMIN — DEXMEDETOMIDINE 0.2 MCG/KG/HR: 100 INJECTION, SOLUTION, CONCENTRATE INTRAVENOUS at 18:35

## 2021-11-23 RX ADMIN — CEFTRIAXONE SODIUM 2 G: 2 INJECTION, POWDER, FOR SOLUTION INTRAMUSCULAR; INTRAVENOUS at 08:28

## 2021-11-23 RX ADMIN — ENOXAPARIN SODIUM 70 MG: 80 INJECTION SUBCUTANEOUS at 20:37

## 2021-11-23 RX ADMIN — Medication 150 MCG/HR: at 00:54

## 2021-11-23 RX ADMIN — Medication 75 MCG: at 22:59

## 2021-11-23 ASSESSMENT — ACTIVITIES OF DAILY LIVING (ADL)
ADLS_ACUITY_SCORE: 23
ADLS_ACUITY_SCORE: 25
ADLS_ACUITY_SCORE: 23
ADLS_ACUITY_SCORE: 25
ADLS_ACUITY_SCORE: 23
ADLS_ACUITY_SCORE: 25
ADLS_ACUITY_SCORE: 23
ADLS_ACUITY_SCORE: 25
ADLS_ACUITY_SCORE: 23

## 2021-11-23 ASSESSMENT — MIFFLIN-ST. JEOR: SCORE: 2221.22

## 2021-11-23 NOTE — PROGRESS NOTES
MEDICAL ICU PROGRESS NOTE  11/23/2021      Date of Service (when I saw the patient): 11/23/2021    ASSESSMENT: Brandon Schafer is a 41 year old male with questionable PMH of remote VTE who was admitted on 11/3/2021 with ARDS 2/2 COVID pneumonia. Now with CAUTI and polymicrobial VAP.     CHANGES and MAJOR THINGS TODAY:   - decrease PEEP  - wean midazolam gtt  - fentanyl 150 to 100 mcg     PLAN:    Neuro:  # Pain and sedation  - Fentanyl infusion, wean  - Midazolam infusion, wean as able  - dilaudid 8mg q 4H  - Ativan 12mg Q4H  - Seroquel 100 mg TID  - RASS goal -2 to -3  - Consider NMB if worsening vent mechanics     Pulmonary:  # ARDS 2/2 COVID pneumonia  Intubated 11/2 prior to transport flight. Prone positioning started 11/5.   - maintain supine --- no benefit from prone  - weaning PEEP as tolerated  - Infectious work-up and treatment as below    Ventilation Mode: CMV/AC  (Continuous Mandatory Ventilation/ Assist Control)  FiO2 (%): 45 %  Rate Set (breaths/minute): 26 breaths/min  Tidal Volume Set (mL): 530 mL  PEEP (cm H2O): 14 cmH2O  Oxygen Concentration (%): 45 %  Resp: 27    # ?COPD exacerbation  Wheezing on exam on 11/4. S/p azithromycin for 5d. Steroids per Covid protocol.   - Continue albuterol nebs prn     Cardiovascular:  # Hypotension, resolved  Transient, and in the setting of sedation. No prior TTE. Troponin peaked at 0.293. EKG with T wave inversion in lateral leads. Suspect troponin was 2/2 demand ischemia in setting of hypoxia.    # Atrial fibrillation  Episode of atrial fibrillation overnight 11/12, hemodynamically stable and resolved spontaneously. TWI on EKG, troponin negative     GI/Nutrition:  # At risk for malnutrition  # Constipation, improving  - Nutrition consult for TF  - Tolerating TF at goal   - Bowel regimen: scheduled Miralax and senna and prn Miralax     Renal/Fluids/Electrolytes:  # Non-oliguric STEFAN, resolved  # Hypokalemia, in setting of ongoing diuresis   Unclear baseline, has not  seen doctor in 20 years. Cr 2.5 on admission, now improved. 11/18 with rising bicarb, BUN, Cr...decreased UOP consistent with overdiuresis.    - holding diuretics     # Hypernatremia, resolved  - decreaseFWF 150ml q 2H-> 75ml q 2H, stable sodium     Endocrine:  # Stress and steroid induced hyperglycemia  - Medium resistance sliding scale insulin      ID:  # COVID pneumonia  Symptoms (headache, SOB, fever, cough, diarrhea) 1 week prior to presentation. Not COVID vaccinated. Tested positive on presentation on 11/2. S/p tocilizumab 11/4.  - Dexamethasone 6 mg daily (total 10 days)     # Fever, tmax 101.3 11/17  # CAUTI  # VAP  Pt with worsening oxygen saturations, fevers, and persistent leukocytosis. Given his prolonged hospitalization, was started on broad-spectrum antibiotics on 11/11. Urine culture grew >100k pan-sensitive E coli. RVP and MRSA nares negative. Sputum grew E coli, S aureus, and GBS. Sputum culture 11/17 with 1+ Ecoli, blood cultures 11/17 pending. Continues to have fevers as of 11/18, redraw blood cultures and test stool for Cdiff. Of note, CXR 11/17 without clear/focal infiltrate.   - Continue CTX for additional 7 day course given persistent Ecoli on 11/17 sputum (11/19 - 11/25)     Abx:  - Ceftriaxone 11/14-11/17; 11/19 -   - Zosyn 11/11-11/14; 11/17 - 11/18  - Vanc 11/11-11/12      Hematology:    # Coagulopathy 2/2 COVID  US BLE negative for DVT.  - Enoxaparin ppx      MSK  # Septum wound, tongue wound 2/2 Proning   - WOC following     General Cares/Prophylaxis:    DVT Prophylaxis: Lovenox ppx 70 mg BID  GI Prophylaxis: PPI  Family Communication: BrotherBrad, updated by phone  Code Status: DNR      Lines/tubes/drains:  - PIV x3  - Arterial line left radial  - NG  - ETT  - Rectal pouch      Disposition:  - Medical ICU    Patient seen and findings/plan discussed with medical ICU staff, Dr. Perlman Daniel Philip Vanderhoef, MD      ====================================  INTERVAL HISTORY:   Notes  reviewed. Afebrile overnight. P/f ratio 146. Net positive 2.2 L. Unresponsive this AM, now opening eyes to verbal stimuli in PM    OBJECTIVE:   1. VITAL SIGNS:   Temp:  [97.9  F (36.6  C)-100.3  F (37.9  C)] 97.9  F (36.6  C)  Pulse:  [63-91] 66  Resp:  [26-28] 27  MAP:  [65 mmHg-94 mmHg] 81 mmHg  Arterial Line BP: ()/(50-69) 127/61  FiO2 (%):  [40 %-50 %] 45 %  SpO2:  [88 %-100 %] 99 %  Ventilation Mode: CMV/AC  (Continuous Mandatory Ventilation/ Assist Control)  FiO2 (%): 45 %  Rate Set (breaths/minute): 26 breaths/min  Tidal Volume Set (mL): 530 mL  PEEP (cm H2O): 14 cmH2O  Oxygen Concentration (%): 45 %  Resp: 27    2. INTAKE/ OUTPUT:   I/O last 3 completed shifts:  In: 2967.23 [I.V.:712.23; NG/GT:1175]  Out: 1325 [Urine:1325]    3. PHYSICAL EXAMINATION:  General: Sedated, intubated, supine  HEENT: Mucous membranes dry, wound on tongue and base of nasal septum  Neuro: sedated  Pulm/Resp: Coarse breath sounds bilaterally without rhonchi, crackles or wheeze, breathing non-labored  CV: tachycardic, S1/S2 without m/r/g;  Abdomen: Obese, soft, non-distended, non-tender  : Flores catheter in place, urine yellow and clear  Ext: trace edema of extremities  Incisions/Skin: Wounds as above, no rashes noted    4. LABS:   Arterial Blood Gases   Recent Labs   Lab 11/23/21  0520 11/22/21  1703 11/22/21  0426 11/21/21  0637   PH 7.46* 7.46* 7.42 7.43   PCO2 47* 48* 53* 52*   PO2 66* 70* 71* 98   HCO3 33* 33* 35* 35*     Complete Blood Count   Recent Labs   Lab 11/23/21  0519 11/22/21  0426 11/21/21  0353 11/20/21  0429   WBC 7.3 6.6 6.7 6.6   HGB 9.1* 8.8* 9.1* 9.4*    163 173 160     Basic Metabolic Panel  Recent Labs   Lab 11/23/21  0519 11/23/21  0432 11/23/21  0059 11/22/21  2143 11/22/21  0941 11/22/21  0426 11/21/21  0835 11/21/21  0353 11/20/21  2331 11/20/21  2325 11/20/21  0936 11/20/21  0429     --   --   --   --  143  --  142  --   --   --  141   POTASSIUM 3.7  --   --   --   --  3.4  --  3.7  --   3.6   < > 2.8*   CHLORIDE 108  --   --   --   --  106  --  106  --   --   --  102   CO2 30  --   --   --   --  33*  --  33*  --   --   --  36*   BUN 30  --   --   --   --  30  --  34*  --   --   --  33*   CR 0.93  --   --   --   --  0.88  --  0.85  --   --   --  0.89   * 159* 160* 154*   < > 191*   < > 226*   < >  --    < > 228*    < > = values in this interval not displayed.     Liver Function Tests  No lab results found in last 7 days.  5. RADIOLOGY:   No results found for this or any previous visit (from the past 24 hour(s)).

## 2021-11-23 NOTE — PLAN OF CARE
Major Shift Events: Sedated, opening eyes with stimulation/cough, Pupils 2 mm sluggish, intermittently withdrawing to pain in uppers. SR, BP WNL, t-max 100.3. P/F ratio this , 45% PEEP 14, moderate oral and ETT secretions, thick. NJ to TF @ goal, 75 ml q2h flushes, rectal pouch popped, new pouch applied. Primofit with 300 ml sal, 1025 ml sal output via straight cath. Right arm skin tear, nare skin breakdown and lower lip bruising, skin issues dressed and lower lip bruising treated via elevation of ETT. Versed @ 8, fentanyl @ 150 and Dex @ 0.3. R PIV's infusing and L art line. Dilaudid po for pain and ativan po given. K replacement this AM. Phos still pending.    Plan: Continue to monitor vitals and UOP. Care conference Friday.  For vital signs and complete assessments, please see documentation flowsheets.

## 2021-11-23 NOTE — PLAN OF CARE
Major Shift Events:  SBP/MAP inversely proportional to level of sedation.  Pt also largely unresponsive beginning of shift.  Sedation weaned accordingly yielding SBP/MAP typically within parameters.  No UOP during shift.  Bladder scan showed approx 300mL.  Straight cath few hours later yielded 450mL concentrated urine.  One large BM this shift.  Skin unchanged with no hospital-introduced skin insults.  Plan: continue sedation weaning and ventilator weaning in attempt to extubate.

## 2021-11-23 NOTE — PROGRESS NOTES
CLINICAL NUTRITION SERVICES - REASSESSMENT NOTE     Nutrition Prescription    RECOMMENDATIONS FOR MDs/PROVIDERS TO ORDER:  - Total daily fluids/adjustments per MD. Current FWF + TF @ GOAL = 1722 ml/day (21 ml/kg IBW)  - Minimize disruptions to EN infusions as able     Malnutrition Status:    - Unable to determine due to unable to perform all aspects for NFPE    Recommendations already ordered by Registered Dietitian (RD):  - Continue current regimen via current access: Osmolite 1.5 Tripp @ goal of 45ml/hr (1080ml/day) +10 Prosource will provide: 2020 kcals (25 kcal/kg IBW), 177 g PRO (2.2 g/kg IBW), 822 ml free H20, 219 g CHO, and 0 g fiber daily.  - Changed Nephronex to Certavite     Future/Additional Recommendations:  - Ongoing tolerance of EN via current access/NGT (Cortrak)  - Weight trends/status and adjustments to regimen as indicated     EVALUATION OF THE PROGRESS TOWARD GOALS   Diet: NPO    Nutrition Support: Osmolite 1.5 Tripp @ goal of 45ml/hr (1080ml/day) +10 Prosource will provide: 2020 kcals (25 kcal/kg IBW), 177 g PRO (2.2 g/kg IBW), 822 ml free H20, 219 g CHO, and 0 g fiber daily.    Intake: 7-day averages =  885 ml, 1328 kcals, and 56 gm PRO + Prosource = 166 gm PRO (2 gm/kg) and 1728 kcals (21 kcals/kg)      NEW FINDINGS and systems review    Nutrition/GI: Pt continues on EN @ goal  via current access/NGT (Cortrak) placed 11/5. Constipation: improving. On bowel regimen. + Loose watery stools via rectal pouch.    Pulm: ARDS 2/2 COVID pneumonia. Intubated 11/2 prior to transport flight. Prone positioning started 11/5, now supine --- no benefit from prone. Poss COPD exacerbation.    CV: Hypotension, resolved. Hemodynamically stable.    Renal: Non-oliguric STEFAN, resolved. Hypokalemia, in setting of ongoing diuresis (resolved/WNL). Most recent phos 4.3 (uptrended).  Poss overdiuresis per MICU- holding diuretics. Hypernatremia, resolved - decreaseFWF 150ml q 2H-> 75ml q 2H (900 ml/day), stable sodium.       Endocrine: Stress and steroid induced hyperglycemia  - Medium resistance sliding scale insulin      Skin:   Per RN note: Right arm skin tear, nare skin breakdown and lower lip bruising, skin issues dressed and lower lip bruising treated via elevation of ETT.  Septum wound, tongue wound 2/2 Proning.   WOC following; Per assessment :   1) Pressure Injury: on TongueStatus: healing and follow up   2) Pressure Injury: on septum. Pressure Injury is Stage 2 -evolving.    Labs/meds: Mg++ 2.6 (H); T (H) --> trending down. Glucose trends: 159, 174, 149. Renal labs WNL. Last .0 (H). Meds: Nephronex; Insulin; KCl; Prosource.    Weights: Fluctuations throughout status likely r/t fluid status. Most recent weight is a possible outlier/incorrect? Admit weight: 163.4 kg --> 151 kg () --> 128.8 kg (). Requires further assessment and trending prior to adjusting dosing weights. Current dosing weight is based on pt IBW in the setting of Obesity class.     MALNUTRITION  % Intake: No decreased intake noted  % Weight Loss: difficult to assess 2/2 fluid shifts --> follow trends  Subcutaneous Fat Loss: Unable to assess COVID+  Muscle Loss: Unable to assess COVID+  Fluid Accumulation/Edema: Mild 2+  Malnutrition Diagnosis: Unable to determine due to unable to perform all aspects for NFPE    Previous Goals   Total avg nutritional intake to meet a minimum of 22 kcal/kg and 2 g PRO/kg daily (per dosing wt 81 kg IBW).  Evaluation: Met for protein but not met for energy     Previous Nutrition Diagnosis  Inadequate oral intake related to mechanical ventilation inhibiting ability to take nutrition PO as evidenced by NPO status, requiring enteral nutrition to meet 100% of needs.   Evaluation: No change    CURRENT NUTRITION DIAGNOSIS  Inadequate energy intake related to disruptions to EN infusions as evidenced by pt not meeting goal energy provisions with 7-day EN averages meeting 21 kcals/kg per dosing weight.         INTERVENTIONS  Implementation  Enteral Nutrition - continue as ordered and continue to monitor  Monitor weight trends and ability to discern weight changes vs fluid status changes    Goals  Total avg nutritional intake to meet a minimum of 22 kcal/kg and 2 g PRO/kg daily (per dosing wt 81 kg IBW).    Monitoring/Evaluation  Progress toward goals will be monitored and evaluated per protocol.     Laura Delgadillo RD, ROLY, [facility]  Neuro ICU  Pager: [phone]    MICU Pager: [phone]

## 2021-11-23 NOTE — PROGRESS NOTES
Assessment and Plan:   Assessment:   Patient Vital signs relatively stable thoughout shift. HR 80's and sinus rhythm. Patient started on Precedex IV drip. Versed weaned as tolerated - Re-paolo to MAR. Patient tolerating vent with FiO2 45% &  Peep of 14 (down from 16).  P/F ratio down from 178 to 155 with 1600 ABG - provider notified. Per provider will leave peep @ 14 overnight and attempt to decreased more tomorrow 11/22.       Plan:   Will continue to monitor and assess. Will continue to address sedation and ventilator weaning as tolerated.

## 2021-11-23 NOTE — PROGRESS NOTES
MEDICAL ICU PROGRESS NOTE  11/22/2021      Date of Service (when I saw the patient): 11/22/2021    ASSESSMENT: Brandon Schafer is a 41 year old male with questionable PMH of remote VTE who was admitted on 11/3/2021 with ARDS 2/2 COVID pneumonia. Now with CAUTI and polymicrobial VAP.     CHANGES and MAJOR THINGS TODAY:   - PEEP 16->14, recheck ABG and decrease PEEP to 12 if maintaining PF>150  - wean midazolam gtt    PLAN:    Neuro:  # Pain and sedation  - Fentanyl infusion  - Midazolam infusion, wean as able  - dilaudid 8mg q 4H  - Ativan 12mg Q4H  - Seroquel 100 mg TID  - RASS goal -4 to -5  - Consider NMB if worsening vent mechanics     Pulmonary:  # ARDS 2/2 COVID pneumonia  Intubated 11/2 prior to transport flight. Prone positioning started 11/5.   - maintain supine --- no benefit from prone  - weaning PEEP as tolerated  - Infectious work-up and treatment as below    Ventilation Mode: CMV/AC  (Continuous Mandatory Ventilation/ Assist Control)  FiO2 (%): 45 %  Rate Set (breaths/minute): 26 breaths/min  Tidal Volume Set (mL): 530 mL  PEEP (cm H2O): 16 cmH2O  Oxygen Concentration (%): 40 %  Peak Inspiratory Pressure (cm H2O) (Drager Aimee): 0  Resp: 26      # ?COPD exacerbation  Wheezing on exam on 11/4. S/p azithromycin for 5d. Steroids per Covid protocol.   - Continue albuterol nebs prn     Cardiovascular:  # Hypotension, resolved  Transient, and in the setting of sedation. No prior TTE. Troponin peaked at 0.293. EKG with T wave inversion in lateral leads. Suspect troponin was 2/2 demand ischemia in setting of hypoxia.    # Atrial fibrillation  Episode of atrial fibrillation overnight 11/12, hemodynamically stable and resolved spontaneously. TWI on EKG, troponin negative     GI/Nutrition:  # At risk for malnutrition  # Constipation, improving  - Nutrition consult for TF  - Tolerating TF at goal   - Bowel regimen: scheduled Miralax and senna and prn Miralax     Renal/Fluids/Electrolytes:  # Non-oliguric STEFAN,  resolved  # Hypokalemia, in setting of ongoing diuresis   Unclear baseline, has not seen doctor in 20 years. Cr 2.5 on admission, now improved. 11/18 with rising bicarb, BUN, Cr...decreased UOP consistent with overdiuresis.    - holding diuretics     # Hypernatremia, resolved  - decreaseFWF 150ml q 2H-> 75ml q 2H, stable sodium     Endocrine:  # Stress and steroid induced hyperglycemia  - Medium resistance sliding scale insulin      ID:  # COVID pneumonia  Symptoms (headache, SOB, fever, cough, diarrhea) 1 week prior to presentation. Not COVID vaccinated. Tested positive on presentation on 11/2. S/p tocilizumab 11/4.  - Dexamethasone 6 mg daily (total 10 days)     # Fever, tmax 101.3 11/17  # CAUTI  # VAP  Pt with worsening oxygen saturations, fevers, and persistent leukocytosis. Given his prolonged hospitalization, was started on broad-spectrum antibiotics on 11/11. Urine culture grew >100k pan-sensitive E coli. RVP and MRSA nares negative. Sputum grew E coli, S aureus, and GBS. Sputum culture 11/17 with 1+ Ecoli, blood cultures 11/17 pending. Continues to have fevers as of 11/18, redraw blood cultures and test stool for Cdiff. Of note, CXR 11/17 without clear/focal infiltrate.   - Continue CTX for additional 7 day course given persistent Ecoli on 11/17 sputum (11/19 - 11/25)     Abx:  - Ceftriaxone 11/14-11/17; 11/19 -   - Zosyn 11/11-11/14; 11/17 - 11/18  - Vanc 11/11-11/12      Hematology:    # Coagulopathy 2/2 COVID  US BLE negative for DVT.  - Enoxaparin ppx      MSK  # Septum wound, tongue wound 2/2 Proning   - WOC following     General Cares/Prophylaxis:    DVT Prophylaxis: Lovenox ppx (40 mg daily)  GI Prophylaxis: PPI  Family Communication: BrotherBrad, updated by phone  Code Status: DNR      Lines/tubes/drains:  - PIV x3  - Arterial line left radial  - NG  - Flores  - ETT  - Rectal pouch      Disposition:  - Medical ICU    Patient seen and findings/plan discussed with medical ICU staff,   Perlman Daniel Philip Vanderhoef, MD      ====================================  INTERVAL HISTORY:   Notes reviewed. Afebrile overnight. P/f ratio 177. Net positive 690. Unresponsive.     OBJECTIVE:   1. VITAL SIGNS:   Temp:  [98.6  F (37  C)-99.3  F (37.4  C)] 98.8  F (37.1  C)  Pulse:  [] 78  Resp:  [26-29] 26  MAP:  [66 mmHg-87 mmHg] 71 mmHg  Arterial Line BP: ()/(51-63) 104/55  FiO2 (%):  [40 %-50 %] 45 %  SpO2:  [88 %-100 %] 90 %  Ventilation Mode: CMV/AC  (Continuous Mandatory Ventilation/ Assist Control)  FiO2 (%): 45 %  Rate Set (breaths/minute): 26 breaths/min  Tidal Volume Set (mL): 530 mL  PEEP (cm H2O): 16 cmH2O  Oxygen Concentration (%): 40 %  Peak Inspiratory Pressure (cm H2O) (Drager Aimee): 0  Resp: 26    2. INTAKE/ OUTPUT:   I/O last 3 completed shifts:  In: 2970.61 [I.V.:570.61; NG/GT:1320]  Out: 1415 [Urine:1415]    3. PHYSICAL EXAMINATION:  General: Sedated, intubated, supine  HEENT: Mucous membranes dry, wound on tongue and base of nasal septum  Neuro: sedated  Pulm/Resp: Coarse breath sounds bilaterally without rhonchi, crackles or wheeze, breathing non-labored  CV: tachycardic, S1/S2 without m/r/g;  Abdomen: Obese, soft, non-distended, non-tender  : Flores catheter in place, urine yellow and clear  Incisions/Skin: Wounds as above, no rashes noted    4. LABS:   Arterial Blood Gases   Recent Labs   Lab 11/22/21  1703 11/22/21  0426 11/21/21  0637 11/20/21  0608   PH 7.46* 7.42 7.43 7.45   PCO2 48* 53* 52* 53*   PO2 70* 71* 98 99   HCO3 33* 35* 35* 37*     Complete Blood Count   Recent Labs   Lab 11/22/21  0426 11/21/21  0353 11/20/21  0429 11/19/21  0408   WBC 6.6 6.7 6.6 8.7   HGB 8.8* 9.1* 9.4* 9.1*    173 160 157     Basic Metabolic Panel  Recent Labs   Lab 11/22/21  1706 11/22/21  1158 11/22/21  0941 11/22/21  0426 11/21/21  0835 11/21/21  0353 11/20/21  2331 11/20/21  2325 11/20/21  2003 11/20/21  1824 11/20/21  0936 11/20/21  0429 11/19/21  0413 11/19/21  0408   NA   --   --   --  143  --  142  --   --   --   --   --  141  --  140   POTASSIUM  --   --   --  3.4  --  3.7  --  3.6  --  3.3*   < > 2.8*   < > 2.6*   CHLORIDE  --   --   --  106  --  106  --   --   --   --   --  102  --  101   CO2  --   --   --  33*  --  33*  --   --   --   --   --  36*  --  37*   BUN  --   --   --  30  --  34*  --   --   --   --   --  33*  --  44*   CR  --   --   --  0.88  --  0.85  --   --   --   --   --  0.89  --  1.04   * 125* 159* 191*   < > 226*   < >  --    < >  --    < > 228*   < > 259*    < > = values in this interval not displayed.     Liver Function Tests  No lab results found in last 7 days.  5. RADIOLOGY:   No results found for this or any previous visit (from the past 24 hour(s)).

## 2021-11-24 ENCOUNTER — APPOINTMENT (OUTPATIENT)
Dept: GENERAL RADIOLOGY | Facility: CLINIC | Age: 41
End: 2021-11-24
Attending: STUDENT IN AN ORGANIZED HEALTH CARE EDUCATION/TRAINING PROGRAM
Payer: MEDICAID

## 2021-11-24 LAB
ANION GAP SERPL CALCULATED.3IONS-SCNC: 7 MMOL/L (ref 3–14)
BASE EXCESS BLDA CALC-SCNC: 5.8 MMOL/L (ref -9–1.8)
BASE EXCESS BLDA CALC-SCNC: 6.7 MMOL/L (ref -9–1.8)
BASE EXCESS BLDA CALC-SCNC: 6.7 MMOL/L (ref -9–1.8)
BUN SERPL-MCNC: 31 MG/DL (ref 7–30)
CALCIUM SERPL-MCNC: 8.9 MG/DL (ref 8.5–10.1)
CHLORIDE BLD-SCNC: 109 MMOL/L (ref 94–109)
CO2 SERPL-SCNC: 27 MMOL/L (ref 20–32)
CREAT SERPL-MCNC: 0.92 MG/DL (ref 0.66–1.25)
ERYTHROCYTE [DISTWIDTH] IN BLOOD BY AUTOMATED COUNT: 15.8 % (ref 10–15)
GFR SERPL CREATININE-BSD FRML MDRD: >90 ML/MIN/1.73M2
GLUCOSE BLD-MCNC: 174 MG/DL (ref 70–99)
GLUCOSE BLDC GLUCOMTR-MCNC: 124 MG/DL (ref 70–99)
GLUCOSE BLDC GLUCOMTR-MCNC: 126 MG/DL (ref 70–99)
GLUCOSE BLDC GLUCOMTR-MCNC: 144 MG/DL (ref 70–99)
GLUCOSE BLDC GLUCOMTR-MCNC: 150 MG/DL (ref 70–99)
GLUCOSE BLDC GLUCOMTR-MCNC: 162 MG/DL (ref 70–99)
HCO3 BLD-SCNC: 31 MMOL/L (ref 21–28)
HCO3 BLD-SCNC: 32 MMOL/L (ref 21–28)
HCO3 BLD-SCNC: 32 MMOL/L (ref 21–28)
HCT VFR BLD AUTO: 29 % (ref 40–53)
HGB BLD-MCNC: 8.8 G/DL (ref 13.3–17.7)
LACTATE SERPL-SCNC: 1.3 MMOL/L (ref 0.7–2)
MAGNESIUM SERPL-MCNC: 2.5 MG/DL (ref 1.6–2.3)
MCH RBC QN AUTO: 27.4 PG (ref 26.5–33)
MCHC RBC AUTO-ENTMCNC: 30.3 G/DL (ref 31.5–36.5)
MCV RBC AUTO: 90 FL (ref 78–100)
O2/TOTAL GAS SETTING VFR VENT: 45 %
O2/TOTAL GAS SETTING VFR VENT: 50 %
O2/TOTAL GAS SETTING VFR VENT: 60 %
PCO2 BLD: 46 MM HG (ref 35–45)
PCO2 BLD: 48 MM HG (ref 35–45)
PCO2 BLD: 48 MM HG (ref 35–45)
PH BLD: 7.42 [PH] (ref 7.35–7.45)
PH BLD: 7.43 [PH] (ref 7.35–7.45)
PH BLD: 7.44 [PH] (ref 7.35–7.45)
PHOSPHATE SERPL-MCNC: 5 MG/DL (ref 2.5–4.5)
PLATELET # BLD AUTO: 177 10E3/UL (ref 150–450)
PO2 BLD: 64 MM HG (ref 80–105)
PO2 BLD: 72 MM HG (ref 80–105)
PO2 BLD: 74 MM HG (ref 80–105)
POTASSIUM BLD-SCNC: 4 MMOL/L (ref 3.4–5.3)
RBC # BLD AUTO: 3.21 10E6/UL (ref 4.4–5.9)
SODIUM SERPL-SCNC: 143 MMOL/L (ref 133–144)
TRIGL SERPL-MCNC: 296 MG/DL
WBC # BLD AUTO: 6.8 10E3/UL (ref 4–11)

## 2021-11-24 PROCEDURE — 99291 CRITICAL CARE FIRST HOUR: CPT | Mod: GC | Performed by: INTERNAL MEDICINE

## 2021-11-24 PROCEDURE — 85027 COMPLETE CBC AUTOMATED: CPT | Performed by: STUDENT IN AN ORGANIZED HEALTH CARE EDUCATION/TRAINING PROGRAM

## 2021-11-24 PROCEDURE — 71045 X-RAY EXAM CHEST 1 VIEW: CPT | Mod: 26 | Performed by: RADIOLOGY

## 2021-11-24 PROCEDURE — 250N000011 HC RX IP 250 OP 636: Performed by: STUDENT IN AN ORGANIZED HEALTH CARE EDUCATION/TRAINING PROGRAM

## 2021-11-24 PROCEDURE — 250N000013 HC RX MED GY IP 250 OP 250 PS 637: Performed by: NURSE PRACTITIONER

## 2021-11-24 PROCEDURE — 80048 BASIC METABOLIC PNL TOTAL CA: CPT | Performed by: STUDENT IN AN ORGANIZED HEALTH CARE EDUCATION/TRAINING PROGRAM

## 2021-11-24 PROCEDURE — 93010 ELECTROCARDIOGRAM REPORT: CPT | Performed by: INTERNAL MEDICINE

## 2021-11-24 PROCEDURE — 94003 VENT MGMT INPAT SUBQ DAY: CPT

## 2021-11-24 PROCEDURE — 999N000157 HC STATISTIC RCP TIME EA 10 MIN

## 2021-11-24 PROCEDURE — 36415 COLL VENOUS BLD VENIPUNCTURE: CPT | Performed by: STUDENT IN AN ORGANIZED HEALTH CARE EDUCATION/TRAINING PROGRAM

## 2021-11-24 PROCEDURE — 84478 ASSAY OF TRIGLYCERIDES: CPT | Performed by: NURSE PRACTITIONER

## 2021-11-24 PROCEDURE — 250N000013 HC RX MED GY IP 250 OP 250 PS 637: Performed by: STUDENT IN AN ORGANIZED HEALTH CARE EDUCATION/TRAINING PROGRAM

## 2021-11-24 PROCEDURE — 83735 ASSAY OF MAGNESIUM: CPT | Performed by: STUDENT IN AN ORGANIZED HEALTH CARE EDUCATION/TRAINING PROGRAM

## 2021-11-24 PROCEDURE — 82803 BLOOD GASES ANY COMBINATION: CPT | Performed by: STUDENT IN AN ORGANIZED HEALTH CARE EDUCATION/TRAINING PROGRAM

## 2021-11-24 PROCEDURE — 258N000003 HC RX IP 258 OP 636: Performed by: STUDENT IN AN ORGANIZED HEALTH CARE EDUCATION/TRAINING PROGRAM

## 2021-11-24 PROCEDURE — 250N000011 HC RX IP 250 OP 636

## 2021-11-24 PROCEDURE — 999N000128 HC STATISTIC PERIPHERAL IV START W/O US GUIDANCE

## 2021-11-24 PROCEDURE — 71045 X-RAY EXAM CHEST 1 VIEW: CPT

## 2021-11-24 PROCEDURE — 93005 ELECTROCARDIOGRAM TRACING: CPT

## 2021-11-24 PROCEDURE — 83605 ASSAY OF LACTIC ACID: CPT | Performed by: STUDENT IN AN ORGANIZED HEALTH CARE EDUCATION/TRAINING PROGRAM

## 2021-11-24 PROCEDURE — A7035 POS AIRWAY PRESS HEADGEAR: HCPCS

## 2021-11-24 PROCEDURE — 250N000009 HC RX 250: Performed by: STUDENT IN AN ORGANIZED HEALTH CARE EDUCATION/TRAINING PROGRAM

## 2021-11-24 PROCEDURE — 250N000013 HC RX MED GY IP 250 OP 250 PS 637

## 2021-11-24 PROCEDURE — 200N000002 HC R&B ICU UMMC

## 2021-11-24 PROCEDURE — 999N000127 HC STATISTIC PERIPHERAL IV START W US GUIDANCE

## 2021-11-24 PROCEDURE — 84100 ASSAY OF PHOSPHORUS: CPT | Performed by: STUDENT IN AN ORGANIZED HEALTH CARE EDUCATION/TRAINING PROGRAM

## 2021-11-24 RX ORDER — FUROSEMIDE 10 MG/ML
40 INJECTION INTRAMUSCULAR; INTRAVENOUS ONCE
Status: COMPLETED | OUTPATIENT
Start: 2021-11-24 | End: 2021-11-24

## 2021-11-24 RX ORDER — QUETIAPINE FUMARATE 100 MG/1
100 TABLET, FILM COATED ORAL EVERY 4 HOURS
Status: DISCONTINUED | OUTPATIENT
Start: 2021-11-24 | End: 2021-11-29

## 2021-11-24 RX ADMIN — INSULIN ASPART 1 UNITS: 100 INJECTION, SOLUTION INTRAVENOUS; SUBCUTANEOUS at 04:59

## 2021-11-24 RX ADMIN — Medication 2 PACKET: at 10:06

## 2021-11-24 RX ADMIN — HYDROMORPHONE HYDROCHLORIDE 8 MG: 4 TABLET ORAL at 04:47

## 2021-11-24 RX ADMIN — LORAZEPAM 12 MG: 2 TABLET ORAL at 16:14

## 2021-11-24 RX ADMIN — HYDROMORPHONE HYDROCHLORIDE 8 MG: 4 TABLET ORAL at 08:20

## 2021-11-24 RX ADMIN — SODIUM CHLORIDE, POTASSIUM CHLORIDE, SODIUM LACTATE AND CALCIUM CHLORIDE 1000 ML: 600; 310; 30; 20 INJECTION, SOLUTION INTRAVENOUS at 00:03

## 2021-11-24 RX ADMIN — Medication 7 MG/HR: at 04:45

## 2021-11-24 RX ADMIN — LORAZEPAM 12 MG: 2 TABLET ORAL at 08:20

## 2021-11-24 RX ADMIN — HYDROMORPHONE HYDROCHLORIDE 8 MG: 4 TABLET ORAL at 12:16

## 2021-11-24 RX ADMIN — FUROSEMIDE 40 MG: 10 INJECTION, SOLUTION INTRAVENOUS at 21:14

## 2021-11-24 RX ADMIN — LORAZEPAM 12 MG: 2 TABLET ORAL at 04:47

## 2021-11-24 RX ADMIN — QUETIAPINE FUMARATE 100 MG: 100 TABLET ORAL at 16:14

## 2021-11-24 RX ADMIN — HYDROMORPHONE HYDROCHLORIDE 8 MG: 4 TABLET ORAL at 21:14

## 2021-11-24 RX ADMIN — Medication 200 MCG/HR: at 05:52

## 2021-11-24 RX ADMIN — QUETIAPINE FUMARATE 100 MG: 100 TABLET ORAL at 04:47

## 2021-11-24 RX ADMIN — Medication 2 PACKET: at 18:01

## 2021-11-24 RX ADMIN — QUETIAPINE FUMARATE 100 MG: 100 TABLET ORAL at 12:16

## 2021-11-24 RX ADMIN — LORAZEPAM 12 MG: 2 TABLET ORAL at 21:14

## 2021-11-24 RX ADMIN — Medication 2 PACKET: at 21:15

## 2021-11-24 RX ADMIN — Medication 2 PACKET: at 08:23

## 2021-11-24 RX ADMIN — Medication 2 PACKET: at 14:05

## 2021-11-24 RX ADMIN — INSULIN ASPART 1 UNITS: 100 INJECTION, SOLUTION INTRAVENOUS; SUBCUTANEOUS at 21:18

## 2021-11-24 RX ADMIN — Medication 40 MG: at 08:21

## 2021-11-24 RX ADMIN — ENOXAPARIN SODIUM 70 MG: 80 INJECTION SUBCUTANEOUS at 08:21

## 2021-11-24 RX ADMIN — HYDROMORPHONE HYDROCHLORIDE 8 MG: 4 TABLET ORAL at 16:14

## 2021-11-24 RX ADMIN — Medication 5 ML: at 08:21

## 2021-11-24 RX ADMIN — QUETIAPINE FUMARATE 100 MG: 100 TABLET ORAL at 21:14

## 2021-11-24 RX ADMIN — DEXMEDETOMIDINE 0.3 MCG/KG/HR: 100 INJECTION, SOLUTION, CONCENTRATE INTRAVENOUS at 18:00

## 2021-11-24 RX ADMIN — INSULIN ASPART 1 UNITS: 100 INJECTION, SOLUTION INTRAVENOUS; SUBCUTANEOUS at 08:48

## 2021-11-24 RX ADMIN — DEXMEDETOMIDINE 0.5 MCG/KG/HR: 100 INJECTION, SOLUTION, CONCENTRATE INTRAVENOUS at 04:47

## 2021-11-24 RX ADMIN — ENOXAPARIN SODIUM 70 MG: 80 INJECTION SUBCUTANEOUS at 21:14

## 2021-11-24 RX ADMIN — DEXMEDETOMIDINE 0.7 MCG/KG/HR: 100 INJECTION, SOLUTION, CONCENTRATE INTRAVENOUS at 00:46

## 2021-11-24 RX ADMIN — Medication 5 ML: at 12:16

## 2021-11-24 RX ADMIN — CEFTRIAXONE SODIUM 2 G: 2 INJECTION, POWDER, FOR SOLUTION INTRAMUSCULAR; INTRAVENOUS at 08:20

## 2021-11-24 RX ADMIN — LORAZEPAM 12 MG: 2 TABLET ORAL at 12:16

## 2021-11-24 ASSESSMENT — ACTIVITIES OF DAILY LIVING (ADL)
ADLS_ACUITY_SCORE: 17
ADLS_ACUITY_SCORE: 23
ADLS_ACUITY_SCORE: 17
ADLS_ACUITY_SCORE: 17
ADLS_ACUITY_SCORE: 25
ADLS_ACUITY_SCORE: 23
ADLS_ACUITY_SCORE: 17
ADLS_ACUITY_SCORE: 23
ADLS_ACUITY_SCORE: 23
ADLS_ACUITY_SCORE: 17
ADLS_ACUITY_SCORE: 23
ADLS_ACUITY_SCORE: 17
ADLS_ACUITY_SCORE: 23
ADLS_ACUITY_SCORE: 17
ADLS_ACUITY_SCORE: 23
ADLS_ACUITY_SCORE: 17
ADLS_ACUITY_SCORE: 23
ADLS_ACUITY_SCORE: 17
ADLS_ACUITY_SCORE: 25

## 2021-11-24 ASSESSMENT — MIFFLIN-ST. JEOR: SCORE: 2316.47

## 2021-11-24 NOTE — PLAN OF CARE
Major Shift Events: Sedated, ~2300 11/23 with bath pt became agitated/alert and moving all extremities, increase in sedation with prn bolus fentanyl and versed to calm patient back down. Pupils 2 mm, sluggish. SR, BP WNL, afeb. Tachycardia into the 130s and SBP ~ 220 with agitation. 45% and PEEP of 12 overnight, P/F ratio this , moderate oral secretions and small ETT output, course/dim lung sounds. NJ to TF @ goal, 75 ml 2h flushes, large incontinence with rectal tube inserted. Per MICU stevenson inserted for retention, 710 ml UOP- sal. Fentanyl @ 200, Versed @ 5 and Dex @ 0.4. R PIV's x2, new PIV mid-shift. Chest XR overnight    Plan: Continue to wean sedation and care conference Friday.  For vital signs and complete assessments, please see documentation flowsheets.

## 2021-11-24 NOTE — PROGRESS NOTES
Care Management Follow Up    Length of Stay (days): 21    Expected Discharge Date:       Concerns to be Addressed: care coordination/care conferences,discharge planning     Patient plan of care discussed at interdisciplinary rounds: Yes    Anticipated Discharge Disposition: Home Care,Transitional Care     Anticipated Discharge Services: None  Anticipated Discharge DME: None    Patient/family educated on Medicare website which has current facility and service quality ratings: yes  Education Provided on the Discharge Plan:    Patient/Family in Agreement with the Plan: yes    Referrals Placed by CM/SW:    Private pay costs discussed: Not applicable    Additional Information:  Patients brother Brad called to set up their weekly care conference for patient. Brad agreed to Friday 11/26 at 1pm for a care conference. He stated that he had call in information.    Kelsey Gonzalez RN Care Coordinator   MICU/SICU  [phone]           Kelsey Gonzalez, RN

## 2021-11-25 ENCOUNTER — APPOINTMENT (OUTPATIENT)
Dept: CT IMAGING | Facility: CLINIC | Age: 41
End: 2021-11-25
Attending: STUDENT IN AN ORGANIZED HEALTH CARE EDUCATION/TRAINING PROGRAM
Payer: MEDICAID

## 2021-11-25 LAB
ANION GAP SERPL CALCULATED.3IONS-SCNC: 4 MMOL/L (ref 3–14)
BASE EXCESS BLDA CALC-SCNC: 7 MMOL/L (ref -9–1.8)
BUN SERPL-MCNC: 26 MG/DL (ref 7–30)
CALCIUM SERPL-MCNC: 8.8 MG/DL (ref 8.5–10.1)
CHLORIDE BLD-SCNC: 106 MMOL/L (ref 94–109)
CO2 SERPL-SCNC: 32 MMOL/L (ref 20–32)
CREAT SERPL-MCNC: 0.92 MG/DL (ref 0.66–1.25)
ERYTHROCYTE [DISTWIDTH] IN BLOOD BY AUTOMATED COUNT: 15.4 % (ref 10–15)
GFR SERPL CREATININE-BSD FRML MDRD: >90 ML/MIN/1.73M2
GLUCOSE BLD-MCNC: 147 MG/DL (ref 70–99)
GLUCOSE BLDC GLUCOMTR-MCNC: 115 MG/DL (ref 70–99)
GLUCOSE BLDC GLUCOMTR-MCNC: 124 MG/DL (ref 70–99)
GLUCOSE BLDC GLUCOMTR-MCNC: 129 MG/DL (ref 70–99)
GLUCOSE BLDC GLUCOMTR-MCNC: 144 MG/DL (ref 70–99)
GLUCOSE BLDC GLUCOMTR-MCNC: 149 MG/DL (ref 70–99)
GLUCOSE BLDC GLUCOMTR-MCNC: 166 MG/DL (ref 70–99)
HCO3 BLD-SCNC: 33 MMOL/L (ref 21–28)
HCT VFR BLD AUTO: 29.6 % (ref 40–53)
HGB BLD-MCNC: 9 G/DL (ref 13.3–17.7)
MAGNESIUM SERPL-MCNC: 2.2 MG/DL (ref 1.6–2.3)
MCH RBC QN AUTO: 27.1 PG (ref 26.5–33)
MCHC RBC AUTO-ENTMCNC: 30.4 G/DL (ref 31.5–36.5)
MCV RBC AUTO: 89 FL (ref 78–100)
O2/TOTAL GAS SETTING VFR VENT: 75 %
PCO2 BLD: 50 MM HG (ref 35–45)
PH BLD: 7.42 [PH] (ref 7.35–7.45)
PLATELET # BLD AUTO: 187 10E3/UL (ref 150–450)
PO2 BLD: 86 MM HG (ref 80–105)
POTASSIUM BLD-SCNC: 3.9 MMOL/L (ref 3.4–5.3)
RBC # BLD AUTO: 3.32 10E6/UL (ref 4.4–5.9)
SODIUM SERPL-SCNC: 142 MMOL/L (ref 133–144)
TRIGL SERPL-MCNC: 318 MG/DL
WBC # BLD AUTO: 7.2 10E3/UL (ref 4–11)

## 2021-11-25 PROCEDURE — 82803 BLOOD GASES ANY COMBINATION: CPT | Performed by: STUDENT IN AN ORGANIZED HEALTH CARE EDUCATION/TRAINING PROGRAM

## 2021-11-25 PROCEDURE — 250N000013 HC RX MED GY IP 250 OP 250 PS 637: Performed by: STUDENT IN AN ORGANIZED HEALTH CARE EDUCATION/TRAINING PROGRAM

## 2021-11-25 PROCEDURE — 71250 CT THORAX DX C-: CPT | Mod: 26 | Performed by: RADIOLOGY

## 2021-11-25 PROCEDURE — 250N000013 HC RX MED GY IP 250 OP 250 PS 637

## 2021-11-25 PROCEDURE — 250N000011 HC RX IP 250 OP 636

## 2021-11-25 PROCEDURE — 85027 COMPLETE CBC AUTOMATED: CPT | Performed by: STUDENT IN AN ORGANIZED HEALTH CARE EDUCATION/TRAINING PROGRAM

## 2021-11-25 PROCEDURE — 94003 VENT MGMT INPAT SUBQ DAY: CPT

## 2021-11-25 PROCEDURE — 250N000011 HC RX IP 250 OP 636: Performed by: STUDENT IN AN ORGANIZED HEALTH CARE EDUCATION/TRAINING PROGRAM

## 2021-11-25 PROCEDURE — 99291 CRITICAL CARE FIRST HOUR: CPT | Mod: GC | Performed by: STUDENT IN AN ORGANIZED HEALTH CARE EDUCATION/TRAINING PROGRAM

## 2021-11-25 PROCEDURE — 250N000009 HC RX 250: Performed by: STUDENT IN AN ORGANIZED HEALTH CARE EDUCATION/TRAINING PROGRAM

## 2021-11-25 PROCEDURE — 258N000003 HC RX IP 258 OP 636: Performed by: STUDENT IN AN ORGANIZED HEALTH CARE EDUCATION/TRAINING PROGRAM

## 2021-11-25 PROCEDURE — 250N000013 HC RX MED GY IP 250 OP 250 PS 637: Performed by: NURSE PRACTITIONER

## 2021-11-25 PROCEDURE — 999N000157 HC STATISTIC RCP TIME EA 10 MIN

## 2021-11-25 PROCEDURE — 80048 BASIC METABOLIC PNL TOTAL CA: CPT | Performed by: STUDENT IN AN ORGANIZED HEALTH CARE EDUCATION/TRAINING PROGRAM

## 2021-11-25 PROCEDURE — 71250 CT THORAX DX C-: CPT

## 2021-11-25 PROCEDURE — 200N000002 HC R&B ICU UMMC

## 2021-11-25 PROCEDURE — 83735 ASSAY OF MAGNESIUM: CPT | Performed by: STUDENT IN AN ORGANIZED HEALTH CARE EDUCATION/TRAINING PROGRAM

## 2021-11-25 PROCEDURE — 999N000185 HC STATISTIC TRANSPORT TIME EA 15 MIN

## 2021-11-25 PROCEDURE — 84478 ASSAY OF TRIGLYCERIDES: CPT | Performed by: NURSE PRACTITIONER

## 2021-11-25 RX ADMIN — LORAZEPAM 12 MG: 2 TABLET ORAL at 07:55

## 2021-11-25 RX ADMIN — LORAZEPAM 12 MG: 2 TABLET ORAL at 15:49

## 2021-11-25 RX ADMIN — LORAZEPAM 12 MG: 2 TABLET ORAL at 21:07

## 2021-11-25 RX ADMIN — INSULIN ASPART 1 UNITS: 100 INJECTION, SOLUTION INTRAVENOUS; SUBCUTANEOUS at 04:26

## 2021-11-25 RX ADMIN — ENOXAPARIN SODIUM 70 MG: 80 INJECTION SUBCUTANEOUS at 07:56

## 2021-11-25 RX ADMIN — HYDROMORPHONE HYDROCHLORIDE 8 MG: 4 TABLET ORAL at 04:23

## 2021-11-25 RX ADMIN — LORAZEPAM 12 MG: 2 TABLET ORAL at 11:27

## 2021-11-25 RX ADMIN — INSULIN ASPART 1 UNITS: 100 INJECTION, SOLUTION INTRAVENOUS; SUBCUTANEOUS at 11:38

## 2021-11-25 RX ADMIN — LORAZEPAM 12 MG: 2 TABLET ORAL at 00:26

## 2021-11-25 RX ADMIN — Medication 2 PACKET: at 13:48

## 2021-11-25 RX ADMIN — HYDROMORPHONE HYDROCHLORIDE 8 MG: 4 TABLET ORAL at 11:23

## 2021-11-25 RX ADMIN — QUETIAPINE FUMARATE 100 MG: 100 TABLET ORAL at 15:49

## 2021-11-25 RX ADMIN — Medication 4 MG/HR: at 18:29

## 2021-11-25 RX ADMIN — Medication 5 ML: at 07:56

## 2021-11-25 RX ADMIN — Medication 2 PACKET: at 11:23

## 2021-11-25 RX ADMIN — INSULIN ASPART 1 UNITS: 100 INJECTION, SOLUTION INTRAVENOUS; SUBCUTANEOUS at 07:54

## 2021-11-25 RX ADMIN — Medication 150 MCG: at 21:54

## 2021-11-25 RX ADMIN — QUETIAPINE FUMARATE 100 MG: 100 TABLET ORAL at 11:23

## 2021-11-25 RX ADMIN — Medication 5 ML: at 11:34

## 2021-11-25 RX ADMIN — HYDROMORPHONE HYDROCHLORIDE 8 MG: 4 TABLET ORAL at 21:07

## 2021-11-25 RX ADMIN — Medication 2 PACKET: at 18:21

## 2021-11-25 RX ADMIN — HYDROMORPHONE HYDROCHLORIDE 8 MG: 4 TABLET ORAL at 00:26

## 2021-11-25 RX ADMIN — Medication 2 PACKET: at 07:55

## 2021-11-25 RX ADMIN — Medication 4 MG/HR: at 00:26

## 2021-11-25 RX ADMIN — DEXMEDETOMIDINE 0.5 MCG/KG/HR: 100 INJECTION, SOLUTION, CONCENTRATE INTRAVENOUS at 11:43

## 2021-11-25 RX ADMIN — Medication 100 MCG/HR: at 18:29

## 2021-11-25 RX ADMIN — HYDROMORPHONE HYDROCHLORIDE 8 MG: 4 TABLET ORAL at 07:55

## 2021-11-25 RX ADMIN — Medication 100 MCG: at 14:00

## 2021-11-25 RX ADMIN — QUETIAPINE FUMARATE 100 MG: 100 TABLET ORAL at 07:55

## 2021-11-25 RX ADMIN — QUETIAPINE FUMARATE 100 MG: 100 TABLET ORAL at 21:07

## 2021-11-25 RX ADMIN — ENOXAPARIN SODIUM 70 MG: 80 INJECTION SUBCUTANEOUS at 21:08

## 2021-11-25 RX ADMIN — DEXMEDETOMIDINE 0.5 MCG/KG/HR: 100 INJECTION, SOLUTION, CONCENTRATE INTRAVENOUS at 15:49

## 2021-11-25 RX ADMIN — Medication 100 MCG/HR: at 00:26

## 2021-11-25 RX ADMIN — LORAZEPAM 12 MG: 2 TABLET ORAL at 04:23

## 2021-11-25 RX ADMIN — QUETIAPINE FUMARATE 100 MG: 100 TABLET ORAL at 00:26

## 2021-11-25 RX ADMIN — QUETIAPINE FUMARATE 100 MG: 100 TABLET ORAL at 04:23

## 2021-11-25 RX ADMIN — Medication 2 PACKET: at 21:08

## 2021-11-25 RX ADMIN — HYDROMORPHONE HYDROCHLORIDE 8 MG: 4 TABLET ORAL at 15:49

## 2021-11-25 RX ADMIN — Medication 40 MG: at 07:56

## 2021-11-25 ASSESSMENT — ACTIVITIES OF DAILY LIVING (ADL)
ADLS_ACUITY_SCORE: 17
ADLS_ACUITY_SCORE: 17
ADLS_ACUITY_SCORE: 19
ADLS_ACUITY_SCORE: 17
ADLS_ACUITY_SCORE: 17
ADLS_ACUITY_SCORE: 19
ADLS_ACUITY_SCORE: 17
ADLS_ACUITY_SCORE: 19
ADLS_ACUITY_SCORE: 17
ADLS_ACUITY_SCORE: 19
ADLS_ACUITY_SCORE: 17
ADLS_ACUITY_SCORE: 19
ADLS_ACUITY_SCORE: 17
ADLS_ACUITY_SCORE: 19

## 2021-11-25 ASSESSMENT — MIFFLIN-ST. JEOR: SCORE: 2327.36

## 2021-11-25 NOTE — PROGRESS NOTES
[facility] MICU STAFF NOTE    Brandon Schafer remains critically ill with hypoxic respiratory failure covid pneumonia      I personally examined and evaluated the patient today. I have evaluated all laboratory values and imaging studies from the past 24 hours.    Key findings and decisions made today included:     41 year old with covid pneumonia complicated by ards remains ventilator dependent . Continue supportive care.     I personally managed the ventilator, sedation, pain control and analgesia, metabolic abnormalities, antibiotic therapy, and nutritional status    Consults ongoing and ordered are: none     Procedures that will happen today are: mechanical ventilation    All treatments were placed at my direction.  I formulated today s plan with the house staff team or resident(s) and agree with the findings and plan in the associated note.      I spent a total of 40 minutes (excluding procedure time) personally providing and directing critical care services at the bedside and on the critical care unit for Brandon Schafer     Cherelle Owens MD   Pulmonary and Critical Care Staff

## 2021-11-25 NOTE — PLAN OF CARE
Patient had one event similar to those described by prior RNs, otherwise stable without issue. Neuros unchanged, able to open eyes and move extremeties; SR in 80-90s unless agitated then up to 130s, BP WDL unless agitated but quickly calms down; Moderate oral secretions, vent settings unchanged; stevenson with WDL ouput, good response to lasix; Rectal tube in place but has major leakage when patient is agitated;     Plan: continue to wean sedation

## 2021-11-25 NOTE — PROGRESS NOTES
MEDICAL ICU PROGRESS NOTE  11/25/2021      Date of Service (when I saw the patient): 11/25/2021    ASSESSMENT: Brandon Schafer is a 41 year old male with questionable PMH of remote VTE who was admitted on 11/3/2021 with ARDS 2/2 COVID pneumonia. Now with CAUTI and polymicrobial VAP.     No changes today    PLAN:    Neuro:  # Pain and sedation  - Fentanyl infusion, wean  - Midazolam infusion, wean as able  - dilaudid 8mg q 4H  - Ativan 12mg Q4H  - Seroquel 100 mg q4h  - RASS goal -2 to -3  - Consider NMB if worsening vent mechanics     Pulmonary:  # ARDS 2/2 COVID pneumonia  Intubated 11/2 prior to transport flight.   - maintain supine --- no benefit from prone  - weaning PEEP as tolerated  - Infectious work-up and treatment as below    Ventilation Mode: CMV/AC  (Continuous Mandatory Ventilation/ Assist Control)  FiO2 (%): 75 %  Rate Set (breaths/minute): 24 breaths/min  Tidal Volume Set (mL): 530 mL  PEEP (cm H2O): 10 cmH2O  Oxygen Concentration (%): 75 %  Resp: 26    #COPD exacerbation  Wheezing on exam on 11/4. S/p azithromycin for 5d. Steroids per Covid protocol.   - Continue albuterol nebs prn     Cardiovascular:  # Hypotension, resolved  Transient, and in the setting of sedation. No prior TTE. Troponin peaked at 0.293. EKG with T wave inversion in lateral leads. Suspect troponin was 2/2 demand ischemia in setting of hypoxia.    # Atrial fibrillation  Episode of atrial fibrillation overnight 11/12, hemodynamically stable and resolved spontaneously. TWI on EKG, troponin negative     GI/Nutrition:  # At risk for malnutrition  # Constipation, improving  - Nutrition consult for TF  - Tolerating TF at goal   - Bowel regimen: scheduled Miralax and senna and prn Miralax     Renal/Fluids/Electrolytes:  # Non-oliguric STEFAN, resolved  # Hypokalemia, in setting of ongoing diuresis   Unclear baseline, has not seen doctor in 20 years. Cr 2.5 on admission, now improved. 11/18 with rising bicarb, BUN, Cr...decreased UOP  consistent with overdiuresis.    - holding diuretics     # Hypernatremia, resolved  - FWF 75ml q 2H, stable sodium     Endocrine:  # Stress and steroid induced hyperglycemia  - Medium resistance sliding scale insulin      ID:  # COVID pneumonia  Symptoms (headache, SOB, fever, cough, diarrhea) 1 week prior to presentation. Not COVID vaccinated. Tested positive on presentation on 11/2. S/p tocilizumab 11/4.  - Dexamethasone 6 mg daily (total 10 days) - completed     # Fever, tmax 101.3 11/17  # CAUTI  # VAP  Pt with worsening oxygen saturations, fevers, and persistent leukocytosis. Given his prolonged hospitalization, was started on broad-spectrum antibiotics on 11/11. Urine culture grew >100k pan-sensitive E coli. RVP and MRSA nares negative. Sputum grew E coli, S aureus, and GBS. Sputum culture 11/17 with 1+ Ecoli, blood cultures 11/17 pending. Continues to have fevers as of 11/18, redraw blood cultures and test stool for Cdiff. Of note, CXR 11/17 without clear/focal infiltrate.   - Continue CTX for additional 7 day course given persistent Ecoli on 11/17 sputum (11/19 - 11/25)     Abx:  - Ceftriaxone 11/14-11/17; 11/19 - 11/25 (completed)  - Zosyn 11/11-11/14; 11/17 - 11/18  - Vanc 11/11-11/12      Hematology:    # Coagulopathy 2/2 COVID  US BLE negative for DVT.  - Enoxaparin ppx 70mg BID     MSK  # Septum wound, tongue wound 2/2 Proning   - WOC following     General Cares/Prophylaxis:    DVT Prophylaxis: Lovenox ppx 70 mg BID  GI Prophylaxis: PPI  Family Communication: BrotherBrad, updated by phone  Code Status: DNR      Lines/tubes/drains:  - PIV x3  - arterial line  - NG  - ETT  - Rectal tube  - Sandra (11/24 - )     Disposition:  - Medical ICU    Patient seen and findings/plan discussed with medical ICU staff, Dr. Owens.    Shirin Moore MD      ====================================  INTERVAL HISTORY:   No major events overnight.    OBJECTIVE:   1. VITAL SIGNS:   Temp:  [98.9  F (37.2  C)-100  F (37.8  C)]  100  F (37.8  C)  Pulse:  [] 83  Resp:  [26-32] 26  BP: (110)/(42) 110/42  MAP:  [61 mmHg-247 mmHg] 85 mmHg  Arterial Line BP: ()/(46-93) 128/65  FiO2 (%):  [50 %-75 %] 75 %  SpO2:  [87 %-100 %] 100 %  Ventilation Mode: CMV/AC  (Continuous Mandatory Ventilation/ Assist Control)  FiO2 (%): 75 %  Rate Set (breaths/minute): 24 breaths/min  Tidal Volume Set (mL): 530 mL  PEEP (cm H2O): 10 cmH2O  Oxygen Concentration (%): 75 %  Resp: 26    2. INTAKE/ OUTPUT:   I/O last 3 completed shifts:  In: 3685.11 [I.V.:675.11; NG/GT:975; IV Piggyback:1000]  Out: 3460 [Urine:2560; Stool:900]    3. PHYSICAL EXAMINATION:  General: Sedated, intubated, supine  HEENT: Mucous membranes dry, wound on tongue and base of nasal septum  Neuro: sedated, wakens to voice  Pulm/Resp: Coarse breath sounds bilaterally without rhonchi, crackles or wheeze, breathing non-labored  CV: tachycardic, S1/S2 without m/r/g;  Abdomen: Obese, soft, non-distended, non-tender  : Flores catheter in place, urine yellow and clear  Ext: trace edema of extremities  Incisions/Skin: Wounds as above, no rashes noted    4. LABS:   Arterial Blood Gases   Recent Labs   Lab 11/25/21 0521 11/24/21  1404 11/24/21  0456 11/24/21  0010   PH 7.42 7.44 7.42 7.43   PCO2 50* 46* 48* 48*   PO2 86 64* 72* 74*   HCO3 33* 32* 31* 32*     Complete Blood Count   Recent Labs   Lab 11/25/21  0520 11/24/21  0455 11/23/21  0519 11/22/21  0426   WBC 7.2 6.8 7.3 6.6   HGB 9.0* 8.8* 9.1* 8.8*    177 171 163     Basic Metabolic Panel  Recent Labs   Lab 11/25/21 0520 11/25/21  0425 11/25/21  0030 11/24/21 2117 11/24/21 0458 11/24/21 0456 11/23/21  0839 11/23/21  0519 11/22/21  0941 11/22/21 0426     --   --   --   --  143  --  143  --  143   POTASSIUM 3.9  --   --   --   --  4.0  --  3.7  --  3.4   CHLORIDE 106  --   --   --   --  109  --  108  --  106   CO2 32  --   --   --   --  27  --  30  --  33*   BUN 26  --   --   --   --  31*  --  30  --  30   CR 0.92  --   --    --   --  0.92  --  0.93  --  0.88   * 149* 129* 162*   < > 174*   < > 174*   < > 191*    < > = values in this interval not displayed.     Liver Function Tests  No lab results found in last 7 days.  5. RADIOLOGY:   No results found for this or any previous visit (from the past 24 hour(s)).

## 2021-11-25 NOTE — PROGRESS NOTES
MEDICAL ICU PROGRESS NOTE  11/24/2021      Date of Service (when I saw the patient): 11/24/2021    ASSESSMENT: Brandon Schafer is a 41 year old male with questionable PMH of remote VTE who was admitted on 11/3/2021 with ARDS 2/2 COVID pneumonia. Now with CAUTI and polymicrobial VAP.     CHANGES and MAJOR THINGS TODAY:   - decrease PEEP  - wean midazolam gtt  - fentanyl to 100 mcg   - lasix 40 mg daily    PLAN:    Neuro:  # Pain and sedation  - Fentanyl infusion, wean  - Midazolam infusion, wean as able  - dilaudid 8mg q 4H  - Ativan 12mg Q4H  - Seroquel 100 mg q4h  - RASS goal -2 to -3  - Consider NMB if worsening vent mechanics     Pulmonary:  # ARDS 2/2 COVID pneumonia  Intubated 11/2 prior to transport flight. Prone positioning started 11/5.   - maintain supine --- no benefit from prone  - weaning PEEP as tolerated  - Infectious work-up and treatment as below    Ventilation Mode: CMV/AC  (Continuous Mandatory Ventilation/ Assist Control)  FiO2 (%): 75 %  Rate Set (breaths/minute): 24 breaths/min  Tidal Volume Set (mL): 530 mL  PEEP (cm H2O): 10 cmH2O  Oxygen Concentration (%): 75 %  Resp: 27    # ?COPD exacerbation  Wheezing on exam on 11/4. S/p azithromycin for 5d. Steroids per Covid protocol.   - Continue albuterol nebs prn     Cardiovascular:  # Hypotension, resolved  Transient, and in the setting of sedation. No prior TTE. Troponin peaked at 0.293. EKG with T wave inversion in lateral leads. Suspect troponin was 2/2 demand ischemia in setting of hypoxia.    # Atrial fibrillation  Episode of atrial fibrillation overnight 11/12, hemodynamically stable and resolved spontaneously. TWI on EKG, troponin negative     GI/Nutrition:  # At risk for malnutrition  # Constipation, improving  - Nutrition consult for TF  - Tolerating TF at goal   - Bowel regimen: scheduled Miralax and senna and prn Miralax     Renal/Fluids/Electrolytes:  # Non-oliguric STEFAN, resolved  # Hypokalemia, in setting of ongoing diuresis   Unclear  baseline, has not seen doctor in 20 years. Cr 2.5 on admission, now improved. 11/18 with rising bicarb, BUN, Cr...decreased UOP consistent with overdiuresis.    - holding diuretics     # Hypernatremia, resolved  - FWF 75ml q 2H, stable sodium     Endocrine:  # Stress and steroid induced hyperglycemia  - Medium resistance sliding scale insulin      ID:  # COVID pneumonia  Symptoms (headache, SOB, fever, cough, diarrhea) 1 week prior to presentation. Not COVID vaccinated. Tested positive on presentation on 11/2. S/p tocilizumab 11/4.  - Dexamethasone 6 mg daily (total 10 days)     # Fever, tmax 101.3 11/17  # CAUTI  # VAP  Pt with worsening oxygen saturations, fevers, and persistent leukocytosis. Given his prolonged hospitalization, was started on broad-spectrum antibiotics on 11/11. Urine culture grew >100k pan-sensitive E coli. RVP and MRSA nares negative. Sputum grew E coli, S aureus, and GBS. Sputum culture 11/17 with 1+ Ecoli, blood cultures 11/17 pending. Continues to have fevers as of 11/18, redraw blood cultures and test stool for Cdiff. Of note, CXR 11/17 without clear/focal infiltrate.   - Continue CTX for additional 7 day course given persistent Ecoli on 11/17 sputum (11/19 - 11/25)     Abx:  - Ceftriaxone 11/14-11/17; 11/19 -   - Zosyn 11/11-11/14; 11/17 - 11/18  - Vanc 11/11-11/12      Hematology:    # Coagulopathy 2/2 COVID  US BLE negative for DVT.  - Enoxaparin ppx      MSK  # Septum wound, tongue wound 2/2 Proning   - WOC following     General Cares/Prophylaxis:    DVT Prophylaxis: Lovenox ppx 70 mg BID  GI Prophylaxis: PPI  Family Communication: BrotherBrad, updated by phone  Code Status: DNR      Lines/tubes/drains:  - PIV x3  - arterial line  - NG  - ETT  - Rectal pouch      Disposition:  - Medical ICU    Patient seen and findings/plan discussed with medical ICU staff, Dr. Perlman Daniel Philip Vanderhoef, MD      ====================================  INTERVAL HISTORY:   Notes reviewed.  Afebrile overnight. P/f ratio 160. Overnight increased agitation requiring sedation boluses, dropping pressures. Recovered with LR bolus.    OBJECTIVE:   1. VITAL SIGNS:   Temp:  [97.9  F (36.6  C)-99.4  F (37.4  C)] 99.4  F (37.4  C)  Pulse:  [] 77  Resp:  [24-34] 27  BP: (110)/(42) 110/42  MAP:  [61 mmHg-247 mmHg] 72 mmHg  Arterial Line BP: ()/(46-93) 108/54  FiO2 (%):  [45 %-75 %] 75 %  SpO2:  [87 %-97 %] 96 %  Ventilation Mode: CMV/AC  (Continuous Mandatory Ventilation/ Assist Control)  FiO2 (%): 75 %  Rate Set (breaths/minute): 24 breaths/min  Tidal Volume Set (mL): 530 mL  PEEP (cm H2O): 10 cmH2O  Oxygen Concentration (%): 75 %  Resp: 27    2. INTAKE/ OUTPUT:   I/O last 3 completed shifts:  In: 3808.01 [I.V.:718.01; NG/GT:1100; IV Piggyback:1000]  Out: 1600 [Urine:1600]    3. PHYSICAL EXAMINATION:  General: Sedated, intubated, supine  HEENT: Mucous membranes dry, wound on tongue and base of nasal septum  Neuro: sedated, wakens to voice  Pulm/Resp: Coarse breath sounds bilaterally without rhonchi, crackles or wheeze, breathing non-labored  CV: tachycardic, S1/S2 without m/r/g;  Abdomen: Obese, soft, non-distended, non-tender  : Flores catheter in place, urine yellow and clear  Ext: trace edema of extremities  Incisions/Skin: Wounds as above, no rashes noted    4. LABS:   Arterial Blood Gases   Recent Labs   Lab 11/24/21  1404 11/24/21  0456 11/24/21  0010 11/23/21  0520   PH 7.44 7.42 7.43 7.46*   PCO2 46* 48* 48* 47*   PO2 64* 72* 74* 66*   HCO3 32* 31* 32* 33*     Complete Blood Count   Recent Labs   Lab 11/24/21  0455 11/23/21  0519 11/22/21  0426 11/21/21  0353   WBC 6.8 7.3 6.6 6.7   HGB 8.8* 9.1* 8.8* 9.1*    171 163 173     Basic Metabolic Panel  Recent Labs   Lab 11/24/21  1622 11/24/21  1215 11/24/21  0848 11/24/21  0458 11/24/21  0456 11/23/21  0839 11/23/21  0519 11/22/21  0941 11/22/21  0426 11/21/21  0835 11/21/21  0353   NA  --   --   --   --  143  --  143  --  143  --  142    POTASSIUM  --   --   --   --  4.0  --  3.7  --  3.4  --  3.7   CHLORIDE  --   --   --   --  109  --  108  --  106  --  106   CO2  --   --   --   --  27  --  30  --  33*  --  33*   BUN  --   --   --   --  31*  --  30  --  30  --  34*   CR  --   --   --   --  0.92  --  0.93  --  0.88  --  0.85   * 126* 144* 150* 174*   < > 174*   < > 191*   < > 226*    < > = values in this interval not displayed.     Liver Function Tests  No lab results found in last 7 days.  5. RADIOLOGY:   Recent Results (from the past 24 hour(s))   XR Chest Port 1 View    Narrative    EXAM: XR CHEST PORT 1 VIEW  11/24/2021 1:35 AM     HISTORY:  worsening hypoxia and shock, COVID+       COMPARISON:  Radiographs 11/17/2021, 11/15/2021.    TECHNIQUE: AP radiograph of the chest 30 degrees    FINDINGS:   Devices, lines, tubes: Endotracheal tube projecting 7.6 cm superior to  the becca. Feeding tube is coiled in the stomach. Interval removal of  right upper extremity PICC.    The trachea is midline. The cardiomediastinal silhouette is stable.  The pulmonary vasculature is indistinct.  No substantial change in  mixed diffuse bilateral interstitial and airspace opacities. Blunting  of the right costophrenic angle. Stable rounded cortical lucency. No  appreciable focal consolidative opacity. No acute osseous abnormality.         Impression    IMPRESSION:   1. No substantial change in bilateral mixed pulmonary opacities.  2. Stable round right apical lucency which may represent a  pneumatocele/bleb or loculated pneumothorax.  3. Stable trace right pleural effusion.    I have personally reviewed the examination and initial interpretation  and I agree with the findings.    MAAME CHRIS MD         SYSTEM ID:  [id number]

## 2021-11-25 NOTE — PLAN OF CARE
"Major Shift Events:  weaned sedation more today, but pt had 3 events similar to that which was described by PM RN -- RR > 40, HR > 120, SBP > 170. Pt looks around, flails, and reaches for ETT.  Following last event, versed gtt rate increased to 4.    Resp therapist increased FiO2 to 75% following blood gas results.  During pt \"agitation events,\" significant amount of stool produced that eluded rectal tube.  Pericare, stevenson care, new absorbant pads, and new gown provided.  900mL measurable stool produced.    Plan: stop weaning IV sedation aggressively   "

## 2021-11-26 LAB
ANION GAP SERPL CALCULATED.3IONS-SCNC: 4 MMOL/L (ref 3–14)
ANION GAP SERPL CALCULATED.3IONS-SCNC: 5 MMOL/L (ref 3–14)
ATRIAL RATE - MUSE: 89 BPM
BASE EXCESS BLDA CALC-SCNC: 6.2 MMOL/L (ref -9–1.8)
BUN SERPL-MCNC: 26 MG/DL (ref 7–30)
BUN SERPL-MCNC: 30 MG/DL (ref 7–30)
CALCIUM SERPL-MCNC: 8.8 MG/DL (ref 8.5–10.1)
CALCIUM SERPL-MCNC: 8.8 MG/DL (ref 8.5–10.1)
CHLORIDE BLD-SCNC: 105 MMOL/L (ref 94–109)
CHLORIDE BLD-SCNC: 106 MMOL/L (ref 94–109)
CO2 SERPL-SCNC: 30 MMOL/L (ref 20–32)
CO2 SERPL-SCNC: 30 MMOL/L (ref 20–32)
CREAT SERPL-MCNC: 0.87 MG/DL (ref 0.66–1.25)
CREAT SERPL-MCNC: 0.89 MG/DL (ref 0.66–1.25)
DIASTOLIC BLOOD PRESSURE - MUSE: NORMAL MMHG
ERYTHROCYTE [DISTWIDTH] IN BLOOD BY AUTOMATED COUNT: 15.3 % (ref 10–15)
GFR SERPL CREATININE-BSD FRML MDRD: >90 ML/MIN/1.73M2
GFR SERPL CREATININE-BSD FRML MDRD: >90 ML/MIN/1.73M2
GLUCOSE BLD-MCNC: 168 MG/DL (ref 70–99)
GLUCOSE BLD-MCNC: 178 MG/DL (ref 70–99)
GLUCOSE BLDC GLUCOMTR-MCNC: 113 MG/DL (ref 70–99)
GLUCOSE BLDC GLUCOMTR-MCNC: 133 MG/DL (ref 70–99)
GLUCOSE BLDC GLUCOMTR-MCNC: 134 MG/DL (ref 70–99)
GLUCOSE BLDC GLUCOMTR-MCNC: 146 MG/DL (ref 70–99)
GLUCOSE BLDC GLUCOMTR-MCNC: 147 MG/DL (ref 70–99)
GLUCOSE BLDC GLUCOMTR-MCNC: 188 MG/DL (ref 70–99)
HCO3 BLD-SCNC: 31 MMOL/L (ref 21–28)
HCT VFR BLD AUTO: 27.5 % (ref 40–53)
HGB BLD-MCNC: 8.6 G/DL (ref 13.3–17.7)
INTERPRETATION ECG - MUSE: NORMAL
MAGNESIUM SERPL-MCNC: 2.3 MG/DL (ref 1.6–2.3)
MAGNESIUM SERPL-MCNC: 2.4 MG/DL (ref 1.6–2.3)
MCH RBC QN AUTO: 27.7 PG (ref 26.5–33)
MCHC RBC AUTO-ENTMCNC: 31.3 G/DL (ref 31.5–36.5)
MCV RBC AUTO: 89 FL (ref 78–100)
O2/TOTAL GAS SETTING VFR VENT: 75 %
P AXIS - MUSE: 19 DEGREES
PCO2 BLD: 45 MM HG (ref 35–45)
PH BLD: 7.45 [PH] (ref 7.35–7.45)
PLATELET # BLD AUTO: 192 10E3/UL (ref 150–450)
PO2 BLD: 105 MM HG (ref 80–105)
POTASSIUM BLD-SCNC: 4 MMOL/L (ref 3.4–5.3)
POTASSIUM BLD-SCNC: 4.3 MMOL/L (ref 3.4–5.3)
PR INTERVAL - MUSE: 154 MS
QRS DURATION - MUSE: 84 MS
QT - MUSE: 368 MS
QTC - MUSE: 447 MS
R AXIS - MUSE: -17 DEGREES
RBC # BLD AUTO: 3.1 10E6/UL (ref 4.4–5.9)
SODIUM SERPL-SCNC: 140 MMOL/L (ref 133–144)
SODIUM SERPL-SCNC: 140 MMOL/L (ref 133–144)
SYSTOLIC BLOOD PRESSURE - MUSE: NORMAL MMHG
T AXIS - MUSE: 52 DEGREES
VENTRICULAR RATE- MUSE: 89 BPM
WBC # BLD AUTO: 7.5 10E3/UL (ref 4–11)

## 2021-11-26 PROCEDURE — 250N000011 HC RX IP 250 OP 636: Performed by: STUDENT IN AN ORGANIZED HEALTH CARE EDUCATION/TRAINING PROGRAM

## 2021-11-26 PROCEDURE — 999N000015 HC STATISTIC ARTERIAL MONITORING DAILY

## 2021-11-26 PROCEDURE — 258N000003 HC RX IP 258 OP 636: Performed by: STUDENT IN AN ORGANIZED HEALTH CARE EDUCATION/TRAINING PROGRAM

## 2021-11-26 PROCEDURE — 99356 PR PROLONGED SERV,INPATIENT,1ST HR: CPT | Performed by: NURSE PRACTITIONER

## 2021-11-26 PROCEDURE — 250N000009 HC RX 250: Performed by: STUDENT IN AN ORGANIZED HEALTH CARE EDUCATION/TRAINING PROGRAM

## 2021-11-26 PROCEDURE — 82803 BLOOD GASES ANY COMBINATION: CPT | Performed by: STUDENT IN AN ORGANIZED HEALTH CARE EDUCATION/TRAINING PROGRAM

## 2021-11-26 PROCEDURE — 80048 BASIC METABOLIC PNL TOTAL CA: CPT | Performed by: STUDENT IN AN ORGANIZED HEALTH CARE EDUCATION/TRAINING PROGRAM

## 2021-11-26 PROCEDURE — 83735 ASSAY OF MAGNESIUM: CPT | Performed by: STUDENT IN AN ORGANIZED HEALTH CARE EDUCATION/TRAINING PROGRAM

## 2021-11-26 PROCEDURE — 250N000013 HC RX MED GY IP 250 OP 250 PS 637: Performed by: NURSE PRACTITIONER

## 2021-11-26 PROCEDURE — 250N000013 HC RX MED GY IP 250 OP 250 PS 637: Performed by: STUDENT IN AN ORGANIZED HEALTH CARE EDUCATION/TRAINING PROGRAM

## 2021-11-26 PROCEDURE — 250N000011 HC RX IP 250 OP 636

## 2021-11-26 PROCEDURE — 200N000002 HC R&B ICU UMMC

## 2021-11-26 PROCEDURE — 85027 COMPLETE CBC AUTOMATED: CPT | Performed by: STUDENT IN AN ORGANIZED HEALTH CARE EDUCATION/TRAINING PROGRAM

## 2021-11-26 PROCEDURE — 999N000157 HC STATISTIC RCP TIME EA 10 MIN

## 2021-11-26 PROCEDURE — 250N000013 HC RX MED GY IP 250 OP 250 PS 637

## 2021-11-26 PROCEDURE — G0463 HOSPITAL OUTPT CLINIC VISIT: HCPCS

## 2021-11-26 PROCEDURE — 99233 SBSQ HOSP IP/OBS HIGH 50: CPT | Performed by: NURSE PRACTITIONER

## 2021-11-26 PROCEDURE — 99291 CRITICAL CARE FIRST HOUR: CPT | Mod: GC | Performed by: STUDENT IN AN ORGANIZED HEALTH CARE EDUCATION/TRAINING PROGRAM

## 2021-11-26 PROCEDURE — 94003 VENT MGMT INPAT SUBQ DAY: CPT

## 2021-11-26 RX ORDER — MIDAZOLAM HCL IN 0.9 % NACL/PF 1 MG/ML
1-8 PLASTIC BAG, INJECTION (ML) INTRAVENOUS CONTINUOUS
Status: DISCONTINUED | OUTPATIENT
Start: 2021-11-26 | End: 2021-11-28

## 2021-11-26 RX ORDER — MULTIPLE VITAMINS W/ MINERALS TAB 9MG-400MCG
1 TAB ORAL DAILY
Status: DISCONTINUED | OUTPATIENT
Start: 2021-11-27 | End: 2021-12-06 | Stop reason: HOSPADM

## 2021-11-26 RX ORDER — FUROSEMIDE 10 MG/ML
40 INJECTION INTRAMUSCULAR; INTRAVENOUS ONCE
Status: COMPLETED | OUTPATIENT
Start: 2021-11-26 | End: 2021-11-26

## 2021-11-26 RX ORDER — METHYLPREDNISOLONE SODIUM SUCCINATE 125 MG/2ML
60 INJECTION, POWDER, LYOPHILIZED, FOR SOLUTION INTRAMUSCULAR; INTRAVENOUS EVERY 24 HOURS
Status: COMPLETED | OUTPATIENT
Start: 2021-11-26 | End: 2021-11-28

## 2021-11-26 RX ADMIN — DEXMEDETOMIDINE 0.7 MCG/KG/HR: 100 INJECTION, SOLUTION, CONCENTRATE INTRAVENOUS at 01:25

## 2021-11-26 RX ADMIN — Medication 5 ML: at 08:26

## 2021-11-26 RX ADMIN — QUETIAPINE FUMARATE 100 MG: 100 TABLET ORAL at 00:43

## 2021-11-26 RX ADMIN — Medication 40 MG: at 08:26

## 2021-11-26 RX ADMIN — LORAZEPAM 12 MG: 2 TABLET ORAL at 12:33

## 2021-11-26 RX ADMIN — LORAZEPAM 12 MG: 2 TABLET ORAL at 20:23

## 2021-11-26 RX ADMIN — Medication 2 PACKET: at 12:32

## 2021-11-26 RX ADMIN — ENOXAPARIN SODIUM 70 MG: 80 INJECTION SUBCUTANEOUS at 08:30

## 2021-11-26 RX ADMIN — Medication 2 PACKET: at 13:55

## 2021-11-26 RX ADMIN — HYDROMORPHONE HYDROCHLORIDE 8 MG: 4 TABLET ORAL at 20:24

## 2021-11-26 RX ADMIN — INSULIN ASPART 1 UNITS: 100 INJECTION, SOLUTION INTRAVENOUS; SUBCUTANEOUS at 04:18

## 2021-11-26 RX ADMIN — METHYLPREDNISOLONE SODIUM SUCCINATE 62.5 MG: 125 INJECTION, POWDER, FOR SOLUTION INTRAMUSCULAR; INTRAVENOUS at 16:12

## 2021-11-26 RX ADMIN — INSULIN ASPART 1 UNITS: 100 INJECTION, SOLUTION INTRAVENOUS; SUBCUTANEOUS at 20:33

## 2021-11-26 RX ADMIN — INSULIN ASPART 1 UNITS: 100 INJECTION, SOLUTION INTRAVENOUS; SUBCUTANEOUS at 08:52

## 2021-11-26 RX ADMIN — LORAZEPAM 12 MG: 2 TABLET ORAL at 16:16

## 2021-11-26 RX ADMIN — QUETIAPINE FUMARATE 100 MG: 100 TABLET ORAL at 12:34

## 2021-11-26 RX ADMIN — QUETIAPINE FUMARATE 100 MG: 100 TABLET ORAL at 08:25

## 2021-11-26 RX ADMIN — LORAZEPAM 12 MG: 2 TABLET ORAL at 04:15

## 2021-11-26 RX ADMIN — Medication 200 MCG/HR: at 20:50

## 2021-11-26 RX ADMIN — Medication 2 PACKET: at 21:31

## 2021-11-26 RX ADMIN — Medication 100 MCG: at 19:56

## 2021-11-26 RX ADMIN — HYDROMORPHONE HYDROCHLORIDE 8 MG: 4 TABLET ORAL at 00:43

## 2021-11-26 RX ADMIN — HYDROMORPHONE HYDROCHLORIDE 8 MG: 4 TABLET ORAL at 08:25

## 2021-11-26 RX ADMIN — ENOXAPARIN SODIUM 70 MG: 80 INJECTION SUBCUTANEOUS at 20:23

## 2021-11-26 RX ADMIN — Medication 2 PACKET: at 18:11

## 2021-11-26 RX ADMIN — LORAZEPAM 12 MG: 2 TABLET ORAL at 00:43

## 2021-11-26 RX ADMIN — Medication 2 PACKET: at 08:26

## 2021-11-26 RX ADMIN — LORAZEPAM 12 MG: 2 TABLET ORAL at 08:25

## 2021-11-26 RX ADMIN — QUETIAPINE FUMARATE 100 MG: 100 TABLET ORAL at 20:23

## 2021-11-26 RX ADMIN — QUETIAPINE FUMARATE 100 MG: 100 TABLET ORAL at 04:15

## 2021-11-26 RX ADMIN — QUETIAPINE FUMARATE 100 MG: 100 TABLET ORAL at 16:16

## 2021-11-26 RX ADMIN — HYDROMORPHONE HYDROCHLORIDE 8 MG: 4 TABLET ORAL at 16:16

## 2021-11-26 RX ADMIN — FUROSEMIDE 40 MG: 10 INJECTION, SOLUTION INTRAVENOUS at 12:41

## 2021-11-26 RX ADMIN — DEXMEDETOMIDINE 0.7 MCG/KG/HR: 100 INJECTION, SOLUTION, CONCENTRATE INTRAVENOUS at 17:37

## 2021-11-26 RX ADMIN — HYDROMORPHONE HYDROCHLORIDE 8 MG: 4 TABLET ORAL at 04:15

## 2021-11-26 RX ADMIN — HYDROMORPHONE HYDROCHLORIDE 8 MG: 4 TABLET ORAL at 12:34

## 2021-11-26 ASSESSMENT — ACTIVITIES OF DAILY LIVING (ADL)
ADLS_ACUITY_SCORE: 17
ADLS_ACUITY_SCORE: 18
ADLS_ACUITY_SCORE: 17

## 2021-11-26 NOTE — PLAN OF CARE
No acute events over night Neuros unchanged, able to open eyes and move extremeties; SR in 80-90s unless agitated then up to 130s, BP WDL unless agitated but quickly calms down, bolused x1 for mild agitation (not as severe as previous notes descriptions); Moderate oral secretions, vent settings unchanged; stevenson with WDL ouput; Rectal tube in place without leakage; Versed decreased to 2mg/hr and dex increased to .7    Plan: continue to wean sedation

## 2021-11-26 NOTE — PROGRESS NOTES
[facility] MICU STAFF NOTE     Brandon Schafer remains critically ill with hypoxic respiratory failure covid pneumonia      I personally examined and evaluated the patient today. I have evaluated all laboratory values and imaging studies from the past 24 hours.     Key findings and decisions made today included:      41 year old with covid pneumonia complicated by ards remains ventilator dependent . Continue supportive care. CT with persistence of scattered dense consolidations, will consider option for steroids in setting of potential post covid  OP . Family conference today .      I personally managed the ventilator, sedation, pain control and analgesia, metabolic abnormalities, antibiotic therapy, and nutritional status     Consults ongoing and ordered are: none      Procedures that will happen today are: mechanical ventilation     All treatments were placed at my direction.  I formulated today s plan with the house staff team or resident(s) and agree with the findings and plan in the associated note.      I spent a total of 40 minutes (excluding procedure time) personally providing and directing critical care services at the bedside and on the critical care unit for Brandon Schafer     Cherelle Owens MD   Pulmonary and Critical Care Staff

## 2021-11-26 NOTE — PROGRESS NOTES
MEDICAL ICU PROGRESS NOTE  11/26/2021      Date of Service (when I saw the patient): 11/26/2021    ASSESSMENT: Brandon Schafer is a 41 year old male with questionable PMH of remote VTE who was admitted on 11/3/2021 with ARDS 2/2 COVID pneumonia. Now with CAUTI and polymicrobial VAP.    PLAN:  - care conference today  - lasix 40 mg today, goal -500 to 1000 ml  - continue to wean sedation as we are able    Neuro:  # Pain and sedation  - Fentanyl infusion, wean  - Midazolam infusion, wean as able  - dilaudid 8mg q 4H  - Ativan 12mg Q4H  - Seroquel 100 mg q4h  - RASS goal -2 to -3  - Consider NMB if worsening vent mechanics     Pulmonary:  # ARDS 2/2 COVID pneumonia  Intubated 11/2 prior to transport flight. CT on 11/25 with mild fibrotic changes, dense consolidations consistent with ARDS. Possible organizing pneumonia.  - maintain supine --- no benefit from prone  - weaning PEEP as tolerated  - Infectious work-up and treatment as below  - lasix 40 mg today, goal -500 to 1000 ml    Ventilation Mode: CMV/AC  (Continuous Mandatory Ventilation/ Assist Control)  FiO2 (%): 75 %  Rate Set (breaths/minute): 24 breaths/min  Tidal Volume Set (mL): 530 mL  PEEP (cm H2O): 10 cmH2O  Oxygen Concentration (%): 75 %  Resp: 24    #COPD exacerbation  Wheezing on exam on 11/4. S/p azithromycin for 5d. Steroids per Covid protocol.   - Continue albuterol nebs prn     Cardiovascular:  # Hypotension, resolved  Transient, and in the setting of sedation. No prior TTE. Troponin peaked at 0.293. EKG with T wave inversion in lateral leads. Suspect troponin was 2/2 demand ischemia in setting of hypoxia.    # Atrial fibrillation  Episode of atrial fibrillation overnight 11/12, hemodynamically stable and resolved spontaneously. TWI on EKG, troponin negative     GI/Nutrition:  # At risk for malnutrition  # Constipation, improving  - Nutrition consult for TF  - Tolerating TF at goal   - Bowel regimen: scheduled Miralax and senna and prn  Miralax     Renal/Fluids/Electrolytes:  # Non-oliguric STEFAN, resolved  # Hypokalemia, in setting of ongoing diuresis   Unclear baseline, has not seen doctor in 20 years. Cr 2.5 on admission, now improved. 11/18 with rising bicarb, BUN, Cr...decreased UOP consistent with overdiuresis.    - holding diuretics     # Hypernatremia, resolved  - FWF 75ml q 2H, stable sodium     Endocrine:  # Stress and steroid induced hyperglycemia  - Medium resistance sliding scale insulin      ID:  # COVID pneumonia  Symptoms (headache, SOB, fever, cough, diarrhea) 1 week prior to presentation. Not COVID vaccinated. Tested positive on presentation on 11/2. S/p tocilizumab 11/4.  - Dexamethasone 6 mg daily (total 10 days) - completed     # Fever, tmax 101.3 11/17  # CAUTI  # VAP  Pt with worsening oxygen saturations, fevers, and persistent leukocytosis. Given his prolonged hospitalization, was started on broad-spectrum antibiotics on 11/11. Urine culture grew >100k pan-sensitive E coli. RVP and MRSA nares negative. Sputum grew E coli, S aureus, and GBS. Sputum culture 11/17 with 1+ Ecoli, blood cultures 11/17 pending. Continues to have fevers as of 11/18, redraw blood cultures and test stool for Cdiff. Of note, CXR 11/17 without clear/focal infiltrate.   - Continue CTX for additional 7 day course given persistent Ecoli on 11/17 sputum (11/19 - 11/25)     Abx:  - Ceftriaxone 11/14-11/17; 11/19 - 11/25 (completed)  - Zosyn 11/11-11/14; 11/17 - 11/18  - Vanc 11/11-11/12      Hematology:    # Coagulopathy 2/2 COVID  US BLE negative for DVT.  - Enoxaparin ppx 70mg BID     MSK  # Septum wound, tongue wound 2/2 Proning   - WOC following     General Cares/Prophylaxis:    DVT Prophylaxis: Lovenox ppx 70 mg BID  GI Prophylaxis: PPI  Family Communication: Brad Short, updated by phone  Code Status: DNR      Lines/tubes/drains:  - PIV x3  - arterial line  - NG  - ETT  - Rectal tube  - Flores (11/24 - )     Disposition:  - Medical ICU    Patient seen  and findings/plan discussed with medical ICU staff, Dr. Owens.    Tj Pacheco MD      ====================================  INTERVAL HISTORY:   No major events overnight. Net negative 500 ml. , PEEP remains at 10.    OBJECTIVE:   1. VITAL SIGNS:   Temp:  [98  F (36.7  C)-99.7  F (37.6  C)] 98  F (36.7  C)  Pulse:  [] 67  Resp:  [24-26] 24  MAP:  [63 mmHg-140 mmHg] 66 mmHg  Arterial Line BP: ()/() 102/49  FiO2 (%):  [75 %] 75 %  SpO2:  [95 %-100 %] 98 %  Ventilation Mode: CMV/AC  (Continuous Mandatory Ventilation/ Assist Control)  FiO2 (%): 75 %  Rate Set (breaths/minute): 24 breaths/min  Tidal Volume Set (mL): 530 mL  PEEP (cm H2O): 10 cmH2O  Oxygen Concentration (%): 75 %  Resp: 24    2. INTAKE/ OUTPUT:   I/O last 3 completed shifts:  In: 3404.57 [I.V.:839.57; NG/GT:1530]  Out: 2140 [Urine:1490; Stool:650]    3. PHYSICAL EXAMINATION:  General: Sedated, intubated, supine  HEENT: Mucous membranes dry  Neuro: sedated, wakens to voice  Pulm/Resp: Coarse breath sounds bilaterally without rhonchi, crackles or wheeze, breathing non-labored  CV: tachycardic, S1/S2 without m/r/g;  Abdomen: Obese, soft, non-distended, non-tender  : Flores catheter in place, urine yellow and clear  Ext: trace edema of extremities  Incisions/Skin: Wounds as above, no rashes noted    4. LABS:   Arterial Blood Gases   Recent Labs   Lab 11/26/21  0424 11/25/21  0521 11/24/21  1404 11/24/21  0456   PH 7.45 7.42 7.44 7.42   PCO2 45 50* 46* 48*   PO2 105 86 64* 72*   HCO3 31* 33* 32* 31*     Complete Blood Count   Recent Labs   Lab 11/26/21  0419 11/25/21  0520 11/24/21  0455 11/23/21  0519   WBC 7.5 7.2 6.8 7.3   HGB 8.6* 9.0* 8.8* 9.1*    187 177 171     Basic Metabolic Panel  Recent Labs   Lab 11/26/21  0851 11/26/21  0419 11/26/21  0418 11/26/21  0043 11/25/21  0752 11/25/21  0520 11/24/21  0458 11/24/21  0456 11/23/21  0839 11/23/21  0519   NA  --  140  --   --   --  142  --  143  --  143    POTASSIUM  --  4.0  --   --   --  3.9  --  4.0  --  3.7   CHLORIDE  --  106  --   --   --  106  --  109  --  108   CO2  --  30  --   --   --  32  --  27  --  30   BUN  --  26  --   --   --  26  --  31*  --  30   CR  --  0.89  --   --   --  0.92  --  0.92  --  0.93   * 168* 147* 133*   < > 147*   < > 174*   < > 174*    < > = values in this interval not displayed.     Liver Function Tests  No lab results found in last 7 days.  5. RADIOLOGY:   Recent Results (from the past 24 hour(s))   CT Chest w/o Contrast    Narrative    CT chest without contrast    INDICATION: Pneumonia resolved. Assess pulmonary fibrosis with  prolonged covid infection    COMPARISON: None. Correlation to chest radiograph from yesterday.    FINDINGS: No contrast. Thyroid is mildly prominent in size. Aorta and  main pulmonary artery normal in size. Heart size normal. No  pericardial effusion. Small bilateral pleural effusions. Nonenlarged  mediastinal lymph nodes are present, these are likely reactive.  Feeding tube progresses into the stomach, then progresses beyond the  inferior margin of the image impression interval returns superiorly  and tip is near the gastric fundus. The tube is completely visualized  on the  and appears grossly intact.    Streaky opacities and dense consolidations bilaterally are noted with  air bronchograms in all lobes of both lungs. Cystic changes in the  lungs are also present to a mild degree. There is subpleural fibrosis.  No dominant nodule. Extensive right apical bullous disease.  Endotracheal tube tip is approximately 3.4 cm above the becca. A  lateral calcified pulmonary granulomas.  Bones appear reasonably well-mineralized with mild degenerative  changes in the mid to lower thoracic spine.      Impression    IMPRESSION: Dense consolidations bilaterally with other mild fibrotic  changes, uncertain whether this is related to more unrelated to the  infectious process. Small bilateral pleural  effusions. Mildly  prominent thyroid gland. Probable reactive nonenlarged mediastinal  lymph nodes. Overall ARDS appearance. Granulomatous disease.    GREGORY BECKWITH MD         SYSTEM ID:  [id number]

## 2021-11-26 NOTE — CONSULTS
WO Nurse Inpatient Pressure Injury Assessment   Reason for consultation: Evaluate and treat Bl cheeks  New consult 11/11- tongue    ASSESSMENT  Pressure Injury: on Tongue  , hospital acquired ,   This is a Medical Device Related Pressure Injury (MDRPI) due to ETT  Pressure Injury is Stage healed Mucosal   Contributing factor of the pressure injury: pressure and immobility  Status: healed and follow up   Recommend provider order: None, at this time     Pressure Injury: on septum , hospital acquired ,   This is a Medical Device Related Pressure Injury (MDRPI) due to ETAD head gear was pushed against the area during proning  Pressure Injury is Stage 2 -evolving   Contributing factor of the pressure injury: pressure and immobility  Status: follow up assessment, evolving, currently supine  Recommend provider order: None, at this time      TREATMENT PLAN  Septum wound: Daily  to apply optifoam if proned- Dressing change every other day  Cleanse the wound with NS and pat dry.  Cut a piece of Comfeel 4x4 (#[id number]) and cover the area.  Massage the dressing in place for better adherence  Then place a layer of Mepilex foam dressing on top of comfeel for cushion  Ensure to place a piece of optifoam along the septal area and mold the Z-flow to avoid direct pressure while proning.      Tongue wounds: Every shift and PRN   Perform oral care per unit routine. Keep mouth moisturized. Reposition ETT tube Q 3-4 hrs, ensure to disengage the tongue from the tube to relive the pressure.    BL cheeks- Continue with mepilex foam dressing for prevention.    Orders Written  WOC Nurse follow-up plan:twice weekly  Nursing to notify the Provider(s) and re-consult the WOC Nurse if wound(s) deteriorates or new skin concern.    Patient History  According to provider note(s): Brandon Schafer is a 41 year old male with questionable PMH of remote VTE who was admitted on 11/3/2021 with ARDS 2/2 COVID pneumonia.    Objective Data  Containment of  urine/stool: Indwelling catheter    Current Diet/ Nutrition:  Orders Placed This Encounter      NPO for Medical/Clinical Reasons Except for: Meds      Output:   I/O last 3 completed shifts:  In: 3404.57 [I.V.:839.57; NG/GT:1530]  Out: 2140 [Urine:1490; Stool:650]    Risk Assessment:   Sensory Perception: 2-->very limited  Moisture: 3-->occasionally moist  Activity: 1-->bedfast  Mobility: 1-->completely immobile  Nutrition: 2-->probably inadequate  Friction and Shear: 2-->potential problem  Adin Score: 11      Labs:   Recent Labs   Lab 11/26/21  0419   HGB 8.6*   WBC 7.5       Physical Exam  Skin inspection: focused BL cheeks, mouth and nose  Patient is high risk for pressure injury development secondary to  proning    Wound Location:  Septum                                                                          11/26            11/9                                                                              11/11                                                            11/15                                                                11/22      Date of last Photo 11/11  Wound History: Pt has been proning intermittently. Wound evolving on 11/18 assessment- he was proned on 11/16 and supined since 11/17  Measurements (length x width x depth, in cm) 0.7 cm x 1 cm  x  0.1 cm   Wound Base:  100 % dermis  Tunneling N/A  Undermining N/A  Palpation of the wound bed: normal   Periwound skin: intact  Color: normal and consistent with surrounding tissue  Temperature: normal   Drainage:, none  Description of drainage: none  Odor: none  Pain: unable to assess due to  sedation ,     2. Tongue- healed  Date of last Photo 11/9- unable to see the actual injury in the picture  Wound History: Pt has been proning intermittently. Edematous face and tongue. Currently able to reposition the ETT disengaging the tongue. Pt has been supined since 11/10 morning.  Measurements (length x width x depth, in cm)  healed      Interventions  Current support surface: Standard  Low air loss mattress  Current off-loading measures: Pillows  Repositioning aid: Pillows  Visual inspection of wound(s) completed   Tube Securement: ETT head gear  Wound Care: was done per plan of care.  Supplies: floor stock and discussed with RN  Educated provided: plan of care and Off-loading pressure  Education provided to: nurse  Discussed importance of:repositioning every 2 hours, off-loading pressure to wound, off-loading mattress and moisture management  Discussed plan of care with Nurse    Miranda Aranda RN

## 2021-11-26 NOTE — PROGRESS NOTES
Care Conference: 11/26    Family Attendance: Kirit Salinas (brothers) Mahsa (mother)  Medical team: MICU Attending, Resident, Palliative MARIAA, RNCC    Discussion: Discussion was held with family to provide medical update. Team stated that patient remains critically ill, but has been making small improvements. Stated that he will most likely need a tracheostomy in the next coming weeks, and ask family if that would align with his quality of life. Brad stated that he wants to respect the wishes of patient and stated that he would not want to have a trach. Kirit stated that he would like to go ahead with trach for patient when able. Family stated that they need to discuss it further over the weekend. They had many thoughtful questions and team was able to answer questions appropriately. Continue with restorative cares    Plan: Meet again next week to discuss trach/goals of care. RNCC will continue to follow for needs.    Kelsey Gonzalez RN Care Coordinator   MICU/SICU  [phone]

## 2021-11-26 NOTE — PLAN OF CARE
No acute changes in patient's condition during today's shift. Patient had CT of their chest this afternoon - see chart for results.    Neuro: Patient is sedated and somnolent. Does rouse to pain and does get quite agitated to the extent that they will reach for their ETT. Patient does not follow commands and does not track appropriately. Pupils from 3-4mm bilaterally and briskly reactive to light.  CV: NSR with MAP goal greater than 65 mmHg and SBP goal less than 170 mmHg.  Pulm: ETT and on CMV setting. FiO2 at 75%, PEEP of 10, RR of 26, and Vt of 530. Lung sounds coarse to coarse over diminished.  GI: NJT with TF at goal rate of 45 ml/hr and free water at 75 ml/q2 hrs. Rectal tube in place.  : Flores catheter in place for retention - see charting for UO.  IV/Gtt: PIV x3 and left radial arterial line. Fentanyl at 100 mcg/hr, versed at 4mg/hr, and precedex at 0.5 mcg/kg/min  Will continue to monitor for safety and comfort.    Jacob Corona RN

## 2021-11-26 NOTE — PROGRESS NOTES
[facility]  Palliative Care Daily Progress Note       Recommendations & Counseling       Family would like to continue full restorative medical efforts for the next few days and reconvene next week to discuss further. They are hopeful that the steroid trial will help Brandon's lungs improve. They have not come to a conclusion about whether to proceed with a trach. Today it was presented that even if Brandon were to improve rapidly from here on out, he would still need a trach because he is on too much support to wean from the vent in the next week. He is currently on too much ventilator support to undergo the placement, so family has some time to make a decision.       Please keep Palliative Care updated on timing of care conference next week and we will plan to attend.     Thank you for the opportunity to be involved in the care of this patient. Please reach out with questions or concerns.     JUANCHO Murray CNP  Palliative Care Consult Team  Pager: [phone]    [facility] Inpatient Team Consult pager [phone] (M-F 8-4:30)  After-hours Answering Service [phone]   Palliative Clinic: [phone]     65 minutes spent. This includes participating in a provider pre-conference and then family meeting from [id number], where we discussed Brandon's covid infection, gave a medical update, and discussed goals of care.       Assessments          Brandon Schafer is a 41 year old male with COVID 19 infection, who remains critically ill due to PNA, hypoxic respiratory failure, and ARDS. He is intubated and sedated in the ICU.       Today, the patient was seen for:  COVID 19 PNA  ARDS  Hypoxic respiratory failure requiring mechanical ventilation    Prognosis, Goals, or Advance Care Planning was addressed today with: Yes.    Mood, coping, and/or meaning in the context of serious illness were addressed today: Yes.            Interval History:     Family care conference held today  "with patient's mother Mahsa and two brothers Brad and Kirit. MICU gave a medical update stating that while Brandon's lungs have made slight improvements (less pressure being needed by vent but same oxygen support) he has a similar prognosis. Due to how critical ill he is, his recovery is expected to be many more months (likely weeks more on the vent and then would need general rehab). Since extubation is not anticipated in the next week, family was told to prepare that to continue restorative efforts, a trach would be needed. Brandon's brothers Brad and Kirit, and mother Mahsa, all have slightly differing opinions about whether they should proceed with a trach; they were encouraged to continuing discussing amongst themselves and to have a tentative answer at next weeks care conference. Family was curious about what transitioning to comfort measures only (CMO) would look like, so both a ventilation discontinuation/ICU scenario, and a more gradual \"do not re-hospitalize\" further down the road scenario were explained.     Key Palliative Symptoms:  We are not helping to manage these symptoms currently in this patient.           Review of Systems:     Unable to discuss with patient, as he is intubated and sedated          Medications:     I have reviewed this patient's medication profile and medications during this hospitalization.           Physical Exam:   Vitals were reviewed  Temp: 98  F (36.7  C) Temp src: Axillary   Pulse: 67   Resp: 24 SpO2: 98 % O2 Device: Mechanical Ventilator      Exam limited by positive covid infection.  Constitutional - critically ill male, intubated and sedated, with multiple infusions running            Data Reviewed:     CMP  Recent Labs   Lab 11/26/21  0851 11/26/21  0419 11/26/21  0418 11/26/21  0043 11/25/21  0752 11/25/21  0520 11/24/21  0458 11/24/21  0456 11/23/21  0839 11/23/21  0519 11/20/21  0936 11/20/21  0429   NA  --  140  --   --   --  142  --  143  --  143   < > 141   POTASSIUM  " --  4.0  --   --   --  3.9  --  4.0  --  3.7   < > 2.8*   CHLORIDE  --  106  --   --   --  106  --  109  --  108   < > 102   CO2  --  30  --   --   --  32  --  27  --  30   < > 36*   ANIONGAP  --  4  --   --   --  4  --  7  --  5   < > 3   * 168* 147* 133*   < > 147*   < > 174*   < > 174*   < > 228*   BUN  --  26  --   --   --  26  --  31*  --  30   < > 33*   CR  --  0.89  --   --   --  0.92  --  0.92  --  0.93   < > 0.89   GFRESTIMATED  --  >90  --   --   --  >90  --  >90  --  >90   < > >90   VIPUL  --  8.8  --   --   --  8.8  --  8.9  --  9.0   < > 8.6   MAG  --  2.4*  --   --   --  2.2  --  2.5*  --  2.6*   < > 2.6*   PHOS  --   --   --   --   --   --   --  5.0*  --  4.3  --  3.3    < > = values in this interval not displayed.     CBC  Recent Labs   Lab 11/26/21 0419 11/25/21 0520 11/24/21  0455 11/23/21  0519   WBC 7.5 7.2 6.8 7.3   RBC 3.10* 3.32* 3.21* 3.29*   HGB 8.6* 9.0* 8.8* 9.1*   HCT 27.5* 29.6* 29.0* 29.4*   MCV 89 89 90 89   MCH 27.7 27.1 27.4 27.7   MCHC 31.3* 30.4* 30.3* 31.0*   RDW 15.3* 15.4* 15.8* 15.9*    187 177 171   Arterial Blood Gas  Recent Labs   Lab 11/26/21  0424 11/25/21  0521 11/24/21  1404 11/24/21  0456   PH 7.45 7.42 7.44 7.42   PCO2 45 50* 46* 48*   PO2 105 86 64* 72*   HCO3 31* 33* 32* 31*   O2PER 75 75 50 45

## 2021-11-27 LAB
ANION GAP SERPL CALCULATED.3IONS-SCNC: 6 MMOL/L (ref 3–14)
ANION GAP SERPL CALCULATED.3IONS-SCNC: 8 MMOL/L (ref 3–14)
BASE EXCESS BLDA CALC-SCNC: 3.9 MMOL/L (ref -9–1.8)
BUN SERPL-MCNC: 28 MG/DL (ref 7–30)
BUN SERPL-MCNC: 28 MG/DL (ref 7–30)
CALCIUM SERPL-MCNC: 9 MG/DL (ref 8.5–10.1)
CALCIUM SERPL-MCNC: 9.1 MG/DL (ref 8.5–10.1)
CHLORIDE BLD-SCNC: 102 MMOL/L (ref 94–109)
CHLORIDE BLD-SCNC: 104 MMOL/L (ref 94–109)
CO2 SERPL-SCNC: 26 MMOL/L (ref 20–32)
CO2 SERPL-SCNC: 29 MMOL/L (ref 20–32)
CREAT SERPL-MCNC: 0.7 MG/DL (ref 0.66–1.25)
CREAT SERPL-MCNC: 0.73 MG/DL (ref 0.66–1.25)
ERYTHROCYTE [DISTWIDTH] IN BLOOD BY AUTOMATED COUNT: 14.3 % (ref 10–15)
GFR SERPL CREATININE-BSD FRML MDRD: >90 ML/MIN/1.73M2
GFR SERPL CREATININE-BSD FRML MDRD: >90 ML/MIN/1.73M2
GLUCOSE BLD-MCNC: 190 MG/DL (ref 70–99)
GLUCOSE BLD-MCNC: 245 MG/DL (ref 70–99)
GLUCOSE BLDC GLUCOMTR-MCNC: 182 MG/DL (ref 70–99)
GLUCOSE BLDC GLUCOMTR-MCNC: 192 MG/DL (ref 70–99)
GLUCOSE BLDC GLUCOMTR-MCNC: 194 MG/DL (ref 70–99)
GLUCOSE BLDC GLUCOMTR-MCNC: 207 MG/DL (ref 70–99)
GLUCOSE BLDC GLUCOMTR-MCNC: 207 MG/DL (ref 70–99)
GLUCOSE BLDC GLUCOMTR-MCNC: 208 MG/DL (ref 70–99)
HCO3 BLD-SCNC: 29 MMOL/L (ref 21–28)
HCT VFR BLD AUTO: 28.8 % (ref 40–53)
HGB BLD-MCNC: 9.2 G/DL (ref 13.3–17.7)
MAGNESIUM SERPL-MCNC: 2.2 MG/DL (ref 1.6–2.3)
MAGNESIUM SERPL-MCNC: 2.3 MG/DL (ref 1.6–2.3)
MCH RBC QN AUTO: 27.1 PG (ref 26.5–33)
MCHC RBC AUTO-ENTMCNC: 31.9 G/DL (ref 31.5–36.5)
MCV RBC AUTO: 85 FL (ref 78–100)
O2/TOTAL GAS SETTING VFR VENT: 65 %
PCO2 BLD: 46 MM HG (ref 35–45)
PH BLD: 7.41 [PH] (ref 7.35–7.45)
PHOSPHATE SERPL-MCNC: 3.8 MG/DL (ref 2.5–4.5)
PLATELET # BLD AUTO: 213 10E3/UL (ref 150–450)
PO2 BLD: 100 MM HG (ref 80–105)
POTASSIUM BLD-SCNC: 3.8 MMOL/L (ref 3.4–5.3)
POTASSIUM BLD-SCNC: 5 MMOL/L (ref 3.4–5.3)
RBC # BLD AUTO: 3.4 10E6/UL (ref 4.4–5.9)
SODIUM SERPL-SCNC: 136 MMOL/L (ref 133–144)
SODIUM SERPL-SCNC: 139 MMOL/L (ref 133–144)
WBC # BLD AUTO: 9.1 10E3/UL (ref 4–11)

## 2021-11-27 PROCEDURE — 250N000011 HC RX IP 250 OP 636: Performed by: STUDENT IN AN ORGANIZED HEALTH CARE EDUCATION/TRAINING PROGRAM

## 2021-11-27 PROCEDURE — 82803 BLOOD GASES ANY COMBINATION: CPT | Performed by: STUDENT IN AN ORGANIZED HEALTH CARE EDUCATION/TRAINING PROGRAM

## 2021-11-27 PROCEDURE — 84100 ASSAY OF PHOSPHORUS: CPT | Performed by: STUDENT IN AN ORGANIZED HEALTH CARE EDUCATION/TRAINING PROGRAM

## 2021-11-27 PROCEDURE — 36415 COLL VENOUS BLD VENIPUNCTURE: CPT | Performed by: STUDENT IN AN ORGANIZED HEALTH CARE EDUCATION/TRAINING PROGRAM

## 2021-11-27 PROCEDURE — 250N000009 HC RX 250: Performed by: STUDENT IN AN ORGANIZED HEALTH CARE EDUCATION/TRAINING PROGRAM

## 2021-11-27 PROCEDURE — 99291 CRITICAL CARE FIRST HOUR: CPT | Mod: GC | Performed by: INTERNAL MEDICINE

## 2021-11-27 PROCEDURE — 250N000013 HC RX MED GY IP 250 OP 250 PS 637: Performed by: STUDENT IN AN ORGANIZED HEALTH CARE EDUCATION/TRAINING PROGRAM

## 2021-11-27 PROCEDURE — 258N000003 HC RX IP 258 OP 636: Performed by: STUDENT IN AN ORGANIZED HEALTH CARE EDUCATION/TRAINING PROGRAM

## 2021-11-27 PROCEDURE — 250N000013 HC RX MED GY IP 250 OP 250 PS 637: Performed by: NURSE PRACTITIONER

## 2021-11-27 PROCEDURE — 80048 BASIC METABOLIC PNL TOTAL CA: CPT | Performed by: STUDENT IN AN ORGANIZED HEALTH CARE EDUCATION/TRAINING PROGRAM

## 2021-11-27 PROCEDURE — 94003 VENT MGMT INPAT SUBQ DAY: CPT

## 2021-11-27 PROCEDURE — 83735 ASSAY OF MAGNESIUM: CPT | Performed by: STUDENT IN AN ORGANIZED HEALTH CARE EDUCATION/TRAINING PROGRAM

## 2021-11-27 PROCEDURE — 999N000157 HC STATISTIC RCP TIME EA 10 MIN

## 2021-11-27 PROCEDURE — 250N000013 HC RX MED GY IP 250 OP 250 PS 637

## 2021-11-27 PROCEDURE — 250N000011 HC RX IP 250 OP 636

## 2021-11-27 PROCEDURE — 200N000002 HC R&B ICU UMMC

## 2021-11-27 PROCEDURE — 85027 COMPLETE CBC AUTOMATED: CPT | Performed by: STUDENT IN AN ORGANIZED HEALTH CARE EDUCATION/TRAINING PROGRAM

## 2021-11-27 PROCEDURE — 250N000012 HC RX MED GY IP 250 OP 636 PS 637: Performed by: STUDENT IN AN ORGANIZED HEALTH CARE EDUCATION/TRAINING PROGRAM

## 2021-11-27 RX ORDER — FUROSEMIDE 10 MG/ML
40 INJECTION INTRAMUSCULAR; INTRAVENOUS ONCE
Status: COMPLETED | OUTPATIENT
Start: 2021-11-27 | End: 2021-11-27

## 2021-11-27 RX ORDER — POTASSIUM CHLORIDE 1.5 G/1.58G
20 POWDER, FOR SOLUTION ORAL ONCE
Status: COMPLETED | OUTPATIENT
Start: 2021-11-27 | End: 2021-11-27

## 2021-11-27 RX ADMIN — Medication 1 TABLET: at 08:26

## 2021-11-27 RX ADMIN — POTASSIUM CHLORIDE 20 MEQ: 1.5 POWDER, FOR SOLUTION ORAL at 18:01

## 2021-11-27 RX ADMIN — LORAZEPAM 12 MG: 2 TABLET ORAL at 04:19

## 2021-11-27 RX ADMIN — DEXMEDETOMIDINE 0.7 MCG/KG/HR: 100 INJECTION, SOLUTION, CONCENTRATE INTRAVENOUS at 20:20

## 2021-11-27 RX ADMIN — Medication 4 MG/HR: at 00:31

## 2021-11-27 RX ADMIN — QUETIAPINE FUMARATE 100 MG: 100 TABLET ORAL at 12:41

## 2021-11-27 RX ADMIN — Medication 2 PACKET: at 08:31

## 2021-11-27 RX ADMIN — QUETIAPINE FUMARATE 100 MG: 100 TABLET ORAL at 00:25

## 2021-11-27 RX ADMIN — Medication 40 MG: at 08:25

## 2021-11-27 RX ADMIN — QUETIAPINE FUMARATE 100 MG: 100 TABLET ORAL at 04:19

## 2021-11-27 RX ADMIN — INSULIN ASPART 2 UNITS: 100 INJECTION, SOLUTION INTRAVENOUS; SUBCUTANEOUS at 04:25

## 2021-11-27 RX ADMIN — LORAZEPAM 12 MG: 2 TABLET ORAL at 00:25

## 2021-11-27 RX ADMIN — HYDROMORPHONE HYDROCHLORIDE 8 MG: 4 TABLET ORAL at 16:21

## 2021-11-27 RX ADMIN — INSULIN ASPART 2 UNITS: 100 INJECTION, SOLUTION INTRAVENOUS; SUBCUTANEOUS at 20:15

## 2021-11-27 RX ADMIN — QUETIAPINE FUMARATE 100 MG: 100 TABLET ORAL at 20:10

## 2021-11-27 RX ADMIN — LORAZEPAM 12 MG: 2 TABLET ORAL at 08:26

## 2021-11-27 RX ADMIN — HYDROMORPHONE HYDROCHLORIDE 8 MG: 4 TABLET ORAL at 00:25

## 2021-11-27 RX ADMIN — HYDROMORPHONE HYDROCHLORIDE 8 MG: 4 TABLET ORAL at 04:19

## 2021-11-27 RX ADMIN — QUETIAPINE FUMARATE 100 MG: 100 TABLET ORAL at 16:21

## 2021-11-27 RX ADMIN — INSULIN ASPART 2 UNITS: 100 INJECTION, SOLUTION INTRAVENOUS; SUBCUTANEOUS at 16:30

## 2021-11-27 RX ADMIN — Medication 2 PACKET: at 11:32

## 2021-11-27 RX ADMIN — ENOXAPARIN SODIUM 70 MG: 80 INJECTION SUBCUTANEOUS at 20:09

## 2021-11-27 RX ADMIN — Medication 2 PACKET: at 23:00

## 2021-11-27 RX ADMIN — DEXMEDETOMIDINE 0.7 MCG/KG/HR: 100 INJECTION, SOLUTION, CONCENTRATE INTRAVENOUS at 00:24

## 2021-11-27 RX ADMIN — HYDROMORPHONE HYDROCHLORIDE 8 MG: 4 TABLET ORAL at 12:41

## 2021-11-27 RX ADMIN — INSULIN ASPART 2 UNITS: 100 INJECTION, SOLUTION INTRAVENOUS; SUBCUTANEOUS at 12:56

## 2021-11-27 RX ADMIN — INSULIN ASPART 2 UNITS: 100 INJECTION, SOLUTION INTRAVENOUS; SUBCUTANEOUS at 00:30

## 2021-11-27 RX ADMIN — ENOXAPARIN SODIUM 70 MG: 80 INJECTION SUBCUTANEOUS at 08:24

## 2021-11-27 RX ADMIN — DEXMEDETOMIDINE 0.7 MCG/KG/HR: 100 INJECTION, SOLUTION, CONCENTRATE INTRAVENOUS at 09:54

## 2021-11-27 RX ADMIN — Medication 2 PACKET: at 17:56

## 2021-11-27 RX ADMIN — FUROSEMIDE 40 MG: 10 INJECTION, SOLUTION INTRAVENOUS at 08:31

## 2021-11-27 RX ADMIN — METHYLPREDNISOLONE SODIUM SUCCINATE 62.5 MG: 125 INJECTION, POWDER, FOR SOLUTION INTRAMUSCULAR; INTRAVENOUS at 16:14

## 2021-11-27 RX ADMIN — Medication 150 MCG/HR: at 09:53

## 2021-11-27 RX ADMIN — Medication 2 PACKET: at 15:07

## 2021-11-27 RX ADMIN — Medication 75 MCG: at 21:37

## 2021-11-27 RX ADMIN — INSULIN ASPART 2 UNITS: 100 INJECTION, SOLUTION INTRAVENOUS; SUBCUTANEOUS at 08:22

## 2021-11-27 RX ADMIN — LORAZEPAM 12 MG: 2 TABLET ORAL at 20:09

## 2021-11-27 RX ADMIN — LORAZEPAM 12 MG: 2 TABLET ORAL at 16:21

## 2021-11-27 RX ADMIN — LORAZEPAM 12 MG: 2 TABLET ORAL at 12:41

## 2021-11-27 RX ADMIN — HYDROMORPHONE HYDROCHLORIDE 8 MG: 4 TABLET ORAL at 08:26

## 2021-11-27 RX ADMIN — QUETIAPINE FUMARATE 100 MG: 100 TABLET ORAL at 08:26

## 2021-11-27 RX ADMIN — HYDROMORPHONE HYDROCHLORIDE 8 MG: 4 TABLET ORAL at 20:10

## 2021-11-27 ASSESSMENT — ACTIVITIES OF DAILY LIVING (ADL)
ADLS_ACUITY_SCORE: 16
ADLS_ACUITY_SCORE: 17
ADLS_ACUITY_SCORE: 17
ADLS_ACUITY_SCORE: 16
ADLS_ACUITY_SCORE: 16
ADLS_ACUITY_SCORE: 18
ADLS_ACUITY_SCORE: 17
ADLS_ACUITY_SCORE: 16
ADLS_ACUITY_SCORE: 17
ADLS_ACUITY_SCORE: 18
ADLS_ACUITY_SCORE: 17
ADLS_ACUITY_SCORE: 16
ADLS_ACUITY_SCORE: 17
ADLS_ACUITY_SCORE: 16
ADLS_ACUITY_SCORE: 17
ADLS_ACUITY_SCORE: 16

## 2021-11-27 ASSESSMENT — MIFFLIN-ST. JEOR: SCORE: 2331

## 2021-11-27 NOTE — PROGRESS NOTES
MEDICAL ICU PROGRESS NOTE  11/27/2021      Date of Service (when I saw the patient): 11/27/2021    ASSESSMENT: Brandon Schafer is a 41 year old male with questionable PMH of remote VTE who was admitted on 11/3/2021 with ARDS 2/2 COVID pneumonia. Now with CAUTI and polymicrobial VAP.    PLAN:  - Continue methylprednisolone  - wean fio2 as able  - weaning versed gtt, bolus as needed.  - lasix 40 mg BID, goal -500 to 1000 ml    Neuro:  # Pain and sedation  - Fentanyl infusion, wean  - Midazolam infusion, wean as able. transition to boluses  - dilaudid 8mg q 4H  - Ativan 12mg Q4H  - Seroquel 100 mg q4h  - RASS goal -2 to -3  - Consider NMB if worsening vent mechanics     Pulmonary:  # ARDS 2/2 COVID pneumonia  # Possible organizing pneumonia  Intubated 11/2 prior to transport flight. CT on 11/25 with mild fibrotic changes, dense consolidations consistent with ARDS. Possible organizing pneumonia, will proceed with steroid therapy and monitor response.  - Methylprednisolone 60 mg x3 day (11/26-11/28)  - maintain supine --- no benefit from prone  - weaning fiO2 as tolerated  - Infectious work-up and treatment as below  - lasix 40 mg BID, goal -500 to 1000 ml    Ventilation Mode: CMV/AC  (Continuous Mandatory Ventilation/ Assist Control)  FiO2 (%): 60 %  Rate Set (breaths/minute): 24 breaths/min  Tidal Volume Set (mL): 530 mL  PEEP (cm H2O): 10 cmH2O  Oxygen Concentration (%): 60 %  Resp: 24    #COPD exacerbation  Wheezing on exam on 11/4. S/p azithromycin for 5d. Steroids per Covid protocol.   - Continue albuterol nebs prn     Cardiovascular:  # Hypotension, resolved  Transient, and in the setting of sedation. No prior TTE. Troponin peaked at 0.293. EKG with T wave inversion in lateral leads. Suspect troponin was 2/2 demand ischemia in setting of hypoxia.    # Atrial fibrillation  Episode of atrial fibrillation overnight 11/12, hemodynamically stable and resolved spontaneously. TWI on EKG, troponin  negative     GI/Nutrition:  # At risk for malnutrition  # Constipation, improving  - Nutrition consult for TF  - Tolerating TF at goal   - Bowel regimen: scheduled Miralax and senna and prn Miralax     Renal/Fluids/Electrolytes:  # Non-oliguric STEFAN, resolved  # Hypokalemia, in setting of ongoing diuresis   Unclear baseline, has not seen doctor in 20 years. Cr 2.5 on admission, now improved. 11/18 with rising bicarb, BUN, Cr...decreased UOP consistent with overdiuresis.    - holding diuretics     # Hypernatremia, resolved  - FWF 75ml q 2H, stable sodium     Endocrine:  # Stress and steroid induced hyperglycemia  - Medium resistance sliding scale insulin      ID:  # COVID pneumonia  Symptoms (headache, SOB, fever, cough, diarrhea) 1 week prior to presentation. Not COVID vaccinated. Tested positive on presentation on 11/2. S/p tocilizumab 11/4.  - Dexamethasone 6 mg daily (total 10 days) - completed     # Fever, tmax 101.3 11/17  # CAUTI  # VAP  Pt with worsening oxygen saturations, fevers, and persistent leukocytosis. Given his prolonged hospitalization, was started on broad-spectrum antibiotics on 11/11. Urine culture grew >100k pan-sensitive E coli. RVP and MRSA nares negative. Sputum grew E coli, S aureus, and GBS. Sputum culture 11/17 with 1+ Ecoli, blood cultures 11/17 pending. Continues to have fevers as of 11/18, redraw blood cultures and test stool for Cdiff. Of note, CXR 11/17 without clear/focal infiltrate.   - Continue CTX for additional 7 day course given persistent Ecoli on 11/17 sputum (11/19 - 11/25)     Abx:  - Ceftriaxone 11/14-11/17; 11/19 - 11/25 (completed)  - Zosyn 11/11-11/14; 11/17 - 11/18  - Vanc 11/11-11/12      Hematology:    # Coagulopathy 2/2 COVID  US BLE negative for DVT.  - Enoxaparin ppx 70mg BID     MSK  # Septum wound, tongue wound 2/2 Proning   - WOC following     General Cares/Prophylaxis:    DVT Prophylaxis: Lovenox ppx 70 mg BID  GI Prophylaxis: PPI  Family Communication: Brother,  Brad, updated by phone  Code Status: DNR      Lines/tubes/drains:  - PIV x3  - arterial line  - NG  - ETT  - Rectal tube  - Flores (11/24 - )     Disposition:  - Medical ICU    Patient seen and findings/plan discussed with medical ICU staff, Dr. Perlman.    Tj Pacheco MD  Internal Medicine PGY-3    ====================================  INTERVAL HISTORY:   No major events overnight. Net positive 398 ml. , PEEP remains at 10. Following basic commands today, but mildly sedated.    OBJECTIVE:   1. VITAL SIGNS:   Temp:  [97.2  F (36.2  C)-99.3  F (37.4  C)] 98.4  F (36.9  C)  Pulse:  [] 54  Resp:  [24-54] 24  MAP:  [64 mmHg-125 mmHg] 106 mmHg  Arterial Line BP: (100-188)/(48-94) 155/79  FiO2 (%):  [60 %-65 %] 60 %  SpO2:  [91 %-100 %] 97 %  Ventilation Mode: CMV/AC  (Continuous Mandatory Ventilation/ Assist Control)  FiO2 (%): 60 %  Rate Set (breaths/minute): 24 breaths/min  Tidal Volume Set (mL): 530 mL  PEEP (cm H2O): 10 cmH2O  Oxygen Concentration (%): 60 %  Resp: 24    2. INTAKE/ OUTPUT:   I/O last 3 completed shifts:  In: 2961.75 [I.V.:996.75; NG/GT:885]  Out: 3275 [Urine:3075; Stool:200]    3. PHYSICAL EXAMINATION:  General: Sedated, intubated, supine, opening eyes  HEENT: MMM  Neuro: sedated, wakens to voice, wiggles toes, opens eyes  Pulm/Resp: Coarse breath sounds bilaterally without rhonchi, crackles or wheeze, breathing non-labored  CV: tachycardic, S1/S2 without m/r/g;  Abdomen: Obese, soft, non-distended, non-tender  : Flores catheter in place, urine yellow and clear  Ext: trace edema of extremities  Incisions/Skin: Wounds as above, no rashes noted    4. LABS:   Arterial Blood Gases   Recent Labs   Lab 11/27/21  0420 11/26/21  0424 11/25/21  0521 11/24/21  1404   PH 7.41 7.45 7.42 7.44   PCO2 46* 45 50* 46*   PO2 100 105 86 64*   HCO3 29* 31* 33* 32*     Complete Blood Count   Recent Labs   Lab 11/27/21  0420 11/26/21  0419 11/25/21  0520 11/24/21  0455   WBC 9.1 7.5 7.2 6.8   HGB  9.2* 8.6* 9.0* 8.8*    192 187 177     Basic Metabolic Panel  Recent Labs   Lab 11/27/21  0821 11/27/21  0424 11/27/21  0420 11/27/21  0030 11/26/21 2032 11/26/21  1809 11/26/21  0851 11/26/21  0419 11/25/21  0752 11/25/21  0520   NA  --   --  136  --   --  140  --  140  --  142   POTASSIUM  --   --  5.0  --   --  4.3  --  4.0  --  3.9   CHLORIDE  --   --  104  --   --  105  --  106  --  106   CO2  --   --  26  --   --  30  --  30  --  32   BUN  --   --  28  --   --  30  --  26  --  26   CR  --   --  0.73  --   --  0.87  --  0.89  --  0.92   * 207* 245* 208*   < > 178*   < > 168*   < > 147*    < > = values in this interval not displayed.     Liver Function Tests  No lab results found in last 7 days.  5. RADIOLOGY:   No results found for this or any previous visit (from the past 24 hour(s)).

## 2021-11-27 NOTE — PLAN OF CARE
D/I: Pt sedated on precedex, versed and fentanyl. Opens eyes and moves all extremities but not following commands. No change in vents settings. Blood pressure and HR within parameters, afebrile (see data). Tube feeds at goal, rectal tube in place. Lasix for diuresis (see I&O).   A: No major shift events. No changes this shift.   P: Continue with current plan of care. Update MD with concerns.

## 2021-11-27 NOTE — PLAN OF CARE
Major Shift Events: Pt requiring additional sedation r/t agitation. Dex 0.7, Versed 4, Fentanyl 150. Rouses to voice/touch, localizes pain. ROSALES spontaneously, 4/5 strength at times. PERRL 3-4 mm. SR-SB, rare PAC noted. 7 beat VE run this AM noted in printed strip. Afebrile. CMV settings remain 65, 530, 24, 10. LS clear/dim. TF @ goal armani NJ, 75mL Q2H FWF. Rectal tube replaced r/t leaking. Large BM output, watery. AUOP via stevenson, no Lasix this shift. Skin unchanged. Access unchanged.    Plan: Wean sedation as able. Continue goals of care.    For vital signs and complete assessments, please see documentation flowsheets.

## 2021-11-28 LAB
ANION GAP SERPL CALCULATED.3IONS-SCNC: 4 MMOL/L (ref 3–14)
BASE EXCESS BLDA CALC-SCNC: 4.2 MMOL/L (ref -9–1.8)
BUN SERPL-MCNC: 30 MG/DL (ref 7–30)
CALCIUM SERPL-MCNC: 8.7 MG/DL (ref 8.5–10.1)
CHLORIDE BLD-SCNC: 103 MMOL/L (ref 94–109)
CO2 SERPL-SCNC: 27 MMOL/L (ref 20–32)
CREAT SERPL-MCNC: 0.67 MG/DL (ref 0.66–1.25)
ERYTHROCYTE [DISTWIDTH] IN BLOOD BY AUTOMATED COUNT: 14.5 % (ref 10–15)
FLUAV RNA SPEC QL NAA+PROBE: NEGATIVE
FLUBV RNA RESP QL NAA+PROBE: NEGATIVE
GFR SERPL CREATININE-BSD FRML MDRD: >90 ML/MIN/1.73M2
GLUCOSE BLD-MCNC: 256 MG/DL (ref 70–99)
GLUCOSE BLDC GLUCOMTR-MCNC: 155 MG/DL (ref 70–99)
GLUCOSE BLDC GLUCOMTR-MCNC: 172 MG/DL (ref 70–99)
GLUCOSE BLDC GLUCOMTR-MCNC: 182 MG/DL (ref 70–99)
GLUCOSE BLDC GLUCOMTR-MCNC: 229 MG/DL (ref 70–99)
GLUCOSE BLDC GLUCOMTR-MCNC: 245 MG/DL (ref 70–99)
GLUCOSE BLDC GLUCOMTR-MCNC: 256 MG/DL (ref 70–99)
HCO3 BLD-SCNC: 29 MMOL/L (ref 21–28)
HCT VFR BLD AUTO: 30.3 % (ref 40–53)
HGB BLD-MCNC: 9.5 G/DL (ref 13.3–17.7)
MAGNESIUM SERPL-MCNC: 2.2 MG/DL (ref 1.6–2.3)
MCH RBC QN AUTO: 27.2 PG (ref 26.5–33)
MCHC RBC AUTO-ENTMCNC: 31.4 G/DL (ref 31.5–36.5)
MCV RBC AUTO: 87 FL (ref 78–100)
O2/TOTAL GAS SETTING VFR VENT: 50 %
PCO2 BLD: 43 MM HG (ref 35–45)
PH BLD: 7.43 [PH] (ref 7.35–7.45)
PHOSPHATE SERPL-MCNC: 4.3 MG/DL (ref 2.5–4.5)
PLATELET # BLD AUTO: 251 10E3/UL (ref 150–450)
PO2 BLD: 86 MM HG (ref 80–105)
POTASSIUM BLD-SCNC: 4.7 MMOL/L (ref 3.4–5.3)
RBC # BLD AUTO: 3.49 10E6/UL (ref 4.4–5.9)
RSV RNA SPEC NAA+PROBE: NEGATIVE
SARS-COV-2 RNA RESP QL NAA+PROBE: NEGATIVE
SODIUM SERPL-SCNC: 134 MMOL/L (ref 133–144)
WBC # BLD AUTO: 8.7 10E3/UL (ref 4–11)

## 2021-11-28 PROCEDURE — 999N000157 HC STATISTIC RCP TIME EA 10 MIN

## 2021-11-28 PROCEDURE — 250N000011 HC RX IP 250 OP 636: Performed by: STUDENT IN AN ORGANIZED HEALTH CARE EDUCATION/TRAINING PROGRAM

## 2021-11-28 PROCEDURE — 250N000013 HC RX MED GY IP 250 OP 250 PS 637: Performed by: STUDENT IN AN ORGANIZED HEALTH CARE EDUCATION/TRAINING PROGRAM

## 2021-11-28 PROCEDURE — 94003 VENT MGMT INPAT SUBQ DAY: CPT

## 2021-11-28 PROCEDURE — 250N000009 HC RX 250: Performed by: STUDENT IN AN ORGANIZED HEALTH CARE EDUCATION/TRAINING PROGRAM

## 2021-11-28 PROCEDURE — 200N000002 HC R&B ICU UMMC

## 2021-11-28 PROCEDURE — 250N000013 HC RX MED GY IP 250 OP 250 PS 637: Performed by: NURSE PRACTITIONER

## 2021-11-28 PROCEDURE — 250N000011 HC RX IP 250 OP 636

## 2021-11-28 PROCEDURE — 80048 BASIC METABOLIC PNL TOTAL CA: CPT | Performed by: STUDENT IN AN ORGANIZED HEALTH CARE EDUCATION/TRAINING PROGRAM

## 2021-11-28 PROCEDURE — 87637 SARSCOV2&INF A&B&RSV AMP PRB: CPT | Performed by: STUDENT IN AN ORGANIZED HEALTH CARE EDUCATION/TRAINING PROGRAM

## 2021-11-28 PROCEDURE — 99291 CRITICAL CARE FIRST HOUR: CPT | Mod: GC | Performed by: INTERNAL MEDICINE

## 2021-11-28 PROCEDURE — 83735 ASSAY OF MAGNESIUM: CPT | Performed by: STUDENT IN AN ORGANIZED HEALTH CARE EDUCATION/TRAINING PROGRAM

## 2021-11-28 PROCEDURE — 250N000013 HC RX MED GY IP 250 OP 250 PS 637

## 2021-11-28 PROCEDURE — 82803 BLOOD GASES ANY COMBINATION: CPT | Performed by: STUDENT IN AN ORGANIZED HEALTH CARE EDUCATION/TRAINING PROGRAM

## 2021-11-28 PROCEDURE — 258N000003 HC RX IP 258 OP 636: Performed by: STUDENT IN AN ORGANIZED HEALTH CARE EDUCATION/TRAINING PROGRAM

## 2021-11-28 PROCEDURE — 85027 COMPLETE CBC AUTOMATED: CPT | Performed by: STUDENT IN AN ORGANIZED HEALTH CARE EDUCATION/TRAINING PROGRAM

## 2021-11-28 PROCEDURE — 999N000015 HC STATISTIC ARTERIAL MONITORING DAILY

## 2021-11-28 PROCEDURE — 84100 ASSAY OF PHOSPHORUS: CPT | Performed by: INTERNAL MEDICINE

## 2021-11-28 PROCEDURE — 93005 ELECTROCARDIOGRAM TRACING: CPT

## 2021-11-28 PROCEDURE — 36415 COLL VENOUS BLD VENIPUNCTURE: CPT | Performed by: STUDENT IN AN ORGANIZED HEALTH CARE EDUCATION/TRAINING PROGRAM

## 2021-11-28 PROCEDURE — 93010 ELECTROCARDIOGRAM REPORT: CPT | Performed by: INTERNAL MEDICINE

## 2021-11-28 RX ORDER — FUROSEMIDE 10 MG/ML
40 INJECTION INTRAMUSCULAR; INTRAVENOUS ONCE
Status: COMPLETED | OUTPATIENT
Start: 2021-11-28 | End: 2021-11-28

## 2021-11-28 RX ORDER — MIDAZOLAM HCL IN 0.9 % NACL/PF 1 MG/ML
1-8 PLASTIC BAG, INJECTION (ML) INTRAVENOUS CONTINUOUS
Status: DISCONTINUED | OUTPATIENT
Start: 2021-11-28 | End: 2021-12-01

## 2021-11-28 RX ADMIN — QUETIAPINE FUMARATE 100 MG: 100 TABLET ORAL at 12:41

## 2021-11-28 RX ADMIN — FUROSEMIDE 40 MG: 10 INJECTION, SOLUTION INTRAVENOUS at 10:32

## 2021-11-28 RX ADMIN — Medication 2 PACKET: at 22:32

## 2021-11-28 RX ADMIN — HYDROMORPHONE HYDROCHLORIDE 8 MG: 4 TABLET ORAL at 15:50

## 2021-11-28 RX ADMIN — LORAZEPAM 12 MG: 2 TABLET ORAL at 03:48

## 2021-11-28 RX ADMIN — QUETIAPINE FUMARATE 100 MG: 100 TABLET ORAL at 00:02

## 2021-11-28 RX ADMIN — Medication 40 MG: at 08:57

## 2021-11-28 RX ADMIN — HYDROMORPHONE HYDROCHLORIDE 8 MG: 4 TABLET ORAL at 00:02

## 2021-11-28 RX ADMIN — Medication 2 PACKET: at 18:47

## 2021-11-28 RX ADMIN — Medication 3 MG/HR: at 03:49

## 2021-11-28 RX ADMIN — Medication 150 MCG/HR: at 00:24

## 2021-11-28 RX ADMIN — QUETIAPINE FUMARATE 100 MG: 100 TABLET ORAL at 23:53

## 2021-11-28 RX ADMIN — ENOXAPARIN SODIUM 70 MG: 80 INJECTION SUBCUTANEOUS at 07:59

## 2021-11-28 RX ADMIN — LORAZEPAM 12 MG: 2 TABLET ORAL at 00:02

## 2021-11-28 RX ADMIN — INSULIN ASPART 2 UNITS: 100 INJECTION, SOLUTION INTRAVENOUS; SUBCUTANEOUS at 03:49

## 2021-11-28 RX ADMIN — Medication 150 MCG: at 17:30

## 2021-11-28 RX ADMIN — LORAZEPAM 12 MG: 2 TABLET ORAL at 15:50

## 2021-11-28 RX ADMIN — LORAZEPAM 12 MG: 2 TABLET ORAL at 12:41

## 2021-11-28 RX ADMIN — HYDROMORPHONE HYDROCHLORIDE 8 MG: 4 TABLET ORAL at 08:01

## 2021-11-28 RX ADMIN — MIDAZOLAM 4 MG: 1 INJECTION INTRAMUSCULAR; INTRAVENOUS at 13:45

## 2021-11-28 RX ADMIN — Medication 2 PACKET: at 08:02

## 2021-11-28 RX ADMIN — LORAZEPAM 12 MG: 2 TABLET ORAL at 23:53

## 2021-11-28 RX ADMIN — DEXMEDETOMIDINE 0.7 MCG/KG/HR: 100 INJECTION, SOLUTION, CONCENTRATE INTRAVENOUS at 06:04

## 2021-11-28 RX ADMIN — QUETIAPINE FUMARATE 100 MG: 100 TABLET ORAL at 03:48

## 2021-11-28 RX ADMIN — LORAZEPAM 12 MG: 2 TABLET ORAL at 20:25

## 2021-11-28 RX ADMIN — Medication 2 PACKET: at 13:48

## 2021-11-28 RX ADMIN — ENOXAPARIN SODIUM 70 MG: 80 INJECTION SUBCUTANEOUS at 20:26

## 2021-11-28 RX ADMIN — HYDROMORPHONE HYDROCHLORIDE 8 MG: 4 TABLET ORAL at 20:25

## 2021-11-28 RX ADMIN — HYDROMORPHONE HYDROCHLORIDE 8 MG: 4 TABLET ORAL at 03:48

## 2021-11-28 RX ADMIN — INSULIN ASPART 3 UNITS: 100 INJECTION, SOLUTION INTRAVENOUS; SUBCUTANEOUS at 00:02

## 2021-11-28 RX ADMIN — MIDAZOLAM 4 MG: 1 INJECTION INTRAMUSCULAR; INTRAVENOUS at 15:06

## 2021-11-28 RX ADMIN — Medication 1 TABLET: at 08:01

## 2021-11-28 RX ADMIN — INSULIN ASPART 1 UNITS: 100 INJECTION, SOLUTION INTRAVENOUS; SUBCUTANEOUS at 12:48

## 2021-11-28 RX ADMIN — Medication 150 MCG/HR: at 23:53

## 2021-11-28 RX ADMIN — HYDROMORPHONE HYDROCHLORIDE 8 MG: 4 TABLET ORAL at 12:40

## 2021-11-28 RX ADMIN — HYDROMORPHONE HYDROCHLORIDE 8 MG: 4 TABLET ORAL at 23:53

## 2021-11-28 RX ADMIN — Medication 2 PACKET: at 10:25

## 2021-11-28 RX ADMIN — Medication 100 MCG: at 12:59

## 2021-11-28 RX ADMIN — INSULIN ASPART 1 UNITS: 100 INJECTION, SOLUTION INTRAVENOUS; SUBCUTANEOUS at 16:02

## 2021-11-28 RX ADMIN — Medication 150 MCG/HR: at 13:44

## 2021-11-28 RX ADMIN — QUETIAPINE FUMARATE 100 MG: 100 TABLET ORAL at 20:25

## 2021-11-28 RX ADMIN — LORAZEPAM 12 MG: 2 TABLET ORAL at 08:01

## 2021-11-28 RX ADMIN — Medication 75 MCG: at 06:00

## 2021-11-28 RX ADMIN — QUETIAPINE FUMARATE 100 MG: 100 TABLET ORAL at 08:01

## 2021-11-28 RX ADMIN — INSULIN ASPART 3 UNITS: 100 INJECTION, SOLUTION INTRAVENOUS; SUBCUTANEOUS at 20:28

## 2021-11-28 RX ADMIN — Medication 4 MG/HR: at 17:42

## 2021-11-28 RX ADMIN — DEXMEDETOMIDINE 1.2 MCG/KG/HR: 100 INJECTION, SOLUTION, CONCENTRATE INTRAVENOUS at 13:52

## 2021-11-28 RX ADMIN — DEXMEDETOMIDINE 1.2 MCG/KG/HR: 100 INJECTION, SOLUTION, CONCENTRATE INTRAVENOUS at 18:55

## 2021-11-28 RX ADMIN — METHYLPREDNISOLONE SODIUM SUCCINATE 62.5 MG: 125 INJECTION, POWDER, FOR SOLUTION INTRAMUSCULAR; INTRAVENOUS at 15:05

## 2021-11-28 RX ADMIN — Medication 100 MCG/HR: at 16:51

## 2021-11-28 RX ADMIN — MIDAZOLAM 4 MG: 1 INJECTION INTRAMUSCULAR; INTRAVENOUS at 16:51

## 2021-11-28 RX ADMIN — INSULIN ASPART 1 UNITS: 100 INJECTION, SOLUTION INTRAVENOUS; SUBCUTANEOUS at 08:15

## 2021-11-28 RX ADMIN — QUETIAPINE FUMARATE 100 MG: 100 TABLET ORAL at 15:50

## 2021-11-28 ASSESSMENT — ACTIVITIES OF DAILY LIVING (ADL)
ADLS_ACUITY_SCORE: 17

## 2021-11-28 NOTE — PLAN OF CARE
D/I: Pt agitated when sedation decreased. Pulling at restrains and trying to get ETT out. FiO2 decreased to 50%, sats 95% no other vent changes. HR sinus bradycardia, BP within goals. Tube feeds at goal. Lasix for diuresis with good results.   A: Pt not tolerating decreased sedation. No major changes in pt overall condition.  P: Continue with current plan of care. Update MD with concerns.

## 2021-11-28 NOTE — PROGRESS NOTES
Major Shift Events:  Opens eyes to voice, moves BLE/BUE when agitated. SR, SBP stable, 2 episodes of agitation, managed with PRN boluses. CMV 50%/530/24/10. Rectal tube with minimal output. Flores with adequate output. TF @ GR. Sedated on precedex/fentanyl/versed.     Plan: Wean sedation and vent as possible.     For vital signs and complete assessments, please see documentation flowsheets.

## 2021-11-28 NOTE — PROGRESS NOTES
MEDICAL ICU PROGRESS NOTE  11/28/2021      Date of Service (when I saw the patient): 11/28/2021    ASSESSMENT: Brandon Schafer is a 41 year old male with questionable PMH of remote VTE who was admitted on 11/3/2021 with ARDS 2/2 COVID pneumonia. Now with CAUTI and polymicrobial VAP.    PLAN:  - stop versed gtt, prn bolus  - lasix 40 mg once, goal -500 to 1000 ml  - decrease peep to 8    Neuro:  # Pain and sedation  - stop versed gtt, bolus 2-4 mg q1h prn  - Fentanyl gtt  - dilaudid 8mg q 4H  - Ativan 12mg Q4H  - Seroquel 100 mg q4h  - RASS goal -2 to -3  - Consider NMB if worsening vent mechanics     Pulmonary:  # ARDS 2/2 COVID pneumonia  # Possible organizing pneumonia  Intubated 11/2 prior to transport flight. CT on 11/25 with mild fibrotic changes, dense consolidations consistent with ARDS. Possible organizing pneumonia, will proceed with steroid therapy and monitor response.  - Methylprednisolone 60 mg x3 day (11/26-11/28)  - maintain supine --- no benefit from prone  - weaning fiO2 as tolerated  - Infectious work-up and treatment as below  - lasix 40 mg once, goal -500 to 1000 ml    Ventilation Mode: CMV/AC  (Continuous Mandatory Ventilation/ Assist Control)  FiO2 (%): 40 %  Rate Set (breaths/minute): 24 breaths/min  Tidal Volume Set (mL): 530 mL  PEEP (cm H2O): 0.1 cmH2O  Oxygen Concentration (%): 50 %  Resp: 24    #COPD exacerbation  Wheezing on exam on 11/4. S/p azithromycin for 5d. Steroids per Covid protocol.   - Continue albuterol nebs prn     Cardiovascular:  # Hypotension, resolved  Transient, and in the setting of sedation. No prior TTE. Troponin peaked at 0.293. EKG with T wave inversion in lateral leads. Suspect troponin was 2/2 demand ischemia in setting of hypoxia.    # Atrial fibrillation  Episode of atrial fibrillation overnight 11/12, hemodynamically stable and resolved spontaneously. TWI on EKG, troponin negative     GI/Nutrition:  # At risk for malnutrition  # Constipation, improving  -  Nutrition consult for TF  - Tolerating TF at goal   - Bowel regimen: scheduled Miralax and senna and prn Miralax     Renal/Fluids/Electrolytes:  # Non-oliguric STEFAN, resolved  # Hypokalemia, in setting of ongoing diuresis   Unclear baseline, has not seen doctor in 20 years. Cr 2.5 on admission, now improved. 11/18 with rising bicarb, BUN, Cr...decreased UOP consistent with overdiuresis.    - holding diuretics     # Hypernatremia, resolved  - FWF 75ml q 2H, stable sodium     Endocrine:  # Stress and steroid induced hyperglycemia  - Medium resistance sliding scale insulin      ID:  # COVID pneumonia  Symptoms (headache, SOB, fever, cough, diarrhea) 1 week prior to presentation. Not COVID vaccinated. Tested positive on presentation on 11/2. S/p tocilizumab 11/4.  - Dexamethasone 6 mg daily (total 10 days) - completed     # Fever, tmax 101.3 11/17  # CAUTI  # VAP  Pt with worsening oxygen saturations, fevers, and persistent leukocytosis. Given his prolonged hospitalization, was started on broad-spectrum antibiotics on 11/11. Urine culture grew >100k pan-sensitive E coli. RVP and MRSA nares negative. Sputum grew E coli, S aureus, and GBS. Sputum culture 11/17 with 1+ Ecoli, blood cultures 11/17 pending. Continues to have fevers as of 11/18, redraw blood cultures and test stool for Cdiff. Of note, CXR 11/17 without clear/focal infiltrate.   - Continue CTX for additional 7 day course given persistent Ecoli on 11/17 sputum (11/19 - 11/25)     Abx:  - Ceftriaxone 11/14-11/17; 11/19 - 11/25 (completed)  - Zosyn 11/11-11/14; 11/17 - 11/18  - Vanc 11/11-11/12      Hematology:    # Coagulopathy 2/2 COVID  US BLE negative for DVT.  - Enoxaparin ppx 70mg BID     MSK  # Septum wound, tongue wound 2/2 Proning   - WOC following     General Cares/Prophylaxis:    DVT Prophylaxis: Lovenox ppx 70 mg BID  GI Prophylaxis: PPI  Family Communication: BrotherBrad, updated by phone  Code Status: DNR      Lines/tubes/drains:  - PIV x3  -  arterial line  - NG  - ETT  - Rectal tube  - Flores (11/24 - )     Disposition:  - Medical ICU    Patient seen and findings/plan discussed with medical ICU staff, Dr. Perlman.    Tj Pacheco MD  Internal Medicine PGY-3    ====================================  INTERVAL HISTORY:   No major events overnight. Net negative 2.2 L. , PEEP remains at 10, FiO2 down trending to 40%. .    OBJECTIVE:   1. VITAL SIGNS:   Temp:  [97.6  F (36.4  C)-98.7  F (37.1  C)] 97.9  F (36.6  C)  Pulse:  [] 53  Resp:  [24-26] 24  MAP:  [79 mmHg-125 mmHg] 103 mmHg  Arterial Line BP: (102-189)/(59-90) 150/78  FiO2 (%):  [40 %-60 %] 40 %  SpO2:  [92 %-100 %] 97 %  Ventilation Mode: CMV/AC  (Continuous Mandatory Ventilation/ Assist Control)  FiO2 (%): 40 %  Rate Set (breaths/minute): 24 breaths/min  Tidal Volume Set (mL): 530 mL  PEEP (cm H2O): 0.1 cmH2O  Oxygen Concentration (%): 50 %  Resp: 24    2. INTAKE/ OUTPUT:   I/O last 3 completed shifts:  In: 3036.1 [I.V.:981.1; NG/GT:1020]  Out: 4975 [Urine:4875; Stool:100]    3. PHYSICAL EXAMINATION:  General: Sedated, intubated, supine, opening eyes  HEENT: MMM  Neuro: sedated, does not waken to voice  Pulm/Resp: Coarse breath sounds bilaterally without rhonchi, crackles or wheeze, breathing non-labored  CV: tachycardic, S1/S2 without m/r/g;  Abdomen: Obese, soft, non-distended, non-tender  : Flores catheter in place, urine yellow and clear  Ext: trace edema of extremities  Incisions/Skin: Wounds as above, no rashes noted    4. LABS:   Arterial Blood Gases   Recent Labs   Lab 11/28/21  0343 11/27/21  0420 11/26/21  0424 11/25/21  0521   PH 7.43 7.41 7.45 7.42   PCO2 43 46* 45 50*   PO2 86 100 105 86   HCO3 29* 29* 31* 33*     Complete Blood Count   Recent Labs   Lab 11/28/21  0343 11/27/21  0420 11/26/21  0419 11/25/21  0520   WBC 8.7 9.1 7.5 7.2   HGB 9.5* 9.2* 8.6* 9.0*    213 192 187     Basic Metabolic Panel  Recent Labs   Lab 11/28/21  0814 11/28/21  0348  11/28/21  0343 11/28/21  0001 11/27/21  1627 11/27/21  1620 11/27/21 0424 11/27/21 0420 11/26/21 2032 11/26/21 1809   NA  --   --  134  --   --  139  --  136  --  140   POTASSIUM  --   --  4.7  --   --  3.8  --  5.0  --  4.3   CHLORIDE  --   --  103  --   --  102  --  104  --  105   CO2  --   --  27  --   --  29  --  26  --  30   BUN  --   --  30  --   --  28  --  28  --  30   CR  --   --  0.67  --   --  0.70  --  0.73  --  0.87   * 229* 256* 245*   < > 190*   < > 245*   < > 178*    < > = values in this interval not displayed.     Liver Function Tests  No lab results found in last 7 days.  5. RADIOLOGY:   No results found for this or any previous visit (from the past 24 hour(s)).

## 2021-11-29 LAB
ANION GAP SERPL CALCULATED.3IONS-SCNC: 7 MMOL/L (ref 3–14)
ATRIAL RATE - MUSE: 53 BPM
BASE EXCESS BLDA CALC-SCNC: 4.4 MMOL/L (ref -9–1.8)
BUN SERPL-MCNC: 31 MG/DL (ref 7–30)
CALCIUM SERPL-MCNC: 8.9 MG/DL (ref 8.5–10.1)
CHLORIDE BLD-SCNC: 103 MMOL/L (ref 94–109)
CO2 SERPL-SCNC: 25 MMOL/L (ref 20–32)
CREAT SERPL-MCNC: 0.68 MG/DL (ref 0.66–1.25)
DIASTOLIC BLOOD PRESSURE - MUSE: NORMAL MMHG
ERYTHROCYTE [DISTWIDTH] IN BLOOD BY AUTOMATED COUNT: 14.6 % (ref 10–15)
GFR SERPL CREATININE-BSD FRML MDRD: >90 ML/MIN/1.73M2
GLUCOSE BLD-MCNC: 222 MG/DL (ref 70–99)
GLUCOSE BLDC GLUCOMTR-MCNC: 174 MG/DL (ref 70–99)
GLUCOSE BLDC GLUCOMTR-MCNC: 178 MG/DL (ref 70–99)
GLUCOSE BLDC GLUCOMTR-MCNC: 194 MG/DL (ref 70–99)
GLUCOSE BLDC GLUCOMTR-MCNC: 203 MG/DL (ref 70–99)
GLUCOSE BLDC GLUCOMTR-MCNC: 212 MG/DL (ref 70–99)
GLUCOSE BLDC GLUCOMTR-MCNC: 259 MG/DL (ref 70–99)
HCO3 BLD-SCNC: 29 MMOL/L (ref 21–28)
HCT VFR BLD AUTO: 32.1 % (ref 40–53)
HGB BLD-MCNC: 10.2 G/DL (ref 13.3–17.7)
INTERPRETATION ECG - MUSE: NORMAL
MAGNESIUM SERPL-MCNC: 2.2 MG/DL (ref 1.6–2.3)
MCH RBC QN AUTO: 27.3 PG (ref 26.5–33)
MCHC RBC AUTO-ENTMCNC: 31.8 G/DL (ref 31.5–36.5)
MCV RBC AUTO: 86 FL (ref 78–100)
O2/TOTAL GAS SETTING VFR VENT: 40 %
P AXIS - MUSE: NORMAL DEGREES
PCO2 BLD: 41 MM HG (ref 35–45)
PH BLD: 7.46 [PH] (ref 7.35–7.45)
PHOSPHATE SERPL-MCNC: 4.1 MG/DL (ref 2.5–4.5)
PLATELET # BLD AUTO: 279 10E3/UL (ref 150–450)
PO2 BLD: 70 MM HG (ref 80–105)
POTASSIUM BLD-SCNC: 4.4 MMOL/L (ref 3.4–5.3)
PR INTERVAL - MUSE: 184 MS
QRS DURATION - MUSE: 78 MS
QT - MUSE: 444 MS
QTC - MUSE: 416 MS
R AXIS - MUSE: -12 DEGREES
RBC # BLD AUTO: 3.73 10E6/UL (ref 4.4–5.9)
SODIUM SERPL-SCNC: 135 MMOL/L (ref 133–144)
SODIUM SERPL-SCNC: 135 MMOL/L (ref 133–144)
SYSTOLIC BLOOD PRESSURE - MUSE: NORMAL MMHG
T AXIS - MUSE: 25 DEGREES
TRIGL SERPL-MCNC: 341 MG/DL
VENTRICULAR RATE- MUSE: 53 BPM
WBC # BLD AUTO: 10.3 10E3/UL (ref 4–11)

## 2021-11-29 PROCEDURE — 36415 COLL VENOUS BLD VENIPUNCTURE: CPT

## 2021-11-29 PROCEDURE — 258N000003 HC RX IP 258 OP 636: Performed by: STUDENT IN AN ORGANIZED HEALTH CARE EDUCATION/TRAINING PROGRAM

## 2021-11-29 PROCEDURE — 250N000011 HC RX IP 250 OP 636

## 2021-11-29 PROCEDURE — 83735 ASSAY OF MAGNESIUM: CPT | Performed by: STUDENT IN AN ORGANIZED HEALTH CARE EDUCATION/TRAINING PROGRAM

## 2021-11-29 PROCEDURE — 250N000013 HC RX MED GY IP 250 OP 250 PS 637: Performed by: STUDENT IN AN ORGANIZED HEALTH CARE EDUCATION/TRAINING PROGRAM

## 2021-11-29 PROCEDURE — 99291 CRITICAL CARE FIRST HOUR: CPT | Performed by: INTERNAL MEDICINE

## 2021-11-29 PROCEDURE — 250N000011 HC RX IP 250 OP 636: Performed by: STUDENT IN AN ORGANIZED HEALTH CARE EDUCATION/TRAINING PROGRAM

## 2021-11-29 PROCEDURE — 85027 COMPLETE CBC AUTOMATED: CPT | Performed by: STUDENT IN AN ORGANIZED HEALTH CARE EDUCATION/TRAINING PROGRAM

## 2021-11-29 PROCEDURE — 250N000013 HC RX MED GY IP 250 OP 250 PS 637: Performed by: NURSE PRACTITIONER

## 2021-11-29 PROCEDURE — 250N000011 HC RX IP 250 OP 636: Performed by: NURSE PRACTITIONER

## 2021-11-29 PROCEDURE — 200N000002 HC R&B ICU UMMC

## 2021-11-29 PROCEDURE — 84100 ASSAY OF PHOSPHORUS: CPT | Performed by: INTERNAL MEDICINE

## 2021-11-29 PROCEDURE — 94003 VENT MGMT INPAT SUBQ DAY: CPT

## 2021-11-29 PROCEDURE — 82803 BLOOD GASES ANY COMBINATION: CPT | Performed by: STUDENT IN AN ORGANIZED HEALTH CARE EDUCATION/TRAINING PROGRAM

## 2021-11-29 PROCEDURE — 999N000157 HC STATISTIC RCP TIME EA 10 MIN

## 2021-11-29 PROCEDURE — 84295 ASSAY OF SERUM SODIUM: CPT

## 2021-11-29 PROCEDURE — 999N000127 HC STATISTIC PERIPHERAL IV START W US GUIDANCE

## 2021-11-29 PROCEDURE — 250N000009 HC RX 250: Performed by: STUDENT IN AN ORGANIZED HEALTH CARE EDUCATION/TRAINING PROGRAM

## 2021-11-29 PROCEDURE — 250N000013 HC RX MED GY IP 250 OP 250 PS 637

## 2021-11-29 PROCEDURE — 84478 ASSAY OF TRIGLYCERIDES: CPT | Performed by: STUDENT IN AN ORGANIZED HEALTH CARE EDUCATION/TRAINING PROGRAM

## 2021-11-29 PROCEDURE — 80048 BASIC METABOLIC PNL TOTAL CA: CPT | Performed by: STUDENT IN AN ORGANIZED HEALTH CARE EDUCATION/TRAINING PROGRAM

## 2021-11-29 RX ORDER — LIDOCAINE 40 MG/G
CREAM TOPICAL
Status: DISCONTINUED | OUTPATIENT
Start: 2021-11-29 | End: 2021-12-06 | Stop reason: HOSPADM

## 2021-11-29 RX ORDER — ARIPIPRAZOLE 15 MG/1
15 TABLET ORAL DAILY
Status: DISCONTINUED | OUTPATIENT
Start: 2021-11-29 | End: 2021-12-03

## 2021-11-29 RX ORDER — HALOPERIDOL 5 MG/ML
5 INJECTION INTRAMUSCULAR EVERY 6 HOURS PRN
Status: DISCONTINUED | OUTPATIENT
Start: 2021-11-29 | End: 2021-12-06 | Stop reason: HOSPADM

## 2021-11-29 RX ORDER — AMINO AC/PROTEIN HYDR/WHEY PRO 10G-100/30
2 LIQUID (ML) ORAL 2 TIMES DAILY
Status: DISCONTINUED | OUTPATIENT
Start: 2021-11-29 | End: 2021-12-06 | Stop reason: HOSPADM

## 2021-11-29 RX ORDER — METHYLPREDNISOLONE SODIUM SUCCINATE 40 MG/ML
40 INJECTION, POWDER, LYOPHILIZED, FOR SOLUTION INTRAMUSCULAR; INTRAVENOUS EVERY 12 HOURS
Status: DISCONTINUED | OUTPATIENT
Start: 2021-11-29 | End: 2021-12-01

## 2021-11-29 RX ORDER — PROPOFOL 10 MG/ML
5-50 INJECTION, EMULSION INTRAVENOUS CONTINUOUS
Status: DISCONTINUED | OUTPATIENT
Start: 2021-11-29 | End: 2021-11-30

## 2021-11-29 RX ORDER — FUROSEMIDE 10 MG/ML
20 INJECTION INTRAMUSCULAR; INTRAVENOUS ONCE
Status: COMPLETED | OUTPATIENT
Start: 2021-11-29 | End: 2021-11-29

## 2021-11-29 RX ORDER — QUETIAPINE FUMARATE 100 MG/1
100 TABLET, FILM COATED ORAL EVERY 6 HOURS
Status: DISCONTINUED | OUTPATIENT
Start: 2021-11-29 | End: 2021-12-03

## 2021-11-29 RX ADMIN — Medication 100 MCG: at 11:45

## 2021-11-29 RX ADMIN — Medication 150 MCG: at 06:37

## 2021-11-29 RX ADMIN — METHYLPREDNISOLONE SODIUM SUCCINATE 40 MG: 40 INJECTION, POWDER, FOR SOLUTION INTRAMUSCULAR; INTRAVENOUS at 09:51

## 2021-11-29 RX ADMIN — Medication 2 PACKET: at 10:31

## 2021-11-29 RX ADMIN — DEXMEDETOMIDINE 0.9 MCG/KG/HR: 100 INJECTION, SOLUTION, CONCENTRATE INTRAVENOUS at 07:42

## 2021-11-29 RX ADMIN — QUETIAPINE FUMARATE 100 MG: 100 TABLET ORAL at 18:15

## 2021-11-29 RX ADMIN — Medication 100 MCG: at 14:00

## 2021-11-29 RX ADMIN — INSULIN ASPART 3 UNITS: 100 INJECTION, SOLUTION INTRAVENOUS; SUBCUTANEOUS at 00:01

## 2021-11-29 RX ADMIN — LORAZEPAM 12 MG: 2 TABLET ORAL at 11:47

## 2021-11-29 RX ADMIN — Medication 150 MCG/HR: at 23:18

## 2021-11-29 RX ADMIN — HYDROMORPHONE HYDROCHLORIDE 8 MG: 4 TABLET ORAL at 04:24

## 2021-11-29 RX ADMIN — ENOXAPARIN SODIUM 70 MG: 80 INJECTION SUBCUTANEOUS at 07:38

## 2021-11-29 RX ADMIN — Medication 2 PACKET: at 19:57

## 2021-11-29 RX ADMIN — ENOXAPARIN SODIUM 70 MG: 80 INJECTION SUBCUTANEOUS at 19:55

## 2021-11-29 RX ADMIN — LORAZEPAM 12 MG: 2 TABLET ORAL at 04:24

## 2021-11-29 RX ADMIN — INSULIN ASPART 2 UNITS: 100 INJECTION, SOLUTION INTRAVENOUS; SUBCUTANEOUS at 07:40

## 2021-11-29 RX ADMIN — Medication 1 TABLET: at 07:37

## 2021-11-29 RX ADMIN — QUETIAPINE FUMARATE 100 MG: 100 TABLET ORAL at 07:36

## 2021-11-29 RX ADMIN — HYDROMORPHONE HYDROCHLORIDE 8 MG: 4 TABLET ORAL at 07:36

## 2021-11-29 RX ADMIN — QUETIAPINE FUMARATE 100 MG: 100 TABLET ORAL at 11:47

## 2021-11-29 RX ADMIN — LORAZEPAM 12 MG: 2 TABLET ORAL at 15:41

## 2021-11-29 RX ADMIN — INSULIN ASPART 1 UNITS: 100 INJECTION, SOLUTION INTRAVENOUS; SUBCUTANEOUS at 20:14

## 2021-11-29 RX ADMIN — ARIPIPRAZOLE 15 MG: 15 TABLET ORAL at 16:25

## 2021-11-29 RX ADMIN — DEXMEDETOMIDINE 1.2 MCG/KG/HR: 100 INJECTION, SOLUTION, CONCENTRATE INTRAVENOUS at 20:32

## 2021-11-29 RX ADMIN — PROPOFOL 20 MCG/KG/MIN: 10 INJECTION, EMULSION INTRAVENOUS at 15:53

## 2021-11-29 RX ADMIN — INSULIN ASPART 2 UNITS: 100 INJECTION, SOLUTION INTRAVENOUS; SUBCUTANEOUS at 04:26

## 2021-11-29 RX ADMIN — LORAZEPAM 12 MG: 2 TABLET ORAL at 07:36

## 2021-11-29 RX ADMIN — FUROSEMIDE 20 MG: 10 INJECTION, SOLUTION INTRAVENOUS at 09:52

## 2021-11-29 RX ADMIN — METHYLPREDNISOLONE SODIUM SUCCINATE 40 MG: 40 INJECTION, POWDER, FOR SOLUTION INTRAMUSCULAR; INTRAVENOUS at 22:40

## 2021-11-29 RX ADMIN — Medication 2 PACKET: at 14:26

## 2021-11-29 RX ADMIN — PROPOFOL 20 MCG/KG/MIN: 10 INJECTION, EMULSION INTRAVENOUS at 19:53

## 2021-11-29 RX ADMIN — INSULIN ASPART 1 UNITS: 100 INJECTION, SOLUTION INTRAVENOUS; SUBCUTANEOUS at 11:50

## 2021-11-29 RX ADMIN — HYDROMORPHONE HYDROCHLORIDE 8 MG: 4 TABLET ORAL at 19:55

## 2021-11-29 RX ADMIN — Medication 150 MCG: at 02:33

## 2021-11-29 RX ADMIN — QUETIAPINE FUMARATE 100 MG: 100 TABLET ORAL at 04:24

## 2021-11-29 RX ADMIN — LORAZEPAM 12 MG: 2 TABLET ORAL at 19:56

## 2021-11-29 RX ADMIN — Medication 150 MCG/HR: at 09:51

## 2021-11-29 RX ADMIN — DEXMEDETOMIDINE 1 MCG/KG/HR: 100 INJECTION, SOLUTION, CONCENTRATE INTRAVENOUS at 01:06

## 2021-11-29 RX ADMIN — HYDROMORPHONE HYDROCHLORIDE 8 MG: 4 TABLET ORAL at 15:41

## 2021-11-29 RX ADMIN — Medication 100 MCG: at 13:25

## 2021-11-29 RX ADMIN — DEXMEDETOMIDINE 1.2 MCG/KG/HR: 100 INJECTION, SOLUTION, CONCENTRATE INTRAVENOUS at 14:44

## 2021-11-29 RX ADMIN — Medication 100 MCG: at 14:30

## 2021-11-29 RX ADMIN — Medication 3 MG/HR: at 13:31

## 2021-11-29 RX ADMIN — Medication 150 MCG: at 04:49

## 2021-11-29 RX ADMIN — INSULIN ASPART 2 UNITS: 100 INJECTION, SOLUTION INTRAVENOUS; SUBCUTANEOUS at 15:41

## 2021-11-29 RX ADMIN — HYDROMORPHONE HYDROCHLORIDE 8 MG: 4 TABLET ORAL at 11:47

## 2021-11-29 RX ADMIN — Medication 40 MG: at 07:37

## 2021-11-29 RX ADMIN — Medication 100 MCG: at 13:05

## 2021-11-29 RX ADMIN — Medication 2 PACKET: at 08:00

## 2021-11-29 ASSESSMENT — ACTIVITIES OF DAILY LIVING (ADL)
ADLS_ACUITY_SCORE: 17
ADLS_ACUITY_SCORE: 17
ADLS_ACUITY_SCORE: 21
ADLS_ACUITY_SCORE: 17

## 2021-11-29 NOTE — PROGRESS NOTES
MEDICAL ICU PROGRESS NOTE  11/29/2021      Date of Service (when I saw the patient): 11/29/2021    ASSESSMENT: Brandon Schafer is a 41 year old male with questionable PMH of remote VTE who was admitted on 11/3/2021 with ARDS 2/2 COVID pneumonia. Now with CAUTI and polymicrobial VAP (11/11).     PLAN:  Furosemide 20 mg x1  Increase methylprednisolone to 40 mg BID  Drop Vt to 480  Decreased FWF, sodium check at 1400 --> stopped FWF  Add low-dose propofol  Add Abilify and prn haloperidol  Decrease quetiapine to q6 hours  Tentative plan for family meeting again on Friday      Neuro:  # Pain and sedation  - Midazolam, fentanyl, and dexmedetomidine infusions  - Midazolam and fentanyl boluses available prn  - Dilaudid 8 mg Q4H PO  - Ativan 12 mg Q4H PO  - Decrease quetiapine to 100 mg Q6H  - Add Ability 15 mg PO daily  - Add haloperidol 5 mg Q6H prn  - RASS goal -2 to -3  - Consider NMB if worsening vent mechanics     Pulmonary:  # ARDS 2/2 COVID pneumonia  # Possible organizing pneumonia  Intubated 11/2 prior to transport flight. CT on 11/25 with mild fibrotic changes, dense consolidations consistent with ARDS. Possible organizing pneumonia, started on methylprednisolone 60 mg x3 days.  - Increase methylprednisolone to 40 mg BID  - Maintain supine --- no benefit from prone  - Weaning fiO2 as tolerated  - Infectious work-up and treatment as below  - Diuresis with 20 mg furosemide x1  - Mechanical ventilation with AC 24/530/+8/60% --> drop Vt to 480  - Wean FiO2 as tolerated     # COPD exacerbation  Wheezing on exam on 11/4. S/p azithromycin for 5d. Steroids per Covid protocol.   - Continue albuterol nebs prn     Cardiovascular:  # Hypotension, resolved  Transient, and in the setting of sedation. No prior TTE. Troponin peaked at 0.293. EKG with T wave inversion in lateral leads. Suspect troponin was 2/2 demand ischemia in setting of hypoxia.     # Atrial fibrillation  Episode of atrial fibrillation overnight 11/12,  hemodynamically stable and resolved spontaneously. TWI on EKG, troponin negative     GI/Nutrition:  # At risk for malnutrition  # Constipation, improving  - Nutrition consult for TF  - Tolerating TF at goal   - Bowel regimen: senna and Miralax prn     Renal/Fluids/Electrolytes:  # Non-oliguric STEFAN, resolved  # Hypokalemia, in setting of ongoing diuresis   Unclear baseline, has not seen doctor in 20 years. Cr 2.5 on admission, now improved.      # Hypernatremia, resolved  - Decrease FWF to 25ml q 2H  - Sodium check at 1400     Endocrine:  # Stress and steroid induced hyperglycemia  - Medium resistance sliding scale insulin      ID:  # COVID pneumonia  Symptoms (headache, SOB, fever, cough, diarrhea) 1 week prior to presentation. Not COVID vaccinated. Tested positive on presentation on 11/2. S/p tocilizumab 11/4.  - Dexamethasone 6 mg daily (total 10 days) - completed     # Fever 11/17  # CAUTI  # VAP  Pt with worsening oxygen saturations, fevers, and persistent leukocytosis. Given his prolonged hospitalization, was started on broad-spectrum antibiotics on 11/11. Urine culture grew >100k pan-sensitive E coli. RVP and MRSA nares negative. Sputum grew E coli, S aureus, and GBS. Sputum culture 11/17 with 1+ E. coli and MSSA, blood cultures negative. Continued to have fevers as of 11/18, redraw blood cultures and test stool for Cdiff.   - Continue to observe off antibiotics     Abx:  - Ceftriaxone 11/14-11/17; 11/19 - 11/25 (completed)  - Zosyn 11/11-11/14; 11/17 - 11/18  - Vanc 11/11-11/12      Hematology:    # Coagulopathy 2/2 COVID  US BLE negative for DVT.  - Enoxaparin ppx 70mg BID     MSK  # Septum wound, tongue wound 2/2 Proning   - WOC following     General Cares/Prophylaxis:    DVT Prophylaxis: Lovenox ppx 70 mg BID  GI Prophylaxis: PPI  Family Communication: SherierBrad, updated by phone at 15:10 pm  Code Status: DNR      Lines/tubes/drains:  - PIV x2  - Arterial line  - NG  - ETT  - Rectal tube  - Flores  (11/24 - )     Disposition:  - Medical ICU    Patient seen and findings/plan discussed with medical ICU staff, Dr. Persaud.    Abril Laguerre, ACNP    ====================================  INTERVAL HISTORY:   Opens eyes to voice. Episodes of intermittent agitation increasing in frequency.    OBJECTIVE:   1. VITAL SIGNS:   Temp:  [97.6  F (36.4  C)-98.6  F (37  C)] 97.6  F (36.4  C)  Pulse:  [] 56  Resp:  [24-29] 24  BP: (142)/(60) 142/60  MAP:  [85 mmHg-148 mmHg] 100 mmHg  Arterial Line BP: (127-213)/() 149/76  FiO2 (%):  [40 %-50 %] 40 %  SpO2:  [88 %-99 %] 95 %  Ventilation Mode: CMV/AC  (Continuous Mandatory Ventilation/ Assist Control)  FiO2 (%): 40 %  Rate Set (breaths/minute): 24 breaths/min  Tidal Volume Set (mL): 530 mL  PEEP (cm H2O): 8 cmH2O  Pressure Support (cm H2O): 10 cmH2O  Oxygen Concentration (%): 60 %  Resp: 24    2. INTAKE/ OUTPUT:   I/O last 3 completed shifts:  In: 3123.72 [I.V.:1258.72; NG/GT:920]  Out: 4800 [Urine:4800]    3. PHYSICAL EXAMINATION:  General: Sedate  HEENT: Mucous membranes moist  Neuro: Not following commands for me  Pulm/Resp: Clear breath sounds bilaterally without rhonchi, crackles or wheeze, breathing non-labored  CV: RRR, S1/S2 without m/r/g; pedal pulses 2+ without pitting LE edema  Abdomen: Soft, non-distended, non-tender  : Flores catheter in place, urine yellow and clear  Incisions/Skin: No rashes noted    4. LABS:   Arterial Blood Gases   Recent Labs   Lab 11/29/21  0432 11/28/21  0343 11/27/21  0420 11/26/21  0424   PH 7.46* 7.43 7.41 7.45   PCO2 41 43 46* 45   PO2 70* 86 100 105   HCO3 29* 29* 29* 31*     Complete Blood Count   Recent Labs   Lab 11/29/21  0432 11/28/21  0343 11/27/21  0420 11/26/21  0419   WBC 10.3 8.7 9.1 7.5   HGB 10.2* 9.5* 9.2* 8.6*    251 213 192     Basic Metabolic Panel  Recent Labs   Lab 11/29/21  0432 11/29/21  0426 11/29/21  0001 11/28/21 2028 11/28/21  0348 11/28/21  0343 11/27/21  1627 11/27/21  1620 11/27/21  0424  11/27/21  0420     --   --   --   --  134  --  139  --  136   POTASSIUM 4.4  --   --   --   --  4.7  --  3.8  --  5.0   CHLORIDE 103  --   --   --   --  103  --  102  --  104   CO2 25  --   --   --   --  27  --  29  --  26   BUN 31*  --   --   --   --  30  --  28  --  28   CR 0.68  --   --   --   --  0.67  --  0.70  --  0.73   * 212* 259* 256*   < > 256*   < > 190*   < > 245*    < > = values in this interval not displayed.     5. RADIOLOGY:   No results found for this or any previous visit (from the past 24 hour(s)).

## 2021-11-29 NOTE — PLAN OF CARE
Major Shift Events:  Off covid precautions. Still sedated and requiring frequent boluses of fentanyl and versed for extreme agitation. Pt would be resting calmly and would wake up, try and bite down on ETT and pull against restraints and thrashing around in bed. During this time, HR would elevated to 120-150s, BP up to 200s/110s. MICU contacted and low dose propofol was added. Versed at 3, Fentanyl at 150, Propofol maxed at 20, Dex maxed at 1.2. Lasix given x1, good urine output today, almost 3L out since 7am. Rectal tube with small amount of watery output. Pressure supported for 8 hours today with no issues. Now CMV 40%/480/24/8. Free water flushes decreased to 30ml Q4. Gave an update to brother (Brad) and updated about the visitor policy since pt is off precautions. Discussed with MICU about midline/PICC and holding off for now unless central access is required, currently has 3 PIVs. New TF started at 55ml/hr, goal of 65ml/hr.     Plan: TF changed to high protein. Now at 55ml/hr, increase to 65ml/hr at 10pm if tolerating. Care conference Friday to have further discussion on trach/peg. Chest CT to be done on Thursday.    For vital signs and complete assessments, please see documentation flowsheets.

## 2021-11-29 NOTE — PROGRESS NOTES
CLINICAL NUTRITION SERVICES - BRIEF NOTE  *Please see full assessment note from 11/23/2021    New Findings:  MICU provider inquired about changing TF formula to a no-fat formula with hypertriglyceridemia and re-starting Propofol. Low fat TF formula requires a large volume of fluid (at least 2400 ml fluid) to meet kcal and protein needs and is not indicated at this time.    TG most recently 318 (H) on 11/25.     Will order a lower fat formula that contains 50% of fat as MCT, while still meeting high protein requirements for critical illness without infusing a large volume of fluid.     Interventions  Collaboration with other providers  Enteral Nutrition - Increase TF rate by 10 ml q 4 hrs as tolerate until new goal rate.        --Vital High Protein @ goal of 65ml/hr (1560ml/day) +4 Prosource will provide: 1720 kcals (21 kcal/kg IBW), 180 g PRO (2.2 g/kg IBW), 1304 ml free H20, 173 g CHO, and 0 g fiber daily, 36 g Fat. Additional kcal from Propofol.        --50% of Fat from MCT = ~18 g Fat/day (~9% kcal from fat).     --Check TG.     RD to follow per protocol.    Shawanda Powell, MS, RD, LD, [facility]  MICU pager: [phone]  ASCOM: [id number]

## 2021-11-29 NOTE — PLAN OF CARE
D/I: Pt very agitated and trying to extubate self when versed drip turned off. Wrist restraints in place. Versed drip turned back on. Peep turned down to 8, FiO2 40%, Tolerated pressure support for a brief time. Blood pressure and heart rate stable, afebrile. Tolerating TF. Lasix x1 today with good results.   A: Pt very agitated at times. Vent settings decreasing.   P: Keep restraints on to keep ETT in place. Follow current plan of care. Update MD with concerns.

## 2021-11-29 NOTE — PROGRESS NOTES
Major Shift Events:  Opens eyes to voice, moves BLE/BUE when agitated. 2 episodes of extreme agitation without precipating event, managed with sedation boluses. SB, SBP stable when comfortable. CMV 45%/530/24/8. Rectal tube with minimal output. Flores with adequate output. TF @ GR. Sedated on precedex/fentanyl/versed.      Plan: Wean sedation and vent as possible.      For vital signs and complete assessments, please see documentation flowsheets.

## 2021-11-30 ENCOUNTER — APPOINTMENT (OUTPATIENT)
Dept: GENERAL RADIOLOGY | Facility: CLINIC | Age: 41
End: 2021-11-30
Attending: STUDENT IN AN ORGANIZED HEALTH CARE EDUCATION/TRAINING PROGRAM
Payer: MEDICAID

## 2021-11-30 LAB
ANION GAP SERPL CALCULATED.3IONS-SCNC: 7 MMOL/L (ref 3–14)
ATRIAL RATE - MUSE: 60 BPM
BASE EXCESS BLDA CALC-SCNC: 3.5 MMOL/L (ref -9–1.8)
BUN SERPL-MCNC: 30 MG/DL (ref 7–30)
CALCIUM SERPL-MCNC: 9.4 MG/DL (ref 8.5–10.1)
CHLORIDE BLD-SCNC: 103 MMOL/L (ref 94–109)
CO2 SERPL-SCNC: 26 MMOL/L (ref 20–32)
CREAT SERPL-MCNC: 0.76 MG/DL (ref 0.66–1.25)
DIASTOLIC BLOOD PRESSURE - MUSE: NORMAL MMHG
ERYTHROCYTE [DISTWIDTH] IN BLOOD BY AUTOMATED COUNT: 14.7 % (ref 10–15)
GFR SERPL CREATININE-BSD FRML MDRD: >90 ML/MIN/1.73M2
GLUCOSE BLD-MCNC: 202 MG/DL (ref 70–99)
GLUCOSE BLDC GLUCOMTR-MCNC: 183 MG/DL (ref 70–99)
GLUCOSE BLDC GLUCOMTR-MCNC: 184 MG/DL (ref 70–99)
GLUCOSE BLDC GLUCOMTR-MCNC: 187 MG/DL (ref 70–99)
GLUCOSE BLDC GLUCOMTR-MCNC: 191 MG/DL (ref 70–99)
GLUCOSE BLDC GLUCOMTR-MCNC: 194 MG/DL (ref 70–99)
GLUCOSE BLDC GLUCOMTR-MCNC: 214 MG/DL (ref 70–99)
HCO3 BLD-SCNC: 29 MMOL/L (ref 21–28)
HCT VFR BLD AUTO: 34 % (ref 40–53)
HGB BLD-MCNC: 11 G/DL (ref 13.3–17.7)
INTERPRETATION ECG - MUSE: NORMAL
MAGNESIUM SERPL-MCNC: 2.2 MG/DL (ref 1.6–2.3)
MCH RBC QN AUTO: 27.5 PG (ref 26.5–33)
MCHC RBC AUTO-ENTMCNC: 32.4 G/DL (ref 31.5–36.5)
MCV RBC AUTO: 85 FL (ref 78–100)
O2/TOTAL GAS SETTING VFR VENT: 55 %
P AXIS - MUSE: 64 DEGREES
PCO2 BLD: 44 MM HG (ref 35–45)
PH BLD: 7.42 [PH] (ref 7.35–7.45)
PHOSPHATE SERPL-MCNC: 4.4 MG/DL (ref 2.5–4.5)
PLATELET # BLD AUTO: 303 10E3/UL (ref 150–450)
PO2 BLD: 98 MM HG (ref 80–105)
POTASSIUM BLD-SCNC: 4.9 MMOL/L (ref 3.4–5.3)
PR INTERVAL - MUSE: 158 MS
QRS DURATION - MUSE: 72 MS
QT - MUSE: 418 MS
QTC - MUSE: 418 MS
R AXIS - MUSE: -13 DEGREES
RBC # BLD AUTO: 4 10E6/UL (ref 4.4–5.9)
SODIUM SERPL-SCNC: 136 MMOL/L (ref 133–144)
SYSTOLIC BLOOD PRESSURE - MUSE: NORMAL MMHG
T AXIS - MUSE: 8 DEGREES
VENTRICULAR RATE- MUSE: 60 BPM
WBC # BLD AUTO: 9.4 10E3/UL (ref 4–11)

## 2021-11-30 PROCEDURE — 200N000002 HC R&B ICU UMMC

## 2021-11-30 PROCEDURE — 250N000011 HC RX IP 250 OP 636

## 2021-11-30 PROCEDURE — 250N000011 HC RX IP 250 OP 636: Performed by: NURSE PRACTITIONER

## 2021-11-30 PROCEDURE — 250N000011 HC RX IP 250 OP 636: Performed by: STUDENT IN AN ORGANIZED HEALTH CARE EDUCATION/TRAINING PROGRAM

## 2021-11-30 PROCEDURE — 80048 BASIC METABOLIC PNL TOTAL CA: CPT | Performed by: STUDENT IN AN ORGANIZED HEALTH CARE EDUCATION/TRAINING PROGRAM

## 2021-11-30 PROCEDURE — 93010 ELECTROCARDIOGRAM REPORT: CPT | Performed by: INTERNAL MEDICINE

## 2021-11-30 PROCEDURE — 83735 ASSAY OF MAGNESIUM: CPT | Performed by: STUDENT IN AN ORGANIZED HEALTH CARE EDUCATION/TRAINING PROGRAM

## 2021-11-30 PROCEDURE — 94003 VENT MGMT INPAT SUBQ DAY: CPT

## 2021-11-30 PROCEDURE — 84100 ASSAY OF PHOSPHORUS: CPT | Performed by: STUDENT IN AN ORGANIZED HEALTH CARE EDUCATION/TRAINING PROGRAM

## 2021-11-30 PROCEDURE — 71045 X-RAY EXAM CHEST 1 VIEW: CPT

## 2021-11-30 PROCEDURE — 93005 ELECTROCARDIOGRAM TRACING: CPT

## 2021-11-30 PROCEDURE — 82803 BLOOD GASES ANY COMBINATION: CPT | Performed by: STUDENT IN AN ORGANIZED HEALTH CARE EDUCATION/TRAINING PROGRAM

## 2021-11-30 PROCEDURE — G0463 HOSPITAL OUTPT CLINIC VISIT: HCPCS

## 2021-11-30 PROCEDURE — 250N000009 HC RX 250: Performed by: STUDENT IN AN ORGANIZED HEALTH CARE EDUCATION/TRAINING PROGRAM

## 2021-11-30 PROCEDURE — 250N000013 HC RX MED GY IP 250 OP 250 PS 637

## 2021-11-30 PROCEDURE — 258N000003 HC RX IP 258 OP 636: Performed by: STUDENT IN AN ORGANIZED HEALTH CARE EDUCATION/TRAINING PROGRAM

## 2021-11-30 PROCEDURE — 999N000015 HC STATISTIC ARTERIAL MONITORING DAILY

## 2021-11-30 PROCEDURE — 250N000013 HC RX MED GY IP 250 OP 250 PS 637: Performed by: STUDENT IN AN ORGANIZED HEALTH CARE EDUCATION/TRAINING PROGRAM

## 2021-11-30 PROCEDURE — 250N000013 HC RX MED GY IP 250 OP 250 PS 637: Performed by: NURSE PRACTITIONER

## 2021-11-30 PROCEDURE — 999N000157 HC STATISTIC RCP TIME EA 10 MIN

## 2021-11-30 PROCEDURE — 71045 X-RAY EXAM CHEST 1 VIEW: CPT | Mod: 26 | Performed by: RADIOLOGY

## 2021-11-30 PROCEDURE — 250N000011 HC RX IP 250 OP 636: Performed by: INTERNAL MEDICINE

## 2021-11-30 PROCEDURE — 99291 CRITICAL CARE FIRST HOUR: CPT | Mod: GC | Performed by: INTERNAL MEDICINE

## 2021-11-30 PROCEDURE — 85027 COMPLETE CBC AUTOMATED: CPT | Performed by: STUDENT IN AN ORGANIZED HEALTH CARE EDUCATION/TRAINING PROGRAM

## 2021-11-30 RX ORDER — PROPOFOL 10 MG/ML
5-50 INJECTION, EMULSION INTRAVENOUS CONTINUOUS
Status: DISCONTINUED | OUTPATIENT
Start: 2021-11-30 | End: 2021-12-03

## 2021-11-30 RX ORDER — FUROSEMIDE 10 MG/ML
20 INJECTION INTRAMUSCULAR; INTRAVENOUS ONCE
Status: COMPLETED | OUTPATIENT
Start: 2021-11-30 | End: 2021-11-30

## 2021-11-30 RX ORDER — LORAZEPAM 2 MG/1
6 TABLET ORAL EVERY 8 HOURS
Status: DISCONTINUED | OUTPATIENT
Start: 2021-11-30 | End: 2021-12-01

## 2021-11-30 RX ORDER — HYDROMORPHONE HYDROCHLORIDE 4 MG/1
4 TABLET ORAL EVERY 6 HOURS
Status: DISCONTINUED | OUTPATIENT
Start: 2021-11-30 | End: 2021-12-01

## 2021-11-30 RX ADMIN — HYDROMORPHONE HYDROCHLORIDE 4 MG: 4 TABLET ORAL at 20:33

## 2021-11-30 RX ADMIN — LORAZEPAM 6 MG: 2 TABLET ORAL at 15:42

## 2021-11-30 RX ADMIN — INSULIN ASPART 1 UNITS: 100 INJECTION, SOLUTION INTRAVENOUS; SUBCUTANEOUS at 12:43

## 2021-11-30 RX ADMIN — INSULIN ASPART 2 UNITS: 100 INJECTION, SOLUTION INTRAVENOUS; SUBCUTANEOUS at 04:42

## 2021-11-30 RX ADMIN — METHYLPREDNISOLONE SODIUM SUCCINATE 40 MG: 40 INJECTION, POWDER, FOR SOLUTION INTRAMUSCULAR; INTRAVENOUS at 09:55

## 2021-11-30 RX ADMIN — DEXMEDETOMIDINE 0.8 MCG/KG/HR: 100 INJECTION, SOLUTION, CONCENTRATE INTRAVENOUS at 03:41

## 2021-11-30 RX ADMIN — QUETIAPINE FUMARATE 100 MG: 100 TABLET ORAL at 06:38

## 2021-11-30 RX ADMIN — LORAZEPAM 12 MG: 2 TABLET ORAL at 04:37

## 2021-11-30 RX ADMIN — Medication 40 MG: at 08:06

## 2021-11-30 RX ADMIN — Medication 100 MCG: at 02:50

## 2021-11-30 RX ADMIN — HYDROMORPHONE HYDROCHLORIDE 8 MG: 4 TABLET ORAL at 08:03

## 2021-11-30 RX ADMIN — ENOXAPARIN SODIUM 70 MG: 80 INJECTION SUBCUTANEOUS at 20:33

## 2021-11-30 RX ADMIN — LORAZEPAM 12 MG: 2 TABLET ORAL at 10:00

## 2021-11-30 RX ADMIN — HYDROMORPHONE HYDROCHLORIDE 8 MG: 4 TABLET ORAL at 04:37

## 2021-11-30 RX ADMIN — PROPOFOL 20 MCG/KG/MIN: 10 INJECTION, EMULSION INTRAVENOUS at 18:19

## 2021-11-30 RX ADMIN — QUETIAPINE FUMARATE 100 MG: 100 TABLET ORAL at 00:48

## 2021-11-30 RX ADMIN — Medication 1 TABLET: at 08:03

## 2021-11-30 RX ADMIN — Medication 200 MCG/HR: at 12:17

## 2021-11-30 RX ADMIN — INSULIN ASPART 1 UNITS: 100 INJECTION, SOLUTION INTRAVENOUS; SUBCUTANEOUS at 00:57

## 2021-11-30 RX ADMIN — QUETIAPINE FUMARATE 100 MG: 100 TABLET ORAL at 18:02

## 2021-11-30 RX ADMIN — PROPOFOL 20 MCG/KG/MIN: 10 INJECTION, EMULSION INTRAVENOUS at 00:39

## 2021-11-30 RX ADMIN — METHYLPREDNISOLONE SODIUM SUCCINATE 40 MG: 40 INJECTION, POWDER, FOR SOLUTION INTRAMUSCULAR; INTRAVENOUS at 22:44

## 2021-11-30 RX ADMIN — ARIPIPRAZOLE 15 MG: 15 TABLET ORAL at 08:07

## 2021-11-30 RX ADMIN — PROPOFOL 20 MCG/KG/MIN: 10 INJECTION, EMULSION INTRAVENOUS at 05:20

## 2021-11-30 RX ADMIN — PROPOFOL 10 MCG/KG/MIN: 10 INJECTION, EMULSION INTRAVENOUS at 09:56

## 2021-11-30 RX ADMIN — FUROSEMIDE 20 MG: 10 INJECTION, SOLUTION INTRAVENOUS at 10:51

## 2021-11-30 RX ADMIN — HYDROMORPHONE HYDROCHLORIDE 8 MG: 4 TABLET ORAL at 00:49

## 2021-11-30 RX ADMIN — LORAZEPAM 12 MG: 2 TABLET ORAL at 00:48

## 2021-11-30 RX ADMIN — INSULIN ASPART 1 UNITS: 100 INJECTION, SOLUTION INTRAVENOUS; SUBCUTANEOUS at 08:04

## 2021-11-30 RX ADMIN — Medication 2 PACKET: at 08:07

## 2021-11-30 RX ADMIN — HYDROMORPHONE HYDROCHLORIDE 4 MG: 4 TABLET ORAL at 14:27

## 2021-11-30 RX ADMIN — QUETIAPINE FUMARATE 100 MG: 100 TABLET ORAL at 11:48

## 2021-11-30 RX ADMIN — INSULIN ASPART 1 UNITS: 100 INJECTION, SOLUTION INTRAVENOUS; SUBCUTANEOUS at 20:44

## 2021-11-30 RX ADMIN — DEXMEDETOMIDINE 0.8 MCG/KG/HR: 100 INJECTION, SOLUTION, CONCENTRATE INTRAVENOUS at 17:26

## 2021-11-30 RX ADMIN — DEXMEDETOMIDINE 1.2 MCG/KG/HR: 100 INJECTION, SOLUTION, CONCENTRATE INTRAVENOUS at 10:36

## 2021-11-30 RX ADMIN — ENOXAPARIN SODIUM 70 MG: 80 INJECTION SUBCUTANEOUS at 08:06

## 2021-11-30 RX ADMIN — PROPOFOL 20 MCG/KG/MIN: 10 INJECTION, EMULSION INTRAVENOUS at 22:58

## 2021-11-30 RX ADMIN — INSULIN ASPART 2 UNITS: 100 INJECTION, SOLUTION INTRAVENOUS; SUBCUTANEOUS at 15:42

## 2021-11-30 RX ADMIN — Medication 2 PACKET: at 20:34

## 2021-11-30 ASSESSMENT — ACTIVITIES OF DAILY LIVING (ADL)
ADLS_ACUITY_SCORE: 19
ADLS_ACUITY_SCORE: 21
ADLS_ACUITY_SCORE: 19
ADLS_ACUITY_SCORE: 21
ADLS_ACUITY_SCORE: 19

## 2021-11-30 ASSESSMENT — MIFFLIN-ST. JEOR: SCORE: 2021.63

## 2021-11-30 NOTE — PLAN OF CARE
Major Shift Events:  Patient unresponsive until 0400 when he started to make a face to painful stimuli. No eye opening. Not following commands. All extremities no response to stimuli. Remains on propofol, precedex, and fentanyl. Weaned versed off. SB all shift 50s, rare PVCs. BP stable. Afebrile. CMV settings, increased FiO2 to 55% for desatting into the upper 80s. Thick, creamy secretions. Adequate UOP, no output from rectal tube.   Plan: Continue to monitor.  For vital signs and complete assessments, please see documentation flowsheets.

## 2021-11-30 NOTE — PROGRESS NOTES
CLINICAL NUTRITION SERVICES - REASSESSMENT NOTE   Nutrition Prescription    RECOMMENDATIONS FOR MDs/PROVIDERS TO ORDER:  If TG remains elevated, consider tapering off Propofol to a different sedation medication. Enteral nutrition will not appreciably contribute to TG level and current elevated TG most certainly related to propofol infusions. If hypertriglyceridemia persists, could consider fish oil supplementation (dosing per PharmD).    Malnutrition Status:    Moderate malnutrition in the context of acute illness    Recommendations already ordered by Registered Dietitian (RD):  Continue EN support as ordered.       Future/Additional Recommendations:  -Monitor propofol use and need to adjust EN support  -Monitor wt trends     EVALUATION OF THE PROGRESS TOWARD GOALS   Diet: NPO    Enteral Access: NGT    FWF: 30 mL q4h    Nutrition Support: EN  11/3-11/16: Novasource Renal @ 30 ml/hr (720 ml) + 10 Prosource provides 1840 kcal (23 kcal/kg IBW), 175 g pro (2.2 g/kg IBW), 132 g CHO, 516 ml free water, and 0 g fiber daily, 1540 mg Phos daily.     11/16-11/29: Osmolite 1.5 Tripp @ goal of 45ml/hr (1080ml/day) +10 Prosource will provide: 2020 kcals (25 kcal/kg IBW), 177 g PRO (2.2 g/kg IBW), 822 ml free H20, 219 g CHO, and 0 g fiber daily, 53 g Fat.        --Not increased to 45 ml/hr until 11/18 11/29 - Current: Vital High Protein @ goal of 65ml/hr (1560ml/day) +4 Prosource will provide: 1720 kcals (21 kcal/kg IBW), 180 g PRO (2.2 g/kg IBW), 1304 ml free H20, 173 g CHO, and 0 g fiber daily, 36 g Fat. Additional kcal from Propofol.        --50% of Fat from MCT = ~18 g Fat/day (~9% kcal from fat).     Enteral Intake: Tolerating new TF at goal rate. Minimal interruptions to TF noted per I/Os.   -->7-day average enteral nutrition infusions: 1044 mL TF + 9.7 ProSource protein packets = 1954 kcals (24 kcal/kg, >100% minimum assessed kcal needs) and 173 g protein (2.1 g protein/kg, or >100% minimum assessed protein needs).  "Propofol just resumed yesterday.     NEW FINDINGS     -Wt trends: Will continue IBW (81 kg) for dosing wt given large wt fluctuations and question accuracy.   11/27/21 0400 139.8 kg (308 lb 3.3 oz)   11/25/21 0400 139.4 kg (307 lb 6.4 oz)   11/24/21 0800 138.3 kg (305 lb)   11/23/21 0000 128.8 kg (284 lb) -- lowest wt this admit, outlier   11/22/21 0600 151 kg (332 lb 14.3 oz) Abnormal    11/20/21 0400 150 kg (330 lb 11 oz)   11/19/21 0600 152.8 kg (336 lb 13.8 oz) Abnormal    11/17/21 1200 148.2 kg (326 lb 11.6 oz)   11/16/21 0600 150 kg (330 lb 11 oz)   11/15/21 0000 151.5 kg (334 lb) Abnormal    11/14/21 0800 149.2 kg (329 lb)   11/11/21 0800 150 kg (330 lb 11 oz)   11/08/21 1300 157.8 kg (347 lb 14.2 oz) Abnormal    11/08/21 0600 157.5 kg (347 lb 3.6 oz) Abnormal    11/07/21 0000 160.3 kg (353 lb 6.4 oz) Abnormal    11/05/21 0341 162.9 kg (359 lb 2.1 oz) Abnormal    11/03/21 0230 163.4 kg (360 lb 3.7 oz) Abnormal      -Labs: Reviewed, notable for: K+ 4.9 (WNL, overall uptrended), Phos 4.4 (WNL),  (H, on propofol)    -GI: Rectal tube in place with 100-1125 mL stool output daily over past week per I/Os.    -Respiratory: Remains intubated, COVID recovered as of 11/29    -Skin: Skin beneath nasal bridle was inspected; appears appropriate, with no skin breakdown or tension on the bridle string.   Per most recent WOC note on 11/30:  \"Pressure Injury: on Tongue  , hospital acquired ,   This is a Medical Device Related Pressure Injury (MDRPI) due to ETT  Pressure Injury is Stage healed Mucosal   Contributing factor of the pressure injury: pressure and immobility  Status: healed and follow up   Recommend provider order: None, at this time      Pressure Injury: on septum , hospital acquired ,   This is a Medical Device Related Pressure Injury (MDRPI) due to ETAD head gear was pushed against the area during proning  Pressure Injury is Stage 2 -healing  Contributing factor of the pressure injury: pressure and " "immobility  Status: follow up assessment, healing, currently supine  Recommend provider order: None, at this time\"    -Meds & Vitamin/Mineral Supplementation: Reviewed, notable for: Propofol gtt @ 19.3 mL/hr (provides ~510 kcal/d), medium dose sliding scale insulin, Lantus 10 units at HS, Thera-vit-M    MALNUTRITION  % Intake: Decreased intake does not meet criteria  % Weight Loss: Difficult to assess with wt fluctuations, fluid status  Subcutaneous Fat Loss: None observed  Muscle Loss: Upper arm (bicep, tricep):  Mild and Posterior calf:  Mild - difficult to fully assess with body habitus  Fluid Accumulation/Edema: Moderate (3+ anasarca per RN flowsheets)  Malnutrition Diagnosis: Moderate malnutrition in the context of acute illness    Previous Goals   Total avg nutritional intake to meet a minimum of 22 kcal/kg and 2 g PRO/kg daily (per dosing wt 81 kg IBW).  Evaluation: Met    Previous Nutrition Diagnosis  Inadequate energy intake related to disruptions to EN infusions as evidenced by pt not meeting goal energy provisions with 7-day EN averages meeting 21 kcals/kg per dosing weight.     Evaluation: No longer applicable, nutrition diagnosis changed below    CURRENT NUTRITION DIAGNOSIS  Inadequate oral intake related to NPO status in setting of intubation as evidenced by continued need for EN support to meet full nutrition needs.       INTERVENTIONS  Implementation  Enteral Nutrition - Continue as ordered    Goals  Total avg nutritional intake to meet a minimum of 22 kcal/kg and 2 g PRO/kg daily (per dosing wt 81 kg IBW).    Monitoring/Evaluation  Progress toward goals will be monitored and evaluated per protocol.     Lisa Dennison, UMANG, LD  Pager: [phone]    "

## 2021-11-30 NOTE — PROGRESS NOTES
MEDICAL ICU PROGRESS NOTE  11/30/2021      Date of Service (when I saw the patient): 11/30/2021    ASSESSMENT: Brandon Schafer is a 42 YO M with PMH of remote VTE who was admitted on 11/3/2021 with ARDS 2/2 COVID pneumonia. Course c/b CAUTI and polymicrobial VAP (11/11).     PLAN:  - Care conf Fri  - Repeat CT chest Thurs  - Increased Glargine to 20 U  - Changed to -2 -> -3 RASS goal  - Reduced Ativan dose/frequency  - Reduced Dilaudid dose/frequency  - 20 Lasix IV     Neuro:  # Pain and sedation  - Propofol gtt, fentanyl gtt, and dexmedetomidine gtt infusions  - Fentanyl boluses available prn  - Dilaudid 4 mg Q8H PO  - Ativan 6 mg Q8H PO  - Quetiapine to 100 mg Q6H  - Ability 15 mg PO daily  - Haloperidol 5 mg Q6H prn  - RASS goal -2 to -3  - Consider NMB if worsening vent mechanics     Pulmonary:  # ARDS 2/2 COVID pneumonia  # Possible organizing pneumonia  Intubated 11/2 prior to transport flight. CT on 11/25 with mild fibrotic changes, dense consolidations consistent with ARDS. Possible organizing pneumonia, started on methylprednisolone 60 mg x3 days.  - Methylprednisolone 40 mg BID  - Maintain supine --- no benefit from prone  - Lung protective vent management  - Infectious treatment below  - Diuresis with 20 mg furosemide x1     # COPD exacerbation  Wheezing on exam on 11/4. S/p azithromycin for 5d. Steroids per Covid protocol.   - Continue albuterol nebs prn     Cardiovascular:  # Hypotension, resolved  Transient, and in the setting of sedation. No prior TTE. Troponin peaked at 0.293. EKG with T wave inversion in lateral leads. Suspect troponin was 2/2 demand ischemia in setting of hypoxia.     # Atrial fibrillation  Episode of atrial fibrillation overnight 11/12, hemodynamically stable and resolved spontaneously. TWI on EKG, troponin negative     GI/Nutrition:  # At risk for malnutrition  # Constipation, improving  - Nutrition consult for TF  - Tolerating TF at goal   - Bowel regimen: senna and Miralax  prn     Renal/Fluids/Electrolytes:  # Non-oliguric STEFAN, resolved  # Hypokalemia, in setting of ongoing diuresis   Unclear baseline, has not seen doctor in 20 years. Cr 2.5 on admission, now improved.      # Hypernatremia, resolved  - FWF 25ml q 2H     Endocrine:  # Stress and steroid induced hyperglycemia  - Medium resistance sliding scale insulin   - 20 U Glargine     ID:  # COVID pneumonia  Symptoms (headache, SOB, fever, cough, diarrhea) 1 week prior to presentation. Not COVID vaccinated. Tested positive on presentation on 11/2. S/p tocilizumab 11/4.  - Dexamethasone 6 mg daily (total 10 days) - completed     # Fever 11/17  # CAUTI  # VAP  Pt with worsening oxygen saturations, fevers, and persistent leukocytosis. Given his prolonged hospitalization, was started on broad-spectrum antibiotics on 11/11. Urine culture grew >100k pan-sensitive E coli. RVP and MRSA nares negative. Sputum grew E coli, S aureus, and GBS. Sputum culture 11/17 with 1+ E. coli and MSSA, blood cultures negative. Continued to have fevers as of 11/18, redraw blood cultures and test stool for Cdiff.   - Continue to observe off antibiotics  Abx:  - Ceftriaxone 11/14-11/17; 11/19 - 11/25 (completed)  - Zosyn 11/11-11/14; 11/17 - 11/18  - Vanc 11/11-11/12      Hematology:    # Coagulopathy 2/2 COVID  US BLE negative for DVT.  - Enoxaparin ppx 70mg BID     MSK  # Septum wound, tongue wound 2/2 Proning   - WOC following     General Cares/Prophylaxis:    DVT Prophylaxis: Lovenox ppx 70 mg BID  GI Prophylaxis: PPI  Family Communication: BrotherBrad, to be updated today  Code Status: DNR      Lines/tubes/drains:  - PIV x2  - Arterial line  - NG  - ETT  - Rectal tube  - Sandra (11/24 - )     Disposition:  - Medical ICU    Patient seen and findings/plan discussed with medical ICU staff, Dr. Persaud.    Thong Cox Jr., PGY-3  MICU 1, [id number]    ====================================  INTERVAL HISTORY:   - Difficult time sedating yesterday evening,  able to wean off of Versed    OBJECTIVE:   1. VITAL SIGNS:   Temp:  [98  F (36.7  C)-99  F (37.2  C)] 98.4  F (36.9  C)  Pulse:  [] 60  Resp:  [24-30] 28  BP: (139-197)/() 150/79  MAP:  [81 mmHg-125 mmHg] 87 mmHg  Arterial Line BP: (124-180)/(60-94) 135/65  FiO2 (%):  [40 %-60 %] 55 %  SpO2:  [88 %-99 %] 97 %  Ventilation Mode: CMV/AC  (Continuous Mandatory Ventilation/ Assist Control)  FiO2 (%): 55 %  Rate Set (breaths/minute): 24 breaths/min  Tidal Volume Set (mL): 480 mL  PEEP (cm H2O): 8 cmH2O  Pressure Support (cm H2O): 10 cmH2O  Oxygen Concentration (%): (S) 50 %  Resp: 28    2. INTAKE/ OUTPUT:   I/O last 3 completed shifts:  In: 3510.23 [I.V.:1405.23; NG/GT:1065]  Out: 5030 [Urine:4930; Stool:100]    3. PHYSICAL EXAMINATION:  General: Sedate  HEENT: Mucous membranes moist  Neuro: Not following commands for me  Pulm/Resp: Clear breath sounds bilaterally without rhonchi, crackles or wheeze, breathing non-labored  CV: RRR, S1/S2 without m/r/g; pedal pulses 2+ without pitting LE edema  Abdomen: Soft, non-distended, non-tender  : Flores catheter in place, urine yellow and clear  Incisions/Skin: No rashes noted    4. LABS:   Arterial Blood Gases   Recent Labs   Lab 11/30/21  0506 11/29/21  0432 11/28/21  0343 11/27/21  0420   PH 7.42 7.46* 7.43 7.41   PCO2 44 41 43 46*   PO2 98 70* 86 100   HCO3 29* 29* 29* 29*     Complete Blood Count   Recent Labs   Lab 11/30/21  0447 11/29/21  0432 11/28/21  0343 11/27/21  0420   WBC 9.4 10.3 8.7 9.1   HGB 11.0* 10.2* 9.5* 9.2*    279 251 213     Basic Metabolic Panel  Recent Labs   Lab 11/30/21  0447 11/30/21  0441 11/30/21  0056 11/29/21 2013 11/29/21  1542 11/29/21  1427 11/29/21  0740 11/29/21  0432 11/28/21  0348 11/28/21  0343 11/27/21  1627 11/27/21  1620     --   --   --   --  135  --  135  --  134  --  139   POTASSIUM 4.9  --   --   --   --   --   --  4.4  --  4.7  --  3.8   CHLORIDE 103  --   --   --   --   --   --  103  --  103  --  102    CO2 26  --   --   --   --   --   --  25  --  27  --  29   BUN 30  --   --   --   --   --   --  31*  --  30  --  28   CR 0.76  --   --   --   --   --   --  0.68  --  0.67  --  0.70   * 191* 187* 178*   < >  --    < > 222*   < > 256*   < > 190*    < > = values in this interval not displayed.     5. RADIOLOGY:   No results found for this or any previous visit (from the past 24 hour(s)).

## 2021-11-30 NOTE — CONSULTS
WO Nurse Inpatient Pressure Injury Assessment   Reason for consultation: Evaluate and treat Bl cheeks  New consult 11/11- tongue    ASSESSMENT  Pressure Injury: on Tongue  , hospital acquired ,   This is a Medical Device Related Pressure Injury (MDRPI) due to ETT  Pressure Injury is Stage healed Mucosal   Contributing factor of the pressure injury: pressure and immobility  Status: healed and follow up   Recommend provider order: None, at this time     Pressure Injury: on septum , hospital acquired ,   This is a Medical Device Related Pressure Injury (MDRPI) due to ETAD head gear was pushed against the area during proning  Pressure Injury is Stage 2 -healing  Contributing factor of the pressure injury: pressure and immobility  Status: follow up assessment, healing, currently supine  Recommend provider order: None, at this time      TREATMENT PLAN  Septum wound: Daily  to apply optifoam if proned- Dressing change every other day  Cleanse the wound with NS and pat dry.  Cut a piece of Comfeel 4x4 (#[id number]) and cover the area.  Massage the dressing in place for better adherence  Then place a layer of Mepilex foam dressing on top of comfeel for cushion  Ensure to place a piece of optifoam along the septal area and mold the Z-flow to avoid direct pressure while proning.      Tongue wounds: Every shift and PRN   Perform oral care per unit routine. Keep mouth moisturized. Reposition ETT tube Q 3-4 hrs, ensure to disengage the tongue from the tube to relive the pressure.    BL cheeks- Continue with mepilex foam dressing for prevention.    Orders Written  WOC Nurse follow-up plan:weekly  Nursing to notify the Provider(s) and re-consult the WOC Nurse if wound(s) deteriorates or new skin concern.    Patient History  According to provider note(s): Brandon Schafer is a 41 year old male with questionable PMH of remote VTE who was admitted on 11/3/2021 with ARDS 2/2 COVID pneumonia.    Objective Data  Containment of urine/stool:  Indwelling catheter    Current Diet/ Nutrition:  Orders Placed This Encounter      NPO for Medical/Clinical Reasons Except for: Meds      Output:   I/O last 3 completed shifts:  In: 3546.53 [I.V.:1486.53; NG/GT:800]  Out: 4970 [Urine:4870; Stool:100]    Risk Assessment:   Sensory Perception: 1-->completely limited  Moisture: 3-->occasionally moist  Activity: 2-->chairfast  Mobility: 1-->completely immobile  Nutrition: 3-->adequate  Friction and Shear: 1-->problem  Adin Score: 11      Labs:   Recent Labs   Lab 11/30/21  0447   HGB 11.0*   WBC 9.4       Physical Exam  Skin inspection: focused BL cheeks, mouth and nose  Patient is high risk for pressure injury development secondary to  proning    Wound Location:  Septum                    11/26                                                           11/29            11/9                                                                              11/11                                                            11/15                                                                11/22      Date of last Photo 11/11  Wound History: Pt has been proning intermittently. Wound evolving on 11/18 assessment- he was proned on 11/16 and supined since 11/17  Measurements (length x width x depth, in cm) 0.7 cm x 0.6 cm  x  0.1 cm   Wound Base:  100 % dermis  Tunneling N/A  Undermining N/A  Palpation of the wound bed: normal   Periwound skin: intact  Color: normal and consistent with surrounding tissue  Temperature: normal   Drainage:, none  Description of drainage: none  Odor: none  Pain: unable to assess due to  sedation ,       Interventions  Current support surface: Standard  Low air loss mattress  Current off-loading measures: Pillows  Repositioning aid: Pillows  Visual inspection of wound(s) completed   Tube Securement: ETT head gear  Wound Care: was done per plan of care.  Supplies: floor stock and discussed with RN  Educated provided: plan of care and Off-loading  pressure  Education provided to: nurse  Discussed importance of:repositioning every 2 hours, off-loading pressure to wound, off-loading mattress and moisture management  Discussed plan of care with Nurse    Miranda Aranda RN

## 2021-12-01 ENCOUNTER — APPOINTMENT (OUTPATIENT)
Dept: CT IMAGING | Facility: CLINIC | Age: 41
End: 2021-12-01
Attending: STUDENT IN AN ORGANIZED HEALTH CARE EDUCATION/TRAINING PROGRAM
Payer: MEDICAID

## 2021-12-01 LAB
ANION GAP SERPL CALCULATED.3IONS-SCNC: 6 MMOL/L (ref 3–14)
BASE EXCESS BLDA CALC-SCNC: 4.9 MMOL/L (ref -9–1.8)
BUN SERPL-MCNC: 34 MG/DL (ref 7–30)
CALCIUM SERPL-MCNC: 9 MG/DL (ref 8.5–10.1)
CHLORIDE BLD-SCNC: 100 MMOL/L (ref 94–109)
CO2 SERPL-SCNC: 28 MMOL/L (ref 20–32)
CREAT SERPL-MCNC: 0.74 MG/DL (ref 0.66–1.25)
ERYTHROCYTE [DISTWIDTH] IN BLOOD BY AUTOMATED COUNT: 15.2 % (ref 10–15)
GFR SERPL CREATININE-BSD FRML MDRD: >90 ML/MIN/1.73M2
GLUCOSE BLD-MCNC: 208 MG/DL (ref 70–99)
GLUCOSE BLDC GLUCOMTR-MCNC: 133 MG/DL (ref 70–99)
GLUCOSE BLDC GLUCOMTR-MCNC: 144 MG/DL (ref 70–99)
GLUCOSE BLDC GLUCOMTR-MCNC: 171 MG/DL (ref 70–99)
GLUCOSE BLDC GLUCOMTR-MCNC: 173 MG/DL (ref 70–99)
GLUCOSE BLDC GLUCOMTR-MCNC: 184 MG/DL (ref 70–99)
GLUCOSE BLDC GLUCOMTR-MCNC: 196 MG/DL (ref 70–99)
HCO3 BLD-SCNC: 30 MMOL/L (ref 21–28)
HCT VFR BLD AUTO: 34.8 % (ref 40–53)
HGB BLD-MCNC: 11.1 G/DL (ref 13.3–17.7)
MAGNESIUM SERPL-MCNC: 2 MG/DL (ref 1.6–2.3)
MCH RBC QN AUTO: 27.4 PG (ref 26.5–33)
MCHC RBC AUTO-ENTMCNC: 31.9 G/DL (ref 31.5–36.5)
MCV RBC AUTO: 86 FL (ref 78–100)
O2/TOTAL GAS SETTING VFR VENT: 40 %
PCO2 BLD: 47 MM HG (ref 35–45)
PH BLD: 7.42 [PH] (ref 7.35–7.45)
PLATELET # BLD AUTO: 306 10E3/UL (ref 150–450)
PO2 BLD: 83 MM HG (ref 80–105)
POTASSIUM BLD-SCNC: 4.5 MMOL/L (ref 3.4–5.3)
RBC # BLD AUTO: 4.05 10E6/UL (ref 4.4–5.9)
SODIUM SERPL-SCNC: 134 MMOL/L (ref 133–144)
TRIGL SERPL-MCNC: 314 MG/DL
WBC # BLD AUTO: 16.4 10E3/UL (ref 4–11)

## 2021-12-01 PROCEDURE — 250N000009 HC RX 250: Performed by: STUDENT IN AN ORGANIZED HEALTH CARE EDUCATION/TRAINING PROGRAM

## 2021-12-01 PROCEDURE — 250N000013 HC RX MED GY IP 250 OP 250 PS 637: Performed by: NURSE PRACTITIONER

## 2021-12-01 PROCEDURE — 258N000003 HC RX IP 258 OP 636: Performed by: STUDENT IN AN ORGANIZED HEALTH CARE EDUCATION/TRAINING PROGRAM

## 2021-12-01 PROCEDURE — 999N000157 HC STATISTIC RCP TIME EA 10 MIN

## 2021-12-01 PROCEDURE — 250N000011 HC RX IP 250 OP 636: Performed by: INTERNAL MEDICINE

## 2021-12-01 PROCEDURE — 250N000013 HC RX MED GY IP 250 OP 250 PS 637

## 2021-12-01 PROCEDURE — 71250 CT THORAX DX C-: CPT | Mod: 26 | Performed by: RADIOLOGY

## 2021-12-01 PROCEDURE — 200N000002 HC R&B ICU UMMC

## 2021-12-01 PROCEDURE — 250N000011 HC RX IP 250 OP 636: Performed by: STUDENT IN AN ORGANIZED HEALTH CARE EDUCATION/TRAINING PROGRAM

## 2021-12-01 PROCEDURE — 250N000013 HC RX MED GY IP 250 OP 250 PS 637: Performed by: STUDENT IN AN ORGANIZED HEALTH CARE EDUCATION/TRAINING PROGRAM

## 2021-12-01 PROCEDURE — 999N000185 HC STATISTIC TRANSPORT TIME EA 15 MIN

## 2021-12-01 PROCEDURE — 85027 COMPLETE CBC AUTOMATED: CPT | Performed by: STUDENT IN AN ORGANIZED HEALTH CARE EDUCATION/TRAINING PROGRAM

## 2021-12-01 PROCEDURE — 84478 ASSAY OF TRIGLYCERIDES: CPT | Performed by: NURSE PRACTITIONER

## 2021-12-01 PROCEDURE — 80048 BASIC METABOLIC PNL TOTAL CA: CPT | Performed by: STUDENT IN AN ORGANIZED HEALTH CARE EDUCATION/TRAINING PROGRAM

## 2021-12-01 PROCEDURE — 250N000011 HC RX IP 250 OP 636

## 2021-12-01 PROCEDURE — 999N000015 HC STATISTIC ARTERIAL MONITORING DAILY

## 2021-12-01 PROCEDURE — 83735 ASSAY OF MAGNESIUM: CPT | Performed by: STUDENT IN AN ORGANIZED HEALTH CARE EDUCATION/TRAINING PROGRAM

## 2021-12-01 PROCEDURE — 82803 BLOOD GASES ANY COMBINATION: CPT | Performed by: STUDENT IN AN ORGANIZED HEALTH CARE EDUCATION/TRAINING PROGRAM

## 2021-12-01 PROCEDURE — 99291 CRITICAL CARE FIRST HOUR: CPT | Mod: GC | Performed by: INTERNAL MEDICINE

## 2021-12-01 PROCEDURE — 71250 CT THORAX DX C-: CPT

## 2021-12-01 PROCEDURE — 94003 VENT MGMT INPAT SUBQ DAY: CPT

## 2021-12-01 PROCEDURE — 250N000011 HC RX IP 250 OP 636: Performed by: NURSE PRACTITIONER

## 2021-12-01 RX ORDER — FUROSEMIDE 10 MG/ML
20 INJECTION INTRAMUSCULAR; INTRAVENOUS ONCE
Status: COMPLETED | OUTPATIENT
Start: 2021-12-01 | End: 2021-12-01

## 2021-12-01 RX ORDER — METHYLPREDNISOLONE SODIUM SUCCINATE 40 MG/ML
40 INJECTION, POWDER, LYOPHILIZED, FOR SOLUTION INTRAMUSCULAR; INTRAVENOUS EVERY 24 HOURS
Status: COMPLETED | OUTPATIENT
Start: 2021-12-02 | End: 2021-12-06

## 2021-12-01 RX ADMIN — PROPOFOL 20 MCG/KG/MIN: 10 INJECTION, EMULSION INTRAVENOUS at 21:47

## 2021-12-01 RX ADMIN — INSULIN ASPART 2 UNITS: 100 INJECTION, SOLUTION INTRAVENOUS; SUBCUTANEOUS at 03:56

## 2021-12-01 RX ADMIN — Medication 40 MG: at 08:11

## 2021-12-01 RX ADMIN — LORAZEPAM 6 MG: 2 TABLET ORAL at 08:10

## 2021-12-01 RX ADMIN — Medication 2 PACKET: at 08:12

## 2021-12-01 RX ADMIN — PROPOFOL 20 MCG/KG/MIN: 10 INJECTION, EMULSION INTRAVENOUS at 11:53

## 2021-12-01 RX ADMIN — INSULIN ASPART 1 UNITS: 100 INJECTION, SOLUTION INTRAVENOUS; SUBCUTANEOUS at 11:46

## 2021-12-01 RX ADMIN — QUETIAPINE FUMARATE 100 MG: 100 TABLET ORAL at 23:43

## 2021-12-01 RX ADMIN — QUETIAPINE FUMARATE 100 MG: 100 TABLET ORAL at 18:05

## 2021-12-01 RX ADMIN — ENOXAPARIN SODIUM 70 MG: 80 INJECTION SUBCUTANEOUS at 20:10

## 2021-12-01 RX ADMIN — HYDROMORPHONE HYDROCHLORIDE 4 MG: 4 TABLET ORAL at 02:03

## 2021-12-01 RX ADMIN — Medication 100 MCG/HR: at 22:02

## 2021-12-01 RX ADMIN — Medication 1 TABLET: at 08:10

## 2021-12-01 RX ADMIN — QUETIAPINE FUMARATE 100 MG: 100 TABLET ORAL at 00:11

## 2021-12-01 RX ADMIN — PROPOFOL 20 MCG/KG/MIN: 10 INJECTION, EMULSION INTRAVENOUS at 02:45

## 2021-12-01 RX ADMIN — QUETIAPINE FUMARATE 100 MG: 100 TABLET ORAL at 06:19

## 2021-12-01 RX ADMIN — INSULIN ASPART 1 UNITS: 100 INJECTION, SOLUTION INTRAVENOUS; SUBCUTANEOUS at 00:20

## 2021-12-01 RX ADMIN — Medication 150 MCG: at 00:00

## 2021-12-01 RX ADMIN — Medication 2 PACKET: at 20:10

## 2021-12-01 RX ADMIN — ENOXAPARIN SODIUM 70 MG: 80 INJECTION SUBCUTANEOUS at 08:11

## 2021-12-01 RX ADMIN — PROPOFOL 20 MCG/KG/MIN: 10 INJECTION, EMULSION INTRAVENOUS at 07:02

## 2021-12-01 RX ADMIN — Medication 75 MCG: at 03:59

## 2021-12-01 RX ADMIN — HYDROMORPHONE HYDROCHLORIDE 4 MG: 4 TABLET ORAL at 08:10

## 2021-12-01 RX ADMIN — DEXMEDETOMIDINE 0.5 MCG/KG/HR: 100 INJECTION, SOLUTION, CONCENTRATE INTRAVENOUS at 15:55

## 2021-12-01 RX ADMIN — PROPOFOL 20 MCG/KG/MIN: 10 INJECTION, EMULSION INTRAVENOUS at 15:54

## 2021-12-01 RX ADMIN — ARIPIPRAZOLE 15 MG: 15 TABLET ORAL at 08:12

## 2021-12-01 RX ADMIN — LORAZEPAM 6 MG: 2 TABLET ORAL at 00:11

## 2021-12-01 RX ADMIN — FUROSEMIDE 20 MG: 10 INJECTION, SOLUTION INTRAMUSCULAR; INTRAVENOUS at 11:45

## 2021-12-01 RX ADMIN — DEXMEDETOMIDINE 0.8 MCG/KG/HR: 100 INJECTION, SOLUTION, CONCENTRATE INTRAVENOUS at 03:20

## 2021-12-01 RX ADMIN — QUETIAPINE FUMARATE 100 MG: 100 TABLET ORAL at 11:22

## 2021-12-01 RX ADMIN — INSULIN ASPART 1 UNITS: 100 INJECTION, SOLUTION INTRAVENOUS; SUBCUTANEOUS at 20:11

## 2021-12-01 RX ADMIN — Medication 200 MCG/HR: at 04:19

## 2021-12-01 RX ADMIN — INSULIN ASPART 1 UNITS: 100 INJECTION, SOLUTION INTRAVENOUS; SUBCUTANEOUS at 23:50

## 2021-12-01 RX ADMIN — HALOPERIDOL LACTATE 5 MG: 5 INJECTION, SOLUTION INTRAMUSCULAR at 22:53

## 2021-12-01 RX ADMIN — INSULIN ASPART 1 UNITS: 100 INJECTION, SOLUTION INTRAVENOUS; SUBCUTANEOUS at 08:17

## 2021-12-01 ASSESSMENT — ACTIVITIES OF DAILY LIVING (ADL)
ADLS_ACUITY_SCORE: 19
ADLS_ACUITY_SCORE: 21
ADLS_ACUITY_SCORE: 19
ADLS_ACUITY_SCORE: 21
ADLS_ACUITY_SCORE: 19
ADLS_ACUITY_SCORE: 21
ADLS_ACUITY_SCORE: 19
ADLS_ACUITY_SCORE: 21

## 2021-12-01 ASSESSMENT — MIFFLIN-ST. JEOR: SCORE: 2044.31

## 2021-12-01 NOTE — PROGRESS NOTES
Care Management Follow Up    Length of Stay (days): 28    Expected Discharge Date:       Concerns to be Addressed: care coordination/care conferences,discharge planning     Patient plan of care discussed at interdisciplinary rounds: Yes    Anticipated Discharge Disposition: Home Care,Transitional Care     Anticipated Discharge Services: None  Anticipated Discharge DME: None    Patient/family educated on Medicare website which has current facility and service quality ratings: yes  Education Provided on the Discharge Plan:    Patient/Family in Agreement with the Plan: yes    Referrals Placed by CM/SW:    Private pay costs discussed: Not applicable    Additional Information:  Talked to Brad, patients brother to set up care conference. He agreed to a care conference for Friday 12/3 at 1pm, and has all the call in information.     Kelsey Gonzalez RN Care Coordinator   MICU/SICU  [phone]           Kelsey Gonzalez, RN

## 2021-12-01 NOTE — PLAN OF CARE
Major Shift Events:  Pt VSS, and afebrile. Pt is intubated and sedated. Pt switched to cpap mode at 0900 this morning and has been tolerating it well. Pt is on 40% FiO2. Pt is on fent, prop and dex for sedation. Pt maintaining RASS -2 - -3. Pt went for CT of the chest. Pt given lasix and diuresed well.     Plan: continue to wean sedation as tolerated.     For vital signs and complete assessments, please see documentation flowsheets.

## 2021-12-01 NOTE — PROVIDER NOTIFICATION
Notified MICU team that patient's WBC is 16.4 up from 9.4 yesterday. Patient has been afebrile this shift.

## 2021-12-01 NOTE — PLAN OF CARE
Major Shift Events:  Maintained RASS goal -2 to -3. Weaned precedex, increased fentanyl, propofol remains the same. Opens eyes to speech. Moves extremities spontaneously at times, otherwise no response to stimuli. Pupils equal and reactive. NSR 60s-90s. BP stable. Afebrile, WBC count up. CMV settings, thick, creamy secretions. Lungs coarse. Adequate UOP. No stool overnight. Fiance visited.   Plan: Continue to monitor and wean sedation as able.   For vital signs and complete assessments, please see documentation flowsheets.

## 2021-12-01 NOTE — PROGRESS NOTES
MEDICAL ICU PROGRESS NOTE  12/01/2021      Date of Service (when I saw the patient): 12/01/2021    ASSESSMENT: Brandon Schafer is a 40 YO M with PMH of remote VTE who was admitted on 11/3/2021 with ARDS 2/2 COVID pneumonia. Course c/b CAUTI, polymicrobial VAP (11/11), and organizing pneumonia.     PLAN:  - Care conference Friday with family  - Ordered CT chest - Thurs  - Discontinued PO Ativan and Dilaudid  - Weaning off of pain/sedation gtt today, attempting to wake patient up  - Reduced Methylpred to 1x/day  - Lasix 20 mg IV     Neuro:  # Pain and sedation  - Weaning off of fentanyl gtt 1st, then Propofol gtt, then dexmedetomidine gtt infusions  - Fentanyl boluses available prn  - Stop Dilaudid 4 mg Q8H PO and Ativan 6 mg Q8H PO  - Quetiapine to 100 mg Q6H  - Ability 15 mg PO daily  - Haloperidol 5 mg Q6H prn  - RASS goal -2 to -3  - Consider NMB if worsening vent mechanics     Pulmonary:  # ARDS 2/2 COVID pneumonia  # Possible organizing pneumonia  Intubated 11/2 prior to transport flight. CT on 11/25 with mild fibrotic changes, dense consolidations consistent with ARDS. Possible organizing pneumonia, started on methylprednisolone 60 mg x3 days.  - Methylprednisolone 40 mg BID -> qd  - Maintain supine --- no benefit from prone  - Lung protective vent management  - Infectious treatment below  - Diuresis with 20 mg furosemide x1     # COPD exacerbation  Wheezing on exam on 11/4. S/p azithromycin for 5d. Steroids per Covid protocol.   - Continue albuterol nebs prn     Cardiovascular:  # Hypotension, resolved  Transient, and in the setting of sedation. No prior TTE. Troponin peaked at 0.293. EKG with T wave inversion in lateral leads. Suspect troponin was 2/2 demand ischemia in setting of hypoxia.     # Atrial fibrillation  Episode of atrial fibrillation overnight 11/12, hemodynamically stable and resolved spontaneously. TWI on EKG, troponin negative     GI/Nutrition:  # At risk for malnutrition  # Constipation,  improving  - Nutrition consult for TF  - Tolerating TF at goal   - Bowel regimen: senna and Miralax prn     Renal/Fluids/Electrolytes:  # Non-oliguric STEFAN, resolved  # Hypokalemia, in setting of ongoing diuresis   Unclear baseline, has not seen doctor in 20 years. Cr 2.5 on admission, now improved.      # Hypernatremia, resolved  - FWF 25ml q 2H     Endocrine:  # Stress and steroid induced hyperglycemia  - Medium resistance sliding scale insulin   - 20 U Glargine     ID:  # COVID pneumonia  Symptoms (headache, SOB, fever, cough, diarrhea) 1 week prior to presentation. Not COVID vaccinated. Tested positive on presentation on 11/2. S/p tocilizumab 11/4.  - Dexamethasone 6 mg daily (total 10 days) - completed     # Fever 11/17  # CAUTI  # VAP  Pt with worsening oxygen saturations, fevers, and persistent leukocytosis. Given his prolonged hospitalization, was started on broad-spectrum antibiotics on 11/11. Urine culture grew >100k pan-sensitive E coli. RVP and MRSA nares negative. Sputum grew E coli, S aureus, and GBS. Sputum culture 11/17 with 1+ E. coli and MSSA, blood cultures negative. Continued to have fevers as of 11/18, redraw blood cultures and test stool for Cdiff.   - Continue to observe off antibiotics  Abx:  - Ceftriaxone 11/14-11/17; 11/19 - 11/25 (completed)  - Zosyn 11/11-11/14; 11/17 - 11/18  - Vanc 11/11-11/12      Hematology:    # Coagulopathy 2/2 COVID  US BLE negative for DVT.  - Enoxaparin ppx 70mg BID     MSK  # Septum wound, tongue wound 2/2 Proning   - WOC following     General Cares/Prophylaxis:    DVT Prophylaxis: Lovenox ppx 70 mg BID  GI Prophylaxis: PPI  Family Communication: BrotherBrad, updated at noon.  Code Status: DNR      Lines/tubes/drains:  - PIV x2  - Arterial line  - NG  - ETT  - Rectal tube  - Flores (11/24 - )     Disposition:  - Medical ICU    Patient seen and findings/plan discussed with medical ICU staff, Dr. Persaud.    Thong Cox Jr., PGY-3  MICU 1,  [id number]    ====================================  INTERVAL HISTORY:   NAEON.    OBJECTIVE:   1. VITAL SIGNS:   Temp:  [98.1  F (36.7  C)-99.7  F (37.6  C)] 98.6  F (37  C)  Pulse:  [59-92] 65  Resp:  [24-34] 24  BP: (108-134)/(61-63) 134/63  MAP:  [61 mmHg-124 mmHg] 65 mmHg  Arterial Line BP: ()/(45-93) 103/49  FiO2 (%):  [40 %-50 %] 40 %  SpO2:  [89 %-98 %] 96 %  Ventilation Mode: CPAP/PS  (Continuous positive airway pressure with Pressure Support)  FiO2 (%): 40 %  Rate Set (breaths/minute): 24 breaths/min  Tidal Volume Set (mL): 480 mL  PEEP (cm H2O): 8 cmH2O  Pressure Support (cm H2O): 10 cmH2O  Oxygen Concentration (%): 40 %  Resp: 24    2. INTAKE/ OUTPUT:   I/O last 3 completed shifts:  In: 3337.4 [I.V.:1352.4; NG/GT:490]  Out: 4050 [Urine:4050]    3. PHYSICAL EXAMINATION:  General: adult man, laying in bed, in NAD  HEENT: Mucous membranes moist  Neuro: sedated, comfortable  Pulm/Resp: Clear breath sounds bilaterally without rhonchi, crackles or wheeze, breathing non-labored  CV: RRR, S1/S2 without m/r/g; pedal pulses 2+ without pitting, LE edema  Abdomen: Soft, non-distended, non-tender  : Flores catheter in place, urine yellow and clear  Incisions/Skin: No rashes noted    4. LABS:   Arterial Blood Gases   Recent Labs   Lab 12/01/21  0409 11/30/21  0506 11/29/21  0432 11/28/21  0343   PH 7.42 7.42 7.46* 7.43   PCO2 47* 44 41 43   PO2 83 98 70* 86   HCO3 30* 29* 29* 29*     Complete Blood Count   Recent Labs   Lab 12/01/21  0409 11/30/21  0447 11/29/21  0432 11/28/21  0343   WBC 16.4* 9.4 10.3 8.7   HGB 11.1* 11.0* 10.2* 9.5*    303 279 251     Basic Metabolic Panel  Recent Labs   Lab 12/01/21  0816 12/01/21  0409 12/01/21  0355 12/01/21  0019 11/30/21  0806 11/30/21  0447 11/29/21  1542 11/29/21  1427 11/29/21  0740 11/29/21  0432 11/28/21  0348 11/28/21  0343   NA  --  134  --   --   --  136  --  135  --  135  --  134   POTASSIUM  --  4.5  --   --   --  4.9  --   --   --  4.4  --  4.7   CHLORIDE   --  100  --   --   --  103  --   --   --  103  --  103   CO2  --  28  --   --   --  26  --   --   --  25  --  27   BUN  --  34*  --   --   --  30  --   --   --  31*  --  30   CR  --  0.74  --   --   --  0.76  --   --   --  0.68  --  0.67   * 208* 196* 144*   < > 202*   < >  --    < > 222*   < > 256*    < > = values in this interval not displayed.     5. RADIOLOGY:   Recent Results (from the past 24 hour(s))   XR Chest Port 1 View    Narrative    Exam: XR CHEST PORT 1 VIEW, 11/30/2021 11:42 AM    Indication: covid ARDS    Comparison: 11/25/2021 chest CT and 11/24/2021 chest x-ray    Findings:   Portable, semiupright view of the chest. Endotracheal tube is in  stable position. Enteric tube termination projects over the left upper  quadrant. Low lung volumes. Stable, diffuse mixed opacities with  retrocardiac consolidation. No pleural effusion or pneumothorax.      Impression    Impression:   1. Stable position of support devices.  2. Stable, low lung volumes with diffuse mixed opacities and  retrocardiac consolidation. Improved on right but increased on left.    I have personally reviewed the examination and initial interpretation  and I agree with the findings.    GREGORY BECKWITH MD         SYSTEM ID:  [id number]

## 2021-12-02 LAB
ANION GAP SERPL CALCULATED.3IONS-SCNC: 7 MMOL/L (ref 3–14)
BASE EXCESS BLDA CALC-SCNC: 4.1 MMOL/L (ref -9–1.8)
BASE EXCESS BLDA CALC-SCNC: 4.7 MMOL/L (ref -9–1.8)
BASE EXCESS BLDA CALC-SCNC: 5.4 MMOL/L (ref -9–1.8)
BUN SERPL-MCNC: 40 MG/DL (ref 7–30)
CALCIUM SERPL-MCNC: 8.7 MG/DL (ref 8.5–10.1)
CHLORIDE BLD-SCNC: 100 MMOL/L (ref 94–109)
CO2 SERPL-SCNC: 29 MMOL/L (ref 20–32)
CREAT SERPL-MCNC: 0.83 MG/DL (ref 0.66–1.25)
ERYTHROCYTE [DISTWIDTH] IN BLOOD BY AUTOMATED COUNT: 15.6 % (ref 10–15)
GFR SERPL CREATININE-BSD FRML MDRD: >90 ML/MIN/1.73M2
GLUCOSE BLD-MCNC: 149 MG/DL (ref 70–99)
GLUCOSE BLDC GLUCOMTR-MCNC: 138 MG/DL (ref 70–99)
GLUCOSE BLDC GLUCOMTR-MCNC: 139 MG/DL (ref 70–99)
GLUCOSE BLDC GLUCOMTR-MCNC: 145 MG/DL (ref 70–99)
GLUCOSE BLDC GLUCOMTR-MCNC: 158 MG/DL (ref 70–99)
GLUCOSE BLDC GLUCOMTR-MCNC: 163 MG/DL (ref 70–99)
GLUCOSE BLDC GLUCOMTR-MCNC: 192 MG/DL (ref 70–99)
GLUCOSE BLDC GLUCOMTR-MCNC: 208 MG/DL (ref 70–99)
HCO3 BLD-SCNC: 28 MMOL/L (ref 21–28)
HCO3 BLD-SCNC: 29 MMOL/L (ref 21–28)
HCO3 BLD-SCNC: 30 MMOL/L (ref 21–28)
HCT VFR BLD AUTO: 34.1 % (ref 40–53)
HGB BLD-MCNC: 10.6 G/DL (ref 13.3–17.7)
MAGNESIUM SERPL-MCNC: 2.1 MG/DL (ref 1.6–2.3)
MCH RBC QN AUTO: 27.5 PG (ref 26.5–33)
MCHC RBC AUTO-ENTMCNC: 31.1 G/DL (ref 31.5–36.5)
MCV RBC AUTO: 89 FL (ref 78–100)
O2/TOTAL GAS SETTING VFR VENT: 40 %
PCO2 BLD: 39 MM HG (ref 35–45)
PCO2 BLD: 39 MM HG (ref 35–45)
PCO2 BLD: 47 MM HG (ref 35–45)
PH BLD: 7.41 [PH] (ref 7.35–7.45)
PH BLD: 7.47 [PH] (ref 7.35–7.45)
PH BLD: 7.48 [PH] (ref 7.35–7.45)
PLATELET # BLD AUTO: 295 10E3/UL (ref 150–450)
PO2 BLD: 80 MM HG (ref 80–105)
PO2 BLD: 83 MM HG (ref 80–105)
PO2 BLD: 85 MM HG (ref 80–105)
POTASSIUM BLD-SCNC: 3.8 MMOL/L (ref 3.4–5.3)
RBC # BLD AUTO: 3.85 10E6/UL (ref 4.4–5.9)
SODIUM SERPL-SCNC: 136 MMOL/L (ref 133–144)
WBC # BLD AUTO: 13.5 10E3/UL (ref 4–11)

## 2021-12-02 PROCEDURE — 85027 COMPLETE CBC AUTOMATED: CPT | Performed by: STUDENT IN AN ORGANIZED HEALTH CARE EDUCATION/TRAINING PROGRAM

## 2021-12-02 PROCEDURE — 250N000011 HC RX IP 250 OP 636: Performed by: INTERNAL MEDICINE

## 2021-12-02 PROCEDURE — 258N000003 HC RX IP 258 OP 636: Performed by: STUDENT IN AN ORGANIZED HEALTH CARE EDUCATION/TRAINING PROGRAM

## 2021-12-02 PROCEDURE — 94003 VENT MGMT INPAT SUBQ DAY: CPT

## 2021-12-02 PROCEDURE — 250N000013 HC RX MED GY IP 250 OP 250 PS 637: Performed by: STUDENT IN AN ORGANIZED HEALTH CARE EDUCATION/TRAINING PROGRAM

## 2021-12-02 PROCEDURE — 80048 BASIC METABOLIC PNL TOTAL CA: CPT | Performed by: STUDENT IN AN ORGANIZED HEALTH CARE EDUCATION/TRAINING PROGRAM

## 2021-12-02 PROCEDURE — 250N000013 HC RX MED GY IP 250 OP 250 PS 637: Performed by: NURSE PRACTITIONER

## 2021-12-02 PROCEDURE — 999N000015 HC STATISTIC ARTERIAL MONITORING DAILY

## 2021-12-02 PROCEDURE — 272N000064 HC CIRCUIT HUMIDITY W/CPAP BIPAP

## 2021-12-02 PROCEDURE — 94660 CPAP INITIATION&MGMT: CPT

## 2021-12-02 PROCEDURE — 272N000054 HC CANNULA HIGH FLOW, ADULT

## 2021-12-02 PROCEDURE — 82803 BLOOD GASES ANY COMBINATION: CPT | Performed by: STUDENT IN AN ORGANIZED HEALTH CARE EDUCATION/TRAINING PROGRAM

## 2021-12-02 PROCEDURE — 250N000011 HC RX IP 250 OP 636

## 2021-12-02 PROCEDURE — 200N000002 HC R&B ICU UMMC

## 2021-12-02 PROCEDURE — 83735 ASSAY OF MAGNESIUM: CPT | Performed by: STUDENT IN AN ORGANIZED HEALTH CARE EDUCATION/TRAINING PROGRAM

## 2021-12-02 PROCEDURE — 250N000009 HC RX 250

## 2021-12-02 PROCEDURE — 250N000011 HC RX IP 250 OP 636: Performed by: STUDENT IN AN ORGANIZED HEALTH CARE EDUCATION/TRAINING PROGRAM

## 2021-12-02 PROCEDURE — 99291 CRITICAL CARE FIRST HOUR: CPT | Mod: GC | Performed by: INTERNAL MEDICINE

## 2021-12-02 PROCEDURE — 999N000215 HC STATISTIC HFNC ADULT NON-CPAP

## 2021-12-02 PROCEDURE — 250N000009 HC RX 250: Performed by: STUDENT IN AN ORGANIZED HEALTH CARE EDUCATION/TRAINING PROGRAM

## 2021-12-02 PROCEDURE — 272N000063 HC CIRCUIT HUMID FACE/TRACH MSK

## 2021-12-02 PROCEDURE — 999N000157 HC STATISTIC RCP TIME EA 10 MIN

## 2021-12-02 RX ORDER — LABETALOL HYDROCHLORIDE 5 MG/ML
20 INJECTION, SOLUTION INTRAVENOUS EVERY 4 HOURS PRN
Status: DISCONTINUED | OUTPATIENT
Start: 2021-12-02 | End: 2021-12-06 | Stop reason: HOSPADM

## 2021-12-02 RX ORDER — FUROSEMIDE 10 MG/ML
20 INJECTION INTRAMUSCULAR; INTRAVENOUS ONCE
Status: COMPLETED | OUTPATIENT
Start: 2021-12-02 | End: 2021-12-02

## 2021-12-02 RX ADMIN — Medication 1 TABLET: at 07:59

## 2021-12-02 RX ADMIN — NICARDIPINE HYDROCHLORIDE 2.5 MG/HR: 0.2 INJECTION, SOLUTION INTRAVENOUS at 12:43

## 2021-12-02 RX ADMIN — METHYLPREDNISOLONE SODIUM SUCCINATE 40 MG: 40 INJECTION, POWDER, FOR SOLUTION INTRAMUSCULAR; INTRAVENOUS at 10:11

## 2021-12-02 RX ADMIN — NICARDIPINE HYDROCHLORIDE 12.5 MG/HR: 0.2 INJECTION, SOLUTION INTRAVENOUS at 16:22

## 2021-12-02 RX ADMIN — QUETIAPINE FUMARATE 100 MG: 100 TABLET ORAL at 23:25

## 2021-12-02 RX ADMIN — DEXMEDETOMIDINE 1.2 MCG/KG/HR: 100 INJECTION, SOLUTION, CONCENTRATE INTRAVENOUS at 11:17

## 2021-12-02 RX ADMIN — Medication 40 MG: at 07:59

## 2021-12-02 RX ADMIN — Medication 2 PACKET: at 20:15

## 2021-12-02 RX ADMIN — INSULIN ASPART 1 UNITS: 100 INJECTION, SOLUTION INTRAVENOUS; SUBCUTANEOUS at 07:58

## 2021-12-02 RX ADMIN — QUETIAPINE FUMARATE 100 MG: 100 TABLET ORAL at 17:37

## 2021-12-02 RX ADMIN — Medication 75 MCG: at 07:30

## 2021-12-02 RX ADMIN — INSULIN ASPART 2 UNITS: 100 INJECTION, SOLUTION INTRAVENOUS; SUBCUTANEOUS at 15:56

## 2021-12-02 RX ADMIN — INSULIN ASPART 1 UNITS: 100 INJECTION, SOLUTION INTRAVENOUS; SUBCUTANEOUS at 20:21

## 2021-12-02 RX ADMIN — Medication 2 PACKET: at 07:59

## 2021-12-02 RX ADMIN — Medication 100 MCG: at 11:15

## 2021-12-02 RX ADMIN — QUETIAPINE FUMARATE 100 MG: 100 TABLET ORAL at 05:12

## 2021-12-02 RX ADMIN — LABETALOL HYDROCHLORIDE 20 MG: 5 INJECTION, SOLUTION INTRAVENOUS at 12:07

## 2021-12-02 RX ADMIN — DEXMEDETOMIDINE 1.2 MCG/KG/HR: 100 INJECTION, SOLUTION, CONCENTRATE INTRAVENOUS at 17:29

## 2021-12-02 RX ADMIN — ENOXAPARIN SODIUM 70 MG: 80 INJECTION SUBCUTANEOUS at 20:16

## 2021-12-02 RX ADMIN — Medication 75 MCG: at 10:15

## 2021-12-02 RX ADMIN — QUETIAPINE FUMARATE 100 MG: 100 TABLET ORAL at 11:58

## 2021-12-02 RX ADMIN — NICARDIPINE HYDROCHLORIDE 10 MG/HR: 0.2 INJECTION, SOLUTION INTRAVENOUS at 23:44

## 2021-12-02 RX ADMIN — FUROSEMIDE 20 MG: 10 INJECTION, SOLUTION INTRAVENOUS at 06:51

## 2021-12-02 RX ADMIN — NICARDIPINE HYDROCHLORIDE 10 MG/HR: 0.2 INJECTION, SOLUTION INTRAVENOUS at 19:43

## 2021-12-02 RX ADMIN — DEXMEDETOMIDINE 1 MCG/KG/HR: 100 INJECTION, SOLUTION, CONCENTRATE INTRAVENOUS at 22:46

## 2021-12-02 RX ADMIN — DEXMEDETOMIDINE 0.5 MCG/KG/HR: 100 INJECTION, SOLUTION, CONCENTRATE INTRAVENOUS at 02:04

## 2021-12-02 RX ADMIN — ENOXAPARIN SODIUM 70 MG: 80 INJECTION SUBCUTANEOUS at 07:58

## 2021-12-02 RX ADMIN — Medication 75 MCG: at 08:45

## 2021-12-02 RX ADMIN — INSULIN ASPART 2 UNITS: 100 INJECTION, SOLUTION INTRAVENOUS; SUBCUTANEOUS at 11:58

## 2021-12-02 RX ADMIN — PROPOFOL 10 MCG/KG/MIN: 10 INJECTION, EMULSION INTRAVENOUS at 01:00

## 2021-12-02 RX ADMIN — ARIPIPRAZOLE 15 MG: 15 TABLET ORAL at 07:59

## 2021-12-02 ASSESSMENT — ACTIVITIES OF DAILY LIVING (ADL)
ADLS_ACUITY_SCORE: 18
ADLS_ACUITY_SCORE: 16
ADLS_ACUITY_SCORE: 18
ADLS_ACUITY_SCORE: 16
ADLS_ACUITY_SCORE: 17
ADLS_ACUITY_SCORE: 16
ADLS_ACUITY_SCORE: 18
ADLS_ACUITY_SCORE: 16
ADLS_ACUITY_SCORE: 18
ADLS_ACUITY_SCORE: 18
ADLS_ACUITY_SCORE: 17
ADLS_ACUITY_SCORE: 18
ADLS_ACUITY_SCORE: 16
ADLS_ACUITY_SCORE: 18
ADLS_ACUITY_SCORE: 16
ADLS_ACUITY_SCORE: 18
ADLS_ACUITY_SCORE: 18
ADLS_ACUITY_SCORE: 16

## 2021-12-02 NOTE — PROGRESS NOTES
Brief Progress Note:    Patient weaned off of sedation and he expressed a desire to be extubated. He was able to follow commands and was responding appropriately to yes/no questions. The primary team called his family (brother - DPOA, and mother) and updated them on the patient's desire. They stated they respected his decision and agreed to extubation to BiPAP today. The primary team updated the patient that his family was in agreement. We discussed code status with the patient and he requested to be full code. He requested CPR and shocks if needed, and to be re-intubated if needed. In addition, he requested to have a tracheotomy performed if needed. Risks and benefits of the procedure were discussed and the patient demonstrated understanding. The primary team updated the family on his desires related to code status and trach, and they were in agreement with his wishes.    Plan:  - Extubate today. Place on BiPAP.  - Family to be updated following extubation this afternoon.   -Plan on care conference tomorrow  - change code status to full code    Thong Cox Jr, MD  Internal Medicine, PGY-3

## 2021-12-02 NOTE — PROGRESS NOTES
Brief Progress Note:    Updated family at 6 pm. They would still like to have the care conference tomorrow at 1 pm.    Thong Cox Jr., MD  Internal Medicine, PGY-3  [phone]

## 2021-12-02 NOTE — PROGRESS NOTES
[facility], Procedure Note          Extubation:       Brandon Schafer  MRN# [id number]   [id number]         Patient extubated at: December 2, 2021, 12:30 PM   Supplemental Oxygen: Via BIPAP 10/5 12 40%   Cough: The cough is good   Secretion Mode: PRN suction with assistance   Secretion Amount: Moderate amount, thick and white / yellow in color   Respiratory Exam:: Breath sounds: good aeration     Location: bilaterally   Skin Exam:: Patient color: natural   Patient Status: Currently anxious   Arterial Blood Gasses: NA         Recorded by Jamilah Baez, RRT

## 2021-12-02 NOTE — PROGRESS NOTES
MEDICAL ICU PROGRESS NOTE  12/02/2021      Date of Service (when I saw the patient): 12/02/2021    ASSESSMENT: Brandon Schafer is a 42 YO M with PMH of remote VTE who was admitted on 11/3/2021 with ARDS 2/2 COVID pneumonia. Course c/b CAUTI, polymicrobial VAP (11/11), and organizing pneumonia.     PLAN:  - Ctn with care conference Friday with family  - Lasix 20 mg IV  - Discussed extubation today with patient and family: he requested extubation, and is okay with intubation again if needed  - Discussed possible trach: patient and family are agreeable to this if needed  - Discussed code status with patient and family: changed to Full code  - Extubated ~ 1pm, will recheck gas this afternoon  - Started Nicardipine gtt and Labetalol prn     Neuro:  # Pain and sedation  - Weaned sedation prior to extubation  - Precedex gtt prn  - Fentanyl boluses available prn  - Quetiapine to 100 mg Q6H  - Ability 15 mg PO daily  - Haloperidol 5 mg Q6H prn     Pulmonary:  # ARDS 2/2 COVID pneumonia  # Possible organizing pneumonia  Intubated 11/2 prior to transport flight. CT on 11/25 with mild fibrotic changes, dense consolidations consistent with ARDS. Possible organizing pneumonia, started on methylprednisolone 60 mg x3 days. CT Chest 12/1/21 - no significant change. Extubated 12/2/21.  - Methylprednisolone 40 mg qd  - Maintain supine --- no benefit from prone  - Infectious treatment below  - Diuresis with 20 mg furosemide x1     # COPD exacerbation  Wheezing on exam on 11/4. S/p azithromycin for 5d. Steroids per Covid protocol.   - Continue albuterol nebs prn     Cardiovascular:  # Hypertension  - Started Nicardipine gtt  - Start Labetolol prn for SBP >160    # Hypotension, resolved  Transient, and in the setting of sedation. No prior TTE. Troponin peaked at 0.293. EKG with T wave inversion in lateral leads. Suspect troponin was 2/2 demand ischemia in setting of hypoxia.     # Atrial fibrillation  Episode of atrial fibrillation  overnight 11/12, hemodynamically stable and resolved spontaneously. TWI on EKG, troponin negative     GI/Nutrition:  # At risk for malnutrition  # Constipation, improving  - Nutrition consult for TF  - Tolerating TF at goal   - Bowel regimen: senna and Miralax prn     Renal/Fluids/Electrolytes:  # Non-oliguric STEFAN, resolved  # Hypokalemia, in setting of ongoing diuresis   Unclear baseline, has not seen doctor in 20 years. Cr 2.5 on admission, now improved.      # Hypernatremia, resolved  - FWF 25ml q 2H     Endocrine:  # Stress and steroid induced hyperglycemia  - Medium resistance sliding scale insulin   - 20 U Glargine     ID:  # COVID pneumonia  Symptoms (headache, SOB, fever, cough, diarrhea) 1 week prior to presentation. Not COVID vaccinated. Tested positive on presentation on 11/2. S/p tocilizumab 11/4.  - Dexamethasone 6 mg daily (total 10 days) - completed     # Fever 11/17  # CAUTI  # VAP  Pt with worsening oxygen saturations, fevers, and persistent leukocytosis. Given his prolonged hospitalization, was started on broad-spectrum antibiotics on 11/11. Urine culture grew >100k pan-sensitive E coli. RVP and MRSA nares negative. Sputum grew E coli, S aureus, and GBS. Sputum culture 11/17 with 1+ E. coli and MSSA, blood cultures negative. Continued to have fevers as of 11/18, redraw blood cultures and test stool for Cdiff.   - Continue to observe off antibiotics  Abx:  - Ceftriaxone 11/14-11/17; 11/19 - 11/25 (completed)  - Zosyn 11/11-11/14; 11/17 - 11/18  - Vanc 11/11-11/12      Hematology:    # Coagulopathy 2/2 COVID  US BLE negative for DVT.  - Enoxaparin ppx 70mg BID     MSK  # Septum wound, tongue wound 2/2 Proning   - WOC following     General Cares/Prophylaxis:    DVT Prophylaxis: Lovenox ppx 70 mg BID  GI Prophylaxis: PPI  Family Communication: BrotherBrad  Code Status: DNR      Lines/tubes/drains:  - PIV x2  - Arterial line  - NG  - Rectal tube  - Flores (11/24 - )     Disposition:  - Medical  ICU    Patient seen and findings/plan discussed with medical ICU staff, Dr. Persaud.    Thong Cox Jr., PGY-3  MICU 1, [id number]    ====================================  INTERVAL HISTORY:   NAEON.    OBJECTIVE:   1. VITAL SIGNS:   Temp:  [98.2  F (36.8  C)-99.1  F (37.3  C)] 99.1  F (37.3  C)  Pulse:  [] 83  Resp:  [19-27] 24  BP: ()/(52-62) 126/62  MAP:  [51 mmHg-108 mmHg] 66 mmHg  Arterial Line BP: ()/(22-83) 95/53  FiO2 (%):  [40 %] 40 %  SpO2:  [89 %-98 %] 95 %  Ventilation Mode: CPAP/PS  (Continuous positive airway pressure with Pressure Support)  FiO2 (%): 40 %  Rate Set (breaths/minute): 24 breaths/min  Tidal Volume Set (mL): 480 mL  PEEP (cm H2O): 8 cmH2O  Pressure Support (cm H2O): 10 cmH2O  Oxygen Concentration (%): 40 %  Resp: 24    2. INTAKE/ OUTPUT:   I/O last 3 completed shifts:  In: 2965.86 [I.V.:1065.86; NG/GT:600]  Out: 2750 [Urine:2750]    3. PHYSICAL EXAMINATION:  General: adult man, laying in bed, in NAD  HEENT: Mucous membranes moist  Neuro: awake, moving all extremities, following commands, answering questions appropriately  Pulm/Resp: Clear breath sounds bilaterally without rhonchi, crackles or wheeze, breathing non-labored  CV: RRR, S1/S2 without m/r/g  Abdomen: Soft, non-distended, non-tender  : Flores catheter in place, urine yellow and clear  Incisions/Skin: No rashes noted    4. LABS:   Arterial Blood Gases   Recent Labs   Lab 12/02/21  0405 12/01/21  0409 11/30/21  0506 11/29/21  0432   PH 7.41 7.42 7.42 7.46*   PCO2 47* 47* 44 41   PO2 80 83 98 70*   HCO3 30* 30* 29* 29*     Complete Blood Count   Recent Labs   Lab 12/02/21  0404 12/01/21  0409 11/30/21  0447 11/29/21  0432   WBC 13.5* 16.4* 9.4 10.3   HGB 10.6* 11.1* 11.0* 10.2*    306 303 279     Basic Metabolic Panel  Recent Labs   Lab 12/02/21  1157 12/02/21  0757 12/02/21  0411 12/02/21  0404 12/01/21  0816 12/01/21  0409 11/30/21  0806 11/30/21  0447 11/29/21  1542 11/29/21  1427 11/29/21  0740  11/29/21  0432   NA  --   --   --  136  --  134  --  136  --  135  --  135   POTASSIUM  --   --   --  3.8  --  4.5  --  4.9  --   --   --  4.4   CHLORIDE  --   --   --  100  --  100  --  103  --   --   --  103   CO2  --   --   --  29  --  28  --  26  --   --   --  25   BUN  --   --   --  40*  --  34*  --  30  --   --   --  31*   CR  --   --   --  0.83  --  0.74  --  0.76  --   --   --  0.68   * 158* 138* 149*   < > 208*   < > 202*   < >  --    < > 222*    < > = values in this interval not displayed.     5. RADIOLOGY:   No results found for this or any previous visit (from the past 24 hour(s)).

## 2021-12-03 LAB
ANION GAP SERPL CALCULATED.3IONS-SCNC: 5 MMOL/L (ref 3–14)
ANION GAP SERPL CALCULATED.3IONS-SCNC: 8 MMOL/L (ref 3–14)
BASE EXCESS BLDA CALC-SCNC: 3.6 MMOL/L (ref -9–1.8)
BUN SERPL-MCNC: 23 MG/DL (ref 7–30)
BUN SERPL-MCNC: 24 MG/DL (ref 7–30)
CALCIUM SERPL-MCNC: 8.7 MG/DL (ref 8.5–10.1)
CALCIUM SERPL-MCNC: 9.3 MG/DL (ref 8.5–10.1)
CHLORIDE BLD-SCNC: 100 MMOL/L (ref 94–109)
CHLORIDE BLD-SCNC: 102 MMOL/L (ref 94–109)
CO2 SERPL-SCNC: 26 MMOL/L (ref 20–32)
CO2 SERPL-SCNC: 28 MMOL/L (ref 20–32)
CREAT SERPL-MCNC: 0.62 MG/DL (ref 0.66–1.25)
CREAT SERPL-MCNC: 0.72 MG/DL (ref 0.66–1.25)
ERYTHROCYTE [DISTWIDTH] IN BLOOD BY AUTOMATED COUNT: 15.4 % (ref 10–15)
GFR SERPL CREATININE-BSD FRML MDRD: >90 ML/MIN/1.73M2
GFR SERPL CREATININE-BSD FRML MDRD: >90 ML/MIN/1.73M2
GLUCOSE BLD-MCNC: 147 MG/DL (ref 70–99)
GLUCOSE BLD-MCNC: 196 MG/DL (ref 70–99)
GLUCOSE BLDC GLUCOMTR-MCNC: 136 MG/DL (ref 70–99)
GLUCOSE BLDC GLUCOMTR-MCNC: 158 MG/DL (ref 70–99)
GLUCOSE BLDC GLUCOMTR-MCNC: 164 MG/DL (ref 70–99)
GLUCOSE BLDC GLUCOMTR-MCNC: 175 MG/DL (ref 70–99)
GLUCOSE BLDC GLUCOMTR-MCNC: 177 MG/DL (ref 70–99)
GLUCOSE BLDC GLUCOMTR-MCNC: 74 MG/DL (ref 70–99)
HCO3 BLD-SCNC: 27 MMOL/L (ref 21–28)
HCT VFR BLD AUTO: 33.6 % (ref 40–53)
HGB BLD-MCNC: 11.1 G/DL (ref 13.3–17.7)
MAGNESIUM SERPL-MCNC: 2.1 MG/DL (ref 1.6–2.3)
MCH RBC QN AUTO: 28 PG (ref 26.5–33)
MCHC RBC AUTO-ENTMCNC: 33 G/DL (ref 31.5–36.5)
MCV RBC AUTO: 85 FL (ref 78–100)
O2/TOTAL GAS SETTING VFR VENT: 50 %
PCO2 BLD: 34 MM HG (ref 35–45)
PH BLD: 7.51 [PH] (ref 7.35–7.45)
PHOSPHATE SERPL-MCNC: 2.7 MG/DL (ref 2.5–4.5)
PLATELET # BLD AUTO: 311 10E3/UL (ref 150–450)
PO2 BLD: 132 MM HG (ref 80–105)
POTASSIUM BLD-SCNC: 3.7 MMOL/L (ref 3.4–5.3)
POTASSIUM BLD-SCNC: 4 MMOL/L (ref 3.4–5.3)
RBC # BLD AUTO: 3.96 10E6/UL (ref 4.4–5.9)
SODIUM SERPL-SCNC: 134 MMOL/L (ref 133–144)
SODIUM SERPL-SCNC: 135 MMOL/L (ref 133–144)
UFH PPP CHRO-ACNC: 0.28 IU/ML
WBC # BLD AUTO: 14.1 10E3/UL (ref 4–11)

## 2021-12-03 PROCEDURE — 250N000013 HC RX MED GY IP 250 OP 250 PS 637: Performed by: NURSE PRACTITIONER

## 2021-12-03 PROCEDURE — 85027 COMPLETE CBC AUTOMATED: CPT | Performed by: STUDENT IN AN ORGANIZED HEALTH CARE EDUCATION/TRAINING PROGRAM

## 2021-12-03 PROCEDURE — 999N000157 HC STATISTIC RCP TIME EA 10 MIN

## 2021-12-03 PROCEDURE — 80048 BASIC METABOLIC PNL TOTAL CA: CPT | Performed by: STUDENT IN AN ORGANIZED HEALTH CARE EDUCATION/TRAINING PROGRAM

## 2021-12-03 PROCEDURE — 250N000009 HC RX 250

## 2021-12-03 PROCEDURE — 200N000002 HC R&B ICU UMMC

## 2021-12-03 PROCEDURE — 83735 ASSAY OF MAGNESIUM: CPT | Performed by: STUDENT IN AN ORGANIZED HEALTH CARE EDUCATION/TRAINING PROGRAM

## 2021-12-03 PROCEDURE — 82803 BLOOD GASES ANY COMBINATION: CPT | Performed by: STUDENT IN AN ORGANIZED HEALTH CARE EDUCATION/TRAINING PROGRAM

## 2021-12-03 PROCEDURE — 250N000011 HC RX IP 250 OP 636: Performed by: NURSE PRACTITIONER

## 2021-12-03 PROCEDURE — 250N000011 HC RX IP 250 OP 636: Performed by: STUDENT IN AN ORGANIZED HEALTH CARE EDUCATION/TRAINING PROGRAM

## 2021-12-03 PROCEDURE — 94660 CPAP INITIATION&MGMT: CPT

## 2021-12-03 PROCEDURE — 36415 COLL VENOUS BLD VENIPUNCTURE: CPT | Performed by: NURSE PRACTITIONER

## 2021-12-03 PROCEDURE — 84100 ASSAY OF PHOSPHORUS: CPT | Performed by: INTERNAL MEDICINE

## 2021-12-03 PROCEDURE — 250N000013 HC RX MED GY IP 250 OP 250 PS 637

## 2021-12-03 PROCEDURE — 99291 CRITICAL CARE FIRST HOUR: CPT | Performed by: INTERNAL MEDICINE

## 2021-12-03 PROCEDURE — 250N000013 HC RX MED GY IP 250 OP 250 PS 637: Performed by: STUDENT IN AN ORGANIZED HEALTH CARE EDUCATION/TRAINING PROGRAM

## 2021-12-03 PROCEDURE — 250N000013 HC RX MED GY IP 250 OP 250 PS 637: Performed by: INTERNAL MEDICINE

## 2021-12-03 PROCEDURE — 36415 COLL VENOUS BLD VENIPUNCTURE: CPT | Performed by: STUDENT IN AN ORGANIZED HEALTH CARE EDUCATION/TRAINING PROGRAM

## 2021-12-03 PROCEDURE — 999N000015 HC STATISTIC ARTERIAL MONITORING DAILY

## 2021-12-03 PROCEDURE — 80048 BASIC METABOLIC PNL TOTAL CA: CPT | Performed by: NURSE PRACTITIONER

## 2021-12-03 PROCEDURE — 250N000011 HC RX IP 250 OP 636

## 2021-12-03 PROCEDURE — 85520 HEPARIN ASSAY: CPT | Performed by: NURSE PRACTITIONER

## 2021-12-03 PROCEDURE — 999N000215 HC STATISTIC HFNC ADULT NON-CPAP

## 2021-12-03 RX ORDER — HYDROCHLOROTHIAZIDE 25 MG/1
25 TABLET ORAL DAILY
Status: DISCONTINUED | OUTPATIENT
Start: 2021-12-03 | End: 2021-12-03

## 2021-12-03 RX ORDER — METHYLPREDNISOLONE SODIUM SUCCINATE 40 MG/ML
10 INJECTION, POWDER, LYOPHILIZED, FOR SOLUTION INTRAMUSCULAR; INTRAVENOUS EVERY 24 HOURS
Status: DISCONTINUED | OUTPATIENT
Start: 2021-12-21 | End: 2021-12-06 | Stop reason: HOSPADM

## 2021-12-03 RX ORDER — POTASSIUM CHLORIDE 1.5 G/1.58G
20 POWDER, FOR SOLUTION ORAL ONCE
Status: COMPLETED | OUTPATIENT
Start: 2021-12-03 | End: 2021-12-03

## 2021-12-03 RX ORDER — METHYLPREDNISOLONE SODIUM SUCCINATE 40 MG/ML
20 INJECTION, POWDER, LYOPHILIZED, FOR SOLUTION INTRAMUSCULAR; INTRAVENOUS EVERY 24 HOURS
Status: DISCONTINUED | OUTPATIENT
Start: 2021-12-14 | End: 2021-12-06 | Stop reason: HOSPADM

## 2021-12-03 RX ORDER — CLONIDINE HYDROCHLORIDE 0.1 MG/1
0.1 TABLET ORAL EVERY 8 HOURS
Status: DISCONTINUED | OUTPATIENT
Start: 2021-12-03 | End: 2021-12-06 | Stop reason: HOSPADM

## 2021-12-03 RX ORDER — QUETIAPINE FUMARATE 50 MG/1
50 TABLET, FILM COATED ORAL AT BEDTIME
Status: DISCONTINUED | OUTPATIENT
Start: 2021-12-03 | End: 2021-12-06 | Stop reason: HOSPADM

## 2021-12-03 RX ORDER — AMLODIPINE BESYLATE 5 MG/1
5 TABLET ORAL DAILY
Status: DISCONTINUED | OUTPATIENT
Start: 2021-12-04 | End: 2021-12-06 | Stop reason: HOSPADM

## 2021-12-03 RX ORDER — HYDRALAZINE HYDROCHLORIDE 20 MG/ML
10 INJECTION INTRAMUSCULAR; INTRAVENOUS EVERY 4 HOURS PRN
Status: DISCONTINUED | OUTPATIENT
Start: 2021-12-03 | End: 2021-12-06 | Stop reason: HOSPADM

## 2021-12-03 RX ORDER — LORAZEPAM 2 MG/ML
0.5 INJECTION INTRAMUSCULAR EVERY 6 HOURS PRN
Status: DISCONTINUED | OUTPATIENT
Start: 2021-12-03 | End: 2021-12-05

## 2021-12-03 RX ORDER — METHYLPREDNISOLONE SODIUM SUCCINATE 40 MG/ML
30 INJECTION, POWDER, LYOPHILIZED, FOR SOLUTION INTRAMUSCULAR; INTRAVENOUS EVERY 24 HOURS
Status: DISCONTINUED | OUTPATIENT
Start: 2021-12-07 | End: 2021-12-06 | Stop reason: HOSPADM

## 2021-12-03 RX ORDER — AMLODIPINE BESYLATE 5 MG/1
5 TABLET ORAL 2 TIMES DAILY
Status: COMPLETED | OUTPATIENT
Start: 2021-12-03 | End: 2021-12-03

## 2021-12-03 RX ORDER — LORAZEPAM 2 MG/ML
0.5 INJECTION INTRAMUSCULAR ONCE
Status: COMPLETED | OUTPATIENT
Start: 2021-12-03 | End: 2021-12-03

## 2021-12-03 RX ORDER — FUROSEMIDE 10 MG/ML
20 INJECTION INTRAMUSCULAR; INTRAVENOUS ONCE
Status: COMPLETED | OUTPATIENT
Start: 2021-12-03 | End: 2021-12-03

## 2021-12-03 RX ORDER — DOXAZOSIN 4 MG/1
4 TABLET ORAL DAILY
Status: DISCONTINUED | OUTPATIENT
Start: 2021-12-03 | End: 2021-12-06 | Stop reason: HOSPADM

## 2021-12-03 RX ORDER — METHYLPREDNISOLONE SODIUM SUCCINATE 40 MG/ML
20 INJECTION, POWDER, LYOPHILIZED, FOR SOLUTION INTRAMUSCULAR; INTRAVENOUS EVERY 24 HOURS
Status: DISCONTINUED | OUTPATIENT
Start: 2021-12-03 | End: 2021-12-03

## 2021-12-03 RX ORDER — HYDRALAZINE HYDROCHLORIDE 20 MG/ML
10 INJECTION INTRAMUSCULAR; INTRAVENOUS EVERY 6 HOURS PRN
Status: DISCONTINUED | OUTPATIENT
Start: 2021-12-03 | End: 2021-12-03

## 2021-12-03 RX ORDER — LORAZEPAM 2 MG/ML
INJECTION INTRAMUSCULAR
Status: DISPENSED
Start: 2021-12-03 | End: 2021-12-04

## 2021-12-03 RX ADMIN — Medication 40 MG: at 07:55

## 2021-12-03 RX ADMIN — POTASSIUM CHLORIDE 20 MEQ: 1.5 POWDER, FOR SOLUTION ORAL at 13:41

## 2021-12-03 RX ADMIN — QUETIAPINE FUMARATE 50 MG: 50 TABLET ORAL at 22:22

## 2021-12-03 RX ADMIN — Medication 1 TABLET: at 07:55

## 2021-12-03 RX ADMIN — FUROSEMIDE 20 MG: 10 INJECTION, SOLUTION INTRAVENOUS at 13:41

## 2021-12-03 RX ADMIN — CLONIDINE HYDROCHLORIDE 0.1 MG: 0.1 TABLET ORAL at 13:41

## 2021-12-03 RX ADMIN — CLONIDINE HYDROCHLORIDE 0.1 MG: 0.1 TABLET ORAL at 22:22

## 2021-12-03 RX ADMIN — LABETALOL HYDROCHLORIDE 20 MG: 5 INJECTION, SOLUTION INTRAVENOUS at 19:03

## 2021-12-03 RX ADMIN — Medication 2 PACKET: at 07:57

## 2021-12-03 RX ADMIN — INSULIN ASPART 1 UNITS: 100 INJECTION, SOLUTION INTRAVENOUS; SUBCUTANEOUS at 19:44

## 2021-12-03 RX ADMIN — NICARDIPINE HYDROCHLORIDE 10 MG/HR: 0.2 INJECTION, SOLUTION INTRAVENOUS at 03:37

## 2021-12-03 RX ADMIN — ARIPIPRAZOLE 15 MG: 15 TABLET ORAL at 07:56

## 2021-12-03 RX ADMIN — AMLODIPINE BESYLATE 5 MG: 5 TABLET ORAL at 19:41

## 2021-12-03 RX ADMIN — LORAZEPAM 0.5 MG: 2 INJECTION INTRAMUSCULAR; INTRAVENOUS at 16:48

## 2021-12-03 RX ADMIN — LORAZEPAM 0.5 MG: 2 INJECTION INTRAMUSCULAR; INTRAVENOUS at 13:57

## 2021-12-03 RX ADMIN — Medication 2 PACKET: at 19:41

## 2021-12-03 RX ADMIN — NICARDIPINE HYDROCHLORIDE 10 MG/HR: 0.2 INJECTION, SOLUTION INTRAVENOUS at 11:23

## 2021-12-03 RX ADMIN — NICARDIPINE HYDROCHLORIDE 10 MG/HR: 0.2 INJECTION, SOLUTION INTRAVENOUS at 07:50

## 2021-12-03 RX ADMIN — INSULIN ASPART 1 UNITS: 100 INJECTION, SOLUTION INTRAVENOUS; SUBCUTANEOUS at 11:38

## 2021-12-03 RX ADMIN — DOXAZOSIN 4 MG: 4 TABLET ORAL at 11:47

## 2021-12-03 RX ADMIN — INSULIN ASPART 1 UNITS: 100 INJECTION, SOLUTION INTRAVENOUS; SUBCUTANEOUS at 15:55

## 2021-12-03 RX ADMIN — METHYLPREDNISOLONE SODIUM SUCCINATE 40 MG: 40 INJECTION, POWDER, FOR SOLUTION INTRAMUSCULAR; INTRAVENOUS at 10:06

## 2021-12-03 RX ADMIN — AMLODIPINE BESYLATE 5 MG: 5 TABLET ORAL at 07:55

## 2021-12-03 RX ADMIN — ENOXAPARIN SODIUM 70 MG: 80 INJECTION SUBCUTANEOUS at 07:56

## 2021-12-03 RX ADMIN — LABETALOL HYDROCHLORIDE 20 MG: 5 INJECTION, SOLUTION INTRAVENOUS at 08:16

## 2021-12-03 RX ADMIN — QUETIAPINE FUMARATE 100 MG: 100 TABLET ORAL at 05:42

## 2021-12-03 RX ADMIN — HYDROCHLOROTHIAZIDE 25 MG: 25 TABLET ORAL at 11:24

## 2021-12-03 ASSESSMENT — ACTIVITIES OF DAILY LIVING (ADL)
ADLS_ACUITY_SCORE: 17
ADLS_ACUITY_SCORE: 17
ADLS_ACUITY_SCORE: 19
ADLS_ACUITY_SCORE: 17
ADLS_ACUITY_SCORE: 19
ADLS_ACUITY_SCORE: 19
ADLS_ACUITY_SCORE: 17
ADLS_ACUITY_SCORE: 19
ADLS_ACUITY_SCORE: 17
ADLS_ACUITY_SCORE: 17
ADLS_ACUITY_SCORE: 19
ADLS_ACUITY_SCORE: 17
ADLS_ACUITY_SCORE: 19
ADLS_ACUITY_SCORE: 17

## 2021-12-03 NOTE — PLAN OF CARE
SLP: Hold - SLP orders received for swallow eval s/p prolonged intubation for COVID. Pt on BiPAP at time of attempt. Will reschedule for 12/4 as appropriate.

## 2021-12-03 NOTE — PROGRESS NOTES
Care Conference: 12/3    Family Attendance: Brad (brother) Kirit (brother) and mother via IPAD and Patient in room     Medical Team: MICU MARIAA, Palliative SW and MICU RNCC     Discussion: Discussion was held to provide medical update with family and patient. Patient is now extubated and following commands and has been making progress since getting extubated. Team stated that he could be medically ready to transfer to floor and then rehab soon if he continues to make progress. Family had appropriate questions and team was able to answer. Brandon stated that he does want his girlfriend to be able to visit and family agreed to let her visit with patient. RNCC will continue to follow for needs.       Plan:  Possible transfer to floor in the next coming days    Kelsey Gonzalez  RN Care Coordinator   MICU/SICU  [phone]

## 2021-12-03 NOTE — PLAN OF CARE
Major Shift Events:  Pt VSS, afebrile and A&Ox1. Pt was extubated today around 1230. Pt was put on bipap and has since been switched to HHF. Pt is on 40% FiO2. Pt is on dex. Pt started on cardene gtt to maintain SBP <160.  Pt having adequate UO. Pt TF stopped at this time, waiting to advance NG to [place].     Plan: place pt on bipap overnight.     For vital signs and complete assessments, please see documentation flowsheets.

## 2021-12-03 NOTE — PROGRESS NOTES
MEDICAL ICU PROGRESS NOTE  12/03/2021      Date of Service (when I saw the patient): 12/02/2021    ASSESSMENT: Brandon Schafer is a 42 YO M with PMH of remote VTE who was admitted on 11/3/2021 with ARDS 2/2 COVID pneumonia requiring intubation on 11/2. Course c/b CAUTI, polymicrobial VAP (11/11), and organizing pneumonia. Extubated to bipap 12/2.       PLAN:  - wean precedex to off  - wean nicardipine to off  - diuresis to goal net neg 1 L  - family care conference this afternoon  - Bipap PRN and overnoc  - start doxazosin  - remove arterial line       Neuro:  # Pain and sedation needs  # Delirium in the setting of prolonged critical illness   - Precedex gtt, wean as tolerated    - Discontinue Fentanyl prn boluses  - Acetaminophen PRN  - Stop scheduled quetiapine BID, add scheduled 50 mg q HS   - Discontinue Abilify 15 mg PO daily  - Haloperidol 5 mg Q6H prn  - lorazepam 0.5mg q 6H PRN (concern for benzo withdrawal   - delirium precautions      Pulmonary:  # ARDS 2/2 COVID pneumonia  # Possible organizing pneumonia  Intubated 11/2 prior to transport flight. CT on 11/25 with mild fibrotic changes, dense consolidations consistent with ARDS. Possible organizing pneumonia, started on methylprednisolone 60 mg x3 days. CT Chest 12/1/21 - no significant change. Extubated 12/2/21.  - Methylprednisolone 40 mg every day plan-> taper ordered  - HFNC as tolerated, Bipap while asleep and PRN   - IS q 2 hours while awake, flutter valve if problems clearing secretions  - Infectious treatment as below       # COPD exacerbation  Wheezing on exam on 11/4. S/p azithromycin for 5d. Steroids per Covid protocol.   - Continue albuterol nebs prn     Cardiovascular:  # Hypertension  -  Nicardipine gtt to keep SBP< 160 wean to off  -  Labetolol, hydralazine prn for SBP >160  - Not on anti-HTN meds PTA   - start amlodipine 5 mg daily  - catapres 0.1mg TID    # Hypotension, resolved  Transient, and in the setting of sedation. No prior TTE.  Troponin peaked at 0.293. EKG with T wave inversion in lateral leads. Suspect troponin was 2/2 demand ischemia in setting of hypoxia.     # Atrial fibrillation  Episode of atrial fibrillation overnight 11/12, hemodynamically stable and resolved spontaneously. TWI on EKG, troponin negative  - continuous tele     GI/Nutrition:  # At risk for malnutrition  # Constipation, improved Last BM 12/1  - resume TF in am   - SLP evaluation  - Tolerating TF at goal   - Bowel regimen: senna, Miralax, bisacodyl suppository prn     Renal/Fluids/Electrolytes:  # Non-oliguric STEFAN, resolved  # Hypokalemia, in setting of ongoing diuresis   # Volume status   # Urinary retention  Unclear baseline, has not seen doctor in 20 years. Cr 2.5 on admission, now improved. Uptrending BUN, creatinine.   - Trend BMP, recheck at 1600  - Goal net negative 1 L today  - Start 4mg doxazosin daily  - plan to remove stevenson catheter tomorrow      # Hypernatremia, resolved  - FWF 30ml q 4H     Endocrine:  # Stress and steroid induced hyperglycemia  - Medium resistance sliding scale insulin   - 20-> 10 U Glargine     ID:  # COVID pneumonia  Symptoms (headache, SOB, fever, cough, diarrhea) 1 week prior to presentation. Not COVID vaccinated. Tested positive on presentation on 11/2. S/p tocilizumab 11/4.  - Dexamethasone 6 mg daily (total 10 days) - completed     # Fever 11/17, resolved   # CAUTI  # VAP  Pt with worsening oxygen saturations, fevers, and persistent leukocytosis. Given his prolonged hospitalization, was started on broad-spectrum antibiotics on 11/11. Urine culture grew >100k pan-sensitive E coli. RVP and MRSA nares negative. Sputum grew E coli, S aureus, and GBS. Sputum culture 11/17 with 1+ E. coli and MSSA, blood cultures negative. Continued to have fevers as of 11/18, redraw blood cultures and stool tested negative for Cdiff.   - Continue to observe off antibiotics  Abx:  - Ceftriaxone 11/14-11/17; 11/19 - 11/25 (completed)  - Zosyn  11/11-11/14; 11/17 - 11/18  - Vanc 11/11-11/12      Hematology:    # Coagulopathy 2/2 COVID  US BLE negative for DVT.  - Enoxaparin ppx 40daily      MSK  # Septum wound, tongue wound 2/2 Proning   # weakness/deconditioning  - WOC following  - OT/PT consults      General Cares/Prophylaxis:    DVT Prophylaxis: Lovenox ppx 70 mg BID  GI Prophylaxis: PPI  Family Communication: BrotherBrad  Code Status: FULL CODE     Lines/tubes/drains:  - PIV x2  - Arterial line, remove today  - NG  - Rectal tube  - Flores (11/24 - )     Disposition:  - Medical ICU    Patient seen and findings/plan discussed with medical ICU staff, Dr. Persaud.    Marj Hoffman, CNP  MICU 1, [id number]    ====================================  INTERVAL HISTORY:   Extubated to bipap yesterday. Tolerated HFNC for part of the evening at 40% 50 LPM .    OBJECTIVE:   1. VITAL SIGNS:   Temp:  [98.2  F (36.8  C)-99.1  F (37.3  C)] 98.5  F (36.9  C)  Pulse:  [] 108  Resp:  [18-27] 18  BP: (100-126)/(52-62) 126/62  MAP:  [54 mmHg-126 mmHg] 88 mmHg  Arterial Line BP: ()/(42-92) 129/69  FiO2 (%):  [40 %] 40 %  SpO2:  [89 %-98 %] 96 %  Ventilation Mode: CPAP/PS  (Continuous positive airway pressure with Pressure Support)  FiO2 (%): 40 %  Rate Set (breaths/minute): 24 breaths/min  Tidal Volume Set (mL): 480 mL  PEEP (cm H2O): 8 cmH2O  Pressure Support (cm H2O): 10 cmH2O  Oxygen Concentration (%): 40 %  Resp: 18    2. INTAKE/ OUTPUT:   I/O last 3 completed shifts:  In: 2618.1 [I.V.:883.1; NG/GT:630]  Out: 4130 [Urine:4130]    3. PHYSICAL EXAMINATION:  General: adult man, laying in bed, in NAD  HEENT: Mucous membranes moist  Neuro: awake, moving all extremities, following commands, answering questions appropriately  Pulm/Resp: Clear breath sounds bilaterally without rhonchi, crackles or wheeze, breathing somewhat labored  CV: RRR, S1/S2 without m/r/g  Abdomen: Obese, soft, non-distended, non-tender, +BS  : Flores catheter in place, urine yellow and  clear  Incisions/Skin: No rashes noted    4. LABS:   Arterial Blood Gases   Recent Labs   Lab 12/02/21  1553 12/02/21  0405 12/01/21  0409 11/30/21  0506   PH 7.47* 7.41 7.42 7.42   PCO2 39 47* 47* 44   PO2 85 80 83 98   HCO3 28 30* 30* 29*     Complete Blood Count   Recent Labs   Lab 12/02/21  0404 12/01/21  0409 11/30/21  0447 11/29/21  0432   WBC 13.5* 16.4* 9.4 10.3   HGB 10.6* 11.1* 11.0* 10.2*    306 303 279     Basic Metabolic Panel  Recent Labs   Lab 12/02/21  2018 12/02/21  1556 12/02/21  1157 12/02/21  0757 12/02/21  0411 12/02/21  0404 12/01/21  0816 12/01/21  0409 11/30/21  0806 11/30/21  0447 11/29/21  1542 11/29/21  1427 11/29/21  0740 11/29/21  0432   NA  --   --   --   --   --  136  --  134  --  136  --  135  --  135   POTASSIUM  --   --   --   --   --  3.8  --  4.5  --  4.9  --   --   --  4.4   CHLORIDE  --   --   --   --   --  100  --  100  --  103  --   --   --  103   CO2  --   --   --   --   --  29  --  28  --  26  --   --   --  25   BUN  --   --   --   --   --  40*  --  34*  --  30  --   --   --  31*   CR  --   --   --   --   --  0.83  --  0.74  --  0.76  --   --   --  0.68   * 208* 192* 158*   < > 149*   < > 208*   < > 202*   < >  --    < > 222*    < > = values in this interval not displayed.     5. RADIOLOGY:   No results found for this or any previous visit (from the past 24 hour(s)).

## 2021-12-04 ENCOUNTER — APPOINTMENT (OUTPATIENT)
Dept: OCCUPATIONAL THERAPY | Facility: CLINIC | Age: 41
End: 2021-12-04
Attending: NURSE PRACTITIONER
Payer: MEDICAID

## 2021-12-04 ENCOUNTER — APPOINTMENT (OUTPATIENT)
Dept: SPEECH THERAPY | Facility: CLINIC | Age: 41
End: 2021-12-04
Attending: STUDENT IN AN ORGANIZED HEALTH CARE EDUCATION/TRAINING PROGRAM
Payer: MEDICAID

## 2021-12-04 LAB
ANION GAP SERPL CALCULATED.3IONS-SCNC: 10 MMOL/L (ref 3–14)
BASE EXCESS BLDA CALC-SCNC: 4.8 MMOL/L (ref -9–1.8)
BUN SERPL-MCNC: 29 MG/DL (ref 7–30)
CALCIUM SERPL-MCNC: 9.2 MG/DL (ref 8.5–10.1)
CHLORIDE BLD-SCNC: 100 MMOL/L (ref 94–109)
CO2 SERPL-SCNC: 27 MMOL/L (ref 20–32)
CREAT SERPL-MCNC: 0.75 MG/DL (ref 0.66–1.25)
ERYTHROCYTE [DISTWIDTH] IN BLOOD BY AUTOMATED COUNT: 15.9 % (ref 10–15)
GFR SERPL CREATININE-BSD FRML MDRD: >90 ML/MIN/1.73M2
GLUCOSE BLD-MCNC: 169 MG/DL (ref 70–99)
GLUCOSE BLDC GLUCOMTR-MCNC: 133 MG/DL (ref 70–99)
GLUCOSE BLDC GLUCOMTR-MCNC: 143 MG/DL (ref 70–99)
GLUCOSE BLDC GLUCOMTR-MCNC: 148 MG/DL (ref 70–99)
GLUCOSE BLDC GLUCOMTR-MCNC: 151 MG/DL (ref 70–99)
GLUCOSE BLDC GLUCOMTR-MCNC: 175 MG/DL (ref 70–99)
HCO3 BLD-SCNC: 28 MMOL/L (ref 21–28)
HCT VFR BLD AUTO: 30.7 % (ref 40–53)
HGB BLD-MCNC: 9.7 G/DL (ref 13.3–17.7)
MAGNESIUM SERPL-MCNC: 2.4 MG/DL (ref 1.6–2.3)
MCH RBC QN AUTO: 27.5 PG (ref 26.5–33)
MCHC RBC AUTO-ENTMCNC: 31.6 G/DL (ref 31.5–36.5)
MCV RBC AUTO: 87 FL (ref 78–100)
O2/TOTAL GAS SETTING VFR VENT: 40 %
PCO2 BLD: 36 MM HG (ref 35–45)
PH BLD: 7.5 [PH] (ref 7.35–7.45)
PLATELET # BLD AUTO: 340 10E3/UL (ref 150–450)
PO2 BLD: 82 MM HG (ref 80–105)
POTASSIUM BLD-SCNC: 3.8 MMOL/L (ref 3.4–5.3)
RBC # BLD AUTO: 3.53 10E6/UL (ref 4.4–5.9)
SODIUM SERPL-SCNC: 137 MMOL/L (ref 133–144)
WBC # BLD AUTO: 14.2 10E3/UL (ref 4–11)

## 2021-12-04 PROCEDURE — 92610 EVALUATE SWALLOWING FUNCTION: CPT | Mod: GN

## 2021-12-04 PROCEDURE — 97535 SELF CARE MNGMENT TRAINING: CPT | Mod: GO

## 2021-12-04 PROCEDURE — 80048 BASIC METABOLIC PNL TOTAL CA: CPT | Performed by: STUDENT IN AN ORGANIZED HEALTH CARE EDUCATION/TRAINING PROGRAM

## 2021-12-04 PROCEDURE — 200N000002 HC R&B ICU UMMC

## 2021-12-04 PROCEDURE — 250N000011 HC RX IP 250 OP 636: Performed by: NURSE PRACTITIONER

## 2021-12-04 PROCEDURE — 250N000013 HC RX MED GY IP 250 OP 250 PS 637: Performed by: INTERNAL MEDICINE

## 2021-12-04 PROCEDURE — 82803 BLOOD GASES ANY COMBINATION: CPT | Performed by: STUDENT IN AN ORGANIZED HEALTH CARE EDUCATION/TRAINING PROGRAM

## 2021-12-04 PROCEDURE — 250N000011 HC RX IP 250 OP 636: Performed by: STUDENT IN AN ORGANIZED HEALTH CARE EDUCATION/TRAINING PROGRAM

## 2021-12-04 PROCEDURE — 250N000013 HC RX MED GY IP 250 OP 250 PS 637: Performed by: STUDENT IN AN ORGANIZED HEALTH CARE EDUCATION/TRAINING PROGRAM

## 2021-12-04 PROCEDURE — 258N000003 HC RX IP 258 OP 636: Performed by: NURSE PRACTITIONER

## 2021-12-04 PROCEDURE — 250N000013 HC RX MED GY IP 250 OP 250 PS 637: Performed by: NURSE PRACTITIONER

## 2021-12-04 PROCEDURE — 99291 CRITICAL CARE FIRST HOUR: CPT | Mod: GC | Performed by: INTERNAL MEDICINE

## 2021-12-04 PROCEDURE — 97110 THERAPEUTIC EXERCISES: CPT | Mod: GO

## 2021-12-04 PROCEDURE — 83735 ASSAY OF MAGNESIUM: CPT | Performed by: STUDENT IN AN ORGANIZED HEALTH CARE EDUCATION/TRAINING PROGRAM

## 2021-12-04 PROCEDURE — 92526 ORAL FUNCTION THERAPY: CPT | Mod: GN

## 2021-12-04 PROCEDURE — 85027 COMPLETE CBC AUTOMATED: CPT | Performed by: STUDENT IN AN ORGANIZED HEALTH CARE EDUCATION/TRAINING PROGRAM

## 2021-12-04 PROCEDURE — 250N000013 HC RX MED GY IP 250 OP 250 PS 637

## 2021-12-04 PROCEDURE — 36415 COLL VENOUS BLD VENIPUNCTURE: CPT | Performed by: STUDENT IN AN ORGANIZED HEALTH CARE EDUCATION/TRAINING PROGRAM

## 2021-12-04 PROCEDURE — 999N000127 HC STATISTIC PERIPHERAL IV START W US GUIDANCE

## 2021-12-04 PROCEDURE — 250N000009 HC RX 250: Performed by: NURSE PRACTITIONER

## 2021-12-04 PROCEDURE — 97165 OT EVAL LOW COMPLEX 30 MIN: CPT | Mod: GO

## 2021-12-04 RX ORDER — FUROSEMIDE 10 MG/ML
20 INJECTION INTRAMUSCULAR; INTRAVENOUS ONCE
Status: COMPLETED | OUTPATIENT
Start: 2021-12-04 | End: 2021-12-04

## 2021-12-04 RX ORDER — QUETIAPINE FUMARATE 25 MG/1
25 TABLET, FILM COATED ORAL DAILY
Status: DISCONTINUED | OUTPATIENT
Start: 2021-12-04 | End: 2021-12-06 | Stop reason: HOSPADM

## 2021-12-04 RX ADMIN — AMLODIPINE BESYLATE 5 MG: 5 TABLET ORAL at 08:37

## 2021-12-04 RX ADMIN — Medication 2 PACKET: at 08:38

## 2021-12-04 RX ADMIN — Medication 2 PACKET: at 21:13

## 2021-12-04 RX ADMIN — INSULIN ASPART 1 UNITS: 100 INJECTION, SOLUTION INTRAVENOUS; SUBCUTANEOUS at 15:54

## 2021-12-04 RX ADMIN — INSULIN ASPART 1 UNITS: 100 INJECTION, SOLUTION INTRAVENOUS; SUBCUTANEOUS at 00:06

## 2021-12-04 RX ADMIN — DOXAZOSIN 4 MG: 4 TABLET ORAL at 08:37

## 2021-12-04 RX ADMIN — FUROSEMIDE 20 MG: 10 INJECTION, SOLUTION INTRAVENOUS at 06:33

## 2021-12-04 RX ADMIN — QUETIAPINE FUMARATE 50 MG: 50 TABLET ORAL at 21:13

## 2021-12-04 RX ADMIN — METHYLPREDNISOLONE SODIUM SUCCINATE 40 MG: 40 INJECTION, POWDER, FOR SOLUTION INTRAMUSCULAR; INTRAVENOUS at 10:11

## 2021-12-04 RX ADMIN — INSULIN ASPART 1 UNITS: 100 INJECTION, SOLUTION INTRAVENOUS; SUBCUTANEOUS at 08:37

## 2021-12-04 RX ADMIN — INSULIN ASPART 1 UNITS: 100 INJECTION, SOLUTION INTRAVENOUS; SUBCUTANEOUS at 04:06

## 2021-12-04 RX ADMIN — Medication 10 MG: at 21:13

## 2021-12-04 RX ADMIN — ENOXAPARIN SODIUM 40 MG: 40 INJECTION SUBCUTANEOUS at 08:37

## 2021-12-04 RX ADMIN — CLONIDINE HYDROCHLORIDE 0.1 MG: 0.1 TABLET ORAL at 21:13

## 2021-12-04 RX ADMIN — DEXMEDETOMIDINE 0.4 MCG/KG/HR: 100 INJECTION, SOLUTION, CONCENTRATE INTRAVENOUS at 21:50

## 2021-12-04 RX ADMIN — QUETIAPINE FUMARATE 25 MG: 25 TABLET ORAL at 12:08

## 2021-12-04 RX ADMIN — Medication 40 MG: at 08:37

## 2021-12-04 RX ADMIN — Medication 1 TABLET: at 08:37

## 2021-12-04 RX ADMIN — CLONIDINE HYDROCHLORIDE 0.1 MG: 0.1 TABLET ORAL at 15:52

## 2021-12-04 RX ADMIN — CLONIDINE HYDROCHLORIDE 0.1 MG: 0.1 TABLET ORAL at 06:00

## 2021-12-04 RX ADMIN — DEXMEDETOMIDINE 0.4 MCG/KG/HR: 100 INJECTION, SOLUTION, CONCENTRATE INTRAVENOUS at 06:00

## 2021-12-04 ASSESSMENT — ACTIVITIES OF DAILY LIVING (ADL)
ADLS_ACUITY_SCORE: 17
ADLS_ACUITY_SCORE: 19
ADLS_ACUITY_SCORE: 19
ADLS_ACUITY_SCORE: 17
ADLS_ACUITY_SCORE: 19
ADLS_ACUITY_SCORE: 17
ADLS_ACUITY_SCORE: 17
ADLS_ACUITY_SCORE: 19
ADLS_ACUITY_SCORE: 19
PREVIOUS_RESPONSIBILITIES: MEAL PREP;HOUSEKEEPING;LAUNDRY;SHOPPING;MEDICATION MANAGEMENT;FINANCES;DRIVING;WORK
ADLS_ACUITY_SCORE: 19
ADLS_ACUITY_SCORE: 19
ADLS_ACUITY_SCORE: 17
ADLS_ACUITY_SCORE: 19
IADL_COMMENTS: PT WAS PREVIOUSLY INDEPENDENT WITH IADL COMPLETION
ADLS_ACUITY_SCORE: 17

## 2021-12-04 NOTE — PROGRESS NOTES
MEDICAL ICU PROGRESS NOTE  12/04/2021      Date of Service (when I saw the patient): 12/04/2021    ASSESSMENT: Brandon Schafer is a 42 YO M with PMH of remote VTE who was admitted on 11/3/2021 with ARDS 2/2 COVID pneumonia requiring intubation on 11/2. Course c/b CAUTI, polymicrobial VAP (11/11), and organizing pneumonia. Extubated to bipap 12/2.       PLAN:  - Add Quetiapine 25mg daily in addition to 50QHS  - Add Melatonin scheduled   -Will check 0000 I/O check goal -500-1L, got Lasix this AM  - If BP problematic increase clonidine to 0.2  - Will consider pulling stevenson Monday   - Updated family at 12:30 pm    Neuro:  # Pain and sedation needs  # Delirium in the setting of prolonged critical illness   - Precedex gtt, wean as tolerated    - Acetaminophen PRN  - Added 25 mg, scheduled 50 mg q HS   - Haloperidol 5 mg Q6H prn  - lorazepam 0.5mg q 6H PRN (concern for benzo withdrawal)   - Melatonin scheduled  - delirium precautions      Pulmonary:  # ARDS 2/2 COVID pneumonia  # Possible organizing pneumonia  Intubated 11/2 prior to transport flight. CT on 11/25 with mild fibrotic changes, dense consolidations consistent with ARDS. Possible organizing pneumonia, started on methylprednisolone 60 mg x3 days. CT Chest 12/1/21 - no significant change. Extubated 12/2/21.  - Methylprednisolone 40 mg every day plan-> taper ordered  - HFNC as tolerated, Bipap while asleep and PRN   - IS q 2 hours while awake, flutter valve if problems clearing secretions  - Infectious treatment as below    # COPD exacerbation  Wheezing on exam on 11/4. S/p azithromycin for 5d. Steroids per Covid protocol.   - Continue albuterol nebs prn     Cardiovascular:  # Hypertension  -  Labetolol, hydralazine prn for SBP >160  - Not on anti-HTN meds PTA   - amlodipine 5 mg daily  - catapres 0.1mg TID    # Hypotension, resolved  Transient, and in the setting of sedation. No prior TTE. Troponin peaked at 0.293. EKG with T wave inversion in lateral leads.  Suspect troponin was 2/2 demand ischemia in setting of hypoxia.     # Atrial fibrillation  Episode of atrial fibrillation overnight 11/12, hemodynamically stable and resolved spontaneously. TWI on EKG, troponin negative  - continuous tele     GI/Nutrition:  # At risk for malnutrition  # Constipation, improved Last BM 12/1  - Ctn TF at set rate  - SLP evaluation  - Bowel regimen: senna, Miralax, bisacodyl suppository prn     Renal/Fluids/Electrolytes:  # Non-oliguric STEFAN, resolved  # Hypokalemia, in setting of ongoing diuresis   # Volume status   # Urinary retention  Unclear baseline, has not seen doctor in 20 years. Cr 2.5 on admission, now improved. Uptrending BUN, creatinine.   - Goal net negative 0.5-1 L today  - 4mg doxazosin daily  - plan to remove stevenson catheter Monday if able     # Hypernatremia, resolved  - FWF 30ml q 4H     Endocrine:  # Stress and steroid induced hyperglycemia  - Medium resistance sliding scale insulin   - 10 U Glargine     ID:  # COVID pneumonia  Symptoms (headache, SOB, fever, cough, diarrhea) 1 week prior to presentation. Not COVID vaccinated. Tested positive on presentation on 11/2. S/p tocilizumab 11/4.  - Dexamethasone 6 mg daily (total 10 days) - completed     # Fever 11/17, resolved   # CAUTI  # VAP  Pt with worsening oxygen saturations, fevers, and persistent leukocytosis. Given his prolonged hospitalization, was started on broad-spectrum antibiotics on 11/11. Urine culture grew >100k pan-sensitive E coli. RVP and MRSA nares negative. Sputum grew E coli, S aureus, and GBS. Sputum culture 11/17 with 1+ E. coli and MSSA, blood cultures negative. Continued to have fevers as of 11/18, redraw blood cultures and stool tested negative for Cdiff.   - Continue to observe off antibiotics  Abx:  - Ceftriaxone 11/14-11/17; 11/19 - 11/25 (completed)  - Zosyn 11/11-11/14; 11/17 - 11/18  - Vanc 11/11-11/12      Hematology:    # Coagulopathy 2/2 COVID  US BLE negative for  DVT.  - Enoxaparin ppx 40daily      MSK  # Septum wound, tongue wound 2/2 Proning   # weakness/deconditioning  - WOC following  - OT/PT consults      General Cares/Prophylaxis:    DVT Prophylaxis: Lovenox ppx 70 mg BID  GI Prophylaxis: PPI  Family Communication: BrothBrad banks  Code Status: FULL CODE     Lines/tubes/drains:  - PIV x2  - NG  - Rectal tube  - Flores (11/24 - )     Disposition:  - Medical ICU    Patient seen and findings/plan discussed with medical ICU staff, Dr. Persaud.    Thong Cox Jr., MD  [facility], [phone]  Pager: [phone]    ====================================  INTERVAL HISTORY:   NAEON.    OBJECTIVE:   1. VITAL SIGNS:   Temp:  [98.3  F (36.8  C)-99.7  F (37.6  C)] 99.7  F (37.6  C)  Pulse:  [] 95  Resp:  [26-42] 28  BP: (111-173)/() 114/73  FiO2 (%):  [40 %-50 %] 40 %  SpO2:  [92 %-99 %] 97 %  FiO2 (%): 40 %  Resp: 28    2. INTAKE/ OUTPUT:   I/O last 3 completed shifts:  In: 1771.35 [I.V.:691.35; NG/GT:660]  Out: 3400 [Urine:3400]    3. PHYSICAL EXAMINATION:  General: adult man, laying in bed, in NAD  HEENT: Mucous membranes moist  Neuro: awake, moving all extremities, following commands, answering questions appropriately  Pulm/Resp: Clear breath sounds bilaterally without rhonchi, crackles or wheeze, breathing somewhat labored  CV: RRR, S1/S2 without m/r/g  Abdomen: Obese, soft, non-distended, non-tender, +BS  : Flores catheter in place, urine yellow and clear  Incisions/Skin: No rashes noted    4. LABS:   Arterial Blood Gases   Recent Labs   Lab 12/04/21  0509 12/03/21  1056 12/02/21  2324 12/02/21  1553   PH 7.50* 7.51* 7.48* 7.47*   PCO2 36 34* 39 39   PO2 82 132* 83 85   HCO3 28 27 29* 28     Complete Blood Count   Recent Labs   Lab 12/04/21  0534 12/03/21  0348 12/02/21  0404 12/01/21  0409   WBC 14.2* 14.1* 13.5* 16.4*   HGB 9.7* 11.1* 10.6* 11.1*    311 295 306     Basic Metabolic Panel  Recent Labs   Lab 12/04/21  0836 12/04/21  0534 12/04/21  0404  12/04/21  0005 12/03/21  1943 12/03/21  1728 12/03/21  0353 12/03/21  0348 12/02/21  0411 12/02/21  0404   NA  --  137  --   --   --  134  --  135  --  136   POTASSIUM  --  3.8  --   --   --  4.0  --  3.7  --  3.8   CHLORIDE  --  100  --   --   --  100  --  102  --  100   CO2  --  27  --   --   --  26  --  28  --  29   BUN  --  29  --   --   --  24  --  23  --  40*   CR  --  0.75  --   --   --  0.72  --  0.62*  --  0.83   * 169* 148* 143*   < > 196*   < > 147*   < > 149*    < > = values in this interval not displayed.     5. RADIOLOGY:   No results found for this or any previous visit (from the past 24 hour(s)).

## 2021-12-04 NOTE — PROGRESS NOTES
12/04/21 1120   General Information   Onset of Illness/Injury or Date of Surgery 11/03/21   Referring Physician Jw Crowder MD   Patient/Family Therapy Goal Statement (SLP) None stated   Pertinent History of Current Problem Pt is a 40 YO M with PMH of remote VTE who was admitted on 11/3/2021 with ARDS 2/2 COVID pneumonia requiring intubation on 11/2. Course c/b CAUTI, polymicrobial VAP (11/11), and organizing pneumonia. Extubated to bipap 12/2. Clinical swallow eval completed per MD order.    General Observations Alert but confused   Past History of Dysphagia None per chart   Type of Evaluation   Type of Evaluation Swallow Evaluation   Oral Motor   Oral Musculature   (generalized weakness)   Structural Abnormalities none present   Mucosal Quality dry   Dentition (Oral Motor)   Dentition (Oral Motor) natural dentition   Facial Symmetry (Oral Motor)   Facial Symmetry (Oral Motor) WNL   Lip Function (Oral Motor)   Lip Range of Motion (Oral Motor) WNL   Tongue Function (Oral Motor)   Tongue ROM (Oral Motor) WNL   Cough/Swallow/Gag Reflex (Oral Motor)   Comment, Cough/Swallow/Gag Reflex (Oral Motor) adequate   Vocal Quality/Secretion Management (Oral Motor)   Comment, Vocal Quality/Secretion Management (Oral Motor) breathy, weak   General Swallowing Observations   Current Diet/Method of Nutritional Intake (General Swallowing Observations, NIS) NPO;nasogastric tube (NG)   Respiratory Support (General Swallowing Observations) nasal cannula  (HFNC 40%FiO2, 40LPM)   Swallowing Evaluation Clinical swallow evaluation   Clinical Swallow Evaluation   Feeding Assistance dependent   Clinical Swallow Evaluation Textures Trialed thin liquids;pureed   Clinical Swallow Eval: Thin Liquid Texture Trial   Mode of Presentation, Thin Liquids spoon;cup;straw;fed by clinician   Volume of Liquid or Food Presented 4oz thin water   Oral Phase of Swallow WFL   Pharyngeal Phase of Swallow intact   Diagnostic Statement No s/sx of aspiration    Clinical Swallow Evaluation: Puree Solid Texture Trial   Mode of Presentation, Puree spoon;fed by clinician   Volume of Puree Presented 3oz applesauce   Oral Phase, Puree WFL   Pharyngeal Phase, Puree intact   Diagnostic Statement WFL, no overt s/sx of aspiration   Esophageal Phase of Swallow   Patient reports or presents with symptoms of esophageal dysphagia No   Swallowing Recommendations   Diet Consistency Recommendations thin liquids (level 0);full liquid diet   Supervision Level for Intake 1:1 supervision needed   Mode of Delivery Recommendations bolus size, small;food moistened;slow rate of intake   Monitoring/Assistance Required (Eating/Swallowing) stop eating activities when fatigue is present;monitor for cough or change in vocal quality with intake   Recommended Feeding/Eating Techniques (Swallow Eval) maintain upright sitting position for eating   Medication Administration Recommendations, Swallowing (SLP) via [place]   Instrumental Assessment Recommendations   (pending progress)   General Therapy Interventions   Planned Therapy Interventions Dysphagia Treatment   Dysphagia treatment Oropharyngeal exercise training;Modified diet education;Instruction of safe swallow strategies;Compensatory strategies for swallowing   SLP Therapy Assessment/Plan   Criteria for Skilled Therapeutic Interventions Met (SLP Eval) yes;treatment indicated   SLP Diagnosis Dysphagia s/p prolonged intubation (1 month)   Rehab Potential (SLP Eval) good, to achieve stated therapy goals   Therapy Frequency (SLP Eval) 5 times/wk   Predicted Duration of Therapy Intervention (SLP Eval) 2 weeks   Comment, Therapy Assessment/Plan (SLP) Clinical swallow eval completed per MD order. Pt presents with dysphagia s/p prolonged intubation (1 month) for COVID pneumonia. Pt on HFNC 40%FiO2, 40LPM. Vocal quality breathy/hoarse. Pt assessed with thin liquids and puree. Swallow functional, no s/sx of aspiration. Recommend full liquid diet (thin  consistency) with 1:1 assistance. Ensure the pt is upright and alert for all PO. SLP will follow.   Therapy Plan Review/Discharge Plan (SLP)   Therapy Plan Review (SLP) evaluation/treatment results reviewed;care plan/treatment goals reviewed;risks/benefits reviewed;current/potential barriers reviewed;participants voiced agreement with care plan;participants included;patient   Demonstrates Need for Referral to Another Service (SLP) clinical nutrition services/dietitian   SLP Discharge Planning    SLP Discharge Recommendation (DC Rec) Transitional Care Facility;home with home care speech therapy   SLP Rationale for DC Rec Dysphagia; should pt d/c home today, he would require a modified diet and ongoing SLP services   SLP Brief overview of current status  Recommend full liquid diet (thin consistency) with 1:1 assistance. SLP will follow. (P)     Total Evaluation Time   Total Evaluation Time (Minutes) 17

## 2021-12-04 NOTE — PLAN OF CARE
Continue to monitor and give prn's for bp > 160 mm hg. Refer to emar for meds given. Has denied any pain today. On precedex for possible narcotic/benzodiazapine withdrawal symptoms. Refer to emar for dosing. Has been on HFNC and bipap throughout at 40% fio2 and will continue to monitor. Tube feedings started at 30cc/hr and tolerating with no problems. Refer to flowsheets for documentation.

## 2021-12-04 NOTE — PLAN OF CARE
See vital signs and flowsheets for detailed information.      Neuro: Pt alert and oriented to self only. Easily reoriented to time, place, and situation. Follows commands. ROSALES with severe weakness. Precedex weaned to 0.4 mcg/kg/hr.   Pulmonary: productive strong intermittent coughing noted.  Small amount of thick creamy secretions expectorated after coughing. Lungs remain clear/diminished/intermittent wheezing noted.   Cardiac: SR/ST noted on monitor with max 's. Afebrile. No ectopy observed. Tolerating HTN regimen.  GI: Tube feedings infusing at 30 ml/hr with 30 ml H2O flushes every 4 hours. Medications admin. Without difficulty via NGT.    : Flores catheter remains intact and patent with straw colored urine noted in bag.   Skin: perineum and scrotal area with redness. Small skin tear noted right upper arm.   IV: pt with PIV x2 unremarkable.      Plan: Continue to follow POC and notify MD of any changes or concerns that arise

## 2021-12-04 NOTE — PROGRESS NOTES
12/04/21 0900   Quick Adds   Type of Visit Initial Occupational Therapy Evaluation       Present no   Language english   Living Environment   People in home child(rosey), adult;child(rosey), dependent   Current Living Arrangements apartment   Home Accessibility stairs to enter home   Number of Stairs, Main Entrance greater than 10 stairs  (2 flights)   Transportation Anticipated car, drives self   Living Environment Comments Pt lives in an apartment with his 3 children. The oldest is 18 and the youngest is 1 years old. Pt has 2 flights of stairs to enter the apartment with no elevator present   Self-Care   Usual Activity Tolerance good   Current Activity Tolerance poor   Regular Exercise No   Equipment Currently Used at Home none   Activity/Exercise/Self-Care Comment Pt was previously independent with ADL completion   Instrumental Activities of Daily Living (IADL)   Previous Responsibilities meal prep;housekeeping;laundry;shopping;medication management;finances;driving;work   IADL Comments Pt was previously independent with IADL completion   Disability/Function   Hearing Difficulty or Deaf no   Wear Glasses or Blind no   Concentrating, Remembering or Making Decisions Difficulty no   Difficulty Communicating no   Difficulty Eating/Swallowing no   Walking or Climbing Stairs Difficulty no   Dressing/Bathing Difficulty no   Toileting issues no   Doing Errands Independently Difficulty (such as shopping) no   Fall history within last six months no   Change in Functional Status Since Onset of Current Illness/Injury yes   General Information   Onset of Illness/Injury or Date of Surgery 11/03/21   Referring Physician Marj Hoffman CNP   Patient/Family Therapy Goal Statement (OT) To return home   Additional Occupational Profile Info/Pertinent History of Current Problem Brandon Schfaer is a 42 YO M with PMH of remote VTE who was admitted on 11/3/2021 with ARDS 2/2 COVID pneumonia requiring intubation on  11/2. Course c/b CAUTI, polymicrobial VAP (11/11), and organizing pneumonia. Extubated to bipap 12/2.     Existing Precautions/Restrictions fall;oxygen therapy device and L/min   Limitations/Impairments safety/cognitive   Left Upper Extremity (Weight-bearing Status) full weight-bearing (FWB)   Right Upper Extremity (Weight-bearing Status) full weight-bearing (FWB)   Left Lower Extremity (Weight-bearing Status) full weight-bearing (FWB)   Right Lower Extremity (Weight-bearing Status) full weight-bearing (FWB)   Heart Disease Risk Factors Overweight;Medical history   Cognitive Status Examination   Orientation Status person;place   Affect/Mental Status (Cognitive) low arousal/lethargic   Follows Commands follows one-step commands   Cognitive Status Comments Pt is able to follow simple commands. Pt is oriented to person, place, month and year. Will continue to monitor cognition   Visual Perception   Visual Impairment/Limitations WNL   Sensory   Sensory Quick Adds No deficits were identified   Pain Assessment   Patient Currently in Pain No   Integumentary/Edema   Integumentary/Edema no deficits were identifed   Range of Motion Comprehensive   General Range of Motion no range of motion deficits identified   Comment, General Range of Motion BUE PROM WFL   Strength Comprehensive (MMT)   Comment, General Manual Muscle Testing (MMT) Assessment Not formally assessed, pt presents with generalized weakness   Activities of Daily Living   BADL Assessment grooming   Grooming Assessment   [place] Level (Grooming) contact guard assist   Clinical Impression   Criteria for Skilled Therapeutic Interventions Met (OT) yes;meets criteria;skilled treatment is necessary   OT Diagnosis decreased activity tolerance and independence with ADL completion   OT Problem List-Impairments impacting ADL problems related to;activity tolerance impaired;cognition;balance;coordination;range of motion (ROM);strength   Assessment of Occupational  Performance 5 or more Performance Deficits   Identified Performance Deficits g/h, toileting, bathing, dressing, functional mobility and cognition   Planned Therapy Interventions (OT) ADL retraining;IADL retraining;cognition;ROM;strengthening;progressive activity/exercise   Clinical Decision Making Complexity (OT) low complexity   Therapy Frequency (OT) 6x/week   Predicted Duration of Therapy 1 week   Anticipated Equipment Needs Upon Discharge (OT) other (see comments)  (TBD)   Risk & Benefits of therapy have been explained evaluation/treatment results reviewed;care plan/treatment goals reviewed;risks/benefits reviewed;current/potential barriers reviewed;participants voiced agreement with care plan;participants included;patient   OT Discharge Planning    OT Discharge Recommendation (DC Rec) Acute [facility]-Motivated patient will benefit from intensive, interdisciplinary therapy.  Anticipate will be able to tolerate 3 hours of therapy per day;Home with assist;home with home care occupational therapy   OT Rationale for DC Rec Pt is below baseline and would benefit from continued skilled therapy to increase activity tolerance and independence with ADL completion. If pt were to discharge to home, pt would require 24/7 assist, OH lift, wc, commode, other ADL AE   OT Brief overview of current status  OH lift   Total Evaluation Time (Minutes)   Total Evaluation Time (Minutes) 8

## 2021-12-05 ENCOUNTER — APPOINTMENT (OUTPATIENT)
Dept: SPEECH THERAPY | Facility: CLINIC | Age: 41
End: 2021-12-05
Attending: STUDENT IN AN ORGANIZED HEALTH CARE EDUCATION/TRAINING PROGRAM
Payer: MEDICAID

## 2021-12-05 ENCOUNTER — APPOINTMENT (OUTPATIENT)
Dept: PHYSICAL THERAPY | Facility: CLINIC | Age: 41
End: 2021-12-05
Attending: NURSE PRACTITIONER
Payer: MEDICAID

## 2021-12-05 LAB
ANION GAP SERPL CALCULATED.3IONS-SCNC: 6 MMOL/L (ref 3–14)
BUN SERPL-MCNC: 31 MG/DL (ref 7–30)
CALCIUM SERPL-MCNC: 9.3 MG/DL (ref 8.5–10.1)
CHLORIDE BLD-SCNC: 103 MMOL/L (ref 94–109)
CO2 SERPL-SCNC: 26 MMOL/L (ref 20–32)
CREAT SERPL-MCNC: 0.8 MG/DL (ref 0.66–1.25)
ERYTHROCYTE [DISTWIDTH] IN BLOOD BY AUTOMATED COUNT: 15.8 % (ref 10–15)
GFR SERPL CREATININE-BSD FRML MDRD: >90 ML/MIN/1.73M2
GLUCOSE BLD-MCNC: 151 MG/DL (ref 70–99)
GLUCOSE BLDC GLUCOMTR-MCNC: 130 MG/DL (ref 70–99)
GLUCOSE BLDC GLUCOMTR-MCNC: 132 MG/DL (ref 70–99)
GLUCOSE BLDC GLUCOMTR-MCNC: 133 MG/DL (ref 70–99)
GLUCOSE BLDC GLUCOMTR-MCNC: 146 MG/DL (ref 70–99)
GLUCOSE BLDC GLUCOMTR-MCNC: 147 MG/DL (ref 70–99)
GLUCOSE BLDC GLUCOMTR-MCNC: 161 MG/DL (ref 70–99)
GLUCOSE BLDC GLUCOMTR-MCNC: 163 MG/DL (ref 70–99)
GLUCOSE BLDC GLUCOMTR-MCNC: 177 MG/DL (ref 70–99)
HCT VFR BLD AUTO: 37.2 % (ref 40–53)
HGB BLD-MCNC: 11.5 G/DL (ref 13.3–17.7)
MAGNESIUM SERPL-MCNC: 2.2 MG/DL (ref 1.6–2.3)
MCH RBC QN AUTO: 27.3 PG (ref 26.5–33)
MCHC RBC AUTO-ENTMCNC: 30.9 G/DL (ref 31.5–36.5)
MCV RBC AUTO: 88 FL (ref 78–100)
PLATELET # BLD AUTO: 273 10E3/UL (ref 150–450)
POTASSIUM BLD-SCNC: 3.9 MMOL/L (ref 3.4–5.3)
RBC # BLD AUTO: 4.21 10E6/UL (ref 4.4–5.9)
SODIUM SERPL-SCNC: 135 MMOL/L (ref 133–144)
WBC # BLD AUTO: 9.3 10E3/UL (ref 4–11)

## 2021-12-05 PROCEDURE — 250N000011 HC RX IP 250 OP 636: Performed by: NURSE PRACTITIONER

## 2021-12-05 PROCEDURE — 200N000002 HC R&B ICU UMMC

## 2021-12-05 PROCEDURE — 250N000013 HC RX MED GY IP 250 OP 250 PS 637: Performed by: STUDENT IN AN ORGANIZED HEALTH CARE EDUCATION/TRAINING PROGRAM

## 2021-12-05 PROCEDURE — 250N000013 HC RX MED GY IP 250 OP 250 PS 637: Performed by: NURSE PRACTITIONER

## 2021-12-05 PROCEDURE — 36415 COLL VENOUS BLD VENIPUNCTURE: CPT | Performed by: STUDENT IN AN ORGANIZED HEALTH CARE EDUCATION/TRAINING PROGRAM

## 2021-12-05 PROCEDURE — 97110 THERAPEUTIC EXERCISES: CPT | Mod: GP

## 2021-12-05 PROCEDURE — 85027 COMPLETE CBC AUTOMATED: CPT | Performed by: STUDENT IN AN ORGANIZED HEALTH CARE EDUCATION/TRAINING PROGRAM

## 2021-12-05 PROCEDURE — 83735 ASSAY OF MAGNESIUM: CPT | Performed by: STUDENT IN AN ORGANIZED HEALTH CARE EDUCATION/TRAINING PROGRAM

## 2021-12-05 PROCEDURE — 97530 THERAPEUTIC ACTIVITIES: CPT | Mod: GP

## 2021-12-05 PROCEDURE — 250N000013 HC RX MED GY IP 250 OP 250 PS 637

## 2021-12-05 PROCEDURE — 92526 ORAL FUNCTION THERAPY: CPT | Mod: GN

## 2021-12-05 PROCEDURE — 999N000157 HC STATISTIC RCP TIME EA 10 MIN

## 2021-12-05 PROCEDURE — 80048 BASIC METABOLIC PNL TOTAL CA: CPT | Performed by: STUDENT IN AN ORGANIZED HEALTH CARE EDUCATION/TRAINING PROGRAM

## 2021-12-05 PROCEDURE — 99231 SBSQ HOSP IP/OBS SF/LOW 25: CPT | Mod: GC | Performed by: INTERNAL MEDICINE

## 2021-12-05 PROCEDURE — 97161 PT EVAL LOW COMPLEX 20 MIN: CPT | Mod: GP

## 2021-12-05 PROCEDURE — 250N000013 HC RX MED GY IP 250 OP 250 PS 637: Performed by: INTERNAL MEDICINE

## 2021-12-05 PROCEDURE — 99291 CRITICAL CARE FIRST HOUR: CPT | Mod: GC | Performed by: INTERNAL MEDICINE

## 2021-12-05 RX ORDER — SERTRALINE HYDROCHLORIDE 25 MG/1
25 TABLET, FILM COATED ORAL DAILY
Status: DISCONTINUED | OUTPATIENT
Start: 2021-12-05 | End: 2021-12-06 | Stop reason: HOSPADM

## 2021-12-05 RX ORDER — HYDROXYZINE HYDROCHLORIDE 25 MG/1
25 TABLET, FILM COATED ORAL EVERY 6 HOURS PRN
Status: DISCONTINUED | OUTPATIENT
Start: 2021-12-05 | End: 2021-12-06 | Stop reason: HOSPADM

## 2021-12-05 RX ORDER — HYDROXYZINE HYDROCHLORIDE 25 MG/1
50 TABLET, FILM COATED ORAL EVERY 6 HOURS PRN
Status: DISCONTINUED | OUTPATIENT
Start: 2021-12-05 | End: 2021-12-06 | Stop reason: HOSPADM

## 2021-12-05 RX ADMIN — CLONIDINE HYDROCHLORIDE 0.1 MG: 0.1 TABLET ORAL at 16:49

## 2021-12-05 RX ADMIN — SERTRALINE HYDROCHLORIDE 25 MG: 25 TABLET ORAL at 10:41

## 2021-12-05 RX ADMIN — Medication 1 TABLET: at 08:20

## 2021-12-05 RX ADMIN — ENOXAPARIN SODIUM 40 MG: 40 INJECTION SUBCUTANEOUS at 08:20

## 2021-12-05 RX ADMIN — CLONIDINE HYDROCHLORIDE 0.1 MG: 0.1 TABLET ORAL at 06:38

## 2021-12-05 RX ADMIN — ACETAMINOPHEN 650 MG: 325 TABLET, FILM COATED ORAL at 02:52

## 2021-12-05 RX ADMIN — AMLODIPINE BESYLATE 5 MG: 5 TABLET ORAL at 08:20

## 2021-12-05 RX ADMIN — METHYLPREDNISOLONE SODIUM SUCCINATE 40 MG: 40 INJECTION, POWDER, FOR SOLUTION INTRAMUSCULAR; INTRAVENOUS at 10:41

## 2021-12-05 RX ADMIN — INSULIN ASPART 1 UNITS: 100 INJECTION, SOLUTION INTRAVENOUS; SUBCUTANEOUS at 16:34

## 2021-12-05 RX ADMIN — CLONIDINE HYDROCHLORIDE 0.1 MG: 0.1 TABLET ORAL at 21:34

## 2021-12-05 RX ADMIN — Medication 10 MG: at 21:34

## 2021-12-05 RX ADMIN — Medication 40 MG: at 08:27

## 2021-12-05 RX ADMIN — Medication 2 PACKET: at 21:34

## 2021-12-05 RX ADMIN — LORAZEPAM 0.5 MG: 2 INJECTION INTRAMUSCULAR; INTRAVENOUS at 04:20

## 2021-12-05 RX ADMIN — QUETIAPINE FUMARATE 25 MG: 25 TABLET ORAL at 08:20

## 2021-12-05 RX ADMIN — INSULIN ASPART 1 UNITS: 100 INJECTION, SOLUTION INTRAVENOUS; SUBCUTANEOUS at 21:37

## 2021-12-05 RX ADMIN — QUETIAPINE FUMARATE 50 MG: 50 TABLET ORAL at 21:34

## 2021-12-05 RX ADMIN — DOXAZOSIN 4 MG: 4 TABLET ORAL at 08:27

## 2021-12-05 RX ADMIN — HYDROXYZINE HYDROCHLORIDE 50 MG: 25 TABLET, FILM COATED ORAL at 21:34

## 2021-12-05 RX ADMIN — Medication 2 PACKET: at 08:27

## 2021-12-05 RX ADMIN — INSULIN ASPART 1 UNITS: 100 INJECTION, SOLUTION INTRAVENOUS; SUBCUTANEOUS at 00:31

## 2021-12-05 RX ADMIN — INSULIN ASPART 1 UNITS: 100 INJECTION, SOLUTION INTRAVENOUS; SUBCUTANEOUS at 12:44

## 2021-12-05 ASSESSMENT — ACTIVITIES OF DAILY LIVING (ADL)
ADLS_ACUITY_SCORE: 19

## 2021-12-05 ASSESSMENT — MIFFLIN-ST. JEOR: SCORE: 2103

## 2021-12-05 NOTE — PROGRESS NOTES
MEDICAL ICU PROGRESS NOTE  12/05/2021      Date of Service (when I saw the patient): 12/05/2021    ASSESSMENT: Brandon Schafer is a 42 YO M with PMH of remote VTE who was admitted on 11/3/2021 with ARDS 2/2 COVID pneumonia requiring intubation on 11/2. Course c/b CAUTI, polymicrobial VAP (11/11), and organizing pneumonia. Extubated to bipap 12/2.       PLAN:  - Weaned off precedex  - Addiction medicine consult, meth history  - Psychology consultation, re: anxiety and coping with prolonged admission  - Discontinued Ativan  - Start Sertraline 25 mg PO daily  - Start Hydroxyzine 25-50 mg PO every 6 hours  - Discontinue stevenson catheter  - Transfer to medicine  - Diurese prn, goal 0.5-1L net neg by MN    Neuro:  # Pain and sedation needs  # Delirium in the setting of prolonged critical illness   - Wean Precedex gtt off 12/5/21  - Acetaminophen PRN  - Seroquel 25 mg, scheduled 50 mg q HS   - Haloperidol 5 mg Q6H prn  - Stop lorazepam 0.5mg q 6H PRN (concern for benzo withdrawal)   - Melatonin scheduled  - delirium precautions     # Acute Stress Reaction related to prolonged ICU stay  # H/o methampetamine use  In AM 12/5/21 patient stating he previously had substance abuse issues with methamphetamines. Current anxiety level making him want to use again.  - Addiction med consulted  - Health psych consulted  - Consider referral to post-ICU clinic, if eligible     Pulmonary:  # ARDS 2/2 COVID pneumonia  # Possible organizing pneumonia  Intubated 11/2 prior to transport flight. CT on 11/25 with mild fibrotic changes, dense consolidations consistent with ARDS. Possible organizing pneumonia, started on methylprednisolone 60 mg x3 days. CT Chest 12/1/21 - no significant change. Extubated 12/2/21.  - Methylprednisolone 40 mg every day   Plan to drop by 10 mg each week until 10 mg total reached, then will remain on 10 mg daily until seen by  outpatient pulm, will need pulm outpatient visit on discharge within 2-3 months with PFTs  and CT chest  - HFNC as tolerated, Bipap while asleep and PRN   - IS q 2 hours while awake, flutter valve if problems clearing secretions  - Infectious treatment as below    # COPD exacerbation  Wheezing on exam on 11/4. S/p azithromycin for 5d. Steroids per Covid protocol.   - Continue albuterol nebs prn     Cardiovascular:  # Hypertension  -  Labetolol, hydralazine prn for SBP >160  - Not on anti-HTN meds PTA   - amlodipine 5 mg daily  - catapres 0.1mg TID    # Hypotension, resolved  Transient, and in the setting of sedation. No prior TTE. Troponin peaked at 0.293. EKG with T wave inversion in lateral leads. Suspect troponin was 2/2 demand ischemia in setting of hypoxia.     # Atrial fibrillation  Episode of atrial fibrillation overnight 11/12, hemodynamically stable and resolved spontaneously. TWI on EKG, troponin negative  - continuous tele     GI/Nutrition:  # At risk for malnutrition  # Constipation, improved Last BM 12/1  - Ctn TF at set rate  - SLP evaluation  - Bowel regimen: senna, Miralax, bisacodyl suppository prn     Renal/Fluids/Electrolytes:  # Non-oliguric STEFAN, resolved  # Hypokalemia, in setting of ongoing diuresis   # Volume status   # Urinary retention  Unclear baseline, has not seen doctor in 20 years. Cr 2.5 on admission, now improved. Uptrending BUN, creatinine.   - Goal net negative 0.5-1 L today  - 4mg doxazosin daily  - plan to remove stevenson catheter Monday if able     # Hypernatremia, resolved  - FWF 30ml q 4H     Endocrine:  # Stress and steroid induced hyperglycemia  - Medium resistance sliding scale insulin   - 10 U Glargine     ID:  # COVID pneumonia  Symptoms (headache, SOB, fever, cough, diarrhea) 1 week prior to presentation. Not COVID vaccinated. Tested positive on presentation on 11/2. S/p tocilizumab 11/4.  - Dexamethasone 6 mg daily (total 10 days) - completed     # Fever 11/17, resolved   # CAUTI  # VAP  Pt with worsening oxygen saturations, fevers, and persistent leukocytosis.  Given his prolonged hospitalization, was started on broad-spectrum antibiotics on 11/11. Urine culture grew >100k pan-sensitive E coli. RVP and MRSA nares negative. Sputum grew E coli, S aureus, and GBS. Sputum culture 11/17 with 1+ E. coli and MSSA, blood cultures negative. Continued to have fevers as of 11/18, redraw blood cultures and stool tested negative for Cdiff.   - Continue to observe off antibiotics  Abx:  - Ceftriaxone 11/14-11/17; 11/19 - 11/25 (completed)  - Zosyn 11/11-11/14; 11/17 - 11/18  - Vanc 11/11-11/12      Hematology:    # Coagulopathy 2/2 COVID  US BLE negative for DVT.  - Enoxaparin ppx 40daily      MSK  # Septum wound, tongue wound 2/2 Proning   # weakness/deconditioning  - WOC following  - OT/PT consults      General Cares/Prophylaxis:    DVT Prophylaxis: Lovenox ppx 70 mg BID  GI Prophylaxis: PPI  Family Communication: BrotherBrad  Code Status: FULL CODE     Lines/tubes/drains:  - PIV x2  - NG  - Rectal tube  - Sandra (11/24 - )     Disposition:  - Medical ICU    Patient seen and findings/plan discussed with medical ICU staff, Dr. Persaud.    Thong Cox Jr., MD  [facility] 1, [phone]  Pager: [phone]    ====================================  INTERVAL HISTORY:  Woke up in early AM and unable to get back to sleep. Received 1x Ativan for this.    OBJECTIVE:   1. VITAL SIGNS:   Temp:  [98.6  F (37  C)-99.7  F (37.6  C)] 99  F (37.2  C)  Pulse:  [] 90  Resp:  [22-30] 22  BP: (106-137)/(60-82) 124/70  FiO2 (%):  [40 %] 40 %  SpO2:  [92 %-98 %] 97 %  FiO2 (%): 40 %  Resp: 22    2. INTAKE/ OUTPUT:   I/O last 3 completed shifts:  In: 1544.2 [P.O.:200; I.V.:354.2; NG/GT:390]  Out: 2550 [Urine:2550]    3. PHYSICAL EXAMINATION:  General: adult man, laying in bed, in NAD  HEENT: Mucous membranes moist  Neuro: awake, moving all extremities, following commands, answering questions appropriately, soft voice  Pulm/Resp: Clear breath sounds bilaterally without rhonchi, crackles or wheeze  CV:  RRR, S1/S2 without m/r/g  Abdomen: Obese, soft, non-distended, non-tender, +BS  : Flores catheter in place, urine yellow and clear  Incisions/Skin: No rashes noted    4. LABS:   Arterial Blood Gases   Recent Labs   Lab 12/04/21  0509 12/03/21  1056 12/02/21  2324 12/02/21  1553   PH 7.50* 7.51* 7.48* 7.47*   PCO2 36 34* 39 39   PO2 82 132* 83 85   HCO3 28 27 29* 28     Complete Blood Count   Recent Labs   Lab 12/04/21  0534 12/03/21  0348 12/02/21  0404 12/01/21  0409   WBC 14.2* 14.1* 13.5* 16.4*   HGB 9.7* 11.1* 10.6* 11.1*    311 295 306     Basic Metabolic Panel  Recent Labs   Lab 12/05/21  0425 12/05/21  0031 12/04/21  2115 12/04/21  1554 12/04/21  0836 12/04/21  0534 12/03/21  1943 12/03/21  1728 12/03/21  0353 12/03/21  0348 12/02/21  0411 12/02/21  0404   NA  --   --   --   --   --  137  --  134  --  135  --  136   POTASSIUM  --   --   --   --   --  3.8  --  4.0  --  3.7  --  3.8   CHLORIDE  --   --   --   --   --  100  --  100  --  102  --  100   CO2  --   --   --   --   --  27  --  26  --  28  --  29   BUN  --   --   --   --   --  29  --  24  --  23  --  40*   CR  --   --   --   --   --  0.75  --  0.72  --  0.62*  --  0.83   * 147* 133* 175*   < > 169*   < > 196*   < > 147*   < > 149*    < > = values in this interval not displayed.     5. RADIOLOGY:   No results found for this or any previous visit (from the past 24 hour(s)).

## 2021-12-05 NOTE — PROGRESS NOTES
"   12/05/21 1100   Quick Adds   Type of Visit Initial PT Evaluation   Living Environment   People in home significant other;child(rosey), dependent   Current Living Arrangements house  (duplex with main, upper, basement levels)   Home Accessibility stairs to enter home;stairs within home   Number of Stairs, Main Entrance 2   Stair Railings, Main Entrance none   Number of Stairs, Within Home, Primary greater than 10 stairs   Stair Railings, Within Home, Primary railing on right side (ascending)   Transportation Anticipated family or friend will provide   Living Environment Comments Pt gives variable answer during subjective interview, many of which also differ from reports to OT and to RN. Unclear of historical accuracy of pt reports. Pt reports his children range from age 2-14, his GF and her brother also currently live with him. Pt frequently states he is \"independently wealthy, I have a trust fund and my GF does not have to work.\" Later per RN pt had previously reported addiction/substance abuse issues, team aware of discrepancies.   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, unable to clarify exercise or activity level prior   Disability/Function   Hearing Difficulty or Deaf no   Wear Glasses or Blind no   Difficulty Communicating no   Walking or Climbing Stairs Difficulty no   Dressing/Bathing Difficulty no   Toileting issues no   Change in Functional Status Since Onset of Current Illness/Injury yes   General Information   Onset of Illness/Injury or Date of Surgery 11/03/21   Referring Physician Gigi Persaud MD   Patient/Family Therapy Goals Statement (PT) \"strong man: return home\" Pt frequently states this during session, especially with asking pt if he is doing alright etc during mobility   Pertinent History of Current Problem (include personal factors and/or " "comorbidities that impact the POC) 40 YO M with PMH of remote VTE who was admitted on 11/3/2021 with ARDS 2/2 COVID pneumonia requiring intubation on 11/2. Course c/b CAUTI, polymicrobial VAP (11/11), and organizing pneumonia. Extubated to bipap 12/2.    Existing Precautions/Restrictions fall;oxygen therapy device and L/min  (HFNC 40L 40% FiO2)   Heart Disease Risk Factors Medical history;Overweight   General Observations Pt reports he has been trying ot move arms/leg frequently in bed to get stronger. Pt very motivated to progress and work with PT, looking forward to chair arrivign this am and getting to sit up.   Cognition   Orientation Status (Cognition) oriented to;person;place   Affect/Mental Status (Cognition) confabulatory  (Pt's reports of being \"I wealthy\" do not match prev hx given)   Follows Commands (Cognition) follows one-step commands;50-74% accuracy;increased processing time needed;repetition of directions required;verbal cues/prompting required   Behavioral Issues overwhelmed easily  (responded well to simple directions, requests)   Safety Deficit (Cognition) moderate deficit;awareness of need for assistance;impulsivity;insight into deficits/self-awareness;safety precautions awareness   Posture    Posture Protracted shoulders;Forward head position   Range of Motion (ROM)   ROM Comment B LEs mildly decreased due to prolonged rest in bed/illness   Strength   Strength Comments B LEs mildly decreased due to prolonged rest in bed/illness, though at least 3+ or better based on functional bed mobility   Bed Mobility   Comment (Bed Mobility) Mod A with second providing light assist   Transfers   Transfer Safety Comments Unsafe to stand from bariatric bed due to height/mattress, appropriate chair arriving for pt use this afternoon   Balance   Balance Comments min to mod A sitting EOB with UE support   Clinical Impression   Criteria for Skilled Therapeutic Intervention yes, treatment indicated   PT Diagnosis " (PT) impaired functional mobility   Influenced by the following impairments decreased activity tolerance, strength, balance   Functional limitations due to impairments decreased I with bed mob, transfers, gait, stairs, ADLs   Clinical Presentation Stable/Uncomplicated   Clinical Presentation Rationale clinical judgement   Clinical Decision Making (Complexity) moderate complexity   Therapy Frequency (PT) 6x/week   Predicted Duration of Therapy Intervention (days/wks) 3 weeks   Planned Therapy Interventions (PT) balance training;bed mobility training;gait training;home exercise program;patient/family education;ROM (range of motion);stair training;strengthening;transfer training;progressive activity/exercise;risk factor education;home program guidelines   Anticipated Equipment Needs at Discharge (PT) walker, rolling   Risk & Benefits of therapy have been explained evaluation/treatment results reviewed;care plan/treatment goals reviewed;risks/benefits reviewed;current/potential barriers reviewed;participants voiced agreement with care plan;participants included;patient   PT Discharge Planning    PT Discharge Recommendation (DC Rec) Transitional Care Facility;home with assist;home with home care physical therapy   PT Rationale for DC Rec Pt currently would need rehab stay but pending length of stay, progression and O2 needs may progress to discharge to home with assist, will need to be able to complete stairs to access home.    PT Brief overview of current status  Mod A x1 with second for safety for sup<>sit EOB. Lift to chair due to bariatric bed/mattress safety.   Total Evaluation Time   Total Evaluation Time (Minutes) 8

## 2021-12-05 NOTE — PROGRESS NOTES
[facility]    Progress Note - Maroon 1 Service        Date of Admission:  11/3/2021    Assessment & Plan      Brandon Schafer is a 41 year old male admitted on 11/3/2021. He has a PMH of remote VTE who was admitted on 11/3/2021 with ARDS 2/2 COVID pneumonia requiring intubation on 11/2 en route to the hospital. Course c/b CAUTI, polymicrobial VAP (11/11), and organizing pneumonia. Extubated to bipap 12/2. He is transferred to the medicine service on 12/5 for further cares.     Today:  - Admitted to medicine   - Discontinued precedex gtt  - Addiction medicine consult for meth history  - Psychology consultation, re: anxiety and coping with prolonged admission  - Discontinued Ativan  - Start Sertraline 25 mg PO daily  - Start Hydroxyzine 25-50 mg PO every 6 hours  - Discontinue stevenson catheter  - Transfer to medicine  - Diurese prn, goal 0.5-1L net neg by MN    # Acute hypoxic respiratory failure 2/2 COVID pneumonia; improving  # ARDS 2/2 COVID pneumonia  # Possible organizing pneumonia  Intubated 11/2 prior to transport flight. CT on 11/25 with mild fibrotic changes, dense consolidations consistent with ARDS. Completed dexamethasone treatment for COVID. Possible organizing pneumonia, started on methylprednisolone 60 mg x3 days. CT Chest 12/1/21 - no significant change. Extubated 12/2/21.  - Methylprednisolone 40 mg every day               Plan to drop by 10 mg each week until 10 mg total reached, then will remain on 10 mg daily until seen by outpatient pulm, will need pulm outpatient visit on discharge within 2-3 months with PFTs and CT chest  - HFNC as tolerated, Bipap while asleep and PRN   - IS q 2 hours while awake, flutter valve if problems clearing secretions  - Infectious treatment as below  - Continue albuterol nebs prn    # Acute Stress Reaction related to prolonged ICU stay  # Delirium in the setting of prolonged critical illness   # H/o methampetamine use  In  AM 12/5/21 patient stating he previously had substance abuse issues with methamphetamines. Current anxiety level making him want to use again.  - Addiction med consulted  - Health psych consulted  - Consider referral to post-ICU clinic, if eligible  - Seroquel 25 mg, scheduled 50 mg q HS   - Haloperidol 5 mg Q6H prn  - Melatonin scheduled  - Delirium precautions     # Non-oliguric STEFAN, resolved  # Hypokalemia, in setting of ongoing diuresis   # Volume status   # Urinary retention  Unclear baseline, has not seen doctor in 20 years. Cr 2.5 on admission, now improved.  - Goal net negative 0.5-1 L today  - 4mg doxazosin daily  - Plan to remove stevenson catheter Monday if able    # Hypertension  - Labetolol, hydralazine prn for SBP >160  - Not on anti-HTN meds PTA   - Amlodipine 5 mg daily  - Clonidine 0.1mg TID    # Stress and steroid induced hyperglycemia  - Medium resistance sliding scale insulin   - 10 U Glargine    # At risk for malnutrition  # Constipation, improved Last BM 12/1  - Ctn TF at set rate  - SLP evaluation then can transition to PO  - Bowel regimen: senna, Miralax, bisacodyl suppository prn    # Septum wound, tongue wound 2/2 Proning   # weakness/deconditioning  - WOC following  - OT/PT consults     # CAUTI  # VAP  Pt with worsening oxygen saturations, fevers, and persistent leukocytosis. Given his prolonged hospitalization, was started on broad-spectrum antibiotics on 11/11. Urine culture grew >100k pan-sensitive E coli. RVP and MRSA nares negative. Sputum grew E coli, S aureus, and GBS. Sputum culture 11/17 with 1+ E. coli and MSSA, blood cultures negative. Continued to have fevers as of 11/18, redraw blood cultures and stool tested negative for Cdiff.   - Continue to observe off antibiotics  Abx:  - Ceftriaxone 11/14-11/17; 11/19 - 11/25 (completed)  - Zosyn 11/11-11/14; 11/17 - 11/18  - Vanc 11/11-11/12     Diet: Adult Formula Drip Feeding: Continuous Vital High Protein; Nasogastric tube; Goal Rate:  30; mL/hr; Medication - Feeding Tube Flush Frequency: At least 15-30 mL water before and after medication administration and with tube clogging; Amount to Send...  Full Liquid Diet    DVT Prophylaxis: Enoxaparin (Lovenox) SQ  Flores Catheter: PRESENT, indication: Strict 1-2 Hour I&O, Strict 1-2 Hour I&O, Strict 1-2 Hour I&O, Retention  Fluids: None  Central Lines: None  Code Status: Full Code      Disposition Plan   Expected discharge: recommended to prior living arrangement once O2 use less than 2 liters/minute.     The patient's care was discussed with the Attending Physician, Dr. Leiva.    John Costello Jr., MD  Internal Medicine- PGY1  [address]  Securely message with the Vocera Web Console (learn more here)  Text page via Mineful Paging/Directory    Please see sign in/sign out for up to date coverage information  _____________________________________________________________________    Interval History   Patient discontinued off precedex gtt. Endorses depression, anxiety, fatigue, and left shoulder pain. He expresses that he wants to regain his strength and go home to be with his family. He states that before this hospitalization, he had not been ill or visited a doctor. He reports that his oxygenation is improving daily and that he is slowly regaining his strength.    4 point ROS negative     Data reviewed today: I reviewed all medications, new labs and imaging results over the last 24 hours.    Physical Exam   Vital Signs: Temp: 100  F (37.8  C) Temp src: Axillary BP: 135/72 Pulse: 84   Resp: 24 SpO2: 94 % O2 Device: High Flow Nasal Cannula (HFNC) Oxygen Delivery: 40 LPM  Weight: 257 lbs 15.01 oz    General: age-appearing man, laying in bed and appears tired, in NAD  HEENT: Mucous membranes moist, pupils equal and reactive to light  Lymph: no cervical or submandibular lymphadenopathy  Neuro: awake, moving all  extremities, following commands, answering questions appropriately, soft voice  Pulm/Resp: Clear breath sounds bilaterally without rhonchi, mild crackles and expiratory wheezes bilaterally  CV: RRR, S1/S2 without m/r/g  Abdomen: Obese, soft, non-distended, non-tender, +BS  : Flores catheter in place, urine yellow and clear  Incisions/Skin: No rashes noted, no bleeding    Data   Recent Labs   Lab 12/05/21  1647 12/05/21  1633 12/05/21  1243 12/05/21  0829 12/05/21  0558 12/04/21  0836 12/04/21  0534 12/03/21  1943 12/03/21  1728 12/03/21  0353 12/03/21  0348   WBC  --   --   --   --  9.3  --  14.2*  --   --   --  14.1*   HGB  --   --   --   --  11.5*  --  9.7*  --   --   --  11.1*   MCV  --   --   --   --  88  --  87  --   --   --  85   PLT  --   --   --   --  273  --  340  --   --   --  311   NA  --   --   --   --  135  --  137  --  134  --  135   POTASSIUM  --   --   --   --  3.9  --  3.8  --  4.0  --  3.7   CHLORIDE  --   --   --   --  103  --  100  --  100  --  102   CO2  --   --   --   --  26  --  27  --  26  --  28   BUN  --   --   --   --  31*  --  29  --  24  --  23   CR  --   --   --   --  0.80  --  0.75  --  0.72  --  0.62*   ANIONGAP  --   --   --   --  6  --  10  --  8  --  5   VIPUL  --   --   --   --  9.3  --  9.2  --  9.3  --  8.7   * 177* 161*   < > 151*   < > 169*   < > 196*   < > 147*    < > = values in this interval not displayed.

## 2021-12-05 NOTE — PLAN OF CARE
"Major Shift Events: Assumed cares from 7114-6940.  Patient stable this shift. Oxygen requirements unchanged. HFNC 40% 40L. Speech advanced to full liquid diet. He did start \"coughing\" and trying to clear his throat this afternoon after having multiple glasses of water and ice chips. Decided to not pursue liquids for the evening. Night RN can reassess. Speech said they would be back tomorrow. Patient is VERY thirsty. No BM this shift. Flores intact and good UO.   No new skin concerns.   Precedex still on at 0.4. Plan to wean tomorrow once multiple doses of seroquel on board.   Would like to get up in the chair, but need a barichair. One ordered today, but did not arrive.     Plan: Continue to monitor overnight and potentially transfer to the floor tomorrow?     For vital signs and complete assessments, please see documentation flowsheets.   "

## 2021-12-05 NOTE — PLAN OF CARE
[id number]:  Neuro: Anxious/fearful overnight, frequently asking when he will go home/stating he's afraid of death/states feelings of isolation. Dex gtt continues and Ativan x1. Orientation intact, unsure of day but knows month and year. Follows commands and uses call light. Speech hoarse/whispers.   Card: SR. BPs stable. Tmax 99.1.   Pulm: 40%/40LPM HFNC. Lungs diminished, pt with productive cough but needs encouragement with pulm toileting.   GI: Full Liq/TF@ goal 30/hr. Pt wanting to drink overnight, but is having coughing with ice chips. Continued reminder for safe swallowing so elected not to give liquids until repeat SLP assessment. BS audible. No BM overnight.   : Flores for retention.   Endo: Q4H, corrected per orders.   Skin: Unchanged, see flowsheets for details.     Plan to wean off Dex this am and possible transfer upstairs. Will continue to monitor and notify team with updates.

## 2021-12-06 ENCOUNTER — APPOINTMENT (OUTPATIENT)
Dept: OCCUPATIONAL THERAPY | Facility: CLINIC | Age: 41
End: 2021-12-06
Attending: STUDENT IN AN ORGANIZED HEALTH CARE EDUCATION/TRAINING PROGRAM
Payer: MEDICAID

## 2021-12-06 ENCOUNTER — HOSPITAL ENCOUNTER (INPATIENT)
Facility: CLINIC | Age: 41
LOS: 8 days | Discharge: NURSING, REHAB OR LONG-TERM CARE | End: 2021-12-14
Attending: INTERNAL MEDICINE | Admitting: STUDENT IN AN ORGANIZED HEALTH CARE EDUCATION/TRAINING PROGRAM
Payer: MEDICAID

## 2021-12-06 ENCOUNTER — APPOINTMENT (OUTPATIENT)
Dept: PHYSICAL THERAPY | Facility: CLINIC | Age: 41
End: 2021-12-06
Attending: STUDENT IN AN ORGANIZED HEALTH CARE EDUCATION/TRAINING PROGRAM
Payer: MEDICAID

## 2021-12-06 VITALS
SYSTOLIC BLOOD PRESSURE: 129 MMHG | BODY MASS INDEX: 34.94 KG/M2 | WEIGHT: 257.94 LBS | HEIGHT: 72 IN | TEMPERATURE: 98.8 F | OXYGEN SATURATION: 94 % | RESPIRATION RATE: 20 BRPM | DIASTOLIC BLOOD PRESSURE: 88 MMHG | HEART RATE: 119 BPM

## 2021-12-06 DIAGNOSIS — J96.01 ACUTE RESPIRATORY FAILURE WITH HYPOXIA (H): ICD-10-CM

## 2021-12-06 DIAGNOSIS — I10 BENIGN ESSENTIAL HYPERTENSION: Primary | ICD-10-CM

## 2021-12-06 PROBLEM — J96.90 RESPIRATORY FAILURE (H): Status: ACTIVE | Noted: 2021-12-06

## 2021-12-06 LAB
ANION GAP SERPL CALCULATED.3IONS-SCNC: 9 MMOL/L (ref 3–14)
BUN SERPL-MCNC: 27 MG/DL (ref 7–30)
CALCIUM SERPL-MCNC: 9.3 MG/DL (ref 8.5–10.1)
CHLORIDE BLD-SCNC: 104 MMOL/L (ref 94–109)
CO2 SERPL-SCNC: 24 MMOL/L (ref 20–32)
CREAT SERPL-MCNC: 0.86 MG/DL (ref 0.66–1.25)
ERYTHROCYTE [DISTWIDTH] IN BLOOD BY AUTOMATED COUNT: 15.7 % (ref 10–15)
GFR SERPL CREATININE-BSD FRML MDRD: >90 ML/MIN/1.73M2
GLUCOSE BLD-MCNC: 133 MG/DL (ref 70–99)
GLUCOSE BLDC GLUCOMTR-MCNC: 128 MG/DL (ref 70–99)
GLUCOSE BLDC GLUCOMTR-MCNC: 138 MG/DL (ref 70–99)
GLUCOSE BLDC GLUCOMTR-MCNC: 140 MG/DL (ref 70–99)
GLUCOSE BLDC GLUCOMTR-MCNC: 141 MG/DL (ref 70–99)
GLUCOSE BLDC GLUCOMTR-MCNC: 142 MG/DL (ref 70–99)
HCT VFR BLD AUTO: 38.8 % (ref 40–53)
HGB BLD-MCNC: 12.1 G/DL (ref 13.3–17.7)
MAGNESIUM SERPL-MCNC: 2.2 MG/DL (ref 1.6–2.3)
MCH RBC QN AUTO: 27.4 PG (ref 26.5–33)
MCHC RBC AUTO-ENTMCNC: 31.2 G/DL (ref 31.5–36.5)
MCV RBC AUTO: 88 FL (ref 78–100)
PLATELET # BLD AUTO: 231 10E3/UL (ref 150–450)
POTASSIUM BLD-SCNC: 3.7 MMOL/L (ref 3.4–5.3)
RBC # BLD AUTO: 4.42 10E6/UL (ref 4.4–5.9)
SODIUM SERPL-SCNC: 137 MMOL/L (ref 133–144)
WBC # BLD AUTO: 13.1 10E3/UL (ref 4–11)

## 2021-12-06 PROCEDURE — 97110 THERAPEUTIC EXERCISES: CPT | Mod: GO

## 2021-12-06 PROCEDURE — 97535 SELF CARE MNGMENT TRAINING: CPT | Mod: GO

## 2021-12-06 PROCEDURE — 999N000043 HC STATISTIC CTO2 CONT OXYGEN TECH TIME EA 90 MIN

## 2021-12-06 PROCEDURE — 250N000013 HC RX MED GY IP 250 OP 250 PS 637: Performed by: NURSE PRACTITIONER

## 2021-12-06 PROCEDURE — 250N000011 HC RX IP 250 OP 636: Performed by: STUDENT IN AN ORGANIZED HEALTH CARE EDUCATION/TRAINING PROGRAM

## 2021-12-06 PROCEDURE — 97530 THERAPEUTIC ACTIVITIES: CPT | Mod: GP

## 2021-12-06 PROCEDURE — 85027 COMPLETE CBC AUTOMATED: CPT | Performed by: STUDENT IN AN ORGANIZED HEALTH CARE EDUCATION/TRAINING PROGRAM

## 2021-12-06 PROCEDURE — 83735 ASSAY OF MAGNESIUM: CPT | Performed by: STUDENT IN AN ORGANIZED HEALTH CARE EDUCATION/TRAINING PROGRAM

## 2021-12-06 PROCEDURE — 120N000001 HC R&B MED SURG/OB

## 2021-12-06 PROCEDURE — 250N000013 HC RX MED GY IP 250 OP 250 PS 637: Performed by: STUDENT IN AN ORGANIZED HEALTH CARE EDUCATION/TRAINING PROGRAM

## 2021-12-06 PROCEDURE — 250N000013 HC RX MED GY IP 250 OP 250 PS 637

## 2021-12-06 PROCEDURE — 250N000011 HC RX IP 250 OP 636: Performed by: NURSE PRACTITIONER

## 2021-12-06 PROCEDURE — 999N000215 HC STATISTIC HFNC ADULT NON-CPAP

## 2021-12-06 PROCEDURE — 82374 ASSAY BLOOD CARBON DIOXIDE: CPT | Performed by: STUDENT IN AN ORGANIZED HEALTH CARE EDUCATION/TRAINING PROGRAM

## 2021-12-06 PROCEDURE — 250N000013 HC RX MED GY IP 250 OP 250 PS 637: Performed by: INTERNAL MEDICINE

## 2021-12-06 PROCEDURE — 99239 HOSP IP/OBS DSCHRG MGMT >30: CPT | Mod: GC | Performed by: INTERNAL MEDICINE

## 2021-12-06 PROCEDURE — 36415 COLL VENOUS BLD VENIPUNCTURE: CPT | Performed by: STUDENT IN AN ORGANIZED HEALTH CARE EDUCATION/TRAINING PROGRAM

## 2021-12-06 PROCEDURE — 99223 1ST HOSP IP/OBS HIGH 75: CPT | Mod: AI | Performed by: PHYSICIAN ASSISTANT

## 2021-12-06 RX ORDER — LIDOCAINE 40 MG/G
CREAM TOPICAL
Status: DISCONTINUED | OUTPATIENT
Start: 2021-12-06 | End: 2021-12-14 | Stop reason: HOSPADM

## 2021-12-06 RX ORDER — METHYLPREDNISOLONE SODIUM SUCCINATE 40 MG/ML
20 INJECTION, POWDER, LYOPHILIZED, FOR SOLUTION INTRAMUSCULAR; INTRAVENOUS EVERY 24 HOURS
Status: DISCONTINUED | OUTPATIENT
Start: 2021-12-14 | End: 2021-12-06

## 2021-12-06 RX ORDER — ONDANSETRON 2 MG/ML
4 INJECTION INTRAMUSCULAR; INTRAVENOUS EVERY 6 HOURS PRN
Status: DISCONTINUED | OUTPATIENT
Start: 2021-12-06 | End: 2021-12-14 | Stop reason: HOSPADM

## 2021-12-06 RX ORDER — HYDROXYZINE HYDROCHLORIDE 25 MG/1
25-50 TABLET, FILM COATED ORAL EVERY 6 HOURS PRN
Status: DISCONTINUED | OUTPATIENT
Start: 2021-12-06 | End: 2021-12-07

## 2021-12-06 RX ORDER — HEPARIN SODIUM,PORCINE 10 UNIT/ML
5-20 VIAL (ML) INTRAVENOUS
Status: ON HOLD | DISCHARGE
Start: 2021-12-06 | End: 2021-12-06

## 2021-12-06 RX ORDER — DEXTROSE MONOHYDRATE 25 G/50ML
25-50 INJECTION, SOLUTION INTRAVENOUS
Status: DISCONTINUED | OUTPATIENT
Start: 2021-12-06 | End: 2021-12-14 | Stop reason: HOSPADM

## 2021-12-06 RX ORDER — METHYLPREDNISOLONE SODIUM SUCCINATE 40 MG/ML
20 INJECTION, POWDER, LYOPHILIZED, FOR SOLUTION INTRAMUSCULAR; INTRAVENOUS EVERY 24 HOURS
Status: ON HOLD | DISCHARGE
Start: 2021-12-14 | End: 2021-12-14

## 2021-12-06 RX ORDER — AMINO AC/PROTEIN HYDR/WHEY PRO 10G-100/30
2 LIQUID (ML) ORAL 2 TIMES DAILY
Status: ON HOLD | DISCHARGE
Start: 2021-12-06 | End: 2021-12-14

## 2021-12-06 RX ORDER — NICOTINE POLACRILEX 4 MG
15-30 LOZENGE BUCCAL
Status: ON HOLD | DISCHARGE
Start: 2021-12-06 | End: 2021-12-06

## 2021-12-06 RX ORDER — MULTIPLE VITAMINS W/ MINERALS TAB 9MG-400MCG
1 TAB ORAL DAILY
Status: DISCONTINUED | OUTPATIENT
Start: 2021-12-07 | End: 2021-12-07

## 2021-12-06 RX ORDER — IPRATROPIUM BROMIDE AND ALBUTEROL SULFATE 2.5; .5 MG/3ML; MG/3ML
3 SOLUTION RESPIRATORY (INHALATION) EVERY 4 HOURS PRN
Qty: 90 ML | Status: ON HOLD | DISCHARGE
Start: 2021-12-06 | End: 2021-12-14

## 2021-12-06 RX ORDER — ONDANSETRON 2 MG/ML
4 INJECTION INTRAMUSCULAR; INTRAVENOUS EVERY 6 HOURS PRN
Status: ON HOLD | DISCHARGE
Start: 2021-12-06 | End: 2021-12-06

## 2021-12-06 RX ORDER — SERTRALINE HYDROCHLORIDE 25 MG/1
25 TABLET, FILM COATED ORAL DAILY
Status: DISCONTINUED | OUTPATIENT
Start: 2021-12-07 | End: 2021-12-07

## 2021-12-06 RX ORDER — HYDROXYZINE HYDROCHLORIDE 50 MG/1
50 TABLET, FILM COATED ORAL EVERY 6 HOURS PRN
Status: ON HOLD | DISCHARGE
Start: 2021-12-06 | End: 2021-12-14

## 2021-12-06 RX ORDER — CLONIDINE HYDROCHLORIDE 0.1 MG/1
0.1 TABLET ORAL EVERY 8 HOURS
Status: ON HOLD | DISCHARGE
Start: 2021-12-06 | End: 2021-12-15

## 2021-12-06 RX ORDER — POTASSIUM CHLORIDE 1.5 G/1.58G
20 POWDER, FOR SOLUTION ORAL ONCE
Status: COMPLETED | OUTPATIENT
Start: 2021-12-06 | End: 2021-12-06

## 2021-12-06 RX ORDER — AMOXICILLIN 250 MG
2 CAPSULE ORAL 2 TIMES DAILY PRN
Status: ON HOLD | DISCHARGE
Start: 2021-12-06 | End: 2021-12-06

## 2021-12-06 RX ORDER — AMLODIPINE BESYLATE 5 MG/1
5 TABLET ORAL DAILY
Status: DISCONTINUED | OUTPATIENT
Start: 2021-12-07 | End: 2021-12-07

## 2021-12-06 RX ORDER — METHYLPREDNISOLONE SODIUM SUCCINATE 40 MG/ML
40 INJECTION, POWDER, LYOPHILIZED, FOR SOLUTION INTRAMUSCULAR; INTRAVENOUS EVERY 24 HOURS
Status: CANCELLED | DISCHARGE
Start: 2021-12-06

## 2021-12-06 RX ORDER — METHYLPREDNISOLONE SODIUM SUCCINATE 40 MG/ML
30 INJECTION, POWDER, LYOPHILIZED, FOR SOLUTION INTRAMUSCULAR; INTRAVENOUS EVERY 24 HOURS
Status: ON HOLD | DISCHARGE
Start: 2021-12-07 | End: 2021-12-14

## 2021-12-06 RX ORDER — ACETAMINOPHEN 325 MG/1
650 TABLET ORAL EVERY 4 HOURS PRN
Status: DISCONTINUED | OUTPATIENT
Start: 2021-12-06 | End: 2021-12-07

## 2021-12-06 RX ORDER — PROCHLORPERAZINE MALEATE 10 MG
10 TABLET ORAL EVERY 6 HOURS PRN
Status: ON HOLD | DISCHARGE
Start: 2021-12-06 | End: 2021-12-06

## 2021-12-06 RX ORDER — ALBUTEROL SULFATE 0.83 MG/ML
2.5 SOLUTION RESPIRATORY (INHALATION) EVERY 6 HOURS PRN
Status: DISCONTINUED | OUTPATIENT
Start: 2021-12-06 | End: 2021-12-14 | Stop reason: HOSPADM

## 2021-12-06 RX ORDER — QUETIAPINE FUMARATE 25 MG/1
25 TABLET, FILM COATED ORAL DAILY
Status: ON HOLD | DISCHARGE
Start: 2021-12-07 | End: 2021-12-14

## 2021-12-06 RX ORDER — NICOTINE POLACRILEX 4 MG
15-30 LOZENGE BUCCAL
Status: DISCONTINUED | OUTPATIENT
Start: 2021-12-06 | End: 2021-12-14 | Stop reason: HOSPADM

## 2021-12-06 RX ORDER — CARBOXYMETHYLCELLULOSE SODIUM 5 MG/ML
1 SOLUTION/ DROPS OPHTHALMIC
Status: DISCONTINUED | OUTPATIENT
Start: 2021-12-06 | End: 2021-12-14 | Stop reason: HOSPADM

## 2021-12-06 RX ORDER — POLYETHYLENE GLYCOL 3350 17 G/17G
17 POWDER, FOR SOLUTION ORAL 2 TIMES DAILY PRN
Status: ON HOLD | DISCHARGE
Start: 2021-12-06 | End: 2021-12-15

## 2021-12-06 RX ORDER — QUETIAPINE FUMARATE 50 MG/1
50 TABLET, FILM COATED ORAL AT BEDTIME
Status: DISCONTINUED | OUTPATIENT
Start: 2021-12-06 | End: 2021-12-08

## 2021-12-06 RX ORDER — PHENOL 1.4 %
10 AEROSOL, SPRAY (ML) MUCOUS MEMBRANE EVERY EVENING
Status: ON HOLD | DISCHARGE
Start: 2021-12-06 | End: 2021-12-14

## 2021-12-06 RX ORDER — METHYLPREDNISOLONE SODIUM SUCCINATE 40 MG/ML
10 INJECTION, POWDER, LYOPHILIZED, FOR SOLUTION INTRAMUSCULAR; INTRAVENOUS EVERY 24 HOURS
Status: ON HOLD | DISCHARGE
Start: 2021-12-21 | End: 2021-12-14

## 2021-12-06 RX ORDER — METHYLPREDNISOLONE SODIUM SUCCINATE 40 MG/ML
30 INJECTION, POWDER, LYOPHILIZED, FOR SOLUTION INTRAMUSCULAR; INTRAVENOUS DAILY
Status: COMPLETED | OUTPATIENT
Start: 2021-12-07 | End: 2021-12-13

## 2021-12-06 RX ORDER — METHYLPREDNISOLONE SODIUM SUCCINATE 40 MG/ML
10 INJECTION, POWDER, LYOPHILIZED, FOR SOLUTION INTRAMUSCULAR; INTRAVENOUS EVERY 24 HOURS
Status: DISCONTINUED | OUTPATIENT
Start: 2021-12-21 | End: 2021-12-06

## 2021-12-06 RX ORDER — QUETIAPINE FUMARATE 50 MG/1
50 TABLET, FILM COATED ORAL AT BEDTIME
Status: ON HOLD | DISCHARGE
Start: 2021-12-06 | End: 2021-12-14

## 2021-12-06 RX ORDER — ONDANSETRON 4 MG/1
4 TABLET, ORALLY DISINTEGRATING ORAL EVERY 6 HOURS PRN
Status: ON HOLD | DISCHARGE
Start: 2021-12-06 | End: 2021-12-14

## 2021-12-06 RX ORDER — HYDRALAZINE HYDROCHLORIDE 20 MG/ML
10 INJECTION INTRAMUSCULAR; INTRAVENOUS EVERY 4 HOURS PRN
Status: ON HOLD | DISCHARGE
Start: 2021-12-06 | End: 2021-12-06

## 2021-12-06 RX ORDER — LIDOCAINE 40 MG/G
CREAM TOPICAL
Status: ON HOLD | DISCHARGE
Start: 2021-12-06 | End: 2021-12-06

## 2021-12-06 RX ORDER — SERTRALINE HYDROCHLORIDE 25 MG/1
25 TABLET, FILM COATED ORAL DAILY
Status: ON HOLD | DISCHARGE
Start: 2021-12-07 | End: 2021-12-14

## 2021-12-06 RX ORDER — METHYLPREDNISOLONE SODIUM SUCCINATE 40 MG/ML
20 INJECTION, POWDER, LYOPHILIZED, FOR SOLUTION INTRAMUSCULAR; INTRAVENOUS DAILY
Status: DISCONTINUED | OUTPATIENT
Start: 2021-12-14 | End: 2021-12-14 | Stop reason: HOSPADM

## 2021-12-06 RX ORDER — DEXTROSE MONOHYDRATE 25 G/50ML
25-50 INJECTION, SOLUTION INTRAVENOUS
Status: ON HOLD | DISCHARGE
Start: 2021-12-06 | End: 2021-12-06

## 2021-12-06 RX ORDER — HYDROXYZINE HYDROCHLORIDE 25 MG/1
25 TABLET, FILM COATED ORAL EVERY 6 HOURS PRN
Status: ON HOLD | DISCHARGE
Start: 2021-12-06 | End: 2021-12-14

## 2021-12-06 RX ORDER — METHYLPREDNISOLONE SODIUM SUCCINATE 40 MG/ML
10 INJECTION, POWDER, LYOPHILIZED, FOR SOLUTION INTRAMUSCULAR; INTRAVENOUS DAILY
Status: DISCONTINUED | OUTPATIENT
Start: 2021-12-21 | End: 2021-12-14 | Stop reason: HOSPADM

## 2021-12-06 RX ORDER — AMLODIPINE BESYLATE 5 MG/1
5 TABLET ORAL DAILY
Status: ON HOLD | DISCHARGE
Start: 2021-12-07 | End: 2021-12-14

## 2021-12-06 RX ORDER — DOXAZOSIN 4 MG/1
4 TABLET ORAL DAILY
Status: ON HOLD | DISCHARGE
Start: 2021-12-07 | End: 2021-12-15

## 2021-12-06 RX ORDER — ONDANSETRON 4 MG/1
4 TABLET, ORALLY DISINTEGRATING ORAL EVERY 6 HOURS PRN
Status: DISCONTINUED | OUTPATIENT
Start: 2021-12-06 | End: 2021-12-14 | Stop reason: HOSPADM

## 2021-12-06 RX ORDER — LABETALOL HYDROCHLORIDE 5 MG/ML
20 INJECTION, SOLUTION INTRAVENOUS EVERY 4 HOURS PRN
Status: ON HOLD | DISCHARGE
Start: 2021-12-06 | End: 2021-12-06

## 2021-12-06 RX ORDER — METHYLPREDNISOLONE SODIUM SUCCINATE 40 MG/ML
30 INJECTION, POWDER, LYOPHILIZED, FOR SOLUTION INTRAMUSCULAR; INTRAVENOUS EVERY 24 HOURS
Status: DISCONTINUED | OUTPATIENT
Start: 2021-12-07 | End: 2021-12-06

## 2021-12-06 RX ORDER — CLONIDINE HYDROCHLORIDE 0.1 MG/1
0.1 TABLET ORAL EVERY 8 HOURS
Status: DISCONTINUED | OUTPATIENT
Start: 2021-12-06 | End: 2021-12-14 | Stop reason: HOSPADM

## 2021-12-06 RX ORDER — ACETAMINOPHEN 325 MG/1
650 TABLET ORAL EVERY 4 HOURS PRN
Status: ON HOLD | DISCHARGE
Start: 2021-12-06 | End: 2021-12-15

## 2021-12-06 RX ORDER — DOXAZOSIN 4 MG/1
4 TABLET ORAL DAILY
Status: DISCONTINUED | OUTPATIENT
Start: 2021-12-07 | End: 2021-12-14 | Stop reason: HOSPADM

## 2021-12-06 RX ORDER — QUETIAPINE FUMARATE 25 MG/1
25 TABLET, FILM COATED ORAL DAILY
Status: DISCONTINUED | OUTPATIENT
Start: 2021-12-07 | End: 2021-12-07

## 2021-12-06 RX ORDER — MULTIPLE VITAMINS W/ MINERALS TAB 9MG-400MCG
1 TAB ORAL DAILY
Status: ON HOLD | DISCHARGE
Start: 2021-12-07 | End: 2021-12-15

## 2021-12-06 RX ORDER — POLYETHYLENE GLYCOL 3350 17 G/17G
17 POWDER, FOR SOLUTION ORAL 2 TIMES DAILY PRN
Status: DISCONTINUED | OUTPATIENT
Start: 2021-12-06 | End: 2021-12-14 | Stop reason: HOSPADM

## 2021-12-06 RX ORDER — BISACODYL 10 MG
10 SUPPOSITORY, RECTAL RECTAL EVERY 8 HOURS PRN
Status: ON HOLD | DISCHARGE
Start: 2021-12-06 | End: 2021-12-06

## 2021-12-06 RX ORDER — PANTOPRAZOLE SODIUM 40 MG/1
40 TABLET, DELAYED RELEASE ORAL
Status: DISCONTINUED | OUTPATIENT
Start: 2021-12-07 | End: 2021-12-09

## 2021-12-06 RX ORDER — ALBUTEROL SULFATE 0.83 MG/ML
2.5 SOLUTION RESPIRATORY (INHALATION) EVERY 6 HOURS PRN
Qty: 90 ML | Status: ON HOLD | DISCHARGE
Start: 2021-12-06 | End: 2021-12-15

## 2021-12-06 RX ADMIN — Medication 2 PACKET: at 09:03

## 2021-12-06 RX ADMIN — Medication 40 MG: at 09:01

## 2021-12-06 RX ADMIN — QUETIAPINE FUMARATE 25 MG: 25 TABLET ORAL at 09:01

## 2021-12-06 RX ADMIN — ENOXAPARIN SODIUM 40 MG: 40 INJECTION SUBCUTANEOUS at 09:00

## 2021-12-06 RX ADMIN — CLONIDINE HYDROCHLORIDE 0.1 MG: 0.1 TABLET ORAL at 05:16

## 2021-12-06 RX ADMIN — INSULIN ASPART 1 UNITS: 100 INJECTION, SOLUTION INTRAVENOUS; SUBCUTANEOUS at 04:23

## 2021-12-06 RX ADMIN — DOXAZOSIN 4 MG: 4 TABLET ORAL at 09:01

## 2021-12-06 RX ADMIN — SERTRALINE HYDROCHLORIDE 25 MG: 25 TABLET ORAL at 09:01

## 2021-12-06 RX ADMIN — Medication 1 TABLET: at 09:01

## 2021-12-06 RX ADMIN — HYDROXYZINE HYDROCHLORIDE 50 MG: 25 TABLET, FILM COATED ORAL at 14:17

## 2021-12-06 RX ADMIN — CLONIDINE HYDROCHLORIDE 0.1 MG: 0.1 TABLET ORAL at 15:37

## 2021-12-06 RX ADMIN — INSULIN ASPART 1 UNITS: 100 INJECTION, SOLUTION INTRAVENOUS; SUBCUTANEOUS at 09:27

## 2021-12-06 RX ADMIN — METHYLPREDNISOLONE SODIUM SUCCINATE 40 MG: 40 INJECTION, POWDER, FOR SOLUTION INTRAMUSCULAR; INTRAVENOUS at 14:17

## 2021-12-06 RX ADMIN — POTASSIUM CHLORIDE 20 MEQ: 1.5 POWDER, FOR SOLUTION ORAL at 05:16

## 2021-12-06 RX ADMIN — AMLODIPINE BESYLATE 5 MG: 5 TABLET ORAL at 09:01

## 2021-12-06 RX ADMIN — ONDANSETRON 4 MG: 2 INJECTION INTRAMUSCULAR; INTRAVENOUS at 12:07

## 2021-12-06 ASSESSMENT — ACTIVITIES OF DAILY LIVING (ADL)
ADLS_ACUITY_SCORE: 12
ADLS_ACUITY_SCORE: 19
ADLS_ACUITY_SCORE: 14
ADLS_ACUITY_SCORE: 19
ADLS_ACUITY_SCORE: 12
ADLS_ACUITY_SCORE: 12
ADLS_ACUITY_SCORE: 19
ADLS_ACUITY_SCORE: 19
ADLS_ACUITY_SCORE: 17
ADLS_ACUITY_SCORE: 19
ADLS_ACUITY_SCORE: 14
ADLS_ACUITY_SCORE: 19
ADLS_ACUITY_SCORE: 12
ADLS_ACUITY_SCORE: 19
ADLS_ACUITY_SCORE: 14
ADLS_ACUITY_SCORE: 14
ADLS_ACUITY_SCORE: 12
ADLS_ACUITY_SCORE: 19
ADLS_ACUITY_SCORE: 19

## 2021-12-06 NOTE — PLAN OF CARE
Physical Therapy Discharge Summary    Reason for therapy discharge:    Discharged to hospital.     Progress towards therapy goal(s). See goals on Care Plan in [facility] electronic health record for goal details.  Goals partially met.  Barriers to achieving goals:   discharge from facility.    Therapy recommendation(s):    Continued therapy is recommended.  Rationale/Recommendations:  Recommend to continue skilled PT to progress functional IND and tolerance.

## 2021-12-06 NOTE — H&P
[facility]    History and Physical  Hospitalist       Date of Admission:  12/6/2021    Assessment & Plan   Brandon Schafer is a 41 year old male with PMHx significant for substance abuse who is a direct admission to [facility] from [facility] for ongoing cares (med/surg bed availability) followed prolonged hospitalization for respiratory failure in the setting of COVID 19 pneumonia/ARDS.     Acute hypoxic respiratory failure 2/2 COVID pneumonia; improving  ARDS 2/2 COVID pneumonia  Possible organizing pneumonia  Intubated 11/2 after presenting with stats in 50s.  CT on 11/25 with mild fibrotic changes, dense consolidations consistent with ARDS. Possible organizing pneumonia, started on methylprednisolone 60 mg x3 days. CT Chest 12/1/21 - no significant change.   Extubated 12/2/21. Currently on HFNC 45%/40LPM Fi02 and BiPAP while asleep and prn.   - Methylprednisolone 40 mg IV every day               Plan to drop by 10 mg each week (next taper on 12/7) until 10 mg total reached, then will remain on 10 mg daily until seen by outpatient pulmonology, will need pulmonology outpatient visit on discharge within 2-3 months with PFTs and CT chest  - HFNC, Bipap while asleep and PRN   --RCAT  - IS q 2 hours while awake, flutter valve if problems clearing secretions  -  albuterol nebs prn  --suction bedside      Acute Stress Reaction related to prolonged ICU stay  Delirium in the setting of prolonged critical illness   H/o methampetamine use  Patient reports history of methamphetamine use and reports using 1-2 times per week up until time of admission.  Current anxiety level making him want to use again. Reports significant anxiety and appears very anxious on admission.   Weaned off precedex 12/5  - Addiction medicine consulted at [facility] but it does not appear this was completed prior to transfer. Pt/family had been receiving some support from palliative team through hospital stay. Will ask psychiatry to see  here.   -- sertraline 25mg daily started 12/5  - Seroquel 25 mg qam, 50 mg q HS scheduled  - hydroxyzine prn  - Melatonin scheduled  - Delirium precautions   - Consider referral to post-ICU clinic, if eligible     Non-oliguric STEFAN, resolved  Hypokalemia, in setting of ongoing diuresis; resolved  Urinary retention; improving  Unclear baseline, has not seen doctor in 20 years. Cr 2.5 on admission, now normalized.   12/06: stevenson catheter removed, has been urinating per his report   - bmp in am   - I&Os  --bladder scan prn   - 4mg doxazosin daily, started at [facility]     Hypertension  Not on anti-HTN meds PTA   - Labetolol, hydralazine prn for SBP >160  - Amlodipine 5 mg daily started at [facility]  - Clonidine 0.1mg TID started at [facility]     Stress and steroid induced hyperglycemia  BG controlled on current regimen.   - Medium resistance sliding scale insulin   - 10 U Glargine  - check A1C     At risk for malnutrition  - Continue TF  + Full Liquid Diet, TF @ 30 today and increased to 40 prior to discharge. Will continue current rate and await nutrition consultation tomorrow. Unclear whether we carry formula previously used- RN paging on call nutrition   - Calorie counts and nutrition is following; recommends keeping feeding tube in until 75% PO intake of total calories  - SLP following      Septum wound, tongue wound 2/2 Proning   weakness/deconditioning  - WOC consult  - OT/PT consults      CAUTI   VAP  Started on broad-spectrum antibiotics on 11/11 after developing worsening oxygen saturations, fevers, and persistent leukocytosis. Urine culture 11/11 grew >100k pan-sensitive E coli. RVP and MRSA nares negative. Sputum grew E coli, S aureus, and GBS. Sputum culture 11/17 with 1+ E. coli and MSSA, blood cultures negative.   Off antibiotics since 11/25  Abx:  - Ceftriaxone 11/14-11/17; 11/19 - 11/25 (completed)  - Zosyn 11/11-11/14; 11/17 - 11/18  - Vanc 11/11-11/12      Reducible Umbilical Hernia  Not strangulated  - Monitor for  changes    DVT Prophylaxis: Enoxaparin (Lovenox) SQ  Code Status: Full Code    Disposition: Expected discharge in 3+ days pending ability to wean F102    Mitzy Love PA-C    Primary Care Physician   None    Chief Complaint   Respiratory failure     History is obtained from the patient    History of Present Illness   Brandon Schafer is a 41 year old male with PMHx significant for hx substance abuse who is a direct admission to [facility] from [facility] for ongoing cares (med/surg bed availability) followed prolonged hospitalization for respiratory failure in the setting of COVID 19 pneumonia/ARDS.  The patient was admitted to [facility] on 11/3/2021 after presenting to an outside hospital with hypoxic respiratory failure requiring intubation.  He has not seen a medical provider in over 20 years prior to his admission.  He remains on the ventilator until extubation 12/2/2021 and has been on high flow nasal cannula with intermittent BiPAP since that time.  His ICU course was complicated by CAUTI, polymicrobial VAP, organizing pneumonia, renal failure, delirium.  Due to lack of MedSurg/IMC bed availability at [facility] be transferred here to [facility] on 12/6/2021 for ongoing cares.  At time of transfer he is on high flow nasal cannula 45% at 40 L/min.  He is presently evaluated in his room on the medical floor.  He reports that he is breathing is comfortable at present.  Has been suctioning himself intermittently as he continues to have secretions.  Reports mild cough.  He denies chest pain or any pain.  Catheter removed today and he reports he is been able to void.  He is on a liquid diet but having some difficulty with this.  He reports significant anxiety worsened by his transfer as he is worried his family will not be able to find him and he will have difficulty contacting them. Currently declining additional meds for anxiety.  He denies suicidal ideation.  He reports some mild nausea this morning which is since  resolved.    Past Medical History    Methamphetamine use  ?prior DVT but patient denies this     Past Surgical History   Past Surgical History:   Procedure Laterality Date     PICC TRIPLE LUMEN PLACEMENT Right 2021    5FR TL PICC       Prior to Admission Medications    Prn tylenol only prior to admit [facility]    Prior to Admission Medications   Prescriptions Last Dose Informant Patient Reported? Taking?   QUEtiapine (SEROQUEL) 25 MG tablet 2021 at Unknown time  No Yes   Sig: Take 1 tablet (25 mg) by mouth daily   QUEtiapine (SEROQUEL) 50 MG tablet 2021 at Unknown time  No Yes   Si tablet (50 mg) by Oral or Feeding Tube route At Bedtime   acetaminophen (TYLENOL) 325 MG tablet 2021 at Unknown time  No Yes   Sig: Take 2 tablets (650 mg) by mouth every 4 hours as needed for mild pain   albuterol (PROVENTIL) (2.5 MG/3ML) 0.083% neb solution prn  No No   Sig: Take 1 vial (2.5 mg) by nebulization every 6 hours as needed for wheezing   amLODIPine (NORVASC) 5 MG tablet 2021 at 0901  No Yes   Sig: Take 1 tablet (5 mg) by mouth daily   cloNIDine (CATAPRES) 0.1 MG tablet 2021 at x2  No Yes   Si tablet (0.1 mg) by Oral or Feeding Tube route every 8 hours   doxazosin (CARDURA) 4 MG tablet 2021 at 0901  No Yes   Si tablet (4 mg) by Oral or Feeding Tube route daily   enoxaparin ANTICOAGULANT (LOVENOX) 40 MG/0.4ML syringe 2021 at 0900  No Yes   Sig: Inject 0.4 mLs (40 mg) Subcutaneous every 24 hours   hydrOXYzine (ATARAX) 25 MG tablet 2021 at Unknown time  No Yes   Sig: Take 1 tablet (25 mg) by mouth every 6 hours as needed for other (adjuvant pain)   hydrOXYzine (ATARAX) 50 MG tablet prn  No No   Sig: Take 1 tablet (50 mg) by mouth every 6 hours as needed for other (adjuvant pain)   insulin aspart (NOVOLOG PEN) 100 UNIT/ML pen 2021 at Unknown time  No Yes   Sig: Inject 1-6 Units Subcutaneous every 4 hours   insulin glargine (LANTUS PEN) 100 UNIT/ML pen 2021 at Unknown  time  No Yes   Sig: Inject 10 Units Subcutaneous At Bedtime   ipratropium - albuterol 0.5 mg/2.5 mg/3 mL (DUONEB) 0.5-2.5 (3) MG/3ML neb solution prn  No No   Sig: Take 1 vial (3 mLs) by nebulization every 4 hours as needed for wheezing   melatonin 10 MG TABS tablet 2021 at Unknown time  No Yes   Sig: Take 1 tablet (10 mg) by mouth every evening   methylPREDNISolone sodium succinate (SOLU-MEDROL) 40 mg/mL injection Starting   No No   Sig: Inject 0.25 mLs (10 mg) into the vein every 24 hours   Patient taking differently: Inject 10 mg into the vein every 24 hours Scheduled to start    methylPREDNISolone sodium succinate (SOLU-MEDROL) 40 mg/mL injection starting   No No   Sig: Inject 0.5 mLs (20 mg) into the vein every 24 hours   methylPREDNISolone sodium succinate (SOLU-MEDROL) 40 mg/mL injection starting   No No   Sig: Inject 0.75 mLs (30 mg) into the vein every 24 hours   multivitamin w/minerals (THERA-VIT-M) tablet 2021 at Unknown time  No Yes   Si tablet by Oral or Feeding Tube route daily   ondansetron (ZOFRAN-ODT) 4 MG ODT tab 2021 at IV  No Yes   Sig: Take 1 tablet (4 mg) by mouth every 6 hours as needed for nausea or vomiting   pantoprazole (PROTONIX) 2 mg/mL SUSP suspension 2021 at 0901  No Yes   Si mLs (40 mg) by Oral or Feeding Tube route every morning (before breakfast)   polyethylene glycol (MIRALAX) 17 g packet prn  No No   Si g by Oral or Feeding Tube route 2 times daily as needed for constipation   protein modular (PROSOURCE TF) LIQD 2021 at Unknown time  No Yes   Si packets by Per Feeding Tube route 2 times daily   sertraline (ZOLOFT) 25 MG tablet 2021 at Unknown time  No Yes   Sig: Take 1 tablet (25 mg) by mouth daily      Facility-Administered Medications: None     Allergies   No Known Allergies    Social History   Denies significant alcohol use or tobacco use. Reports methamphetamine use up until admission, 1-2x per week.     Family  History    Reviewed.     Review of Systems   The 10 point Review of Systems is negative other than noted in the HPI or here.     Physical Exam   Temp: 98.9  F (37.2  C) Temp src: Oral BP: (!) 149/92 Pulse: 116   Resp: 20 SpO2: 91 % O2 Device: High Flow Nasal Cannula (HFNC) Oxygen Delivery: 40 LPM  There were no vitals filed for this visit.  Vital Signs with Ranges  Temp:  [98.8  F (37.1  C)-99.2  F (37.3  C)] 98.9  F (37.2  C)  Pulse:  [100-123] 116  Resp:  [20-26] 20  BP: (127-169)/() 149/92  FiO2 (%):  [21 %-40 %] 35 %  SpO2:  [91 %-99 %] 91 %  No intake/output data recorded.    Constitutional: Alert and oriented to place, situation, date. Appears comfortable though anxious   ENT:  moist mucous membranes  Eyes:  Sclera anicteric, EOMI  Respiratory: Lungs diminished throughout, no increased work of breathing or wheezing   Cardiovascular: Regular rhythm with mild tachycardia, no significant LE edema   GI:  active bowel sounds, abdomen soft, non-tender. Large umbilical hernia, reduced easily   Skin/Integumen: chronic stasis changed  MSK:  Moves all four extremities. Normal tone  Neuro:  Speech is clear. Face symmetric. CN 2-12 grossly intact  Psych:  Very anxious appearing      Data   Data reviewed today:  I personally reviewed no images or EKG's today.  Recent Labs   Lab 12/06/21  1422 12/06/21  0858 12/06/21  0422 12/06/21  0411 12/05/21  0829 12/05/21  0558 12/04/21  0836 12/04/21  0534   WBC  --   --   --  13.1*  --  9.3  --  14.2*   HGB  --   --   --  12.1*  --  11.5*  --  9.7*   MCV  --   --   --  88  --  88  --  87   PLT  --   --   --  231  --  273  --  340   NA  --   --   --  137  --  135  --  137   POTASSIUM  --   --   --  3.7  --  3.9  --  3.8   CHLORIDE  --   --   --  104  --  103  --  100   CO2  --   --   --  24  --  26  --  27   BUN  --   --   --  27  --  31*  --  29   CR  --   --   --  0.86  --  0.80  --  0.75   ANIONGAP  --   --   --  9  --  6  --  10   VIPUL  --   --   --  9.3  --  9.3  --  9.2    * 140* 141* 133*   < > 151*   < > 169*    < > = values in this interval not displayed.       No results found for this or any previous visit (from the past 24 hour(s)).

## 2021-12-06 NOTE — PROGRESS NOTES
PALLIATIVE CARE SOCIAL WORK Progress Note   [facility] ([place]) Unit 4A    Care Conference:    PCSW present at care conference. Brandon and family (via ipad) were given medical update. Hope that he'll remain extubated, but if necessary he's willing to be re-intubated. Brandon would like visitors, including his SO.     Plan: PCSW will remain available for emotional coping while Palliative Care Team is following.    DINH Pineda, [facility]  Palliative Care Clinical   Pager [phone]    [facility] Inpatient Team Consult Pager [phone] Mon-Fri 8-4:30  After hours Answering Service [phone]

## 2021-12-06 NOTE — PLAN OF CARE
"Major Shift Events: Assumed cares from 2294-1655.    Patient stable this shift. Oxygen requirements unchanged. HFNC 40% 40L.   Speech reevaluated today - continue full liquid diet.  PT worked with him today and he was able to sit edge of bed with minimal assistance.   No new skin concerns.   Hydroxyzine given x1 at bedtime to prevent nighttime anxiety experienced last night.     Patient did mention this morning that he has previously \"struggled with a meth addiction\". He did mention he was fearful of relapsing. Concerns voiced to MICU team who did set him up with addiction medicine and health psychology. He will need counseling for post ICU PTSD/depression/anxiety. He was very tearful majority of the morning stating he was afraid to die and scared to be alone. Very emotional today. Did start zoloft today.     Up in the chair tonight at 2100. Tolerated well. Coloring in his room now.     Plan: Transfer to intermediate care when a bed becomes available.      For vital signs and complete assessments, please see documentation flowsheets.   "

## 2021-12-06 NOTE — PHARMACY-ADMISSION MEDICATION HISTORY
Pharmacy Medication History  Admission medication history interview status for the 12/6/2021  admission is complete. See EPIC admission navigator for prior to admission medications     Location of Interview: Outside patient room but on unit  Medication history sources: [facility] transfer med rec    Additional medication history information:   Please note, the below represents what was started after rk castillo's admission to [facility]. Prior to that admission he was only taking tylenol as needed.      Medication reconciliation completed by provider prior to medication history? No    Time spent in this activity: 15 mins    Prior to Admission medications    Medication Sig Last Dose Taking? Auth Provider   acetaminophen (TYLENOL) 325 MG tablet Take 2 tablets (650 mg) by mouth every 4 hours as needed for mild pain 12/5/2021 at Unknown time Yes Shawanda Galdamez MD   amLODIPine (NORVASC) 5 MG tablet Take 1 tablet (5 mg) by mouth daily 12/6/2021 at 0901 Yes Shawanda Galdamez MD   cloNIDine (CATAPRES) 0.1 MG tablet 1 tablet (0.1 mg) by Oral or Feeding Tube route every 8 hours 12/6/2021 at x2 Yes Shawanda Galdamez MD   doxazosin (CARDURA) 4 MG tablet 1 tablet (4 mg) by Oral or Feeding Tube route daily 12/6/2021 at 0901 Yes Shawanda Galdamez MD   enoxaparin ANTICOAGULANT (LOVENOX) 40 MG/0.4ML syringe Inject 0.4 mLs (40 mg) Subcutaneous every 24 hours 12/6/2021 at 0900 Yes Shawanda Galdamez MD   hydrOXYzine (ATARAX) 25 MG tablet Take 1 tablet (25 mg) by mouth every 6 hours as needed for other (adjuvant pain) 12/6/2021 at Unknown time Yes Shawanda Galdamez MD   insulin aspart (NOVOLOG PEN) 100 UNIT/ML pen Inject 1-6 Units Subcutaneous every 4 hours 12/6/2021 at Unknown time Yes Shawanda Galdamez MD   insulin glargine (LANTUS PEN) 100 UNIT/ML pen Inject 10 Units Subcutaneous At Bedtime 12/5/2021 at Unknown time Yes Shawanda Galdamez MD   melatonin 10 MG TABS tablet Take 1 tablet (10 mg) by mouth every evening 12/5/2021 at Unknown time Yes Shawanda Galdamez MD    multivitamin w/minerals (THERA-VIT-M) tablet 1 tablet by Oral or Feeding Tube route daily 12/6/2021 at Unknown time Yes Shawanda Galdamez MD   ondansetron (ZOFRAN-ODT) 4 MG ODT tab Take 1 tablet (4 mg) by mouth every 6 hours as needed for nausea or vomiting 12/6/2021 at IV Yes Shawanda Galdamez MD   pantoprazole (PROTONIX) 2 mg/mL SUSP suspension 20 mLs (40 mg) by Oral or Feeding Tube route every morning (before breakfast) 12/6/2021 at 0901 Yes Shawanda Galdamez MD   protein modular (PROSOURCE TF) LIQD 2 packets by Per Feeding Tube route 2 times daily 12/6/2021 at Unknown time Yes Shawanda Galdamez MD   QUEtiapine (SEROQUEL) 25 MG tablet Take 1 tablet (25 mg) by mouth daily 12/6/2021 at Unknown time Yes Shawanda Galdamez MD   QUEtiapine (SEROQUEL) 50 MG tablet 1 tablet (50 mg) by Oral or Feeding Tube route At Bedtime 12/5/2021 at Unknown time Yes Shawanda Galdamez MD   sertraline (ZOLOFT) 25 MG tablet Take 1 tablet (25 mg) by mouth daily 12/6/2021 at Unknown time Yes Shawanda Galdamez MD   albuterol (PROVENTIL) (2.5 MG/3ML) 0.083% neb solution Take 1 vial (2.5 mg) by nebulization every 6 hours as needed for wheezing prn  Shawanda Galdamez MD   hydrOXYzine (ATARAX) 50 MG tablet Take 1 tablet (50 mg) by mouth every 6 hours as needed for other (adjuvant pain) prn  Shawanda Galdamez MD   ipratropium - albuterol 0.5 mg/2.5 mg/3 mL (DUONEB) 0.5-2.5 (3) MG/3ML neb solution Take 1 vial (3 mLs) by nebulization every 4 hours as needed for wheezing prn  Shawanda Galdamez MD   methylPREDNISolone sodium succinate (SOLU-MEDROL) 40 mg/mL injection Inject 0.25 mLs (10 mg) into the vein every 24 hours  Patient taking differently: Inject 10 mg into the vein every 24 hours Scheduled to start 12/21 Starting 12/21  Shawanda Galdamez MD   methylPREDNISolone sodium succinate (SOLU-MEDROL) 40 mg/mL injection Inject 0.5 mLs (20 mg) into the vein every 24 hours starting 12/14  Shawanda Galdamez MD   methylPREDNISolone sodium succinate (SOLU-MEDROL) 40 mg/mL injection Inject  0.75 mLs (30 mg) into the vein every 24 hours starting 12/7  Shawanda Galdamez MD   polyethylene glycol (MIRALAX) 17 g packet 17 g by Oral or Feeding Tube route 2 times daily as needed for constipation prn  Shawanda Galdamez MD Tiffany M. Reinitz, PharmD    The information provided in this note is only as accurate as the sources available at the time of update(s)

## 2021-12-06 NOTE — PLAN OF CARE
Major Shift Events: A&Ox4, can be disoriented to time. Follows all commands. 4/5 throughout. Weaned hi-flow NC to 21%, sats in low 90s. Able to repositions self with minor help. Small, loose BM on bedpan x1. Florse pulled at 0500. No skin changes.     Plan: Transfer orders for [facility] unit, pending bed availability.     For vital signs and complete assessments, please see documentation flowsheets.

## 2021-12-06 NOTE — PROGRESS NOTES
[facility]    Progress Note - [place] 1 Service        Date of Admission:  11/3/2021    Assessment & Plan      Brandon Schafer is a 41 year old male admitted on 11/3/2021. He has a PMH of remote VTE who was admitted on 11/3/2021 with ARDS 2/2 COVID pneumonia requiring intubation on 11/2 en route to the hospital. Course c/b CAUTI, polymicrobial VAP (11/11), and organizing pneumonia. Extubated to bipap 12/2. He is transferred to the medicine service on 12/5 for further cares. Currently on HFNC and BIPAP prn + night.    Today:  - Addiction medicine consulted for meth use history  - Psychology consultation, re: anxiety and coping with prolonged admission  - Flores catheter removed  - PT/OT    # Acute hypoxic respiratory failure 2/2 COVID pneumonia; improving  # ARDS 2/2 COVID pneumonia  # Possible organizing pneumonia  Intubated 11/2 prior to transport flight. CT on 11/25 with mild fibrotic changes, dense consolidations consistent with ARDS. Completed dexamethasone treatment for COVID. Possible organizing pneumonia, started on methylprednisolone 60 mg x3 days. CT Chest 12/1/21 - no significant change. Extubated 12/2/21.  - Methylprednisolone 40 mg every day               Plan to drop by 10 mg each week until 10 mg total reached, then will remain on 10 mg daily until seen by outpatient pulm, will need pulm outpatient visit on discharge within 2-3 months with PFTs and CT chest  - HFNC as tolerated, Bipap while asleep and PRN   - IS q 2 hours while awake, flutter valve if problems clearing secretions  - Infectious treatment as below  - Continue albuterol nebs prn    # Acute Stress Reaction related to prolonged ICU stay  # Delirium in the setting of prolonged critical illness   # H/o methampetamine use  In AM 12/5/21 patient stating he previously had substance abuse issues with methamphetamines. Current anxiety level making him want to use again.  - Addiction med consulted  -  Health psych consulted  - Consider referral to post-ICU clinic, if eligible  - Seroquel 25 mg, scheduled 50 mg q HS   - Haloperidol 5 mg Q6H prn  - hydroxyzine prn  - Melatonin scheduled  - Delirium precautions     # Non-oliguric STEFAN, resolved  # Hypokalemia, in setting of ongoing diuresis   # Urinary retention; improving  Unclear baseline, has not seen doctor in 20 years. Cr 2.5 on admission, now improved. 12/06: stevenson catheter removed, pt has yet to urinate.  - daily bmp  - I&Os  - 4mg doxazosin daily    # Hypertension  Not on anti-HTN meds PTA   - Labetolol, hydralazine prn for SBP >160  - Amlodipine 5 mg daily  - Clonidine 0.1mg TID    # Stress and steroid induced hyperglycemia  - Medium resistance sliding scale insulin   - 10 U Glargine    # At risk for malnutrition  # Constipation, improved Last BM 12/1  - Ctn TF at set rate + FLD  - Calorie counts and nutrition is following; recommends keeping feeding tube in until 75% PO intake of total calories  - Bowel regimen: senna, Miralax, bisacodyl suppository prn    # Septum wound, tongue wound 2/2 Proning   # weakness/deconditioning  - WOC following  - OT/PT consults     # CAUTI  # VAP  Pt with worsening oxygen saturations, fevers, and persistent leukocytosis. Given his prolonged hospitalization, was started on broad-spectrum antibiotics on 11/11. Urine culture grew >100k pan-sensitive E coli. RVP and MRSA nares negative. Sputum grew E coli, S aureus, and GBS. Sputum culture 11/17 with 1+ E. coli and MSSA, blood cultures negative. Continued to have fevers as of 11/18, redraw blood cultures and stool tested negative for Cdiff.   - Continue to observe off antibiotics  Abx:  - Ceftriaxone 11/14-11/17; 11/19 - 11/25 (completed)  - Zosyn 11/11-11/14; 11/17 - 11/18  - Vanc 11/11-11/12     # Reducible Umbilical Hernia  Not strangulated  - Monitor    Diet: Adult Formula Drip Feeding: Continuous Vital High Protein; Nasogastric tube; Goal Rate: 30; mL/hr; Medication - Feeding  Tube Flush Frequency: At least 15-30 mL water before and after medication administration and with tube clogging; Amount to Send...  Full Liquid Diet    DVT Prophylaxis: Enoxaparin (Lovenox) SQ  Stevenson Catheter: Not present  Fluids: None  Central Lines: None  Code Status: Full Code      Disposition Plan   Expected discharge: recommended to prior living arrangement once O2 use less than 2 liters/minute.     The patient's care was discussed with the Attending Physician, Dr. Leiva.      Lewis Bah, Medical Student    John Costello Jr., MD  Internal Medicine- PGY1  [address]  Securely message with the Vocera Web Console (learn more here)  Text page via [employer] Paging/Directory    Please see sign in/sign out for up to date coverage information  _____________________________________________________________________    Interval History   Pt teary at times during discussion. Says he lost several family members recently. He has not urinated since having his stevenson removed last night. He denies N/V/fever/chills/SOB.    4 point ROS negative     Data reviewed today: I reviewed all medications, new labs and imaging results over the last 24 hours.    Physical Exam   Vital Signs: Temp: 99  F (37.2  C) Temp src: Oral BP: (!) 149/92 Pulse: 105   Resp: 26 SpO2: 95 % O2 Device: High Flow Nasal Cannula (HFNC) Oxygen Delivery: 40 LPM  Weight: 257 lbs 15.01 oz    General: age-appearing man, laying in bed and appears tired, in NAD  HEENT: Mucous membranes moist, pupils equal and reactive to light  Lymph: no cervical or submandibular lymphadenopathy  Neuro: awake, moving all extremities, following commands, answering questions appropriately, soft voice. Arm flexion/extension, hip flexion/extension, and leg flexion are 5/5 bilaterally. Shoulder flexion/extension and leg extension are 4/5 bilaterally.  Pulm/Resp: Clear breath sounds  bilaterally without rhonchi, mild crackles and expiratory wheezes bilaterally  CV: RRR, S1/S2 without m/r/g  Abdomen: Obese, soft, non-distended, non-tender, +BS, ~6 inch reducible umbilical hernia  Incisions/Skin: No rashes noted, no bleeding    Data   Recent Labs   Lab 12/06/21  0422 12/06/21  0411 12/05/21  2351 12/05/21  0829 12/05/21  0558 12/04/21  0836 12/04/21  0534   WBC  --  13.1*  --   --  9.3  --  14.2*   HGB  --  12.1*  --   --  11.5*  --  9.7*   MCV  --  88  --   --  88  --  87   PLT  --  231  --   --  273  --  340   NA  --  137  --   --  135  --  137   POTASSIUM  --  3.7  --   --  3.9  --  3.8   CHLORIDE  --  104  --   --  103  --  100   CO2  --  24  --   --  26  --  27   BUN  --  27  --   --  31*  --  29   CR  --  0.86  --   --  0.80  --  0.75   ANIONGAP  --  9  --   --  6  --  10   VIPUL  --  9.3  --   --  9.3  --  9.2   * 133* 132*   < > 151*   < > 169*    < > = values in this interval not displayed.

## 2021-12-06 NOTE — CONSULTS
Health Psychology                                                                                                                          Andria Matthew, Ph.D., L.P [phone]  Marylin Jesus, Ph.D., L.P. [phone]  Madelyn Swain, Ph.D. [phone]  Nova Foote, Ph.D., L.P. [phone]  Berry Zavala, Ph.D., A.B.P.P., L.P. [phone]         Evelin Benito, Ph.D., L.P. [phone]                [facility] [place], 3rd Floor  [address] [place] [address]    Inpatient Health Psychology Consultation      Date of Service:  12/6/21    Consult received for Brandon to support him in coping with recovery from COVID and history of PTSD. Connected with P[facility], with Palliative.  She is familiar with Brandon's case and has liv following with Palliative Care and was planning to meet with him today. We agreed that it makes sense for her to address this consult and will re-assess Brandon's needs if the Palliative Care team signs off.    Madelyn Swain, PhD,   Health Psychologist  [phone]

## 2021-12-06 NOTE — PROGRESS NOTES
CLINICAL NUTRITION SERVICES - BRIEF NOTE    Pt meeting < 50% of estimated TF as rate currently held @ 30 ml/hr (unknown reasoning).     INTERVENTIONS  Recommendations / Nutrition Prescription  Tolerance to TF @ goal  Ongoing Po adequacy   RD to recommend to NOT remove FT until pt is able to consume at least 75% of lower end needs orally  Ongoing SLP recommendations     Implementation  RD to adv TF to goal:   Vital High Protein @ goal of 65ml/hr (1560ml/day) +4 Prosource will provide: 1720 kcals (21 kcal/kg IBW), 180 g PRO (2.2 g/kg IBW), 1304 ml free H20, 173 g CHO, and 0 g fiber daily, 36 g Fat.     Calorie counts 12/7-12/9 + Ensure Enlive BID       Dolores Smalls, UMANG, MS, LD  SICU: [id number]

## 2021-12-06 NOTE — DISCHARGE SUMMARY
[facility]  Discharge Summary - Medicine       Date of Admission:  11/3/2021  Date of Discharge:  12/6/2021  Discharging Provider: Rafael Leiva MD  Discharge Service: [place] 1    Discharge Diagnoses   Acute hypoxic respiratory failure 2/2 COVID-19 Pneumonia; improving  ARDS 2/2 COVID-19 pneumonia; improving  Possible Organizing Pneumonia; ongoing  Delirium 2/2 prolonged critical illness; improving  History of methamphetamine use; ongoing  Acute stress reaction 2/2 prolonged ICU stay; ongoing  Non-oliguric STEFAN, resolved  Hypokalemia; resolved  Urinary retention; improving  Hypertension; ongoing  Concern for malnutrition; ongoing  Stress and steroid induced hyperglycemia; improving  Reducible Umbilical Hernia; stable  CAUTI; resolved  VAP; resolved  Septum wound, tongue wound 2/2 proning; improving  Weakness/deconditioning; improving    Follow-ups Needed After Discharge   Follow up with provider at [facility] for ongoing medical care. Will need PCP follow up after discharge. Will need outpatient pulmonary clinic follow up, outpatient psychiatry, outpatient substance abuse treatment, and likely physical therapy.    Unresulted Labs Ordered in the Past 30 Days of this Admission     No orders found from 10/4/2021 to 11/4/2021.      These results will be followed up by [facility] providers    Discharge Disposition   Transferred to [facility]  Condition at discharge: Stable    Hospital Course   Brandon Schafer is a 41 year old male admitted to [facility] on 11/3/2021. He has a PMH of remote VTE who was admitted on 11/3/2021 with ARDS 2/2 COVID pneumonia requiring intubation on 11/2 en route to the hospital. Course c/b CAUTI, polymicrobial VAP (11/11), and organizing pneumonia. Extubated to bipap 12/2. He is transferred to the medicine service on 12/5 for further cares. Currently on HFNC and BIPAP prn + night.    The following problems were addressed during his  hospitalization:    # Acute hypoxic respiratory failure 2/2 COVID pneumonia; improving  # ARDS 2/2 COVID pneumonia  # Possible organizing pneumonia  Intubated 11/2 prior to transport flight. CT on 11/25 with mild fibrotic changes, dense consolidations consistent with ARDS. Completed dexamethasone treatment for COVID. Possible organizing pneumonia, started on methylprednisolone 60 mg x3 days. CT Chest 12/1/21 - no significant change. Extubated 12/2/21.  - Methylprednisolone 40 mg every day               Plan to drop by 10 mg each week (next taper on 12/7) until 10 mg total reached, then will remain on 10 mg daily until seen by outpatient pulmonology, will need pulmonology outpatient visit on discharge within 2-3 months with PFTs and CT chest  - HFNC as tolerated, Bipap while asleep and PRN   - IS q 2 hours while awake, flutter valve if problems clearing secretions  - Infectious treatment as below  - Continue albuterol nebs prn     # Acute Stress Reaction related to prolonged ICU stay  # Delirium in the setting of prolonged critical illness   # H/o methampetamine use  In AM 12/5/21 patient stating he previously had substance abuse issues with methamphetamines. Current anxiety level making him want to use again.  - Addiction medicine consult  - Health psychiatry consult  - Consider referral to post-ICU clinic, if eligible  - Seroquel 25 mg, scheduled 50 mg q HS   - Haloperidol 5 mg Q6H prn  - hydroxyzine prn  - Melatonin scheduled  - Delirium precautions      # Non-oliguric STEFAN, resolved  # Hypokalemia, in setting of ongoing diuresis; resolved  # Urinary retention; improving  Unclear baseline, has not seen doctor in 20 years. Cr 2.5 on admission, now improved. 12/06: stevenson catheter removed, pt has yet to urinate.  - Daily bmp  - I&Os  - 4mg doxazosin daily     # Hypertension  Not on anti-HTN meds PTA   - Labetolol, hydralazine prn for SBP >160  - Amlodipine 5 mg daily  - Clonidine 0.1mg TID     # Stress and steroid  induced hyperglycemia  - Medium resistance sliding scale insulin   - 10 U Glargine     # At risk for malnutrition  # Constipation, improved Last BM 12/1  - Continue TF at set rate + Full Liquid Diet  - Calorie counts and nutrition is following; recommends keeping feeding tube in until 75% PO intake of total calories  - Bowel regimen: senna, Miralax, bisacodyl suppository prn     # Septum wound, tongue wound 2/2 Proning   # weakness/deconditioning  - WOC consult  - OT/PT consults      # CAUTI  # VAP  Pt with worsening oxygen saturations, fevers, and persistent leukocytosis. Given his prolonged hospitalization, was started on broad-spectrum antibiotics on 11/11. Urine culture grew >100k pan-sensitive E coli. RVP and MRSA nares negative. Sputum grew E coli, S aureus, and GBS. Sputum culture 11/17 with 1+ E. coli and MSSA, blood cultures negative. Continued to have fevers as of 11/18, redraw blood cultures and stool tested negative for Cdiff.   - Continue to observe off antibiotics  Abx:  - Ceftriaxone 11/14-11/17; 11/19 - 11/25 (completed)  - Zosyn 11/11-11/14; 11/17 - 11/18  - Vanc 11/11-11/12      # Reducible Umbilical Hernia  Not strangulated  - Monitor for changes       Consultations This Hospital Stay   NUTRITION SERVICES ADULT IP CONSULT  PHARMACY IP CONSULT  PALLIATIVE CARE ADULT IP CONSULT  PHARMACY IP CONSULT  PHARMACY IP CONSULT  NUTRITION SERVICES ADULT IP CONSULT  VASCULAR ACCESS CARE ADULT IP CONSULT  WOUND OSTOMY CONTINENCE NURSE  IP CONSULT  VASCULAR ACCESS ADULT IP CONSULT  VASCULAR ACCESS FOR PICC PLACEMENT ADULT IP CONSULT  VASCULAR ACCESS FOR PICC PLACEMENT ADULT IP CONSULT  WOUND OSTOMY CONTINENCE NURSE  IP CONSULT  PHARMACY TO DOSE VANCO  CARE MANAGEMENT / SOCIAL WORK IP CONSULT  VASCULAR ACCESS CARE ADULT IP CONSULT  VASCULAR ACCESS CARE ADULT IP CONSULT  VASCULAR ACCESS CARE ADULT IP CONSULT  VASCULAR ACCESS ADULT IP CONSULT  VASCULAR ACCESS CARE ADULT IP CONSULT  OCCUPATIONAL THERAPY ADULT IP  CONSULT  PHYSICAL THERAPY ADULT IP CONSULT  SPEECH LANGUAGE PATH ADULT IP CONSULT  VASCULAR ACCESS CARE ADULT IP CONSULT  PSYCHOLOGY ADULT IP CONSULT  ADDICTION SERVICE ADULT IP CONSULT FOR [place]    Code Status   Full Code       The patient was discussed with Dr. Rafael Costello Jr., MD  Internal Medicine- PGY1  [facility] [address] [place]  [address] [facility] [address]  Phone: [phone]  ______________________________________________________________________    Physical Exam   Vital Signs: Temp: 98.8  F (37.1  C) Temp src: Oral BP: 129/88 Pulse: 119   Resp: 20 SpO2: 94 % O2 Device: High Flow Nasal Cannula (HFNC) Oxygen Delivery: 40 LPM  Weight: 257 lbs 15.01 oz    General: age-appearing man, laying in bed and appears tired, in NAD  HEENT: Mucous membranes moist, pupils equal and reactive to light  Lymph: no cervical or submandibular lymphadenopathy  Neuro: awake, moving all extremities, following commands, answering questions appropriately, soft voice. Arm flexion/extension, hip flexion/extension, and leg flexion are 5/5 bilaterally. Shoulder flexion/extension and leg extension are 4/5 bilaterally.  Pulm/Resp: Clear breath sounds bilaterally without rhonchi, mild crackles and expiratory wheezes bilaterally  CV: RRR, S1/S2 without m/r/g  Abdomen: Obese, soft, non-distended, non-tender, +BS, ~6 inch reducible umbilical hernia  Incisions/Skin: No rashes noted, no bleeding      Primary Care Physician   Physician No Ref-Primary    Discharge Orders   No discharge procedures on file.    Significant Results and Procedures   Most Recent 3 CBC's:  Recent Labs   Lab Test 12/06/21  0411 12/05/21  0558 12/04/21  0534   WBC 13.1* 9.3 14.2*   HGB 12.1* 11.5* 9.7*   MCV 88 88 87    273 340     Most Recent 3 BMP's:  Recent Labs   Lab Test 12/06/21  1422 12/06/21  0858 12/06/21  0422 12/06/21  0411 12/05/21  0829 12/05/21  0558 12/04/21  0836  12/04/21  0534   NA  --   --   --  137  --  135  --  137   POTASSIUM  --   --   --  3.7  --  3.9  --  3.8   CHLORIDE  --   --   --  104  --  103  --  100   CO2  --   --   --  24  --  26  --  27   BUN  --   --   --  27  --  31*  --  29   CR  --   --   --  0.86  --  0.80  --  0.75   ANIONGAP  --   --   --  9  --  6  --  10   VIPUL  --   --   --  9.3  --  9.3  --  9.2   * 140* 141* 133*   < > 151*   < > 169*    < > = values in this interval not displayed.       Discharge Medications   Current Discharge Medication List      CONTINUE these medications which have NOT CHANGED    Details   Acetaminophen (TYLENOL PO) Take 1,000 mg by mouth daily as needed for mild pain or fever           Allergies   No Known Allergies

## 2021-12-07 ENCOUNTER — APPOINTMENT (OUTPATIENT)
Dept: SPEECH THERAPY | Facility: CLINIC | Age: 41
End: 2021-12-07
Attending: PHYSICIAN ASSISTANT
Payer: MEDICAID

## 2021-12-07 ENCOUNTER — APPOINTMENT (OUTPATIENT)
Dept: OCCUPATIONAL THERAPY | Facility: CLINIC | Age: 41
End: 2021-12-07
Attending: PHYSICIAN ASSISTANT
Payer: MEDICAID

## 2021-12-07 LAB
ANION GAP SERPL CALCULATED.3IONS-SCNC: 5 MMOL/L (ref 3–14)
BUN SERPL-MCNC: 22 MG/DL (ref 7–30)
CALCIUM SERPL-MCNC: 9.5 MG/DL (ref 8.5–10.1)
CHLORIDE BLD-SCNC: 104 MMOL/L (ref 94–109)
CO2 SERPL-SCNC: 28 MMOL/L (ref 20–32)
CREAT SERPL-MCNC: 0.82 MG/DL (ref 0.66–1.25)
ERYTHROCYTE [DISTWIDTH] IN BLOOD BY AUTOMATED COUNT: 15.6 % (ref 10–15)
GFR SERPL CREATININE-BSD FRML MDRD: >90 ML/MIN/1.73M2
GLUCOSE BLD-MCNC: 152 MG/DL (ref 70–99)
GLUCOSE BLDC GLUCOMTR-MCNC: 124 MG/DL (ref 70–99)
GLUCOSE BLDC GLUCOMTR-MCNC: 127 MG/DL (ref 70–99)
GLUCOSE BLDC GLUCOMTR-MCNC: 136 MG/DL (ref 70–99)
GLUCOSE BLDC GLUCOMTR-MCNC: 144 MG/DL (ref 70–99)
GLUCOSE BLDC GLUCOMTR-MCNC: 150 MG/DL (ref 70–99)
HBA1C MFR BLD: 6.7 % (ref 0–5.6)
HCT VFR BLD AUTO: 37.5 % (ref 40–53)
HGB BLD-MCNC: 11.6 G/DL (ref 13.3–17.7)
MCH RBC QN AUTO: 26.9 PG (ref 26.5–33)
MCHC RBC AUTO-ENTMCNC: 30.9 G/DL (ref 31.5–36.5)
MCV RBC AUTO: 87 FL (ref 78–100)
PLATELET # BLD AUTO: 249 10E3/UL (ref 150–450)
POTASSIUM BLD-SCNC: 3.6 MMOL/L (ref 3.4–5.3)
RBC # BLD AUTO: 4.32 10E6/UL (ref 4.4–5.9)
SODIUM SERPL-SCNC: 137 MMOL/L (ref 133–144)
WBC # BLD AUTO: 11.3 10E3/UL (ref 4–11)

## 2021-12-07 PROCEDURE — 250N000013 HC RX MED GY IP 250 OP 250 PS 637: Performed by: INTERNAL MEDICINE

## 2021-12-07 PROCEDURE — 120N000001 HC R&B MED SURG/OB

## 2021-12-07 PROCEDURE — 250N000013 HC RX MED GY IP 250 OP 250 PS 637: Performed by: PHYSICIAN ASSISTANT

## 2021-12-07 PROCEDURE — 83036 HEMOGLOBIN GLYCOSYLATED A1C: CPT | Performed by: PHYSICIAN ASSISTANT

## 2021-12-07 PROCEDURE — 92610 EVALUATE SWALLOWING FUNCTION: CPT | Mod: GN

## 2021-12-07 PROCEDURE — 999N000157 HC STATISTIC RCP TIME EA 10 MIN

## 2021-12-07 PROCEDURE — 99233 SBSQ HOSP IP/OBS HIGH 50: CPT | Performed by: INTERNAL MEDICINE

## 2021-12-07 PROCEDURE — 250N000011 HC RX IP 250 OP 636: Performed by: PHYSICIAN ASSISTANT

## 2021-12-07 PROCEDURE — 94660 CPAP INITIATION&MGMT: CPT

## 2021-12-07 PROCEDURE — 97165 OT EVAL LOW COMPLEX 30 MIN: CPT | Mod: GO

## 2021-12-07 PROCEDURE — 36415 COLL VENOUS BLD VENIPUNCTURE: CPT | Performed by: PHYSICIAN ASSISTANT

## 2021-12-07 PROCEDURE — 97535 SELF CARE MNGMENT TRAINING: CPT | Mod: GO

## 2021-12-07 PROCEDURE — 80048 BASIC METABOLIC PNL TOTAL CA: CPT | Performed by: PHYSICIAN ASSISTANT

## 2021-12-07 PROCEDURE — 92526 ORAL FUNCTION THERAPY: CPT | Mod: GN

## 2021-12-07 PROCEDURE — 85027 COMPLETE CBC AUTOMATED: CPT | Performed by: PHYSICIAN ASSISTANT

## 2021-12-07 PROCEDURE — 250N000012 HC RX MED GY IP 250 OP 636 PS 637: Performed by: PHYSICIAN ASSISTANT

## 2021-12-07 RX ORDER — ACETAMINOPHEN 325 MG/10.15ML
650 LIQUID ORAL EVERY 4 HOURS PRN
Status: DISCONTINUED | OUTPATIENT
Start: 2021-12-07 | End: 2021-12-14 | Stop reason: HOSPADM

## 2021-12-07 RX ORDER — DEXTROSE MONOHYDRATE 100 MG/ML
INJECTION, SOLUTION INTRAVENOUS CONTINUOUS PRN
Status: DISCONTINUED | OUTPATIENT
Start: 2021-12-07 | End: 2021-12-14 | Stop reason: HOSPADM

## 2021-12-07 RX ORDER — HYDROXYZINE HYDROCHLORIDE 25 MG/1
25-50 TABLET, FILM COATED ORAL EVERY 6 HOURS PRN
Status: DISCONTINUED | OUTPATIENT
Start: 2021-12-07 | End: 2021-12-14 | Stop reason: HOSPADM

## 2021-12-07 RX ORDER — AMLODIPINE BESYLATE 5 MG/1
5 TABLET ORAL DAILY
Status: DISCONTINUED | OUTPATIENT
Start: 2021-12-08 | End: 2021-12-09 | Stop reason: ALTCHOICE

## 2021-12-07 RX ORDER — AMINO AC/PROTEIN HYDR/WHEY PRO 10G-100/30
1 LIQUID (ML) ORAL 2 TIMES DAILY
Status: DISCONTINUED | OUTPATIENT
Start: 2021-12-07 | End: 2021-12-13

## 2021-12-07 RX ORDER — GUAIFENESIN 600 MG/1
15 TABLET, EXTENDED RELEASE ORAL DAILY
Status: DISCONTINUED | OUTPATIENT
Start: 2021-12-07 | End: 2021-12-13

## 2021-12-07 RX ORDER — SERTRALINE HYDROCHLORIDE 25 MG/1
25 TABLET, FILM COATED ORAL DAILY
Status: DISCONTINUED | OUTPATIENT
Start: 2021-12-08 | End: 2021-12-08

## 2021-12-07 RX ORDER — QUETIAPINE FUMARATE 25 MG/1
25 TABLET, FILM COATED ORAL DAILY
Status: DISCONTINUED | OUTPATIENT
Start: 2021-12-08 | End: 2021-12-08

## 2021-12-07 RX ADMIN — MULTIVIT AND MINERALS-FERROUS GLUCONATE 9 MG IRON/15 ML ORAL LIQUID 15 ML: at 12:21

## 2021-12-07 RX ADMIN — DOXAZOSIN 4 MG: 4 TABLET ORAL at 09:04

## 2021-12-07 RX ADMIN — HYDROXYZINE HYDROCHLORIDE 25 MG: 25 TABLET ORAL at 13:10

## 2021-12-07 RX ADMIN — SERTRALINE HYDROCHLORIDE 25 MG: 25 TABLET ORAL at 09:04

## 2021-12-07 RX ADMIN — INSULIN GLARGINE 10 UNITS: 100 INJECTION, SOLUTION SUBCUTANEOUS at 00:03

## 2021-12-07 RX ADMIN — INSULIN GLARGINE 10 UNITS: 100 INJECTION, SOLUTION SUBCUTANEOUS at 22:01

## 2021-12-07 RX ADMIN — ENOXAPARIN SODIUM 40 MG: 40 INJECTION SUBCUTANEOUS at 09:04

## 2021-12-07 RX ADMIN — CLONIDINE HYDROCHLORIDE 0.1 MG: 0.1 TABLET ORAL at 16:48

## 2021-12-07 RX ADMIN — AMLODIPINE BESYLATE 5 MG: 5 TABLET ORAL at 09:04

## 2021-12-07 RX ADMIN — CLONIDINE HYDROCHLORIDE 0.1 MG: 0.1 TABLET ORAL at 05:47

## 2021-12-07 RX ADMIN — Medication 1 PACKET: at 22:10

## 2021-12-07 RX ADMIN — Medication 1 PACKET: at 12:21

## 2021-12-07 RX ADMIN — PANTOPRAZOLE SODIUM 40 MG: 40 TABLET, DELAYED RELEASE ORAL at 05:47

## 2021-12-07 RX ADMIN — CLONIDINE HYDROCHLORIDE 0.1 MG: 0.1 TABLET ORAL at 21:59

## 2021-12-07 RX ADMIN — CLONIDINE HYDROCHLORIDE 0.1 MG: 0.1 TABLET ORAL at 00:01

## 2021-12-07 RX ADMIN — QUETIAPINE FUMARATE 50 MG: 50 TABLET ORAL at 21:59

## 2021-12-07 RX ADMIN — QUETIAPINE FUMARATE 50 MG: 50 TABLET ORAL at 00:01

## 2021-12-07 RX ADMIN — METHYLPREDNISOLONE SODIUM SUCCINATE 30 MG: 40 INJECTION, POWDER, FOR SOLUTION INTRAMUSCULAR; INTRAVENOUS at 09:04

## 2021-12-07 RX ADMIN — QUETIAPINE FUMARATE 25 MG: 25 TABLET ORAL at 09:04

## 2021-12-07 ASSESSMENT — ACTIVITIES OF DAILY LIVING (ADL)
ADLS_ACUITY_SCORE: 11
ADLS_ACUITY_SCORE: 16
ADLS_ACUITY_SCORE: 14
ADLS_ACUITY_SCORE: 16
PREVIOUS_RESPONSIBILITIES: MEAL PREP;HOUSEKEEPING;LAUNDRY;SHOPPING;MEDICATION MANAGEMENT;FINANCES;DRIVING;WORK
ADLS_ACUITY_SCORE: 11
ADLS_ACUITY_SCORE: 20
ADLS_ACUITY_SCORE: 11
ADLS_ACUITY_SCORE: 11
ADLS_ACUITY_SCORE: 16
ADLS_ACUITY_SCORE: 11
ADLS_ACUITY_SCORE: 14
DEPENDENT_IADLS:: INDEPENDENT
ADLS_ACUITY_SCORE: 11
ADLS_ACUITY_SCORE: 16
ADLS_ACUITY_SCORE: 11
ADLS_ACUITY_SCORE: 11
ADLS_ACUITY_SCORE: 16
ADLS_ACUITY_SCORE: 11
ADLS_ACUITY_SCORE: 16

## 2021-12-07 NOTE — PLAN OF CARE
Occupational Therapy Discharge Summary    Reason for therapy discharge:    Discharge to [facility].    Progress towards therapy goal(s). See goals on Care Plan in [facility] electronic health record for goal details.  Pt. Not seen by d/cing therapist.  Per  chart pt. D/c'd to  [facility].    Therapy recommendation(s):    Continue with OT in IP setting to max. Safety/indep. with ADLs/mobility.

## 2021-12-07 NOTE — PROGRESS NOTES
12/07/21 0838   General Information   Onset of Illness/Injury or Date of Surgery 12/06/21   Referring Physician Mitzy Love PA-C   Behavioral Issues overwhelmed easily  (emotional thinking about family that has passed away)   Patient/Family Therapy Goal Statement (SLP) None stated    Pertinent History of Current Problem Brandon Schafer is a 41 year old male with PMHx significant for substance abuse who is a direct admission to [facility] from [facility] for ongoing cares (med/surg bed availability) followed prolonged hospitalization for respiratory failure in the setting of COVID 19 pneumonia/ARDS.    General Observations Pt is alert and agreeable to evaluation. He is upright in a chair eating his breakfast   Past History of Dysphagia None prior to this recent admission for COVID. He was evaluated at [facility] and recommended a full liquid diet    Type of Evaluation   Type of Evaluation Swallow Evaluation   Oral Motor   Oral Musculature generally intact   Structural Abnormalities none present   Mucosal Quality adequate;dry   Dentition (Oral Motor)   Dentition (Oral Motor) natural dentition   Facial Symmetry (Oral Motor)   Facial Symmetry (Oral Motor) WNL   Lip Function (Oral Motor)   Lip Range of Motion (Oral Motor) WNL   Tongue Function (Oral Motor)   Tongue ROM (Oral Motor) WNL   Vocal Quality/Secretion Management (Oral Motor)   Vocal Quality (Oral Motor) hoarse   Secretion Management (Oral Motor) WNL   General Swallowing Observations   Current Diet/Method of Nutritional Intake (General Swallowing Observations, NIS) full liquid diet;nasogastric tube (NG)   Respiratory Support (General Swallowing Observations) supplemental oxygen;nasal cannula  (HFNC at 35 lpm and 35% FiO2)   Swallowing Evaluation Clinical swallow evaluation   Clinical Swallow Evaluation   Feeding Assistance frequent cues/help required   Clinical Swallow Evaluation Textures Trialed thin liquids;pureed;solid foods   Clinical Swallow Eval:  Thin Liquid Texture Trial   Mode of Presentation, Thin Liquids cup;straw;self-fed   Volume of Liquid or Food Presented 4 oz    Oral Phase of Swallow WFL   Pharyngeal Phase of Swallow intact   Diagnostic Statement No overt s/sx of aspiration, no changes in breath sounds or vocal quality    Clinical Swallow Evaluation: Puree Solid Texture Trial   Mode of Presentation, Puree spoon;self-fed   Volume of Puree Presented 2 oz    Oral Phase, Puree WFL   Pharyngeal Phase, Puree intact   Diagnostic Statement No overt s/sx of aspiration    Clinical Swallow Evaluation: Solid Food Texture Trial   Mode of Presentation self-fed   Volume Presented 1/2 cracker   Oral Phase effortful AP movement  (slightly prolonged time needed)   Pharyngeal Phase intact   Diagnostic Statement Slightly prolonged oral manipulation with some increase in work of breathing during mastication. No overt s/sx of aspiration. Pt denied any difficulty    Esophageal Phase of Swallow   Patient reports or presents with symptoms of esophageal dysphagia No   Swallowing Recommendations   Diet Consistency Recommendations minced & moist (level 5);thin liquids (level 0)   Supervision Level for Intake 1:1 supervision needed   Mode of Delivery Recommendations bolus size, small;food moistened;slow rate of intake   Swallowing Maneuver Recommendations alternate food and liquid intake;extra swallow   Monitoring/Assistance Required (Eating/Swallowing) stop eating activities when fatigue is present;cue for finger/lingual sweep if oral pocketing present;monitor for cough or change in vocal quality with intake;optimize oral intake to minimize need for tube feeding   Recommended Feeding/Eating Techniques (Swallow Eval) maintain upright sitting position for eating;maintain upright posture during/after eating for 30 minutes;minimize distractions during oral intake;provide assist with feeding   Medication Administration Recommendations, Swallowing (SLP) As per pt preference    Instrumental Assessment Recommendations instrumental evaluation not recommended at this time   General Therapy Interventions   Planned Therapy Interventions Dysphagia Treatment   Dysphagia treatment Instruction of safe swallow strategies;Modified diet education   SLP Therapy Assessment/Plan   Criteria for Skilled Therapeutic Interventions Met (SLP Eval) yes;treatment indicated   SLP Diagnosis Mild-moderate oropharyngeal dysphagia   Rehab Potential (SLP Eval) good, to achieve stated therapy goals   Therapy Frequency (SLP Eval) 5 times/wk   Predicted Duration of Therapy Intervention (SLP Eval) 2 weeks   Comment, Therapy Assessment/Plan (SLP) Pt seen for clinical swallow evaluation. Oral mech is WFL, with the exception of hoarse vocal quality s/p prolonged intubation. Pt consumed thin liquids by cup and straw sip without overt s/sx of aspiration, no changes in breath sounds or vocal quality. Pt also tolerated a small amount of puree and regular textured crackers without overt s/sx of aspiration. Mastication and posterior propulsion is prolonged and results in slight increase in work of breathing but functional for softer textures. Pt denied any difficulty t/o evaluation. Mild-moderate oropharyngeal dysphagia. Recommend minced and moist (5) and thin liquids. Pt must be seated upright/in a chair for meals. Small bites/sips, slow rate, and alternate solids and liquids. Initiate SLP services for dysphagia.    Therapy Plan Review/Discharge Plan (SLP)   Therapy Plan Review (SLP) evaluation/treatment results reviewed;participants voiced agreement with care plan;participants included;patient   SLP Discharge Planning    SLP Discharge Recommendation (DC Rec) home with home care speech therapy;[facility]-Motivated patient will benefit from intensive, interdisciplinary therapy.  Anticipate will be able to tolerate 3 hours of therapy per day   SLP Rationale for DC Rec Dysphagia, below baseline    SLP Brief overview of  current status  Mild-moderate oropharyngeal dysphagia. Recommend minced and moist (5) and thin liquids. Pt must be seated upright/in a chair for meals. Small bites/sips, slow rate, and alternate solids and liquids. Initiate SLP services for dysphagia.     Total Evaluation Time   Total Evaluation Time (Minutes) 10

## 2021-12-07 NOTE — PLAN OF CARE
Summary:     DATE & TIME: 12/7/21   Cognitive Concerns/ Orientation : Alert and Oriented x3-4, sometimes lacks awareness of his situation.  States I'm just going to leave, calls family for ride.     BEHAVIOR & AGGRESSION TOOL COLOR: Green   CIWA SCORE: NA    ABNL VS/O2: VSS on HFNC   MOBILITY: Lift for transfers.  Patient lacks the strength to stand.  Sometimes attempts to stand. Requires redirection from staff.    PAIN MANAGMENT: Denies   DIET: Full Liquid. On TF at 30ml/hr.  Residuals 30 after 4 hrs.    BOWEL/BLADDER: Continent, stevenson removed at [facility], patient using urinal.  Hx of retention- 's-160's.  ABNL LAB/BG: WBC 13.1, , 128, 124.    DRAIN/DEVICES: PIV SL   TELEMETRY RHYTHM: NSR to ST.   SKIN: No skins issues to note.  Scattered bruising.    TESTS/PROCEDURES: NA  D/C DAY/GOALS/PLACE: Pending improvement on strength and oxygen requirements.    OTHER IMPORTANT INFO: LESVIA

## 2021-12-07 NOTE — CONSULTS
CLINICAL NUTRITION SERVICES  -  ASSESSMENT NOTE    Recommendations Ordered by Registered Dietitian (RD):   Calorie Counts 12/7-12/9  Ensure Enlive BID    Restart TF with new formula -->   TwoCal HN @ 40 mL/hr to provide 1920 kcal, 80 g protein, 210 g CHO, 5 g fiber, 910 mL free water.   Add 1 pkt Prosource BID for additional 22 g protein  Total ProSource + TF = 2000 kcal (24 kcal/kg), 102 g protein (1.3 g/kg)  Flush 60 mL q 4 hours      Future/Additional Recommendations:   - Cycle in the coming days pending oral intakes    Malnutrition: (12/7)   % Weight Loss:  Unable to accurately assess   % Intake:  Decreased intake does not meet criteria for malnutrition   11/30 Subcutaneous Fat Loss: None observed  11/30 Muscle Loss: Upper arm (bicep, tricep):  Mild and Posterior calf:  Mild - difficult to fully assess with body habitus  Fluid Retention:  None noted     Malnutrition Diagnosis: Unable to determine due to inability to decipher weight trending at this time.      REASON FOR ASSESSMENT  Brandon Schafer is a 41 year old male seen by Registered Dietitian for Provider Order - Registered Dietitian to Assess and Order TF per Medical Nutrition Therapy Protocol    NUTRITION HISTORY  - Information obtained from chart review. Pt is a direct transfer from [facility].He was initiated on TF via OGT on 11/3. NGT placed 11/5.   - Last known regimen at [facility] as follows, obtained via RD note dated 12/6 -->     Vital High Protein @ goal of 65ml/hr (1560ml/day) +4 Prosource will provide: 1720 kcals (21 kcal/kg IBW), 180 g PRO (2.2 g/kg IBW), 1304 ml free H20, 173 g CHO, and 0 g fiber daily, 36 g Fat.     RD also ordered calorie counts to begin today, and Ensure Enlive yesterday prior to transfer to this facility.     CURRENT NUTRITION ORDERS  Diet Order:     Full liquid    Current Intake/Tolerance:  Ordered a small liquid meal --> cream of wheat, ice cream, apple juice, coffee.     NUTRITION FOCUSED PHYSICAL ASSESSMENT FOR DIAGNOSING  MALNUTRITION)  Carried Forward from 11/30 -->   Subcutaneous Fat Loss: None observed  Muscle Loss: Upper arm (bicep, tricep):  Mild and Posterior calf:  Mild - difficult to fully assess with body habitus    Obtained from Chart/Interdisciplinary Team:  Trace 1+  Adin - Nutrition 2; Total 17    ANTHROPOMETRICS  Height: 6'   Weight: 117 kg (257 lb 15 oz) - wt is outlier   BMI: 34.98 kg/m2  Weight Status:  Obesity Grade I BMI 30-34.9  IBW: 80.9 kg   Weight History: Fluctuations in weights likely r/t shifts in fluid status. Will continue to monitor. Using IBW as dosing weight given large range, unable to determine true, dry weight at this time.   Wt Readings from Last 10 Encounters:   12/05/21 117 kg (257 lb 15 oz)   11/02/21 (!) 161 kg (355 lb)   03/10/15 113.4 kg (250 lb)       LABS  Labs reviewed  Recent Labs   Lab 12/07/21  0228 12/06/21  2311 12/06/21  1919 12/06/21  1422 12/06/21  0858 12/06/21  0422   * 128* 142* 138* 140* 141*       MEDICATIONS  Medications reviewed  Med sliding scale insulin + Lantus 10 units   Thera vit M daily     ASSESSED NUTRITION NEEDS PER APPROVED PRACTICE GUIDELINES:  Dosing Weight: 81 kg IBW  Estimated Energy Needs: 4011-1484 kcals/day (22-25 kcal/kg IBW)  Justification: Obese   Estimated Protein Needs:  grams protein/day (1.2-2 grams of pro/kg IBW)  Justification: Obesity guidelines  Estimated Fluid Needs: 1 mL/kcal  Justification: Maintenance and Per provider pending fluid status    MALNUTRITION:  % Weight Loss:  Unable to accurately assess   % Intake:  Decreased intake does not meet criteria for malnutrition   11/30 Subcutaneous Fat Loss: None observed  11/30 Muscle Loss: Upper arm (bicep, tricep):  Mild and Posterior calf:  Mild - difficult to fully assess with body habitus  Fluid Retention:  None noted     Malnutrition Diagnosis: Unable to determine due to inability to decipher weight trending at this time.     NUTRITION DIAGNOSIS:  Inadequate enteral nutrition  infusion related to transferred facilities yesterday as evidenced by TF currently on hold on a FLD.       NUTRITION INTERVENTIONS  Recommendations / Nutrition Prescription  Diet per SLP  Calorie Counts 12/7-12/9  Ensure Enlive BID    Restart TF with new formula -->   TwoCal HN @ 40 mL/hr to provide 1920 kcal, 80 g protein, 210 g CHO, 5 g fiber, 910 mL free water.   Add 1 pkt Prosource BID for additional 22 g protein  Total ProSource + TF = 2000 kcal (24 kcal/kg), 102 g protein (1.3 g/kg)  Flush 60 mL q 4 hours       Implementation  Nutrition education: Per Provider order if indicated   EN Composition, EN Schedule, Feeding Tube Flush, Medical Food Supplement and Collaboration and Referral of Nutrition care    Nutrition Goals  PO to meet at least 60-75% est needs prior to stopping TF.       MONITORING AND EVALUATION:  Progress towards goals will be monitored and evaluated per protocol and Practice Guidelines    Irma Ang RD, LD  [facility], [facility], [facility]  Pager: [phone]  Weekend Pager: [phone]

## 2021-12-07 NOTE — CONSULTS
[facility], Consult & Liaison    Brandon Schafer  December 7, 2021    Session start: 1307  Session end: 1320  Session duration in minutes: 13 minutes  CPT utilized: 27323 - Brief diagnostic assessment (modifier 52)  Patient was seen virtually ([employer] cart or other teleconferencing device).    Reason for consult: Psychiatry consult was requested due to anxious distress. Patient was seen by [facility] Liaison team.     Identifying information: Brandon is 41 year old White  male   followed related to anxious distress.     Presenting problem (including symptoms, strengths risks, resources used): Patient reports symptoms of anxiety, ruminating thoughts, worry and SOB. In individual therapeutic contact with patient today, patient presents with flat affect, anxious mood.    Safety concerns are not present today for SI, SIB or HI.      Current risk factors for suicide include recent traumatic experience, chronic pain, history of or current substance use and chronic pain and/or chronic illness. Protective factors against suicide include strong bond to family/friends, community support, responsibilities to others (spouse, pets, children, etc.), engaged and/or invested in treatment and identification of future goals.    Mental Status Exam   Affect: Flat  Appearance: Appropriate   Attention Span/Concentration: Attentive    Eye Contact: Variable  Fund of Knowledge: Appropriate   Language /Speech Content: Fluent  Language /Speech Volume: Soft   Language /Speech Rate/Productions: Normal   Recent Memory: Intact  Remote Memory: Intact  Mood: Anxious   Orientation:   Person: Yes   Place: Yes  Time of Day: Yes   Date: Yes   Situation (Do they understand why they are here?): Yes   Psychomotor Behavior: Normal   Thought Content: Clear  Thought Form: Intact    Current medications:   Current Facility-Administered Medications   Medication     acetaminophen (TYLENOL) solution 650 mg     albuterol (PROVENTIL) neb solution 2.5 mg      [START ON 12/8/2021] amLODIPine (NORVASC) tablet 5 mg     carboxymethylcellulose PF (REFRESH PLUS) 0.5 % ophthalmic solution 1 drop     cloNIDine (CATAPRES) tablet 0.1 mg     dextrose 10% infusion     glucose gel 15-30 g    Or     dextrose 50 % injection 25-50 mL    Or     glucagon injection 1 mg     doxazosin (CARDURA) tablet 4 mg     enoxaparin ANTICOAGULANT (LOVENOX) injection 40 mg     hydrOXYzine (ATARAX) tablet 25-50 mg     insulin aspart (NovoLOG) injection (RAPID ACTING)     insulin aspart (NovoLOG) injection (RAPID ACTING)     insulin glargine (LANTUS PEN) injection 10 Units     lidocaine (LMX4) cream     lidocaine 1 % 0.1-1 mL     melatonin tablet 1 mg     methylPREDNISolone sodium succinate (solu-MEDROL) injection 30 mg    Followed by     [START ON 12/14/2021] methylPREDNISolone sodium succinate (solu-MEDROL) injection 20 mg    Followed by     [START ON 12/21/2021] methylPREDNISolone sodium succinate (solu-MEDROL) injection 10 mg     multivitamins w/minerals liquid 15 mL     No lozenges or gum should be given while patient on BIPAP/AVAPS/AVAPS AE     ondansetron (ZOFRAN-ODT) ODT tab 4 mg    Or     ondansetron (ZOFRAN) injection 4 mg     pantoprazole (PROTONIX) EC tablet 40 mg     Patient is already receiving anticoagulation with heparin, enoxaparin (LOVENOX), warfarin (COUMADIN)  or other anticoagulant medication     Patient may continue current oral medications     polyethylene glycol (MIRALAX) Packet 17 g     protein modular (PROSOURCE TF) 1 packet     [START ON 12/8/2021] QUEtiapine (SEROquel) tablet 25 mg     QUEtiapine (SEROquel) tablet 50 mg     [START ON 12/8/2021] sertraline (ZOLOFT) tablet 25 mg     sodium chloride (PF) 0.9% PF flush 3 mL     sodium chloride (PF) 0.9% PF flush 3 mL     Relevant history:     Today's session with Mr. Schafer was brief as the RN, Spouse and Writer were present and he reports feeling overwhelmed due to increase stimulation of multiple people talking.  He begins to  "breath heavy and has difficulty talking during today's session.      Wife interjects and asks writer \"Do you know what he has been through in 35 days?\" Writer asked specifically what she was referring to. She then began to tell Writer about her concerns for his care and that he has been transferred from one hospital to another and he's not getting the care here as he was as previous hospitals. Writer redirected her about the purpose of the visit today regarding how we can help with his increased anxiety. She appeared defensive with pressure and rapids speech and noted \"Wouldn't you be anxious if you have been through all of this shit. This is unbelievable.\" Writer reminded her that they are there to help and that at no time is anyone saying that his anxiety is not valid but we are inquiring how we can help him during this very stressful time.  She appeared to deescalate at this time and allowed Mr. Schafer to speak for himself.     He reports that his medical concerns have caused him significant stress and states \"I worry a lot about what is going to happen to me.\"  He reports challenges sleeping as he doesn't want to dream about his medical condition or the recent death of his father and grandparent. His wife reports he is not sleeping well during the day or night.  She reports this is \"he is having nightmares/night terros\" and he correct her that \"its not nightmares just dreaming about this stuff and its overwhelming\".  He reports that this session is increase his anxiety and that he would like to conclude for today and if possible see a provider in person tomorrow.     Cultural Influences: There is no mention during encounter today of cultural influences that would impact current or future care.     Therapeutic intervention and progress:  Therapeutic intervention consisted of building therapeutic rapport, active listening and validation. Patient is making progress towards treatment goals as evidenced by " participation in care and assessments. .     Collateral information:   Reviewed chart and coordinated with spouse as she was present for today's visit.      Diagnosis:   Adjustment disorder with anxious distress F43.23    Plan:     Continue care coordination with care team.      Maintain current transition plan.    Writer to resubmit psychiatric consult to be seen in person by inhouse provider as soon as possible.     JASMINA EATON, [facility], [facility], Consult & Liaison Service  [phone]

## 2021-12-07 NOTE — PLAN OF CARE
Speech Language Therapy Discharge Summary    Reason for therapy discharge:    Discharged to [facility]    Progress towards therapy goal(s). See goals on Care Plan in [facility] electronic health record for goal details.  Goals partially met.  Barriers to achieving goals:   discharge from facility.    Therapy recommendation(s):    Continued therapy is recommended.  Rationale/Recommendations:  Recommend full liquid diet (thin consistency) with 1:1 assistance. SLP will follow for oropharyngeal dysphagia.

## 2021-12-07 NOTE — PROGRESS NOTES
"   12/07/21 1100   Quick Adds   Type of Visit Initial Occupational Therapy Evaluation   Living Environment   People in home significant other;child(rosey), dependent   Current Living Arrangements house  (Duplex)   Home Accessibility stairs to enter home;stairs within home   Number of Stairs, Main Entrance 2   Stair Railings, Main Entrance none   Number of Stairs, Within Home, Primary greater than 10 stairs   Stair Railings, Within Home, Primary railing on right side (ascending)   Transportation Anticipated family or friend will provide   Living Environment Comments Lives in duplex w/S.O. and 3 kids ages 2 to 18. He reports the duplex has 1-2 FRANCY and is 2-story w/full flight to bedrooms/bath.    Self-Care   Usual Activity Tolerance good   Current Activity Tolerance poor   Regular Exercise No   Equipment Currently Used at Home none   Activity/Exercise/Self-Care Comment Per pt report: Indep ADL/IADL, mobility no AD. he states both he and girfriend at home during day (did not expound on if working)   Instrumental Activities of Daily Living (IADL)   Previous Responsibilities meal prep;housekeeping;laundry;shopping;medication management;finances;driving;work   Disability/Function   Hearing Difficulty or Deaf no   Wear Glasses or Blind no   Concentrating, Remembering or Making Decisions Difficulty no   Difficulty Communicating no   Difficulty Eating/Swallowing no   Walking or Climbing Stairs Difficulty no   Dressing/Bathing Difficulty no   Toileting issues no   Doing Errands Independently Difficulty (such as shopping) no   Fall history within last six months no   Change in Functional Status Since Onset of Current Illness/Injury yes   General Information   Onset of Illness/Injury or Date of Surgery 11/03/21   Referring Physician Dr. Mario Alberto Chou   Patient/Family Therapy Goal Statement (OT) \"whatever I have to do\"   Additional Occupational Profile Info/Pertinent History of Current Problem 40yo male admitted 11/3/21 to [facility] with " "ARDS d/t COVID pneumonia requiring intubation. Course c/b CAUTI, polymicrobial VAP (11/11) and organizing pneumonia. Extubated to bipap 12/2. Transferred to [facility] 12/6/21. (Now on HFNC 35L 35% Fi02)   Existing Precautions/Restrictions fall;oxygen therapy device and L/min   Limitations/Impairments safety/cognitive   Left Upper Extremity (Weight-bearing Status) full weight-bearing (FWB)   Right Upper Extremity (Weight-bearing Status) full weight-bearing (FWB)   Left Lower Extremity (Weight-bearing Status) full weight-bearing (FWB)   Right Lower Extremity (Weight-bearing Status) full weight-bearing (FWB)   Cognitive Status Examination   Orientation Status orientation to person, place and time   Affect/Mental Status (Cognitive) WFL;other (see comments)  (pt states he feels \"disoriented\". See below.)   Follows Commands follows one-step commands;over 90% accuracy   Cognitive Status Comments Pt states: \"I feel like was in an airplane crash\" He then expounded on north and south poles/ noted some confusion at times? However, oriented to month, year, place, POTUS this AM   Visual Perception   Impact of Vision Impairment on Function (Vision) Pt reports his vision is \"messed up\" and when asked if blurry he states \"yes\". Does not wear corrective lenses at baseline. Will further assess.    Pain Assessment   Patient Currently in Pain No   Range of Motion Comprehensive   Comment, General Range of Motion AROM R onofre 90o only, able to passively range to approx 140o until he c/o pain. L onofre AROM approx 150o. B elbow AROM WNL however R weak, shaky and incoordinated  when attempting active flex. B supination/pronation and B wrist flex/ext WFL.    Strength Comprehensive (MMT)   Comment, General Manual Muscle Testing (MMT) Assessment R onofre 3-/5. L onofre 4-/5.   Coordination   Coordination Comments Dominant R FMC affected by weakness and tremoring. Bilateral thumb-digit adduction slow.   Bed Mobility   Comment (Bed Mobility) Unable to achieve " with A of 1 and no assistance available this AM. Per nursing report, pt was transferred with A of 2 to commode during night shift.    Transfers   Transfer Comments Needs further assessment   Activities of Daily Living   BADL Assessment grooming   Grooming Assessment   [place] Level (Grooming) minimum assist (75% patient effort);set up   Position (Grooming) sitting up in bed   Comment (Grooming) Requires positioning of elbows, A with equip/tubing. See daily doc note for details   Clinical Impression   Criteria for Skilled Therapeutic Interventions Met (OT) yes;meets criteria;skilled treatment is necessary   OT Diagnosis decreased ADL/IADL performance   OT Problem List-Impairments impacting ADL problems related to;activity tolerance impaired;balance;cognition;coordination;range of motion (ROM);strength;mobility   Assessment of Occupational Performance 5 or more Performance Deficits   Identified Performance Deficits Grooming, dressing, toileting, bathing, functional mobility, HH management, , community mobility   Planned Therapy Interventions (OT) ADL retraining;IADL retraining;cognition;fine motor coordination training;motor coordination training;ROM;strengthening;transfer training;progressive activity/exercise   Clinical Decision Making Complexity (OT) low complexity   Therapy Frequency (OT) Daily   Predicted Duration of Therapy 1 week   Anticipated Equipment Needs Upon Discharge (OT)   (TBD)   Risk & Benefits of therapy have been explained evaluation/treatment results reviewed;care plan/treatment goals reviewed;risks/benefits reviewed;current/potential barriers reviewed;participants voiced agreement with care plan;participants included;patient   OT Discharge Planning    OT Discharge Recommendation (DC Rec) [facility]-Motivated patient will benefit from intensive, interdisciplinary therapy.  Anticipate will be able to tolerate 3 hours of therapy per day;Home with assist;home with home care  occupational therapy   OT Rationale for DC Rec Pt is below baseline and would benefit from continued skilled therapy to increase activity tolerance and independence with ADL completion. If pt were to discharge to home, pt would require 24/7 assist, OH lift, wc, commode, other ADL AE   Total Evaluation Time (Minutes)   Total Evaluation Time (Minutes) 15

## 2021-12-07 NOTE — PROGRESS NOTES
"SPIRITUAL HEALTH SERVICES  SPIRITUAL ASSESSMENT Progress Note  [facility] UNIT [facility]    REFERRAL SOURCE: Patient request    Bedside visit with Brandon and spouse Ana. Brandon was tearful at several points during our visit, especially at the beginning when he shared that both his father and grandmother  while he was hospitalized. Brandon was especially close to his father, and he shared memories of family trips, fishing,  Hunting, and spending time outdoors with the rest of their family. Brandon also named missing his children and wanting \"fresh air.\"    I oriented Ana and Brandon to [facility] and how to request a  during this hospitalization.    I provided grief support, exploration of support resources, and empathetic listening.    PLAN: I will continue to follow. Spiritual Health Services remains available for patient, family, and staff support.     Please page the on call  by placing a STAT or ASAP [facility] referral for Spiritual Health (which will roll to the on call pager).        SARA Sahu.   Associate   Pager [phone]      "

## 2021-12-07 NOTE — PROGRESS NOTES
[facility]    Hospitalist Progress Note    Assessment & Plan   Brandon Schafer is a 41 year old male with PMHx significant for substance abuse who is a direct admission to [facility] from [facility] for ongoing cares (med/surg bed availability) followed prolonged hospitalization for respiratory failure in the setting of COVID 19 pneumonia/ARDS.      Acute hypoxic respiratory failure 2/2 COVID pneumonia; improving  ARDS 2/2 COVID pneumonia  Possible organizing pneumonia  Intubated 11/2 after presenting with stats in 50s.  CT on 11/25 with mild fibrotic changes, dense consolidations consistent with ARDS. Possible organizing pneumonia, started on methylprednisolone 60 mg x3 days. CT Chest 12/1/21 - no significant change.   Extubated 12/2/21. Currently on HFNC 45%/40LPM Fi02 and BiPAP while asleep and prn.   - Methylprednisolone 40 mg IV every day               Plan to drop by 10 mg each week (next taper on 12/7) until 10 mg total reached, then will remain on 10 mg daily until seen by outpatient pulmonology, will need pulmonology outpatient visit on discharge within 2-3 months with PFTs and CT chest.  Can transition to prednisone, 40 mg of methylprednisolone equates to 50 mg of prednisone.  (Not yet done)  - HFNC, Bipap while asleep and PRN   --RCAT, continue albuterol inhalers, wean oxygen down as possible.       Acute Stress Reaction related to prolonged ICU stay  Delirium in the setting of prolonged critical illness   H/o methampetamine use  Patient reports history of methamphetamine use and reports using 1-2 times per week up until time of admission.  Current anxiety level making him want to use again. Reports significant anxiety and appears very anxious on admission.   Weaned off precedex 12/5  - Addiction medicine consulted at [facility] but it does not appear this was completed prior to transfer. Pt/family had been receiving some support from palliative team through hospital stay. Will ask psychiatry to  see here.   -- sertraline 25mg daily started 12/5  - Seroquel 25 mg qam, 50 mg q HS scheduled  - hydroxyzine prn  - Melatonin scheduled  - Delirium precautions        Non-oliguric STEFAN, resolved  Hypokalemia, in setting of ongoing diuresis; resolved  Urinary retention; improving  Unclear baseline, has not seen doctor in 20 years. Cr 2.5 on admission, now normalized.   12/06: stevenson catheter removed, has been urinating per his report .  -Continue doxazosin started at [facility] this admission.     Hypertension  Not on anti-HTN meds PTA   - Labetolol, hydralazine prn for SBP >160  - Amlodipine 5 mg daily and clonidine 0.1mg TID started at [facility] this admission, currently blood pressure stable.     Stress and steroid induced hyperglycemia  BG controlled on current regimen.   - Medium resistance sliding scale insulin   - 10 U Glargine  - check A1C     At risk for malnutrition  - Continue TF  + Full Liquid Diet, TF @ 30 today and increased to 40 prior to discharge. Will continue current rate and await nutrition consultation tomorrow. Unclear whether we carry formula previously used- RN paging on call nutrition   - Calorie counts and nutrition is following; recommends keeping feeding tube in until 75% PO intake of total calories  - SLP following      Septum wound, tongue wound 2/2 Proning   weakness/deconditioning  - WOC consult  - OT/PT consults      CAUTI   VAP  Started on broad-spectrum antibiotics on 11/11 after developing worsening oxygen saturations, fevers, and persistent leukocytosis. Urine culture 11/11 grew >100k pan-sensitive E coli. RVP and MRSA nares negative. Sputum grew E coli, S aureus, and GBS. Sputum culture 11/17 with 1+ E. coli and MSSA, blood cultures negative.   Off antibiotics since 11/25  Abx:  - Ceftriaxone 11/14-11/17; 11/19 - 11/25 (completed)  - Zosyn 11/11-11/14; 11/17 - 11/18  - Vanc 11/11-11/12      Reducible Umbilical Hernia  Not strangulated  - Monitor for changes  DVT Prophylaxis: Lovenox  subcu  Code Status: Full Code     Disposition: Expected discharge possibly to LTAC versus TCU depending on his progress    Kenya Benito MD  Text Page   (7am to 6pm)    Interval History   Patient had breakfast today morning, calorie count ongoing, he is receiving tube feeding, he is very slow to respond, he continues to be on high flow oxygen 35 L, he denies any chest pain.    -Data reviewed today: I reviewed all new labs and imaging results over the last 24 hours.  Physical Exam   Temp: 98.1  F (36.7  C) Temp src: Oral BP: 135/86 Pulse: 101   Resp: 18 SpO2: 94 % O2 Device: High Flow Nasal Cannula (HFNC) Oxygen Delivery: 35 LPM  There were no vitals filed for this visit.  Vital Signs with Ranges  Temp:  [98.1  F (36.7  C)-99  F (37.2  C)] 98.1  F (36.7  C)  Pulse:  [101-123] 101  Resp:  [18-20] 18  BP: (108-149)/(71-92) 135/86  FiO2 (%):  [30 %-35 %] 35 %  SpO2:  [91 %-96 %] 94 %  No intake/output data recorded.    Constitutional: Awake, alert, cooperative, morbidly obese  Respiratory: Bilateral basilar crackles noted  Cardiovascular: Regular rate and rhythm, normal S1 and S2, and no murmur noted  GI: Normal bowel sounds, soft, non-distended, non-tender  Skin/Integumen: No rashes, no cyanosis, 1+ edema noted bilateral lower extremities  Neuro : moving all 4 extremities, very slow to respond.    Medications     dextrose       - MEDICATION INSTRUCTIONS -       - MEDICATION INSTRUCTIONS -       - MEDICATION INSTRUCTIONS -         [START ON 12/8/2021] amLODIPine  5 mg Oral or Feeding Tube Daily     cloNIDine  0.1 mg Oral or Feeding Tube Q8H     doxazosin  4 mg Oral or Feeding Tube Daily     enoxaparin ANTICOAGULANT  40 mg Subcutaneous Daily     insulin aspart  1-7 Units Subcutaneous TID AC     insulin aspart  1-5 Units Subcutaneous At Bedtime     insulin glargine  10 Units Subcutaneous At Bedtime     methylPREDNISolone  30 mg Intravenous Daily    Followed by     [START ON 12/14/2021] methylPREDNISolone  20 mg  Intravenous Daily    Followed by     [START ON 12/21/2021] methylPREDNISolone  10 mg Intravenous Daily     multivitamins w/minerals  15 mL Per Feeding Tube Daily     pantoprazole  40 mg Oral QAM AC     protein modular  1 packet Per Feeding Tube BID     [START ON 12/8/2021] QUEtiapine  25 mg Oral or Feeding Tube Daily     QUEtiapine  50 mg Oral or Feeding Tube At Bedtime     [START ON 12/8/2021] sertraline  25 mg Oral or Feeding Tube Daily     sodium chloride (PF)  3 mL Intracatheter Q8H       Data   Recent Labs   Lab 12/07/21  1202 12/07/21  1059 12/07/21  0755 12/07/21 0228 12/06/21  0422 12/06/21  0411 12/05/21  0829 12/05/21  0558 12/04/21  0836 12/04/21  0534   WBC  --  11.3*  --   --   --  13.1*  --  9.3  --  14.2*   HGB  --  11.6*  --   --   --  12.1*  --  11.5*  --  9.7*   MCV  --  87  --   --   --  88  --  88  --  87   PLT  --  249  --   --   --  231  --  273  --  340   NA  --  137  --   --   --  137  --  135  --  137   POTASSIUM  --  3.6  --   --   --  3.7  --  3.9  --  3.8   CHLORIDE  --  104  --   --   --  104  --  103  --  100   CO2  --   --   --   --   --  24  --  26  --  27   BUN  --   --   --   --   --  27  --  31*  --  29   CR  --   --   --   --   --  0.86  --  0.80  --  0.75   ANIONGAP  --   --   --   --   --  9  --  6  --  10   VIPUL  --   --   --   --   --  9.3  --  9.3  --  9.2   *  --  127* 124*   < > 133*   < > 151*   < > 169*    < > = values in this interval not displayed.     Recent Labs   Lab 12/07/21  1202 12/07/21  0755 12/07/21 0228 12/06/21 2311 12/06/21  1919   * 127* 124* 128* 142*       Imaging:   No results found for this or any previous visit (from the past 24 hour(s)).

## 2021-12-08 ENCOUNTER — APPOINTMENT (OUTPATIENT)
Dept: OCCUPATIONAL THERAPY | Facility: CLINIC | Age: 41
End: 2021-12-08
Attending: INTERNAL MEDICINE
Payer: MEDICAID

## 2021-12-08 ENCOUNTER — APPOINTMENT (OUTPATIENT)
Dept: GENERAL RADIOLOGY | Facility: CLINIC | Age: 41
End: 2021-12-08
Attending: INTERNAL MEDICINE
Payer: MEDICAID

## 2021-12-08 ENCOUNTER — APPOINTMENT (OUTPATIENT)
Dept: SPEECH THERAPY | Facility: CLINIC | Age: 41
End: 2021-12-08
Attending: INTERNAL MEDICINE
Payer: MEDICAID

## 2021-12-08 ENCOUNTER — APPOINTMENT (OUTPATIENT)
Dept: PHYSICAL THERAPY | Facility: CLINIC | Age: 41
End: 2021-12-08
Attending: PHYSICIAN ASSISTANT
Payer: MEDICAID

## 2021-12-08 LAB
CREAT SERPL-MCNC: 0.86 MG/DL (ref 0.66–1.25)
GFR SERPL CREATININE-BSD FRML MDRD: >90 ML/MIN/1.73M2
GLUCOSE BLDC GLUCOMTR-MCNC: 113 MG/DL (ref 70–99)
GLUCOSE BLDC GLUCOMTR-MCNC: 131 MG/DL (ref 70–99)
GLUCOSE BLDC GLUCOMTR-MCNC: 132 MG/DL (ref 70–99)
GLUCOSE BLDC GLUCOMTR-MCNC: 135 MG/DL (ref 70–99)
GLUCOSE BLDC GLUCOMTR-MCNC: 160 MG/DL (ref 70–99)
PLATELET # BLD AUTO: 230 10E3/UL (ref 150–450)

## 2021-12-08 PROCEDURE — 92526 ORAL FUNCTION THERAPY: CPT | Mod: GN | Performed by: SPEECH-LANGUAGE PATHOLOGIST

## 2021-12-08 PROCEDURE — 99207 PR CONSULT E&M CHANGED TO INITIAL LEVEL: CPT | Performed by: PSYCHIATRY & NEUROLOGY

## 2021-12-08 PROCEDURE — 36415 COLL VENOUS BLD VENIPUNCTURE: CPT | Performed by: INTERNAL MEDICINE

## 2021-12-08 PROCEDURE — 74018 RADEX ABDOMEN 1 VIEW: CPT

## 2021-12-08 PROCEDURE — 250N000013 HC RX MED GY IP 250 OP 250 PS 637: Performed by: INTERNAL MEDICINE

## 2021-12-08 PROCEDURE — 85049 AUTOMATED PLATELET COUNT: CPT | Performed by: INTERNAL MEDICINE

## 2021-12-08 PROCEDURE — 250N000011 HC RX IP 250 OP 636: Performed by: PHYSICIAN ASSISTANT

## 2021-12-08 PROCEDURE — 99233 SBSQ HOSP IP/OBS HIGH 50: CPT | Performed by: HOSPITALIST

## 2021-12-08 PROCEDURE — 999N000157 HC STATISTIC RCP TIME EA 10 MIN

## 2021-12-08 PROCEDURE — 250N000013 HC RX MED GY IP 250 OP 250 PS 637: Performed by: PHYSICIAN ASSISTANT

## 2021-12-08 PROCEDURE — 97530 THERAPEUTIC ACTIVITIES: CPT | Mod: GO

## 2021-12-08 PROCEDURE — 5A09357 ASSISTANCE WITH RESPIRATORY VENTILATION, LESS THAN 24 CONSECUTIVE HOURS, CONTINUOUS POSITIVE AIRWAY PRESSURE: ICD-10-PCS | Performed by: HOSPITALIST

## 2021-12-08 PROCEDURE — 99222 1ST HOSP IP/OBS MODERATE 55: CPT | Performed by: PSYCHIATRY & NEUROLOGY

## 2021-12-08 PROCEDURE — 250N000013 HC RX MED GY IP 250 OP 250 PS 637: Performed by: PSYCHIATRY & NEUROLOGY

## 2021-12-08 PROCEDURE — 97162 PT EVAL MOD COMPLEX 30 MIN: CPT | Mod: GP

## 2021-12-08 PROCEDURE — 82565 ASSAY OF CREATININE: CPT | Performed by: INTERNAL MEDICINE

## 2021-12-08 PROCEDURE — 120N000001 HC R&B MED SURG/OB

## 2021-12-08 PROCEDURE — 97530 THERAPEUTIC ACTIVITIES: CPT | Mod: GP

## 2021-12-08 RX ORDER — QUETIAPINE FUMARATE 25 MG/1
25 TABLET, FILM COATED ORAL EVERY 6 HOURS PRN
Status: DISCONTINUED | OUTPATIENT
Start: 2021-12-08 | End: 2021-12-09

## 2021-12-08 RX ORDER — QUETIAPINE FUMARATE 25 MG/1
25 TABLET, FILM COATED ORAL 2 TIMES DAILY WITH MEALS
Status: DISCONTINUED | OUTPATIENT
Start: 2021-12-08 | End: 2021-12-13

## 2021-12-08 RX ADMIN — HYDROXYZINE HYDROCHLORIDE 50 MG: 25 TABLET ORAL at 02:56

## 2021-12-08 RX ADMIN — AMLODIPINE BESYLATE 5 MG: 5 TABLET ORAL at 08:56

## 2021-12-08 RX ADMIN — Medication 1 PACKET: at 10:09

## 2021-12-08 RX ADMIN — CLONIDINE HYDROCHLORIDE 0.1 MG: 0.1 TABLET ORAL at 14:13

## 2021-12-08 RX ADMIN — SERTRALINE HYDROCHLORIDE 50 MG: 50 TABLET ORAL at 08:56

## 2021-12-08 RX ADMIN — METHYLPREDNISOLONE SODIUM SUCCINATE 30 MG: 40 INJECTION, POWDER, FOR SOLUTION INTRAMUSCULAR; INTRAVENOUS at 08:57

## 2021-12-08 RX ADMIN — CLONIDINE HYDROCHLORIDE 0.1 MG: 0.1 TABLET ORAL at 21:15

## 2021-12-08 RX ADMIN — QUETIAPINE FUMARATE 25 MG: 25 TABLET ORAL at 08:56

## 2021-12-08 RX ADMIN — PANTOPRAZOLE SODIUM 40 MG: 40 TABLET, DELAYED RELEASE ORAL at 08:56

## 2021-12-08 RX ADMIN — ENOXAPARIN SODIUM 40 MG: 40 INJECTION SUBCUTANEOUS at 08:57

## 2021-12-08 RX ADMIN — Medication 1 PACKET: at 21:16

## 2021-12-08 RX ADMIN — QUETIAPINE FUMARATE 25 MG: 25 TABLET ORAL at 17:43

## 2021-12-08 RX ADMIN — MULTIVIT AND MINERALS-FERROUS GLUCONATE 9 MG IRON/15 ML ORAL LIQUID 15 ML: at 10:08

## 2021-12-08 RX ADMIN — QUETIAPINE FUMARATE 75 MG: 50 TABLET ORAL at 21:15

## 2021-12-08 RX ADMIN — CLONIDINE HYDROCHLORIDE 0.1 MG: 0.1 TABLET ORAL at 08:56

## 2021-12-08 RX ADMIN — INSULIN GLARGINE 10 UNITS: 100 INJECTION, SOLUTION SUBCUTANEOUS at 21:15

## 2021-12-08 RX ADMIN — DOXAZOSIN 4 MG: 4 TABLET ORAL at 08:56

## 2021-12-08 ASSESSMENT — ACTIVITIES OF DAILY LIVING (ADL)
ADLS_ACUITY_SCORE: 11
ADLS_ACUITY_SCORE: 9
ADLS_ACUITY_SCORE: 11
ADLS_ACUITY_SCORE: 9
ADLS_ACUITY_SCORE: 11
ADLS_ACUITY_SCORE: 11
ADLS_ACUITY_SCORE: 9
ADLS_ACUITY_SCORE: 11
ADLS_ACUITY_SCORE: 11
ADLS_ACUITY_SCORE: 9
ADLS_ACUITY_SCORE: 11
ADLS_ACUITY_SCORE: 9
ADLS_ACUITY_SCORE: 9
ADLS_ACUITY_SCORE: 11
ADLS_ACUITY_SCORE: 9
ADLS_ACUITY_SCORE: 11
ADLS_ACUITY_SCORE: 9
ADLS_ACUITY_SCORE: 11
ADLS_ACUITY_SCORE: 9
ADLS_ACUITY_SCORE: 11
ADLS_ACUITY_SCORE: 9
ADLS_ACUITY_SCORE: 11

## 2021-12-08 NOTE — PROVIDER NOTIFICATION
MD Notification    Notified Person: MD    Notified Person Name: Dr. Barfield    Notification Date/Time: 12/08/21 0543    Notification Interaction: Vocera paging    Purpose of Notification: Pt pulled out NG at least 5 cm, unable to advance. TF stopped for now; XR to verify placement?    Orders Received: Pending    Comments:

## 2021-12-08 NOTE — PLAN OF CARE
Summary:    DATE & TIME: 12/08/21 8711-1237  Cognitive Concerns/ Orientation : Alert and Oriented x3, disoriented to situation.  Anxious.  BEHAVIOR & AGGRESSION TOOL COLOR: Green   ABNL VS/O2: VSS except tachy at times on HFNC 35% on 35 LPM; refused bipap, caused more anxiety  MOBILITY: Assist x2 w/ gait belt/ walker- up to C  PAIN MANAGMENT: Denies   DIET: Minced and moist- thin liquids. On TF at 40ml/hr.  Manual FWF 60mL q4 hrs. Takes pills whole by mouth.  BOWEL/BLADDER: Continent, patient using urinal and bedside commode.  No BM this shift. Hx of retention   ABNL LAB/BG:   DRAIN/DEVICES: PIV SL.  NG pulled out at least 5 cm, clamped for now until placement verified by XR.  TELEMETRY RHYTHM: NSR- tachy at times  SKIN: Pale/ toby. Scattered bruising.    TESTS/PROCEDURES: NA  D/C DAY/GOALS/PLACE: Pending improvement on strength and oxygen requirements.    OTHER IMPORTANT INFO: Bariatric bed  Covid recovered. Pt was very anxious and confused during the night, wanted oxygen off; PRN Ativan given x1 and pt and writer spoke with spouse (Ana) on the phone which helped calm down pt.

## 2021-12-08 NOTE — PLAN OF CARE
Summary:    DATE & TIME: 12/7/21 1026-1027  Cognitive Concerns/ Orientation : Alert and Oriented x4    BEHAVIOR & AGGRESSION TOOL COLOR: Green   ABNL VS/O2: VSS on HFNC 35% on 35 LPM  MOBILITY: Assist x2 w/ gait belt/ walker- up to C  PAIN MANAGMENT: Denies   DIET: Minced and moist- thin liquids. On TF at 30ml/hr.  Manual FWF 60mL q4 hrs. Takes pills whole by mouth.  BOWEL/BLADDER: Continent, patient using urinal and bedside camode.  Hx of retention   ABNL LAB/BG: WBC 13.1, HGB 11.6 ,156    DRAIN/DEVICES: PIV SL   TELEMETRY RHYTHM: NSR- tachy at times  SKIN: No skins issues to note- unkempt and unclean, unable to shower due to HFNC. Scattered bruising.    TESTS/PROCEDURES: NA  D/C DAY/GOALS/PLACE: Pending improvement on strength and oxygen requirements.    OTHER IMPORTANT INFO: Bariatric bed delivered this shift. Covid recovered.

## 2021-12-08 NOTE — CONSULTS
"Consult Date: 12/08/2021    REQUESTING PHYSICIAN:  Kathi Terry.    REASON FOR CONSULTATION:  Severe anxiety.    IDENTIFYING DATA:  The patient is a 41-year-old gentleman with a very severe COVID-19 infection, who was transferred to [facility] for continuing care after a prolonged hospital stay at the [facility].  He has a known history of methamphetamine use and had a very lengthy ICU stay with multiple complications, prolonged intubation.  He has been highly anxious according to review of records.    CHIEF COMPLAINT:  \"I think I slept okay.\"    HISTORY OF PRESENT ILLNESS:  Brandon is a 41-year-old gentleman who initially presented to the [facility] due to hypoxic respiratory failure as a result of COVID-19.  He was intubated in early November when he presented with low oxygen saturation.  He has now been extubated, transferred to [facility] for ongoing care.  He is still on prednisone and has had a waxing and waning mental status with periods of agitation and anxiety.  He was started on some Zoloft 25 mg daily, about 3 days ago, currently on scheduled Seroquel 25 mg in the morning and 50 mg at bedtime.  The consult service saw him yesterday, it sounds like there was a somewhat combative interaction with the patient's wife regarding his situation.  Kathi Terry saw him briefly, but suggested that psychiatry evaluate him.    This morning, Brandon is lying in bed.  He has high flow oxygen in place.  He is lethargic, he does open his eyes to address me and seems to know that he is in the hospital, but has a hard time giving me a detailed history.  He acknowledges being anxious.  I can see from the records that he was anxious on the night shift.  He was given some Ativan.  There was some communication with his wife.  It appears that the overall impression at this point is that he likely has signs of delirium, but has had a very traumatic and prolonged hospital stay due " "to the severity of his medical problems.  Prior to this admission, it is reported in the record that he was using amphetamines on a regular basis, the details of that are not readily available.  I do not see a drug screen or clear evidence that he had amphetamines in his system at the time of admission.  I explained to Brandon that he is taking some Zoloft and Seroquel to help his anxiety, he was able to nod in the affirmative.  As far as I know, he was not on any specific psychotropic medications prior to his initial hospitalization.  I do not see any recent psychiatric consult, they did apparently order a chemical dependency consult when he was over at the [facility], but this was never completed.  At this juncture, the patient really cannot give me detailed history as he is quite drowsy this morning, though he does not appear to be obviously distressed.  He does look somewhat short of breath and struggles to articulate his needs.  According to the 's consultation yesterday there is a general perception that he was having nightmares, not sleeping, clearly feeling confused and agitated at times.  He expressed that he was feeling \"overwhelmed.\" I am not aware of any convincing history of hypomania, wendy, psychosis, generalized anxiety, panic, OCD, eating disorder history or ADHD.  There is nothing in the record to suggest that he has been hospitalized previously for psychiatric reasons.    PAST PSYCHIATRIC HISTORY:  As above.    PAST CHEMICAL DEPENDENCY HISTORY:  Per chart review, there is mention of an amphetamine use disorder, but this is not well documented.  I do not have a drug screen available during this protracted hospitalization.    PAST MEDICAL HISTORY:  Acute hypoxic respiratory failure secondary to COVID pneumonia, resulting in ARDS and prolonged hospitalization, nonoliguric STEFAN, which has resolved, hypokalemia in the setting of diuresis, urinary retention, hypertension, " "stress and steroid-induced hyperglycemia, obesity, septum wound, tongue wound secondary to intubation, catheter-associated UTI, and umbilical hernia.    PRIOR TO ADMISSION MEDICATIONS:    1.  Tylenol.  2.  Proventil.    3.  Norvasc.  4.  Catapres.  5.  Cardura.  6.  Lovenox.   7.  Atarax 50 mg every 6 hours p.r.n.  8.  NovoLog.  9.  Lantus.  10.  DuoNeb.  11.  Methylprednisolone.  12.  Multivitamin.  13.  Zofran.  14.  Protonix.  15.  MiraLax.  16.  ProSource tube feeds  17.  Seroquel 25 mg in the morning and 50 mg at bedtime.   18.  Zoloft 25 mg daily, which was started on 2021.    FAMILY HISTORY:  Unknown.    SOCIAL HISTORY:  I really have limited details.  I know that his wife has been involved and is frequently at bedside during his care during the day.  It sounds like he enjoys outdoor hobbies, has children and has been missing his family.  It is noted that his father and grandmother apparently  during this prolonged hospitalization, which has added an increased level of stress.    REVIEW OF SYSTEMS:  A 10-point review of systems is positive for significant shortness of breath on respiratory exam and lethargy on neurologic exam.    PHYSICAL EXAMINATION:    VITAL SIGNS:  Blood pressure 143/97, temperature 98.1, pulse 97, respiratory rate 22, oxygen saturation 93%.  MENTAL STATUS EXAMINATION:  Appearance:  Brandon is lying in bed.  He has high flow oxygen in place.  He is somnolent, but arouses to voice, appears mildly short of breath.  He is a poor historian.  Speech is soft, slightly dysarthric.  Use of language poor.  Motor exam calm.  Coordination, station and gait impaired.  Muscle strength and tone diminished.  Affect is flat.  Mood \"anxious.\" Thought process generally logical and coherent when he answers questions, but he seems to be struggling to maintain attention.  No loosening of associations, flight of ideas or formal thought disorder.  Thought content negative for current hallucinations, " delusions, paranoia, suicidal or homicidal ideation, though per chart review, he had expressed that he was having feelings of anxiety and confusion along with nightmares.  Insight and judgment are fair.  Cognitive exam:  The patient is fatigued.  He rouses to voice.  He is oriented to person.  He knows he is in the hospital, but had a difficult time sustaining attention to give me additional details.  Chart review reflects that he has been oriented during the day, but at times confused as well.    IMPRESSION:  The patient is a 41-year-old gentleman with a very complex and prolonged hospitalization.  Historically, he has a diagnosis of an amphetamine use disorder.  He appears to have delirium, which is multifactorial.  He may have some elements of PTSD, the extent of depression is difficult to ascertain, but no doubt there are significant external stressors including potential grief from the loss of loved ones that complicate this picture.  At this time, I think it would be appropriate to maintain him on Seroquel, I would avoid anticholinergic medications such as Benadryl or hydroxyzine.  I would try to avoid lorazepam, which is potentially deliriogenic.  We can optimize Seroquel and utilize Zoloft, which has some potential benefit for depression and PTSD symptoms.  Frequent reorientation, support from family, utilizing medications to target sleep will be beneficial.  I would note that the use of prednisone, which is essential at this point is probably exacerbating this situation.    DIAGNOSES:     1.  Delirium, multifactorial.  2.  Acute respiratory failure secondary to COVID-19 infection.    3.  Amphetamine use disorder.    4.  Bereavement  5.  Rule out posttraumatic stress disorder.  6.  Rule out major depressive disorder secondary to severe COVID-19 infection requiring prolonged intubation and hospitalization.    PLAN:     1.  Optimize Zoloft at 50 mg daily.  2.  Continue Seroquel, I would dose this at 25 mg  b.i.d., 75 mg at bedtime to target sleep.  I also added a small dose of 25 mg every 6 hours p.r.n. for anxiety breakthrough.  3.  I would try to avoid Ativan and hydroxyzine given their deliriogenic potential.  4.  Continue to provide support, reconsult Psychiatry as warranted.  5.  With respect to his chemical dependency history, this is obviously a secondary concern and not a priority at this point.  I do not think it has a major influence on the current clinical presentation.    Christo Nicholson MD        D: 2021   T: 2021   MT: KELLY    Name:     GUY REINOSO  MRN:      [id number]        Account:      [id number]   :      1980           Consult Date: 2021     Document: [id number]

## 2021-12-08 NOTE — PROGRESS NOTES
[facility]    Hospitalist Progress Note    Assessment & Plan   Brandon Schafer is a 41 year old male with PMHx significant for substance abuse who is a direct admission to [facility] from [facility] for ongoing cares (med/surg bed availability) followed prolonged hospitalization for respiratory failure in the setting of COVID 19 pneumonia/ARDS.      Acute hypoxic respiratory failure 2/2 COVID pneumonia; improving  ARDS 2/2 COVID pneumonia  Intubated 11/2 after presenting with stats in 50s.  CT on 11/25 with mild fibrotic changes, dense consolidations consistent with ARDS. Possible organizing pneumonia, started on methylprednisolone 60 mg x3 days. CT Chest 12/1/21 - no significant change.   Extubated 12/2/21. Currently on HFNC  Weaned to 35L Fi02 and BiPAP while asleep and prn.   - Started on Methylprednisolone 40 mg IV every day               Plan to drop by 10 mg each week (first taper on 12/7) until 10 mg total reached, then will remain on 10 mg daily until seen by outpatient pulmonology, will need pulmonology outpatient visit on discharge within 2-3 months with PFTs and CT chest.  Can transition to prednisone, 40 mg of methylprednisolone equates to 50 mg of prednisone.  (Not yet done)  - HFNC wean as able, Bipap while asleep and PRN   --RCAT, continue albuterol inhalers, wean oxygen down as possible.       Acute Stress Reaction related to prolonged ICU stay  Delirium in the setting of prolonged critical illness   H/o methampetamine use  Patient reports history of methamphetamine use and reports using 1-2 times per week up until time of admission.  Current anxiety level making him want to use again. Reports significant anxiety and appears very anxious on admission.   Weaned off precedex 12/5  - Addiction medicine consulted at [facility] but it does not appear this was completed prior to transfer. Pt/family had been receiving some support from palliative team through hospital stay.   -Psychiatry  consult  -- sertraline 25mg daily started 12/5, increased to 50 mg 12/8/2021  - Seroquel 25 mg qam, 50 mg q HS scheduled, increased to 25/75 mg 12/8/2021  -Avoid hydroxyzine/Ativan.  - Melatonin scheduled  - Delirium precautions   -Sleepy today on encounters, did not follow commands.  Adjust medications if persistent somnolence.       Non-oliguric STEFAN, resolved  Hypokalemia, in setting of ongoing diuresis; resolved  Urinary retention; improving  Unclear baseline, has not seen doctor in 20 years. Cr 2.5 on admission, now normalized.   12/06: stevenson catheter removed, has been urinating per his report .  -Continue doxazosin started at [facility] this admission.     Hypertension  Not on anti-HTN meds PTA   - Labetolol, hydralazine prn for SBP >160  - Amlodipine 5 mg daily and clonidine 0.1mg TID started at [facility] this admission, currently blood pressure stable.     Stress and steroid induced hyperglycemia  BG controlled on current regimen.   - Medium resistance sliding scale insulin   - 10 U Glargine, minimal requirements per SSI, 1-2/24 hours  -A1c 6.7.     At risk for malnutrition  - Continue TF  + Full Liquid Diet, TF @ 30 today and increased to 40 prior to discharge. Will continue current rate and await nutrition consultation tomorrow. Unclear whether we carry formula previously used- RN paging on call nutrition   - Calorie counts and nutrition is following; recommends keeping feeding tube in until 75% PO intake of total calories  - SLP following      Septum wound, tongue wound 2/2 Proning   weakness/deconditioning  - WOC consult  - OT/PT consults      CAUTI   VAP  Started on broad-spectrum antibiotics on 11/11 after developing worsening oxygen saturations, fevers, and persistent leukocytosis. Urine culture 11/11 grew >100k pan-sensitive E coli. RVP and MRSA nares negative. Sputum grew E coli, S aureus, and GBS. Sputum culture 11/17 with 1+ E. coli and MSSA, blood cultures negative.   Off antibiotics since 11/25  Abx:  -  Ceftriaxone 11/14-11/17; 11/19 - 11/25 (completed)  - Zosyn 11/11-11/14; 11/17 - 11/18  - Vanc 11/11-11/12      Reducible Umbilical Hernia  Not strangulated  - Monitor for changes  DVT Prophylaxis: Lovenox subcu  Code Status: Full Code     Disposition: TBD pending Covid course/hypoxia, Psychiatry plan established.  Likely to TCU.    Total unit/floor time 35 minutes:  time consisted of the following assuming Patient care in complex Patient with covid PNX with hypoxia; adjustment reaction, examination of patient, review of records including labs, imaging results, medications, interdisciplinary notes and completing documentation; > 50%  Coordination of Care time with Nursing and RT regarding Covid and hypoxia; coordinating with Speciality, Pyschiatiry regarding  Adjustment reaction care plan, management and surveillance.     Uri Khan MD  Text Page   (7am to 6pm)    Interval History   Somnolent on morning encounters today, opens eyes to voice however did not follow commands.  Nursing reports no neurologic focality, no acute behavioral issues.  Weaning O2 currently 30 L, HFNC.  No reports of cough, sputum, afebrile    -Data reviewed today: I reviewed all new labs and imaging results over the last 24 hours.  Physical Exam   Temp: 99.5  F (37.5  C) Temp src: Axillary BP: 117/70 Pulse: 103   Resp: 22 SpO2: 95 % O2 Device: High Flow Nasal Cannula (HFNC) Oxygen Delivery: 35 LPM  There were no vitals filed for this visit.  Vital Signs with Ranges  Temp:  [97.2  F (36.2  C)-99.5  F (37.5  C)] 99.5  F (37.5  C)  Pulse:  [] 103  Resp:  [18-27] 22  BP: (117-144)/(70-98) 117/70  FiO2 (%):  [35 %-50 %] 35 %  SpO2:  [87 %-99 %] 95 %  I/O last 3 completed shifts:  In: 1369 [P.O.:190; NG/GT:1179]  Out: -     Constitutional:  NAD, sleeping on AM encounter; opens eyes to voice, did not follow command  Respiratory: Bilateral basilar crackles noted; O2 per HFNC, at 30L  Cardiovascular: Regular rate and rhythm,  no murmur  noted  GI:  soft, non-distended, non-tender  Skin/Integumen: No rashes, no cyanosis, 1+ edema noted bilateral lower extremities  Neuro : moving all 4 extremities, somnolent    Medications     dextrose       - MEDICATION INSTRUCTIONS -       - MEDICATION INSTRUCTIONS -       - MEDICATION INSTRUCTIONS -         amLODIPine  5 mg Oral or Feeding Tube Daily     cloNIDine  0.1 mg Oral or Feeding Tube Q8H     doxazosin  4 mg Oral or Feeding Tube Daily     enoxaparin ANTICOAGULANT  40 mg Subcutaneous Daily     insulin aspart  1-7 Units Subcutaneous TID AC     insulin aspart  1-5 Units Subcutaneous At Bedtime     insulin glargine  10 Units Subcutaneous At Bedtime     methylPREDNISolone  30 mg Intravenous Daily    Followed by     [START ON 12/14/2021] methylPREDNISolone  20 mg Intravenous Daily    Followed by     [START ON 12/21/2021] methylPREDNISolone  10 mg Intravenous Daily     multivitamins w/minerals  15 mL Per Feeding Tube Daily     pantoprazole  40 mg Oral QAM AC     protein modular  1 packet Per Feeding Tube BID     QUEtiapine  25 mg Oral or Feeding Tube BID w/meals     QUEtiapine  75 mg Oral or Feeding Tube At Bedtime     sertraline  50 mg Oral or Feeding Tube Daily     sodium chloride (PF)  3 mL Intracatheter Q8H       Data   Recent Labs   Lab 12/08/21  0854 12/08/21  0852 12/08/21  0152 12/07/21  2157 12/07/21  1202 12/07/21  1059 12/06/21  0422 12/06/21  0411 12/05/21  0829 12/05/21  0558   WBC  --   --   --   --   --  11.3*  --  13.1*  --  9.3   HGB  --   --   --   --   --  11.6*  --  12.1*  --  11.5*   MCV  --   --   --   --   --  87  --  88  --  88   PLT  --  230  --   --   --  249  --  231  --  273   NA  --   --   --   --   --  137  --  137  --  135   POTASSIUM  --   --   --   --   --  3.6  --  3.7  --  3.9   CHLORIDE  --   --   --   --   --  104  --  104  --  103   CO2  --   --   --   --   --  28  --  24  --  26   BUN  --   --   --   --   --  22  --  27  --  31*   CR  --  0.86  --   --   --  0.82  --  0.86   --  0.80   ANIONGAP  --   --   --   --   --  5  --  9  --  6   VIPUL  --   --   --   --   --  9.5  --  9.3  --  9.3   *  --  132* 136*   < > 152*   < > 133*   < > 151*    < > = values in this interval not displayed.     Recent Labs   Lab 12/08/21  0854 12/08/21  0152 12/07/21  2157 12/07/21  1842 12/07/21  1202   * 132* 136* 144* 150*       Imaging:   Recent Results (from the past 24 hour(s))   XR Abdomen Port 1 View    Narrative    ABDOMEN ONE VIEW PORTABLE  12/8/2021 8:38 AM     HISTORY: Evaluate FT location.    COMPARISON: November 6, 2021       Impression    IMPRESSION: Feeding tube coiled in the stomach.    DEVAUGHN NATION MD         SYSTEM ID:  [id number]

## 2021-12-08 NOTE — CONSULTS
"Pt seen for new psychiatric consultation, see my dictation. He has a delirium complicated by prolonged ICU stay, bereavement. Coordinating care with family seems important given his fluctuating sx, there was no specific contact # I could find in the chart for \"Ana\" who is listed as his spouse, but I understand she was in contact with our SW yesterday, very upset. I did speak with the patients brother Brad to update him on the pts care, he informed me Ana is the pts girlfriend, so Brad would be the primary contact for future reference.     Christo Nicholson MD      "

## 2021-12-08 NOTE — PLAN OF CARE
Summary: Recovered COVID (PNA/ARDS)   DATE & TIME: 12/08/21 1852-0155  Cognitive Concerns/ Orientation : A&O x4, forgetful, flat affect and is anxious at times.  BEHAVIOR & AGGRESSION TOOL COLOR: Green   ABNL VS/O2: VSS except tachy at times (low 100's) on HFNC 35% on 35 LPM, notified RT that patients sats mid-high 90's and to wean but they have not done so; Bipap at night but has refused prior  MOBILITY: Assist x2 w/ GB/walker- up to BSC and to chair x1. Some BUE tremors.  PAIN MANAGMENT: Denies   DIET: Minced and moist- thin liquids, on calorie counts; fair appetite. TF restarted after XR verfication (MD okay with this), running at 40ml/hr (goal).  Manual FWF 60mL q4 hrs (next at 2200). Takes pills whole by mouth one at a time  BOWEL/BLADDER: Continent, patient using urinal and bedside commode. BM this shift. Hx of retention   ABNL LAB/BG: , 160, 131  DRAIN/DEVICES: PIV SL.  NG tube  TELEMETRY RHYTHM: NSR- tachy at times  SKIN: Pale/ toby. Scattered bruising. +1 BLE edema  TESTS/PROCEDURES: NA  D/C DAY/GOALS/PLACE: Pending improvement on strength and oxygen requirements. PT recommending TCU now    OTHER IMPORTANT INFO: Bariatric bed, needs to be zero'd for accurate weight. Covid recovered. CONDE, no SOB reported, infrequent nonproductive cough, LS diminished. Continue IV solumedrol taper. Psych saw patient, seroquel increased at bedtime and now BID, also has PRN order.

## 2021-12-08 NOTE — PROGRESS NOTES
CALORIE COUNT    Approximate Oral Intake for:    12/7  Calories: Negligible   Protein: Negligible       Intake from TF/PN:     TF briefly held this morning after tube was pulled out about 5 cm, XR confirmation that tip is still gastric. Per RN will restart TF this morning at goal as follows -->     TwoCal HN @ 40 mL/hr to provide 1920 kcal, 80 g protein, 210 g CHO, 5 g fiber, 910 mL free water.   Add 1 pkt Prosource BID for additional 22 g protein  Total ProSource + TF = 2000 kcal (24 kcal/kg), 102 g protein (1.3 g/kg)  Flush 60 mL q 4 hours       Estimated Needs:    DW: 81 kg IBW  Estimated Energy Needs: 9437-5422 kcals/day (22-25 kcal/kg IBW)  Justification: Obese   Estimated Protein Needs:  grams protein/day (1.2-2 grams of pro/kg IBW)  Justification: Obesity guidelines      Summary:     - Pt seen in room. He reports that he ate very little yesterday. He would prefer not to receive the Ensure supplements and would rather just focus on food.   - Breakfast tray on table this morning, cream of wheat, ice cream, juice, coffee.   - Calorie counts will continue through 12/9.     Irma Ang RD, LD  [facility], [facility], [facility]  Pager: [phone]  Weekend Pager: [phone]

## 2021-12-09 ENCOUNTER — APPOINTMENT (OUTPATIENT)
Dept: OCCUPATIONAL THERAPY | Facility: CLINIC | Age: 41
End: 2021-12-09
Attending: INTERNAL MEDICINE
Payer: MEDICAID

## 2021-12-09 ENCOUNTER — APPOINTMENT (OUTPATIENT)
Dept: PHYSICAL THERAPY | Facility: CLINIC | Age: 41
End: 2021-12-09
Attending: INTERNAL MEDICINE
Payer: MEDICAID

## 2021-12-09 LAB
GLUCOSE BLDC GLUCOMTR-MCNC: 109 MG/DL (ref 70–99)
GLUCOSE BLDC GLUCOMTR-MCNC: 127 MG/DL (ref 70–99)
GLUCOSE BLDC GLUCOMTR-MCNC: 128 MG/DL (ref 70–99)
GLUCOSE BLDC GLUCOMTR-MCNC: 133 MG/DL (ref 70–99)
GLUCOSE BLDC GLUCOMTR-MCNC: 140 MG/DL (ref 70–99)

## 2021-12-09 PROCEDURE — 250N000013 HC RX MED GY IP 250 OP 250 PS 637: Performed by: PHYSICIAN ASSISTANT

## 2021-12-09 PROCEDURE — 250N000013 HC RX MED GY IP 250 OP 250 PS 637: Performed by: INTERNAL MEDICINE

## 2021-12-09 PROCEDURE — 97110 THERAPEUTIC EXERCISES: CPT | Mod: GO

## 2021-12-09 PROCEDURE — 97530 THERAPEUTIC ACTIVITIES: CPT | Mod: GP | Performed by: PHYSICAL THERAPIST

## 2021-12-09 PROCEDURE — 97530 THERAPEUTIC ACTIVITIES: CPT | Mod: GO

## 2021-12-09 PROCEDURE — 250N000013 HC RX MED GY IP 250 OP 250 PS 637: Performed by: PSYCHIATRY & NEUROLOGY

## 2021-12-09 PROCEDURE — 120N000001 HC R&B MED SURG/OB

## 2021-12-09 PROCEDURE — 999N000157 HC STATISTIC RCP TIME EA 10 MIN

## 2021-12-09 PROCEDURE — 99232 SBSQ HOSP IP/OBS MODERATE 35: CPT | Performed by: HOSPITALIST

## 2021-12-09 PROCEDURE — 250N000011 HC RX IP 250 OP 636: Performed by: PHYSICIAN ASSISTANT

## 2021-12-09 RX ORDER — METOPROLOL TARTRATE 25 MG/1
25 TABLET, FILM COATED ORAL 2 TIMES DAILY
Status: DISCONTINUED | OUTPATIENT
Start: 2021-12-09 | End: 2021-12-09

## 2021-12-09 RX ORDER — QUETIAPINE FUMARATE 25 MG/1
25 TABLET, FILM COATED ORAL EVERY 6 HOURS PRN
Status: DISCONTINUED | OUTPATIENT
Start: 2021-12-09 | End: 2021-12-14 | Stop reason: HOSPADM

## 2021-12-09 RX ORDER — METOPROLOL TARTRATE 25 MG/1
25 TABLET, FILM COATED ORAL 2 TIMES DAILY
Status: DISCONTINUED | OUTPATIENT
Start: 2021-12-09 | End: 2021-12-14 | Stop reason: HOSPADM

## 2021-12-09 RX ADMIN — DOXAZOSIN 4 MG: 4 TABLET ORAL at 09:15

## 2021-12-09 RX ADMIN — METOPROLOL TARTRATE 25 MG: 25 TABLET, FILM COATED ORAL at 09:15

## 2021-12-09 RX ADMIN — Medication 1 PACKET: at 20:00

## 2021-12-09 RX ADMIN — CLONIDINE HYDROCHLORIDE 0.1 MG: 0.1 TABLET ORAL at 14:05

## 2021-12-09 RX ADMIN — MULTIVIT AND MINERALS-FERROUS GLUCONATE 9 MG IRON/15 ML ORAL LIQUID 15 ML: at 09:15

## 2021-12-09 RX ADMIN — SERTRALINE HYDROCHLORIDE 50 MG: 50 TABLET ORAL at 09:15

## 2021-12-09 RX ADMIN — ENOXAPARIN SODIUM 40 MG: 40 INJECTION SUBCUTANEOUS at 09:15

## 2021-12-09 RX ADMIN — METOPROLOL TARTRATE 25 MG: 25 TABLET, FILM COATED ORAL at 20:00

## 2021-12-09 RX ADMIN — QUETIAPINE FUMARATE 25 MG: 25 TABLET ORAL at 09:16

## 2021-12-09 RX ADMIN — CLONIDINE HYDROCHLORIDE 0.1 MG: 0.1 TABLET ORAL at 22:52

## 2021-12-09 RX ADMIN — Medication 1 PACKET: at 09:16

## 2021-12-09 RX ADMIN — METHYLPREDNISOLONE SODIUM SUCCINATE 30 MG: 40 INJECTION, POWDER, FOR SOLUTION INTRAMUSCULAR; INTRAVENOUS at 09:14

## 2021-12-09 RX ADMIN — PANTOPRAZOLE SODIUM 40 MG: 40 TABLET, DELAYED RELEASE ORAL at 05:13

## 2021-12-09 RX ADMIN — CLONIDINE HYDROCHLORIDE 0.1 MG: 0.1 TABLET ORAL at 05:13

## 2021-12-09 RX ADMIN — QUETIAPINE FUMARATE 25 MG: 25 TABLET ORAL at 17:43

## 2021-12-09 RX ADMIN — INSULIN GLARGINE 10 UNITS: 100 INJECTION, SOLUTION SUBCUTANEOUS at 22:55

## 2021-12-09 RX ADMIN — QUETIAPINE FUMARATE 75 MG: 50 TABLET ORAL at 22:51

## 2021-12-09 ASSESSMENT — ACTIVITIES OF DAILY LIVING (ADL)
ADLS_ACUITY_SCORE: 11
ADLS_ACUITY_SCORE: 10
ADLS_ACUITY_SCORE: 10
ADLS_ACUITY_SCORE: 11
ADLS_ACUITY_SCORE: 10
ADLS_ACUITY_SCORE: 11
ADLS_ACUITY_SCORE: 11
ADLS_ACUITY_SCORE: 10
ADLS_ACUITY_SCORE: 10
ADLS_ACUITY_SCORE: 11
ADLS_ACUITY_SCORE: 10

## 2021-12-09 NOTE — PROGRESS NOTES
Pt was on heated-high flow nasal cannula 35L, 35% FIO2 in this shift and declined to use Bipap overnight.

## 2021-12-09 NOTE — PLAN OF CARE
Summary: Recovered COVID (PNA/ARDS)   DATE & TIME: 12/09/21 5782-0223  Cognitive Concerns/ Orientation : A&O x4, forgetful, flat affect and is anxious at times.  BEHAVIOR & AGGRESSION TOOL COLOR: Green   ABNL VS/O2: VSS except tachy at times mostly with activity(low 100's). Weaned off of high flow, currently on Oximizer canula at 6 LPM. Bipap at night but has refused prior  MOBILITY: Assist x1-2 w/ GB/walker- up to BSC and to chair w/meals Some BUE tremors.  PAIN MANAGMENT: Denies   DIET: Minced and moist- thin liquids, on calorie counts; fair appetite. TF now only 8pm-8am. Free water flushes manually q4hrs. Takes pills whole by mouth one at a time  BOWEL/BLADDER: Continent, patient using urinal and bedside commode. BM this shift. Hx of retention   ABNL LAB/BG: , 128, 127  DRAIN/DEVICES: PIV SL.  NG tube   TELEMETRY RHYTHM: ST/NSR  SKIN: Pale/ toby. Scattered bruising. +1 BLE edema. Blanchable redness to coccyx. Dry/tough skin on hands and heels. Lips dry/cracked- oral cares.   TESTS/PROCEDURES: NA  D/C DAY/GOALS/PLACE: Pending improvement on strength and oxygen requirements. PT recommending TCU, will need placement    OTHER IMPORTANT INFO: Covid recovered. CONDE, no SOB reported, infrequent nonproductive cough, LS diminished. Continue IV solumedrol taper. No need for PRN seroquel this shift. Updated brother today with patients permission, Ipad is set up in room for family to call. Also consulted spiritual health, patient has flat affect and feels lonely due to prolonged hospital stay. Brother will be in cities to visit next week.

## 2021-12-09 NOTE — PROGRESS NOTES
CALORIE COUNT      Approximate Oral Intake for:    12/8  Calories:  482 kcal  Protein:  20 g protein      Intake from TF/PN:         TwoCal HN @ 40 mL/hr to provide 1920 kcal, 80 g protein, 210 g CHO, 5 g fiber, 910 mL free water.   Add 1 pkt Prosource BID for additional 22 g protein  Total ProSource + TF = 2000 kcal (24 kcal/kg), 102 g protein (1.3 g/kg)  Flush 60 mL q 4 hours       Estimated Needs:      DW: 81 kg IBW    Estimated Energy Needs: 3132-2732 kcals/day (22-25 kcal/kg IBW)  Justification: Obese   Estimated Protein Needs:  grams protein/day (1.2-2 grams of pro/kg IBW)  Justification: Obesity guidelines      Summary:     - Met 27% estimated energy needs and 21% estimated protein needs yesterday.   - To assist in appetite stimulation will cycle TF today.     Run TF @ 40 mL/hr from 8 pm-8 am to provide 960 kcal, 40 g protein, 105 g CHO, 2.5 g fiber, 455 mL free water.   - Continue ProSource BID for additional 22 g protein and 80 kcal  - Total (TF + ProSource) = 1040 kcal (58% energy needs), 66 g protein (68% protein needs)     - Calorie counts will continue through today. May need to increase volume of overnight feed if unable to eat more today.     Irma Ang RD, LD  [facility]  Pager: [phone]  Weekend Pager: [phone]

## 2021-12-09 NOTE — PROGRESS NOTES
[facility]    Hospitalist Progress Note    Assessment & Plan   Brandon Schafer is a 41 year old male with PMHx significant for substance abuse who is a direct admission to [facility] from [facility] for ongoing cares (med/surg bed availability) followed prolonged hospitalization for respiratory failure in the setting of COVID 19 pneumonia/ARDS.      Acute hypoxic respiratory failure 2/2 COVID pneumonia; improving  ARDS 2/2 COVID pneumonia  Intubated 11/2 after presenting with stats in 50s.  CT on 11/25 with mild fibrotic changes, dense consolidations consistent with ARDS. Possible organizing pneumonia, started on methylprednisolone 60 mg x3 days. CT Chest 12/1/21 - no significant change.   Extubated 12/2/21. Currently on HFNC  Weaned to 35L Fi02 and BiPAP while asleep and prn.   - Started on Methylprednisolone 40 mg IV every day               Plan to drop by 10 mg each week (first taper on 12/7) until 10 mg total reached, then will remain on 10 mg daily until seen by outpatient pulmonology, will need pulmonology outpatient visit on discharge within 2-3 months with PFTs and CT chest.  Can transition to prednisone, 40 mg of methylprednisolone equates to 50 mg of prednisone.  (Not yet done)  - HFNC wean as able, Bipap while asleep and PRN   --RCAT, continue albuterol inhalers, wean oxygen down as possible.  -Continue progress O2 wean currently on 9 L per Oxymizer;        Acute Stress Reaction related to prolonged ICU stay  Delirium in the setting of prolonged critical illness   H/o methampetamine use  Patient reports history of methamphetamine use and reports using 1-2 times per week up until time of admission.  Current anxiety level making him want to use again. Reports significant anxiety and appears very anxious on admission.   Weaned off precedex 12/5  - Addiction medicine consulted at [facility] but it does not appear this was completed prior to transfer. Pt/family had been receiving some support from  palliative team through hospital stay.   -Psychiatry consult  -- sertraline 25mg daily started 12/5, increased to 50 mg 12/8/2021  - Seroquel 25 mg qam, 50 mg q HS scheduled, increased to 25/75 mg 12/8/2021  -Avoid hydroxyzine/Ativan.  - Melatonin scheduled  - Delirium precautions   -More alert today; participates with Therapies     Non-oliguric SETFAN, resolved  Hypokalemia, in setting of ongoing diuresis; resolved  Urinary retention; improving  Unclear baseline, has not seen doctor in 20 years. Cr 2.5 on admission, now normalized.   12/06: stevenson catheter removed,  Urinating  -Continue doxazosin started at [facility] this admission.  -Update BMP/renal function in a.m.     Hypertension  Not on anti-HTN meds PTA   - Labetolol, hydralazine prn for SBP >160  - Amlodipine 5 mg daily and clonidine 0.1mg TID started at [facility] this admission, currently blood pressure stable.     Stress and steroid induced hyperglycemia  BG controlled on current regimen.   - Medium resistance sliding scale insulin   - 10 U Glargine, minimal requirements per SSI, 1-2/24 hours  -A1c 6.7.  -Glucoses -133.  TF and p.o. calories.     At risk for malnutrition  - Continue TF  + Full Liquid Diet, TF @ 30 today and increased to 40 prior to discharge. Will continue current rate and await nutrition consultation tomorrow. Unclear whether we carry formula previously used- RN paging on call nutrition   - Calorie counts and nutrition is following; recommends keeping feeding tube in until 75% PO intake of total calories  - SLP following      Septum wound, tongue wound 2/2 Proning   weakness/deconditioning  - WOC consult  - OT/PT consults      CAUTI   VAP  Started on broad-spectrum antibiotics on 11/11 after developing worsening oxygen saturations, fevers, and persistent leukocytosis. Urine culture 11/11 grew >100k pan-sensitive E coli. RVP and MRSA nares negative. Sputum grew E coli, S aureus, and GBS. Sputum culture 11/17 with 1+ E. coli and MSSA, blood  cultures negative.   Off antibiotics since 11/25  Abx:  - Ceftriaxone 11/14-11/17; 11/19 - 11/25 (completed)  - Zosyn 11/11-11/14; 11/17 - 11/18  - Vanc 11/11-11/12      Reducible Umbilical Hernia  Not strangulated  - Monitor for changes  DVT Prophylaxis: Lovenox subcu  Code Status: Full Code     Disposition: TBD pending Covid course/hypoxia, Psychiatry plan established.  Family would like TCU/ARF closer to home.    I spent 25 minutes on the unit/floor in management of care today reviewing labs, medications, interdisciplinary notes; and completing documentation of encounter and orders with over 50% of my time was spent Counseling the Patient regarding  Coordinating Care and plan with Nursing Covid/Hypoxia and Specialists, Psychiatry regarding stress reaction management and surveillance     Uri Khan MD  Text Page   (7am to 6pm)    Interval History   More alert today, participates with Therapies.  Continue O2 wean from HFNC to Oxymizer NC.  No cough, increased secretions, afebrile.      -Data reviewed today: I reviewed all new labs and imaging results over the last 24 hours.  Physical Exam   Temp: 97.4  F (36.3  C) Temp src: Oral BP: 129/79 Pulse: 106   Resp: 24 SpO2: 97 % O2 Device: Oxymizer cannula Oxygen Delivery: 9 LPM  Vitals:    12/08/21 2005 12/09/21 0611   Weight: 143.8 kg (317 lb) 141.5 kg (312 lb)     Vital Signs with Ranges  Temp:  [97.4  F (36.3  C)-98.9  F (37.2  C)] 97.4  F (36.3  C)  Pulse:  [] 106  Resp:  [20-26] 24  BP: (127-153)/(53-89) 129/79  FiO2 (%):  [35 %] 35 %  SpO2:  [94 %-99 %] 97 %  I/O last 3 completed shifts:  In: 1779 [P.O.:360; NG/GT:1419]  Out: 1250 [Urine:1250]    Constitutional:  NAD, more alert, participates with Therapies.  Calm, cooperative.    Respiratory: Bilateral basilar crackles noted; O2 now per oxymizer NC  Cardiovascular: Regular rate and rhythm,  no murmur noted  GI:  soft, non-distended, non-tender  Skin/Integumen: No rashes, no cyanosis, 1+ edema noted  bilateral lower extremities  Neuro : moving all 4 extremities, up and standing with Therapies.  Psych:  Affect:  Calm     Medications     dextrose       - MEDICATION INSTRUCTIONS -       - MEDICATION INSTRUCTIONS -       - MEDICATION INSTRUCTIONS -         cloNIDine  0.1 mg Oral or Feeding Tube Q8H     doxazosin  4 mg Oral or Feeding Tube Daily     enoxaparin ANTICOAGULANT  40 mg Subcutaneous Daily     insulin aspart  1-7 Units Subcutaneous TID AC     insulin aspart  1-5 Units Subcutaneous At Bedtime     insulin glargine  10 Units Subcutaneous At Bedtime     methylPREDNISolone  30 mg Intravenous Daily    Followed by     [START ON 12/14/2021] methylPREDNISolone  20 mg Intravenous Daily    Followed by     [START ON 12/21/2021] methylPREDNISolone  10 mg Intravenous Daily     metoprolol tartrate  25 mg Oral or Feeding Tube BID     multivitamins w/minerals  15 mL Per Feeding Tube Daily     [START ON 12/10/2021] pantoprazole  40 mg Per Feeding Tube QAM AC     protein modular  1 packet Per Feeding Tube BID     QUEtiapine  25 mg Oral or Feeding Tube BID w/meals     QUEtiapine  75 mg Oral or Feeding Tube At Bedtime     sertraline  50 mg Oral or Feeding Tube Daily     sodium chloride (PF)  3 mL Intracatheter Q8H       Data   Recent Labs   Lab 12/09/21  1216 12/09/21  0912 12/09/21  0155 12/08/21  0854 12/08/21  0852 12/07/21  1202 12/07/21  1059 12/06/21  0422 12/06/21  0411 12/05/21  0829 12/05/21  0558   WBC  --   --   --   --   --   --  11.3*  --  13.1*  --  9.3   HGB  --   --   --   --   --   --  11.6*  --  12.1*  --  11.5*   MCV  --   --   --   --   --   --  87  --  88  --  88   PLT  --   --   --   --  230  --  249  --  231  --  273   NA  --   --   --   --   --   --  137  --  137  --  135   POTASSIUM  --   --   --   --   --   --  3.6  --  3.7  --  3.9   CHLORIDE  --   --   --   --   --   --  104  --  104  --  103   CO2  --   --   --   --   --   --  28  --  24  --  26   BUN  --   --   --   --   --   --  22  --  27  --   31*   CR  --   --   --   --  0.86  --  0.82  --  0.86  --  0.80   ANIONGAP  --   --   --   --   --   --  5  --  9  --  6   VIPUL  --   --   --   --   --   --  9.5  --  9.3  --  9.3   * 140* 133*   < >  --    < > 152*   < > 133*   < > 151*    < > = values in this interval not displayed.     Recent Labs   Lab 12/09/21  1216 12/09/21  0912 12/09/21  0155 12/08/21  2107 12/08/21  1735   * 140* 133* 113* 131*       Imaging:

## 2021-12-09 NOTE — CONSULTS
Care Management Initial Consult    General Information  Assessment completed with: Patient,Family,  (Brother Brad)  Type of CM/SW Visit: Initial Assessment    Primary Care Provider verified and updated as needed: No   Readmission within the last 30 days: no previous admission in last 30 days      Reason for Consult: discharge planning  Advance Care Planning: Advance Care Planning Reviewed: no concerns identified          Communication Assessment  Patient's communication style: spoken language (English or Bilingual)    Hearing Difficulty or Deaf: no   Wear Glasses or Blind: no    Cognitive  Cognitive/Neuro/Behavioral: .WDL except  Level of Consciousness: alert  Arousal Level: opens eyes spontaneously  Orientation: oriented x 4  Mood/Behavior: flat affect  Best Language: 0 - No aphasia  Speech: whispers    Living Environment:   People in home: child(rosey), dependent     Current living Arrangements: house      Able to return to prior arrangements: no       Family/Social Support:  Care provided by: self  Provides care for: no one  Marital Status: Single  Sibling(s)          Description of Support System: Supportive    Support Assessment: Adequate family and caregiver support    Current Resources:   Patient receiving home care services: No     Community Resources:    Equipment currently used at home: none  Supplies currently used at home:      Employment/Financial:  Employment Status: unemployed        Financial Concerns: insurance, none   Referral to Financial Counselor: Yes       Lifestyle & Psychosocial Needs:  Social Determinants of Health     Tobacco Use: High Risk     Smoking Tobacco Use: Current Every Day Smoker     Smokeless Tobacco Use: Never Used   Alcohol Use: Not on file   Financial Resource Strain: Not on file   Food Insecurity: Not on file   Transportation Needs: Not on file   Physical Activity: Not on file   Stress: Not on file   Social Connections: Not on file   Intimate Partner Violence: Not on file    Depression: Not on file   Housing Stability: Not on file       Functional Status:  Prior to admission patient needed assistance:   Dependent ADLs:: Independent  Dependent IADLs:: Independent       Mental Health Status:  Mental Health Status: Current Concern  Mental Health Management: Psychiatrist    Chemical Dependency Status:  Chemical Dependency Status: Past Concern             Values/Beliefs:  Spiritual, Cultural Beliefs, Anabaptism Practices, Values that affect care: yes       Cultural/Anabaptism Practices Patient Routinely Participates In: prayer       Additional Information:  Long discussion with patient's brother Brad and patient regarding plan of care. Patient stating he is feeling better since admission.   Patient lives with his girlfriend and kids in a split-level home.  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  Patients brother stated that family would have preferred patient be moved to a hospital closer to his home,howver writer informed that patient was on Hi iza oxygen and medically not stable for transfer to a [facility] hospital. Assured brother and girlfriend that patients needs will be met at [facility] and will be watched closely.  Discussed that once patient is off HiFlow o2 and cleared medically patient will need ARU/TCU for his rehabilitation.  will work with patient eventually     Cathy Scott RN  Inpatient Care Coordinator  [facility]  # [phone]

## 2021-12-09 NOTE — PROGRESS NOTES
Care Management Follow Up    Length of Stay (days): 3    Expected Discharge Date: 12/10/2021     Concerns to be Addressed: coping/stress,adjustment to diagnosis/illness,discharge planning     Patient plan of care discussed at interdisciplinary rounds: Yes    Anticipated Discharge Disposition:  Acute Rehab or Transitional Care Unit     Anticipated Discharge Services:    Anticipated Discharge DME:      Patient/family educated on Medicare website which has current facility and service quality ratings:    Education Provided on the Discharge Plan:    Patient/Family in Agreement with the Plan:      Referrals Placed by CM/SW:    Private pay costs discussed: Not applicable    Additional Information:  Social Work staff is following to assist with rehab placement.  Patient was at [facility] from 11/3 to 12/6 and transferred here on 12/6.   Current discharge recommendation by therapy is ARU.  Patient's brother, who is the  for family, did express interest to the CT RN  to locate patient closer to his home even if this meant admitting to a TCU rather than ARU.  [facility] has two ARU's and writer has called to inquire about vacancies at [facility] [phone] and [facility] at [phone].  Writer also made a referral to [facility] ARU.  Discharge to an ARU or TCU outside of [facility] may be difficult due to his insurance status which is MA pending.  Per account notes, the MA application was completed on 11/8 and they have up to 45 days to process the application.  Plan:  Will contact brother tomorrow and speak with patient to explain the benefit of ARU over TCU.  Will follow up with [facility]'s tomorrow.  Will ask hospitalist for an estimated discharge date.        YOANA Zhou

## 2021-12-09 NOTE — PROGRESS NOTES
12/08/21 1700   Quick Adds   Type of Visit Initial PT Evaluation       Present no   Living Environment   People in home significant other;child(rosey), dependent   Current Living Arrangements house   Home Accessibility stairs to enter home;stairs within home   Number of Stairs, Main Entrance 1   Stair Railings, Main Entrance none   Number of Stairs, Within Home, Primary greater than 10 stairs  (13)   Stair Railings, Within Home, Primary railings on both sides of stairs   Transportation Anticipated car, drives self;family or friend will provide   Living Environment Comments Patient lives with his wife and kids in a split-level home.  His bedroom, bathroom, and kitchen are on the upper level.  Laundry is located in the basement but patient states his spouse could do the laundry upon return home.  Patient sleeps in a standard bed.  He has a tub-shower with grab bars but no chair/bench.   Self-Care   Usual Activity Tolerance good   Current Activity Tolerance fair   Regular Exercise No   Equipment Currently Used at Home none   Activity/Exercise/Self-Care Comment Patient reports baseline independence with dressing, bathroom, and toileting tasks.  He was driving and ambulating at home and in the community without an assistive device.  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   Disability/Function   Walking or Climbing Stairs Difficulty no   Dressing/Bathing Difficulty no   Toileting issues no   Doing Errands Independently Difficulty (such as shopping) no   Fall history within last six months no   General Information   Onset of Illness/Injury or Date of Surgery 12/06/21   Referring Physician Mitzy Love PA-C   Patient/Family Therapy Goals Statement (PT) Patient would like to return home to his family   Pertinent History of Current Problem (include personal factors and/or comorbidities that impact the POC) 41 year old male with PMHx significant for substance abuse who is a direct  admission to [facility] from [facility] for ongoing cares (med/surg bed availability) followed prolonged hospitalization for respiratory failure in the setting of COVID 19 pneumonia/ARDS.    Existing Precautions/Restrictions fall;oxygen therapy device and L/min   Cognition   Orientation Status (Cognition) oriented x 4   Affect/Mental Status (Cognition) WFL   Follows Commands (Cognition) WFL   Pain Assessment   Patient Currently in Pain No   Posture    Posture Forward head position;Protracted shoulders   Range of Motion (ROM)   ROM Quick Adds ROM WFL   Strength   Manual Muscle Testing Quick Adds Able to perform R SLR;Able to perform L SLR;Deficits observed during functional mobility   Strength Comments Generalized lower extremity strength from prolonged hospitalization with decreased activity, grossly 4/5   Bed Mobility   Comment (Bed Mobility) Patient performs supine>sit with SBA, relies heavily on momentum and upper extremity support on bed rail to come to sitting at EOB.  Patient able to maintain static sitting balance with SBA.  Patient returning to bed at end of session, SBA for sit>supine with HOB flat and no bed rail.     Transfers   Transfer Safety Comments Patient completes x5 sit<>stands from EOB with CGA.  Patient cued once to pushing hands from support surface and demonstrates safe hand placement throughout remaining repetitions.  Patient able to maintain static standing balance 10-15 seconds with each sit>stand, no upper extremity support but CGA for safety.  Patient appropriately reaching back for sitting surface prior to stand>sit, demonstrates appropriate eccentric control to bed surface.  Patient reporting fatigue in lower extremities and shortness of breath with repeated sit<>stands, SpO2 remaining >90% on HFNC 35 LPM, 35% FiO2.   Gait/Stairs (Locomotion)   Comment (Gait/Stairs) Patient ambulating 5' in room with CGA and FWW.  Patient demonstrating step-through gait pattern with decreased step length and  slowed gait speed.  No loss of balance during gait but patient reporting fatigue and shortness of breath.  Cued for pursed lip breathing to assist in aerobic recovery, SpO2 95%.   Balance   Balance Comments Impaired dynamic balance   Sensory Examination   Sensory Perception Comments Patient reports numbness in toes.   Clinical Impression   Criteria for Skilled Therapeutic Intervention yes, treatment indicated   PT Diagnosis (PT) Impaired gait   Influenced by the following impairments Generalized lower extremity weakness, aerobic deconditioning, impaired dynamic balance, poor activity tolerance   Functional limitations due to impairments Impaired independence with bed mobility, transfers, gait, and stair negotiation   Clinical Presentation Evolving/Changing   Clinical Presentation Rationale Clinical judgment, prolonged hospitalization, PMH, social support   Clinical Decision Making (Complexity) moderate complexity   Therapy Frequency (PT) 6x/week   Predicted Duration of Therapy Intervention (days/wks) 5 days   Planned Therapy Interventions (PT) balance training;bed mobility training;gait training;home exercise program;patient/family education;stair training;strengthening;transfer training;progressive activity/exercise   Anticipated Equipment Needs at Discharge (PT) walker, rolling   Risk & Benefits of therapy have been explained evaluation/treatment results reviewed;care plan/treatment goals reviewed;risks/benefits reviewed;participants voiced agreement with care plan;participants included;patient   PT Discharge Planning    PT Discharge Recommendation (DC Rec) Transitional Care Facility;home with assist;home with home care physical therapy   PT Rationale for DC Rec Patient currently functioning below baseline (IND ADLs and mobility).  Patient presents with significant decondition and poor activity tolerance following prolonged hospitalization.  Patient would benefit from continued IP PT as well as TCU to progress  independence with functional mobility tasks and increase activity tolerance.  If patient were to discharge home, anticipate he would require 24/7 assist x 1-2 for all ADLs and mobility.  Patient would require a bariatric FWW and manual wheelchair.  Would benefit from HH PT if dishcarging home to address deficits to optimize safety.   Total Evaluation Time   Total Evaluation Time (Minutes) 10

## 2021-12-09 NOTE — PLAN OF CARE
VSS, A&Ox4. Flat affect. HF 35%, 35L. DH flushed q4, tube feeding at goal rate 40mL/h.  and 133. Tele - sinus rhythm. L PIV saline locked. No complaints of pain or nausea, pt resting comfortably at this time.

## 2021-12-10 ENCOUNTER — APPOINTMENT (OUTPATIENT)
Dept: SPEECH THERAPY | Facility: CLINIC | Age: 41
End: 2021-12-10
Attending: INTERNAL MEDICINE
Payer: MEDICAID

## 2021-12-10 ENCOUNTER — APPOINTMENT (OUTPATIENT)
Dept: PHYSICAL THERAPY | Facility: CLINIC | Age: 41
End: 2021-12-10
Attending: INTERNAL MEDICINE
Payer: MEDICAID

## 2021-12-10 ENCOUNTER — APPOINTMENT (OUTPATIENT)
Dept: OCCUPATIONAL THERAPY | Facility: CLINIC | Age: 41
End: 2021-12-10
Attending: INTERNAL MEDICINE
Payer: MEDICAID

## 2021-12-10 LAB
ANION GAP SERPL CALCULATED.3IONS-SCNC: 8 MMOL/L (ref 3–14)
BUN SERPL-MCNC: 18 MG/DL (ref 7–30)
CALCIUM SERPL-MCNC: 9.2 MG/DL (ref 8.5–10.1)
CHLORIDE BLD-SCNC: 105 MMOL/L (ref 94–109)
CO2 SERPL-SCNC: 26 MMOL/L (ref 20–32)
CREAT SERPL-MCNC: 0.73 MG/DL (ref 0.66–1.25)
ERYTHROCYTE [DISTWIDTH] IN BLOOD BY AUTOMATED COUNT: 15.3 % (ref 10–15)
GFR SERPL CREATININE-BSD FRML MDRD: >90 ML/MIN/1.73M2
GLUCOSE BLD-MCNC: 123 MG/DL (ref 70–99)
GLUCOSE BLDC GLUCOMTR-MCNC: 119 MG/DL (ref 70–99)
GLUCOSE BLDC GLUCOMTR-MCNC: 120 MG/DL (ref 70–99)
GLUCOSE BLDC GLUCOMTR-MCNC: 126 MG/DL (ref 70–99)
GLUCOSE BLDC GLUCOMTR-MCNC: 129 MG/DL (ref 70–99)
GLUCOSE BLDC GLUCOMTR-MCNC: 140 MG/DL (ref 70–99)
HCT VFR BLD AUTO: 37.2 % (ref 40–53)
HGB BLD-MCNC: 11.8 G/DL (ref 13.3–17.7)
MAGNESIUM SERPL-MCNC: 2.2 MG/DL (ref 1.6–2.3)
MCH RBC QN AUTO: 27.6 PG (ref 26.5–33)
MCHC RBC AUTO-ENTMCNC: 31.7 G/DL (ref 31.5–36.5)
MCV RBC AUTO: 87 FL (ref 78–100)
PLATELET # BLD AUTO: 230 10E3/UL (ref 150–450)
POTASSIUM BLD-SCNC: 3.7 MMOL/L (ref 3.4–5.3)
RBC # BLD AUTO: 4.28 10E6/UL (ref 4.4–5.9)
SODIUM SERPL-SCNC: 139 MMOL/L (ref 133–144)
TSH SERPL DL<=0.005 MIU/L-ACNC: 2.88 MU/L (ref 0.4–4)
WBC # BLD AUTO: 10.2 10E3/UL (ref 4–11)

## 2021-12-10 PROCEDURE — 99232 SBSQ HOSP IP/OBS MODERATE 35: CPT | Performed by: HOSPITALIST

## 2021-12-10 PROCEDURE — 83735 ASSAY OF MAGNESIUM: CPT | Performed by: HOSPITALIST

## 2021-12-10 PROCEDURE — 97535 SELF CARE MNGMENT TRAINING: CPT | Mod: GO

## 2021-12-10 PROCEDURE — 250N000013 HC RX MED GY IP 250 OP 250 PS 637: Performed by: PHYSICIAN ASSISTANT

## 2021-12-10 PROCEDURE — 120N000001 HC R&B MED SURG/OB

## 2021-12-10 PROCEDURE — 80048 BASIC METABOLIC PNL TOTAL CA: CPT | Performed by: HOSPITALIST

## 2021-12-10 PROCEDURE — 36415 COLL VENOUS BLD VENIPUNCTURE: CPT | Performed by: HOSPITALIST

## 2021-12-10 PROCEDURE — 84443 ASSAY THYROID STIM HORMONE: CPT | Performed by: HOSPITALIST

## 2021-12-10 PROCEDURE — 250N000013 HC RX MED GY IP 250 OP 250 PS 637: Performed by: INTERNAL MEDICINE

## 2021-12-10 PROCEDURE — 97110 THERAPEUTIC EXERCISES: CPT | Mod: GO

## 2021-12-10 PROCEDURE — 85027 COMPLETE CBC AUTOMATED: CPT | Performed by: HOSPITALIST

## 2021-12-10 PROCEDURE — 999N000157 HC STATISTIC RCP TIME EA 10 MIN

## 2021-12-10 PROCEDURE — 250N000011 HC RX IP 250 OP 636: Performed by: PHYSICIAN ASSISTANT

## 2021-12-10 PROCEDURE — 97116 GAIT TRAINING THERAPY: CPT | Mod: GP

## 2021-12-10 PROCEDURE — 92526 ORAL FUNCTION THERAPY: CPT | Mod: GN

## 2021-12-10 PROCEDURE — 250N000013 HC RX MED GY IP 250 OP 250 PS 637: Performed by: PSYCHIATRY & NEUROLOGY

## 2021-12-10 PROCEDURE — 250N000011 HC RX IP 250 OP 636: Performed by: INTERNAL MEDICINE

## 2021-12-10 PROCEDURE — 97530 THERAPEUTIC ACTIVITIES: CPT | Mod: GP

## 2021-12-10 RX ADMIN — SERTRALINE HYDROCHLORIDE 50 MG: 50 TABLET ORAL at 09:42

## 2021-12-10 RX ADMIN — CLONIDINE HYDROCHLORIDE 0.1 MG: 0.1 TABLET ORAL at 21:55

## 2021-12-10 RX ADMIN — METOPROLOL TARTRATE 25 MG: 25 TABLET, FILM COATED ORAL at 09:42

## 2021-12-10 RX ADMIN — Medication 1 PACKET: at 09:44

## 2021-12-10 RX ADMIN — DOXAZOSIN 4 MG: 4 TABLET ORAL at 09:42

## 2021-12-10 RX ADMIN — Medication 40 MG: at 09:43

## 2021-12-10 RX ADMIN — QUETIAPINE FUMARATE 25 MG: 25 TABLET ORAL at 09:42

## 2021-12-10 RX ADMIN — Medication 1 PACKET: at 20:11

## 2021-12-10 RX ADMIN — CLONIDINE HYDROCHLORIDE 0.1 MG: 0.1 TABLET ORAL at 06:10

## 2021-12-10 RX ADMIN — ENOXAPARIN SODIUM 40 MG: 40 INJECTION SUBCUTANEOUS at 09:42

## 2021-12-10 RX ADMIN — METHYLPREDNISOLONE SODIUM SUCCINATE 30 MG: 40 INJECTION, POWDER, FOR SOLUTION INTRAMUSCULAR; INTRAVENOUS at 09:42

## 2021-12-10 RX ADMIN — MULTIVIT AND MINERALS-FERROUS GLUCONATE 9 MG IRON/15 ML ORAL LIQUID 15 ML: at 09:43

## 2021-12-10 RX ADMIN — ENOXAPARIN SODIUM 40 MG: 40 INJECTION SUBCUTANEOUS at 21:54

## 2021-12-10 RX ADMIN — METOPROLOL TARTRATE 25 MG: 25 TABLET, FILM COATED ORAL at 21:55

## 2021-12-10 RX ADMIN — QUETIAPINE FUMARATE 25 MG: 25 TABLET ORAL at 18:01

## 2021-12-10 ASSESSMENT — ACTIVITIES OF DAILY LIVING (ADL)
ADLS_ACUITY_SCORE: 10
ADLS_ACUITY_SCORE: 10
ADLS_ACUITY_SCORE: 8
ADLS_ACUITY_SCORE: 11
ADLS_ACUITY_SCORE: 11
ADLS_ACUITY_SCORE: 10
ADLS_ACUITY_SCORE: 8
ADLS_ACUITY_SCORE: 8
ADLS_ACUITY_SCORE: 10
ADLS_ACUITY_SCORE: 11
ADLS_ACUITY_SCORE: 11
ADLS_ACUITY_SCORE: 10
ADLS_ACUITY_SCORE: 11
ADLS_ACUITY_SCORE: 8
ADLS_ACUITY_SCORE: 11
ADLS_ACUITY_SCORE: 8
ADLS_ACUITY_SCORE: 10
ADLS_ACUITY_SCORE: 10
ADLS_ACUITY_SCORE: 11
ADLS_ACUITY_SCORE: 10
ADLS_ACUITY_SCORE: 11
ADLS_ACUITY_SCORE: 10

## 2021-12-10 NOTE — PROGRESS NOTES
[facility]    On December 7, 2021 received referral from Cathy, Care Manager.     I reviewed the chart of Brandon for potential admission to [facility] pending clinical readiness and bed availability.      Patient does not have insurance but financial counselor is working on MA.  Patient could not admit until MA is active.  Patient may lose medical management criteria before this is in place.     Will continue to follow for appropriateness and bed availability.    Elijah Pendleton RN, BSN, HNB-BC  Utilization , RN -- LTACH

## 2021-12-10 NOTE — PLAN OF CARE
Summary: Recovered COVID (PNA/ARDS)   DATE & TIME: 12/09/21 2633-1280  Cognitive Concerns/ Orientation : A&O x4, forgetful, flat affect and is anxious at times.  BEHAVIOR & AGGRESSION TOOL COLOR: Green   ABNL VS/O2: VSS except tachy at times mostly with activity(low 100's). Weaned off of high flow, currently on Oximizer canula at 6 LPM. Bipap at night but has refused prior  MOBILITY: Assist x1 w/ GB/walker- up to BSC and to chair w/meals Some BUE tremors.  PAIN MANAGMENT: Denies   DIET: Minced and moist- thin liquids, on calorie counts; fair appetite. TF now only 8pm-8am. Free water flushes manually q4hrs. Takes pills whole by mouth one at a time  BOWEL/BLADDER: Continent, patient using urinal and bedside commode. BM this shift. Hx of retention   ABNL LAB/BG: ,120  DRAIN/DEVICES: PIV SL.  NG tube   TELEMETRY RHYTHM: ST/NSR  SKIN: Pale/ toby. Scattered bruising. +1 BLE edema. Blanchable redness to coccyx. Dry/tough skin on hands and heels. Lips dry/cracked- oral cares.   TESTS/PROCEDURES: NA  D/C DAY/GOALS/PLACE: Pending improvement on strength and oxygen requirements. PT recommending TCU, will need placement    OTHER IMPORTANT INFO: Covid recovered. CONDE, no SOB reported, infrequent nonproductive cough, LS diminished. Continue IV solumedrol taper. No need for PRN seroquel this shift.

## 2021-12-10 NOTE — PLAN OF CARE
Summary: Recovered COVID (PNA/ARDS)   DATE & TIME: 12/140/21 AM shift  Cognitive Concerns/ Orientation : A&O x4, forgetful, flat affect BEHAVIOR & AGGRESSION TOOL COLOR: Green   ABNL VS/O2: VSS on 4L oxymizer cannula, satting 93%-96%. Denies SOB. CONDE noted. LS diminished. Encouraged to use IS  MOBILITY: Assist x1 GB and walker to BSC and chair-Some BUE tremors.  PAIN MANAGMENT: Denies   DIET: Diet advanced to Level 7; TF overnight. Tolerating. Denies nausea. Takes pills ok with water.   BOWEL/BLADDER: Continent, patient using urinal and bedside commode. No BM this shift  ABNL LAB/BG: /140  DRAIN/DEVICES: PIV SL.  NG tube   TELEMETRY RHYTHM: NSR  SKIN: Pale/ toby. Scattered bruising. +1 BLE edema. Blanchable redness to coccyx. Dry/tough skin on hands and heels. Lips dry/cracked- oral cares.   TESTS/PROCEDURES: NA  D/C DAY/GOALS/PLACE: Pending improvement on strength and oxygen requirements. TCU or ARF, CC is following.   OTHER IMPORTANT INFO: Continue IV solumedrol taper.

## 2021-12-10 NOTE — PROGRESS NOTES
CLINICAL NUTRITION SERVICES - REASSESSMENT NOTE    RECOMMENDATIONS FOR MD/PROVIDER TO ORDER:     - Will stop Calorie Count. If pt consistently consumes 75% of meals would support removal of TF in the next couple days.   - TF is currently running to meet 58% energy needs and 68% protein needs.      Recommendations Ordered by Registered Dietitian (RD):     - Pt did not meet an average of >60-75% est needs to discontinue TF. He did get close yesterday.   - For now, not enough evidence to support stopping TF altogether. Continue nighttime cycle.     Malnutrition:   (12/7)   % Weight Loss:  Unable to accurately assess   % Intake:  Decreased intake does not meet criteria for malnutrition   11/30 Subcutaneous Fat Loss: None observed  11/30 Muscle Loss: Upper arm (bicep, tricep):  Mild and Posterior calf:  Mild - difficult to fully assess with body habitus  Fluid Retention:  None noted      Malnutrition Diagnosis: Unable to determine due to inability to decipher weight trending at this time.      EVALUATION OF PROGRESS TOWARD GOALS   Diet: Level 5 Minced and Moist, Level 0 Thin Liquids     Nutrition Support: TF is cycled overnight to provide:   Type of Feeding Tube: NGT  Enteral Frequency:  Continuous  Enteral Regimen: TwoCal HN @ 40 ml/hr x 12 hours   Total Enteral Provisions: 960 kcal, 40 g protein, 105 g CHO, 2.5 g fiber, 455 mL free water  Free Water Flush: 60 mL q 4 hours   Add ProSource BID for additional 80 kcal and 22 g protein   Total (TF + ProSource) = 1040 kcal (58% energy needs), 66 g protein (68% protein needs)     Intake/Tolerance:   - Calorie Count Summary: 12/7-12/9 12/7 - Neglibible   12/8 - 482 kcal, 20 g protein (<30% est needs)   12/9 - 1170 kcal, 55 g protein  (66% energy needs, 56% protein needs)     - Stooling: BM x1-2 daily since admission to [facility].     - Weight: 141.5 kg  Wt Readings from Last 10 Encounters:   12/10/21 141.5 kg (312 lb)   12/05/21 117 kg (257 lb 15 oz)   11/02/21 (!) 161 kg (355 lb)    03/10/15 113.4 kg (250 lb)       ASSESSED NUTRITION NEEDS:  Dosing Weight: 81 kg IBW  Estimated Energy Needs: 0318-3977 kcals/day (22-25 kcal/kg IBW)  Justification: Obese   Estimated Protein Needs:  grams protein/day (1.2-2 grams of pro/kg IBW)  Justification: Obesity guidelines  Estimated Fluid Needs: 1 mL/kcal      NEW FINDINGS:   - Pt coping w/ anxiety.   - weaned off high flow, getting oximizer    Previous Goals:   PO to meet at least 60-75% est needs prior to stopping TF.   Evaluation: Not met    Previous Nutrition Diagnosis:   Inadequate enteral nutrition infusion related to transferred facilities yesterday as evidenced by TF currently on hold on a FLD  Evaluation: Completed     CURRENT NUTRITION DIAGNOSIS  Inadequate oral intake related to prolonged intubatoin as evidenced by pt tolerating <60% est needs assessed by calorie count x3 days.     INTERVENTIONS  Recommendations / Nutrition Prescription  - Pt did not meet an average of >60-75% est needs to discontinue TF. He did get close yesterday.   - For now, not enough evidence to support stopping TF altogether. Continue nighttime cycle.    Implementation  None new    Goals  Intake of 75% meals or better consistently to discontinue TF .      MONITORING AND EVALUATION:  Progress towards goals will be monitored and evaluated per protocol and Practice Guidelines    Irma Ang RD, LD  [facility], [facility], Ortho, [facility]  Pager: [phone]  Weekend Pager: [phone]

## 2021-12-10 NOTE — PROGRESS NOTES
"SPIRITUAL HEALTH SERVICES  SPIRITUAL ASSESSMENT Progress Note  [facility] UN[facility]    REFERRAL SOURCE: Follow up; patient request    Reintroduced myself to Brandon this morning. He was tearful throughout our visit, and shared that he is especially missing his four children, and repeatedly said \"I want to go home.\" Brandon shared some Bible verses and stories that are a source of comfort for him (2 Corinthians 12:9 \" My liv is sufficient for you, for power is made perfect in weakness\"; Job).     Brandon especially resonates with Job, a figure who has had all that is dear to him taken away but still trusts in God, as well as the book of Job's exploration of God's power and plans not necessarily being understood by humans. I asked Brandon if he had any sense of what God's might be doing through him during this time; he named \"keeping my family together.\"    I offered to pray with Brandon, which he welcomed. I also provided bereavement support, exploration of benjamín as a tool of coping and strength, and reassurance through prayer and validation of emotions.    PLAN: I will continue to follow. Spiritual Health Services remains available for patient, family, and staff support.     Please page the on call  by placing a STAT or [facility] referral for Spiritual Health (which will roll to the on call pager).        SARA Sahu.   Associate   Pager [phone]      "

## 2021-12-10 NOTE — PROGRESS NOTES
[facility]    Hospitalist Progress Note    Assessment & Plan   Brandon Schafer is a 41 year old male with PMHx significant for substance abuse who is a [facility] for ongoing cares (med/surg bed availability) followed prolonged hospitalization for respiratory failure in the setting of COVID 19 pneumonia/ARDS.      Acute hypoxic respiratory failure 2/2 COVID pneumonia; improving  ARDS 2/2 COVID pneumonia  Intubated 11/2 after presenting with stats in 50s.  CT on 11/25 with mild fibrotic changes, dense consolidations consistent with ARDS. Possible organizing pneumonia, started on methylprednisolone 60 mg x3 days. CT Chest 12/1/21 - no significant change.   Extubated 12/2/21. Currently on HFNC  Weaned to 35L Fi02 and BiPAP while asleep and prn.   - Started on Methylprednisolone 40 mg IV every day               Plan to drop by 10 mg each week (first taper on 12/7) until 10 mg total reached, then will remain on 10 mg daily until seen by outpatient pulmonology, will need pulmonology outpatient visit on discharge within 2-3 months with PFTs and CT chest.  Can transition to prednisone, 40 mg of methylprednisolone equates to 50 mg of prednisone.  (Not yet done)  - HFNC wean as able, Bipap while asleep and PRN   --RCAT, continue albuterol inhalers, wean oxygen down as possible.  -Continue progress O2 wean currently on 4 L per Oxymizer; increased activity tolerance, ambulates with wheeled walker with assistance.       Acute Stress Reaction related to prolonged ICU stay  Delirium in the setting of prolonged critical illness   H/o methampetamine use  Patient reports history of methamphetamine use and reports using 1-2 times per week up until time of admission.  Current anxiety level making him want to use again. Reports significant anxiety and appears very anxious on admission.   Weaned off precedex 12/5  - Addiction medicine consulted at [facility] but it does not appear this was  completed prior to transfer. Pt/family had been receiving some support from palliative team through hospital stay.   -Psychiatry consult  -- sertraline 25mg daily started 12/5, increased to 50 mg 12/8/2021  - Seroquel 25 mg qam, 50 mg q HS scheduled, increased to 25/75 mg 12/8/2021  -Avoid hydroxyzine/Ativan.  - Melatonin scheduled  - Delirium precautions   -Nursing notes no behavioral issues, affect brighter, motivated.     Non-oliguric STEFAN, resolved  Hypokalemia, in setting of ongoing diuresis; resolved  Urinary retention; improving  Unclear baseline, has not seen doctor in 20 years. Cr 2.5 on admission, now normalized.   12/06: stevenson catheter removed,  Urinating  -Continue doxazosin started at [facility] this admission.  -Update BMP/renal function in a.m. renal function stable.     Hypertension  Not on anti-HTN meds PTA   - Labetolol, hydralazine prn for SBP >160  - Amlodipine 5 mg daily and clonidine 0.1mg TID started at [facility] this admission, blood pressure WNL to episodic soft on current regimen, close monitor, adjust antihypertensives accordingly.     Stress and steroid induced hyperglycemia  BG controlled on current regimen.   - Medium resistance sliding scale insulin   - 10 U Glargine, minimal requirements per SSI, 1-2/24 hours  -A1c 6.7.  -Glucoses -140.  TF and p.o. calories.     At risk for malnutrition  - Continue TF  + Full Liquid Diet, TF @ 30 today and increased to 40 prior to discharge. Will continue current rate and await nutrition consultation tomorrow. Unclear whether we carry formula previously used- RN paging on call nutrition   - Calorie counts and nutrition is following; recommends keeping feeding tube in until 75% PO intake of total calories  - SLP following      Septum wound, tongue wound 2/2 Proning   weakness/deconditioning  - WOC consult  - OT/PT consults      CAUTI   VAP  Started on broad-spectrum antibiotics on 11/11 after developing worsening oxygen saturations, fevers, and persistent  leukocytosis. Urine culture 11/11 grew >100k pan-sensitive E coli. RVP and MRSA nares negative. Sputum grew E coli, S aureus, and GBS. Sputum culture 11/17 with 1+ E. coli and MSSA, blood cultures negative.   Off antibiotics since 11/25  Abx:  - Ceftriaxone 11/14-11/17; 11/19 - 11/25 (completed)  - Zosyn 11/11-11/14; 11/17 - 11/18  - Vanc 11/11-11/12      Reducible Umbilical Hernia  Not strangulated  - Monitor for changes  DVT Prophylaxis: Lovenox subcu  Code Status: Full Code     Disposition: TBD pending Covid course/hypoxia, Psychiatry plan established.  Family would like TCU/ARF closer to home.    I spent 25 minutes on the unit/floor in management of care today reviewing labs, medications, interdisciplinary notes; and completing documentation of encounter and orders with over 50% of my time was spent Counseling the Patient regarding Covid, decreasing oxygen requirements, behavior/stress reaction, current functional ability.  And Coordinating Care and plan with Nursing and Specialists, Psychiatry regarding stress reaction management and surveillance      Uri Khan MD  Text Page   (7am to 6pm)    Interval History   Alert today, decrease in O2 requirements.  Denies cough, sputum, ambulatory with assistance and walker without desat.  Tries to eat.  Motivated.    -Data reviewed today: I reviewed all new labs and imaging results over the last 24 hours.  Physical Exam   Temp: 98.3  F (36.8  C) Temp src: Oral BP: 97/60 Pulse: 90   Resp: 22 SpO2: 99 % O2 Device: Oxymizer cannula Oxygen Delivery: 4 LPM  Vitals:    12/08/21 2005 12/09/21 0611 12/10/21 0605   Weight: 143.8 kg (317 lb) 141.5 kg (312 lb) 141.5 kg (312 lb)     Vital Signs with Ranges  Temp:  [97.4  F (36.3  C)-98.3  F (36.8  C)] 98.3  F (36.8  C)  Pulse:  [] 90  Resp:  [22-24] 22  BP: ()/(60-96) 97/60  SpO2:  [95 %-99 %] 99 %  I/O last 3 completed shifts:  In: 1588 [P.O.:600; NG/GT:988]  Out: 1850 [Urine:1850]    Constitutional:   NAD,  alert, calm, cooperative      Respiratory: Bilateral basilar crackles noted; O2 now per oxymizer NC  Cardiovascular: Regular rate and rhythm,  no murmur noted  GI:  soft, non-distended, non-tender  Skin/Integumen: No rashes, no cyanosis, 1+ edema noted bilateral lower extremities  Neuro : moving all 4 extremities, up and standing with Therapies.  Psych:  Affect:  Calm     Medications     dextrose       - MEDICATION INSTRUCTIONS -       - MEDICATION INSTRUCTIONS -       - MEDICATION INSTRUCTIONS -         cloNIDine  0.1 mg Oral or Feeding Tube Q8H     doxazosin  4 mg Oral or Feeding Tube Daily     enoxaparin ANTICOAGULANT  40 mg Subcutaneous BID     insulin aspart  1-7 Units Subcutaneous TID AC     insulin aspart  1-5 Units Subcutaneous At Bedtime     insulin glargine  10 Units Subcutaneous At Bedtime     methylPREDNISolone  30 mg Intravenous Daily    Followed by     [START ON 12/14/2021] methylPREDNISolone  20 mg Intravenous Daily    Followed by     [START ON 12/21/2021] methylPREDNISolone  10 mg Intravenous Daily     metoprolol tartrate  25 mg Oral or Feeding Tube BID     multivitamins w/minerals  15 mL Per Feeding Tube Daily     pantoprazole  40 mg Per Feeding Tube QAM AC     protein modular  1 packet Per Feeding Tube BID     QUEtiapine  25 mg Oral or Feeding Tube BID w/meals     QUEtiapine  75 mg Oral or Feeding Tube At Bedtime     sertraline  50 mg Oral or Feeding Tube Daily     sodium chloride (PF)  3 mL Intracatheter Q8H       Data   Recent Labs   Lab 12/10/21  1303 12/10/21  0924 12/10/21  0901 12/08/21  0854 12/08/21  0852 12/07/21  1202 12/07/21  1059 12/06/21  0422 12/06/21  0411   WBC  --  10.2  --   --   --   --  11.3*  --  13.1*   HGB  --  11.8*  --   --   --   --  11.6*  --  12.1*   MCV  --  87  --   --   --   --  87  --  88   PLT  --  230  --   --  230  --  249  --  231   NA  --  139  --   --   --   --  137  --  137   POTASSIUM  --  3.7  --   --   --   --  3.6  --  3.7   CHLORIDE  --  105  --   --    --   --  104  --  104   CO2  --  26  --   --   --   --  28  --  24   BUN  --  18  --   --   --   --  22  --  27   CR  --  0.73  --   --  0.86  --  0.82  --  0.86   ANIONGAP  --  8  --   --   --   --  5  --  9   VIPUL  --  9.2  --   --   --   --  9.5  --  9.3   * 123* 119*   < >  --    < > 152*   < > 133*    < > = values in this interval not displayed.     Recent Labs   Lab 12/10/21  1303 12/10/21  0924 12/10/21  0901 12/10/21  0205 12/09/21  2154   * 123* 119* 120* 109*       Imaging:

## 2021-12-11 ENCOUNTER — APPOINTMENT (OUTPATIENT)
Dept: OCCUPATIONAL THERAPY | Facility: CLINIC | Age: 41
End: 2021-12-11
Attending: INTERNAL MEDICINE
Payer: MEDICAID

## 2021-12-11 ENCOUNTER — APPOINTMENT (OUTPATIENT)
Dept: SPEECH THERAPY | Facility: CLINIC | Age: 41
End: 2021-12-11
Attending: INTERNAL MEDICINE
Payer: MEDICAID

## 2021-12-11 ENCOUNTER — APPOINTMENT (OUTPATIENT)
Dept: PHYSICAL THERAPY | Facility: CLINIC | Age: 41
End: 2021-12-11
Attending: INTERNAL MEDICINE
Payer: MEDICAID

## 2021-12-11 LAB
CREAT SERPL-MCNC: 0.73 MG/DL (ref 0.66–1.25)
GFR SERPL CREATININE-BSD FRML MDRD: >90 ML/MIN/1.73M2
GLUCOSE BLDC GLUCOMTR-MCNC: 123 MG/DL (ref 70–99)
GLUCOSE BLDC GLUCOMTR-MCNC: 126 MG/DL (ref 70–99)
GLUCOSE BLDC GLUCOMTR-MCNC: 128 MG/DL (ref 70–99)
GLUCOSE BLDC GLUCOMTR-MCNC: 136 MG/DL (ref 70–99)
GLUCOSE BLDC GLUCOMTR-MCNC: 96 MG/DL (ref 70–99)
PLATELET # BLD AUTO: 236 10E3/UL (ref 150–450)

## 2021-12-11 PROCEDURE — 120N000001 HC R&B MED SURG/OB

## 2021-12-11 PROCEDURE — 99231 SBSQ HOSP IP/OBS SF/LOW 25: CPT | Performed by: HOSPITALIST

## 2021-12-11 PROCEDURE — 97110 THERAPEUTIC EXERCISES: CPT | Mod: GP

## 2021-12-11 PROCEDURE — 97116 GAIT TRAINING THERAPY: CPT | Mod: GP

## 2021-12-11 PROCEDURE — 36415 COLL VENOUS BLD VENIPUNCTURE: CPT | Performed by: INTERNAL MEDICINE

## 2021-12-11 PROCEDURE — 250N000013 HC RX MED GY IP 250 OP 250 PS 637: Performed by: PHYSICIAN ASSISTANT

## 2021-12-11 PROCEDURE — 250N000011 HC RX IP 250 OP 636: Performed by: INTERNAL MEDICINE

## 2021-12-11 PROCEDURE — 250N000011 HC RX IP 250 OP 636: Performed by: PHYSICIAN ASSISTANT

## 2021-12-11 PROCEDURE — 82565 ASSAY OF CREATININE: CPT | Performed by: INTERNAL MEDICINE

## 2021-12-11 PROCEDURE — 250N000013 HC RX MED GY IP 250 OP 250 PS 637: Performed by: PSYCHIATRY & NEUROLOGY

## 2021-12-11 PROCEDURE — 92526 ORAL FUNCTION THERAPY: CPT | Mod: GN

## 2021-12-11 PROCEDURE — 97535 SELF CARE MNGMENT TRAINING: CPT | Mod: GO | Performed by: OCCUPATIONAL THERAPIST

## 2021-12-11 PROCEDURE — 97530 THERAPEUTIC ACTIVITIES: CPT | Mod: GP

## 2021-12-11 PROCEDURE — 97530 THERAPEUTIC ACTIVITIES: CPT | Mod: GO | Performed by: OCCUPATIONAL THERAPIST

## 2021-12-11 PROCEDURE — 250N000013 HC RX MED GY IP 250 OP 250 PS 637: Performed by: INTERNAL MEDICINE

## 2021-12-11 PROCEDURE — 85049 AUTOMATED PLATELET COUNT: CPT | Performed by: INTERNAL MEDICINE

## 2021-12-11 RX ADMIN — CLONIDINE HYDROCHLORIDE 0.1 MG: 0.1 TABLET ORAL at 20:38

## 2021-12-11 RX ADMIN — METOPROLOL TARTRATE 25 MG: 25 TABLET, FILM COATED ORAL at 10:01

## 2021-12-11 RX ADMIN — ENOXAPARIN SODIUM 40 MG: 40 INJECTION SUBCUTANEOUS at 10:04

## 2021-12-11 RX ADMIN — Medication 1 PACKET: at 10:07

## 2021-12-11 RX ADMIN — CLONIDINE HYDROCHLORIDE 0.1 MG: 0.1 TABLET ORAL at 13:53

## 2021-12-11 RX ADMIN — DOXAZOSIN 4 MG: 4 TABLET ORAL at 10:01

## 2021-12-11 RX ADMIN — INSULIN GLARGINE 10 UNITS: 100 INJECTION, SOLUTION SUBCUTANEOUS at 21:53

## 2021-12-11 RX ADMIN — MULTIVIT AND MINERALS-FERROUS GLUCONATE 9 MG IRON/15 ML ORAL LIQUID 15 ML: at 10:08

## 2021-12-11 RX ADMIN — SERTRALINE HYDROCHLORIDE 50 MG: 50 TABLET ORAL at 10:01

## 2021-12-11 RX ADMIN — CLONIDINE HYDROCHLORIDE 0.1 MG: 0.1 TABLET ORAL at 06:34

## 2021-12-11 RX ADMIN — Medication 40 MG: at 06:36

## 2021-12-11 RX ADMIN — Medication 1 PACKET: at 20:41

## 2021-12-11 RX ADMIN — INSULIN GLARGINE 10 UNITS: 100 INJECTION, SOLUTION SUBCUTANEOUS at 00:05

## 2021-12-11 RX ADMIN — QUETIAPINE FUMARATE 75 MG: 50 TABLET ORAL at 20:38

## 2021-12-11 RX ADMIN — METHYLPREDNISOLONE SODIUM SUCCINATE 30 MG: 40 INJECTION, POWDER, FOR SOLUTION INTRAMUSCULAR; INTRAVENOUS at 09:57

## 2021-12-11 RX ADMIN — ENOXAPARIN SODIUM 40 MG: 40 INJECTION SUBCUTANEOUS at 20:39

## 2021-12-11 RX ADMIN — QUETIAPINE FUMARATE 25 MG: 25 TABLET ORAL at 17:47

## 2021-12-11 RX ADMIN — QUETIAPINE FUMARATE 25 MG: 25 TABLET ORAL at 20:37

## 2021-12-11 RX ADMIN — METOPROLOL TARTRATE 25 MG: 25 TABLET, FILM COATED ORAL at 20:38

## 2021-12-11 RX ADMIN — QUETIAPINE FUMARATE 25 MG: 25 TABLET ORAL at 10:01

## 2021-12-11 ASSESSMENT — ACTIVITIES OF DAILY LIVING (ADL)
ADLS_ACUITY_SCORE: 8

## 2021-12-11 NOTE — PROGRESS NOTES
[facility]    Hospitalist Progress Note    Assessment & Plan   Brandon Schafer is a 41 year old male with PMHx significant for substance abuse who is a direct admission to [facility] from [facility] for ongoing cares (med/surg bed availability) followed prolonged hospitalization for respiratory failure in the setting of COVID 19 pneumonia/ARDS.      Acute hypoxic respiratory failure 2/2 COVID pneumonia; improving  ARDS 2/2 COVID pneumonia  Intubated 11/2 after presenting with stats in 50s.  CT on 11/25 with mild fibrotic changes, dense consolidations consistent with ARDS. Possible organizing pneumonia, started on methylprednisolone 60 mg x3 days. CT Chest 12/1/21 - no significant change.   Extubated 12/2/21. Currently on HFNC  Weaned to 35L Fi02 and BiPAP while asleep and prn.   - Started on Methylprednisolone 40 mg IV every day               Plan to drop by 10 mg each week (first taper on 12/7) until 10 mg total reached, then will remain on 10 mg daily until seen by outpatient pulmonology, will need pulmonology outpatient visit on discharge within 2-3 months with PFTs and CT chest.  Can transition to prednisone, 40 mg of methylprednisolone equates to 50 mg of prednisone.  (Not yet done)  - HFNC wean as able, Bipap while asleep and PRN   --RCAT, continue albuterol inhalers, wean oxygen down as possible.  -Continue to progress with O2 wean currently on 2 L per Oxymizer; increased activity tolerance, ambulates with wheeled walker with assistance.       Acute Stress Reaction related to prolonged ICU stay  Delirium in the setting of prolonged critical illness   H/o methampetamine use  Patient reports history of methamphetamine use and reports using 1-2 times per week up until time of admission.  Current anxiety level making him want to use again. Reports significant anxiety and appears very anxious on admission.   Weaned off precedex 12/5  - Addiction medicine consulted at [facility] but it does not appear  this was completed prior to transfer. Pt/family had been receiving some support from palliative team through hospital stay.   -Psychiatry consult  -- sertraline 25mg daily started 12/5, increased to 50 mg 12/8/2021  - Seroquel 25 mg qam, 50 mg q HS scheduled, increased to 25/75 mg 12/8/2021  -Avoid hydroxyzine/Ativan.  - Melatonin scheduled  - Delirium precautions   -Nursing notes no acute behavioral issues, observations confirm, affect brighter, motivated.      Non-oliguric STEFAN, resolved  Hypokalemia, in setting of ongoing diuresis; resolved  Urinary retention; improving  Unclear baseline, has not seen doctor in 20 years. Cr 2.5 on admission, now normalized.   12/06: stevenson catheter removed,  Urinating  -Continue doxazosin started at [facility] this admission.  -Taking in fluids/p.o.'s, renal function stable.     Hypertension  Not on anti-HTN meds PTA   - Labetolol, hydralazine prn for SBP >160  - Amlodipine 5 mg daily and clonidine 0.1mg TID started at [facility] this admission, blood pressure WNL to episodic soft on current regimen, close monitor, adjust antihypertensives accordingly.     Stress and steroid induced hyperglycemia  BG controlled on current regimen.   - Medium resistance sliding scale insulin   - 10 U Glargine, minimal requirements per SSI, 1-2/24 hours  -A1c 6.7.  -Glucoses WNL glucoses WNL 120s.  TF and p.o. calories.     At risk for malnutrition  - Continue TF  + Full Liquid Diet, TF @ 30 today and increased to 40 prior to discharge. Will continue current rate and await nutrition consultation tomorrow. Unclear whether we carry formula previously used- RN paging on call nutrition   - Calorie counts and nutrition is following; recommends keeping feeding tube in until 75% PO intake of total calories  - SLP following      Septum wound, tongue wound 2/2 Proning   weakness/deconditioning  - WOC consult  - OT/PT consults      CAUTI   VAP  Started on broad-spectrum antibiotics on 11/11 after developing worsening  oxygen saturations, fevers, and persistent leukocytosis. Urine culture 11/11 grew >100k pan-sensitive E coli. RVP and MRSA nares negative. Sputum grew E coli, S aureus, and GBS. Sputum culture 11/17 with 1+ E. coli and MSSA, blood cultures negative.   Off antibiotics since 11/25  Abx:  - Ceftriaxone 11/14-11/17; 11/19 - 11/25 (completed)  - Zosyn 11/11-11/14; 11/17 - 11/18  - Vanc 11/11-11/12      Reducible Umbilical Hernia  Not strangulated  - Monitor for changes  DVT Prophylaxis: Lovenox subcu  Code Status: Full Code     Disposition: TBD pending Covid course/hypoxia, patient and family would like TCU/ARF closer to home in [facility].  SW assisting.      Uri Khan MD  Text Page   (7am to 6pm)    Interval History   Decreased O2 requirements.  Up and ambulatory with assistance, with a walker without desaturation.  Participates and motivated with Therapies.  No acute behavioral issues, no anxiety/depression.     -Data reviewed today: I reviewed all new labs and imaging results over the last 24 hours.  Physical Exam   Temp: 97.9  F (36.6  C) Temp src: Oral BP: 135/81 Pulse: 88   Resp: 18 SpO2: 92 % O2 Device: Nasal cannula Oxygen Delivery: 2 LPM  Vitals:    12/08/21 2005 12/09/21 0611 12/10/21 0605   Weight: 143.8 kg (317 lb) 141.5 kg (312 lb) 141.5 kg (312 lb)     Vital Signs with Ranges  Temp:  [97.9  F (36.6  C)-98.3  F (36.8  C)] 97.9  F (36.6  C)  Pulse:  [] 88  Resp:  [18-22] 18  BP: ()/(60-93) 135/81  SpO2:  [92 %-99 %] 92 %  I/O last 3 completed shifts:  In: 170 [NG/GT:170]  Out: -     Constitutional:    NAD, more alert, calm, cooperative     Respiratory: Bilateral basilar crackles noted; O2 now per oxymizer NC  Cardiovascular: Regular rate and rhythm,  no murmur noted  GI:  soft, non-distended, non-tender  Skin/Integumen: No rashes, no cyanosis, 1+ edema noted bilateral lower extremities  Neuro : moving all 4 extremities, up and ambulatory with Therapies with wheeled  walker.  Psych: Oriented x3.  Affect:  Calm     Medications     dextrose       - MEDICATION INSTRUCTIONS -       - MEDICATION INSTRUCTIONS -       - MEDICATION INSTRUCTIONS -         cloNIDine  0.1 mg Oral or Feeding Tube Q8H     doxazosin  4 mg Oral or Feeding Tube Daily     enoxaparin ANTICOAGULANT  40 mg Subcutaneous BID     insulin aspart  1-7 Units Subcutaneous TID AC     insulin aspart  1-5 Units Subcutaneous At Bedtime     insulin glargine  10 Units Subcutaneous At Bedtime     methylPREDNISolone  30 mg Intravenous Daily    Followed by     [START ON 12/14/2021] methylPREDNISolone  20 mg Intravenous Daily    Followed by     [START ON 12/21/2021] methylPREDNISolone  10 mg Intravenous Daily     metoprolol tartrate  25 mg Oral or Feeding Tube BID     multivitamins w/minerals  15 mL Per Feeding Tube Daily     pantoprazole  40 mg Per Feeding Tube QAM AC     protein modular  1 packet Per Feeding Tube BID     QUEtiapine  25 mg Oral or Feeding Tube BID w/meals     QUEtiapine  75 mg Oral or Feeding Tube At Bedtime     sertraline  50 mg Oral or Feeding Tube Daily     sodium chloride (PF)  3 mL Intracatheter Q8H       Data   Recent Labs   Lab 12/11/21  1110 12/11/21  1009 12/11/21  0253 12/10/21  2118 12/10/21  1303 12/10/21  0924 12/08/21  0854 12/08/21  0852 12/07/21  1202 12/07/21  1059 12/06/21  0422 12/06/21  0411   WBC  --   --   --   --   --  10.2  --   --   --  11.3*  --  13.1*   HGB  --   --   --   --   --  11.8*  --   --   --  11.6*  --  12.1*   MCV  --   --   --   --   --  87  --   --   --  87  --  88     --   --   --   --  230  --  230  --  249  --  231   NA  --   --   --   --   --  139  --   --   --  137  --  137   POTASSIUM  --   --   --   --   --  3.7  --   --   --  3.6  --  3.7   CHLORIDE  --   --   --   --   --  105  --   --   --  104  --  104   CO2  --   --   --   --   --  26  --   --   --  28  --  24   BUN  --   --   --   --   --  18  --   --   --  22  --  27   CR  --   --   --   --   --  0.73   --  0.86  --  0.82  --  0.86   ANIONGAP  --   --   --   --   --  8  --   --   --  5  --  9   VIPUL  --   --   --   --   --  9.2  --   --   --  9.5  --  9.3   GLC  --  126* 123* 129*   < > 123*   < >  --    < > 152*   < > 133*    < > = values in this interval not displayed.     Recent Labs   Lab 12/11/21  1009 12/11/21  0253 12/10/21  2118 12/10/21  1648 12/10/21  1303   * 123* 129* 126* 140*       Imaging:

## 2021-12-11 NOTE — PLAN OF CARE
Summary: Recovered COVID (PNA/ARDS)   DATE & TIME: 12/11/21 7799-4053  Cognitive Concerns/ Orientation : A&O x4,   BEHAVIOR & AGGRESSION TOOL COLOR: Green   ABNL VS/O2: VSS; weaned to 2 lpm nasal cannula, satting 92-95%. Denies SOB. CONDE noted. LS diminished. Using IS well  MOBILITY: Assist x1 GB and walker to bathroom and chair-Some BUE tremors; ambulated ruby with PT  PAIN MANAGMENT: Denies   DIET: Diet advanced to regular; TF overnight(8 pm to 8 am). Tolerating. Denies nausea. Takes pills ok with water.   BOWEL/BLADDER: Continent, BM x 1  ABNL LAB/BG: , 136  DRAIN/DEVICES: PIV SL.  NG tube in R nare, pressure injury to lower part of nare - taped TF tubing to forehead to relieve pressure  TELEMETRY RHYTHM: NSR - ST  SKIN: Pale/ toby. Scattered bruising. +1 BLE edema. Blanchable redness to coccyx. Dry/tough skin on hands and heels. Lips dry/cracked-  TESTS/PROCEDURES: NA  D/C DAY/GOALS/PLACE: Pending improvement on strength and oxygen requirements. TCU or ARF, CC is following.   OTHER IMPORTANT INFO: Continues IV solumedrol taper. Followed by PT/OT/SLP.  Grieving this morning as he was told his uncle passed away last night, spiritual health consult ordered.

## 2021-12-11 NOTE — PROGRESS NOTES
Care Management Follow Up    Length of Stay (days): 4    Expected Discharge Date: 12/13/2021     Concerns to be Addressed: discharge planning     Patient plan of care discussed at interdisciplinary rounds: Yes    Anticipated Discharge Disposition: Acute Rehab,Transitional Care     Anticipated Discharge Services: None  Anticipated Discharge DME: None    Patient/family educated on Medicare website which has current facility and service quality ratings:    Education Provided on the Discharge Plan:    Patient/Family in Agreement with the Plan: yes    Referrals Placed by CM/SW: Post Acute Facilities  Private pay costs discussed:     Additional Information: Inquires have been made to two ARU'S at [facility] and [facility] in [place].  [facility] is also an option if ARU's in [place] are not available or decline due to patient's MA pending application.    Writer did call patient's brother this evening to update him regarding the recommendation of ARU rather than TCU.  Explained the ARU provides a more intensive therapy program and is recommended by hospital PT and OT staff. Did explain that a barrier to rehab placement in ARU or TCU will be patient's MA pending status.  In this case the [facility] program may be more likely to accept patient if patient meets criteria  [facility] in order to know if patient meets ARU criteria.   compa Simons, indicates patient does meet clinical ARU criteria  Brad understands the rationale for acute rehab and support this plan and he hopes his brother will agree.  Writer did not have an opportunity to discuss with patient today and will ask weekend SW to meet with patient.     On Monday both [facility] and [facility] ARU will complete their assessments.  Writer will be asking [facility] to expedite the processing of patient's MA application.       Dedra Ayala, [facility] Hospital[facility]

## 2021-12-11 NOTE — PROVIDER NOTIFICATION
Text page sent to on call: Pt's HR mostly in high 40s today. Currently 38-low 40s. Asymptomatic. States she has intermittent episodes in the 40s other admissions and she's had workups done and they haven't found anything.

## 2021-12-11 NOTE — PLAN OF CARE
Summary: Recovered COVID (PNA/ARDS)   DATE & TIME: 12/10/21 6323-9120  Cognitive Concerns/ Orientation : A&O x4, forgetful, flat affect BEHAVIOR & AGGRESSION TOOL COLOR: Green   ABNL VS/O2: VSS on 3L oxymizer cannula, satting 93%-96%. Denies SOB. CONDE noted. LS diminished. Encouraged to use IS  MOBILITY: Assist x1 GB and walker to BSC and chair-Some BUE tremors.  PAIN MANAGMENT: Denies   DIET: Diet advanced to Level 7; TF overnight(8 pm to 8 am). Tolerating. Denies nausea. Takes pills ok with water.   BOWEL/BLADDER: Continent, patient using urinal and bedside commode. No BM this shift  ABNL LAB/BG:   DRAIN/DEVICES: PIV SL.  NG tube   TELEMETRY RHYTHM: NSR  SKIN: Pale/ toby. Scattered bruising. +1 BLE edema. Blanchable redness to coccyx. Dry/tough skin on hands and heels. Lips dry/cracked- oral cares.   TESTS/PROCEDURES: NA  D/C DAY/GOALS/PLACE: Pending improvement on strength and oxygen requirements. TCU or ARF, CC is following.   OTHER IMPORTANT INFO: Continue IV solumedrol taper.

## 2021-12-11 NOTE — PROGRESS NOTES
"SPIRITUAL HEALTH SERVICES  [facility] UN[address] MEDICAL SPECIALTIES  ON-CALL VISIT    Referral Source: Pt; staff    Foci: Grief support; Prayer support; Emotional support, words of /guidance.     Pt identifies as non-Adventist. Pt names he has 6 children at home, ranging in age from 2 to 18, and that he and his girlfriend care for them - she is with them. He is concerned for their well-being during his absence. He reported that [facility] (his home) is [place] 60 [place] of [place].      reviewed chart; provided emotional support through listening, emotional validation, words of encouragement and assurance; provided spiritual care support through prayer, scripture reading with reflections related to pt, validating his feelings, while inviting ways frame his journey; provided Rastafari item - a [place] cross - for pt to keep as a source of spiritual comfort and a reminder of God's presence with him.     Pt highly receptive to  support, sharing extensively about his health challenges, while focusing on grief and loss related to losing his father, grandfather, uncle Clark, and then finding out today he lost his uncle Shade some weeks ago when he was yet intubated.   Pt expressed tears, evidenced anxiety, but also his hopes, with significant verbalizing. Pt receptive to scripture reading and prayer, joining in the prayer at times. Pt quotes scripture (the Bible) often, with his benjamín in God in [place] being his anchor. In particular, pt claimed Jameel 53:5 as helpful: \"By his stripes we are healed.\"     Plan: Unit  will continue to follow pt/loved ones for support.   On-call  support is available by placing a STAT or [facility]AP [facility] referral for Spiritual Health.                                                                                                                                         Markell Peoples  Staff   Pager [phone]   "

## 2021-12-11 NOTE — PLAN OF CARE
CONDE. 93% on 3 L oxymizer. SR to ST. Tube feed at goal rate. Good appetite. Up Ax1 to commode. Lungs dim. Plan for TCU when bed available.

## 2021-12-12 ENCOUNTER — APPOINTMENT (OUTPATIENT)
Dept: OCCUPATIONAL THERAPY | Facility: CLINIC | Age: 41
End: 2021-12-12
Attending: INTERNAL MEDICINE
Payer: MEDICAID

## 2021-12-12 ENCOUNTER — APPOINTMENT (OUTPATIENT)
Dept: PHYSICAL THERAPY | Facility: CLINIC | Age: 41
End: 2021-12-12
Attending: INTERNAL MEDICINE
Payer: MEDICAID

## 2021-12-12 ENCOUNTER — APPOINTMENT (OUTPATIENT)
Dept: SPEECH THERAPY | Facility: CLINIC | Age: 41
End: 2021-12-12
Attending: INTERNAL MEDICINE
Payer: MEDICAID

## 2021-12-12 LAB
GLUCOSE BLDC GLUCOMTR-MCNC: 116 MG/DL (ref 70–99)
GLUCOSE BLDC GLUCOMTR-MCNC: 119 MG/DL (ref 70–99)
GLUCOSE BLDC GLUCOMTR-MCNC: 124 MG/DL (ref 70–99)

## 2021-12-12 PROCEDURE — 250N000011 HC RX IP 250 OP 636: Performed by: INTERNAL MEDICINE

## 2021-12-12 PROCEDURE — 97530 THERAPEUTIC ACTIVITIES: CPT | Mod: GO | Performed by: OCCUPATIONAL THERAPIST

## 2021-12-12 PROCEDURE — 250N000013 HC RX MED GY IP 250 OP 250 PS 637: Performed by: INTERNAL MEDICINE

## 2021-12-12 PROCEDURE — 250N000011 HC RX IP 250 OP 636: Performed by: PHYSICIAN ASSISTANT

## 2021-12-12 PROCEDURE — 97110 THERAPEUTIC EXERCISES: CPT | Mod: GP

## 2021-12-12 PROCEDURE — 120N000001 HC R&B MED SURG/OB

## 2021-12-12 PROCEDURE — 92526 ORAL FUNCTION THERAPY: CPT | Mod: GN

## 2021-12-12 PROCEDURE — 99231 SBSQ HOSP IP/OBS SF/LOW 25: CPT | Performed by: HOSPITALIST

## 2021-12-12 PROCEDURE — 250N000013 HC RX MED GY IP 250 OP 250 PS 637: Performed by: PSYCHIATRY & NEUROLOGY

## 2021-12-12 PROCEDURE — 250N000013 HC RX MED GY IP 250 OP 250 PS 637: Performed by: PHYSICIAN ASSISTANT

## 2021-12-12 PROCEDURE — 97110 THERAPEUTIC EXERCISES: CPT | Mod: GO | Performed by: OCCUPATIONAL THERAPIST

## 2021-12-12 RX ADMIN — MULTIVIT AND MINERALS-FERROUS GLUCONATE 9 MG IRON/15 ML ORAL LIQUID 15 ML: at 09:18

## 2021-12-12 RX ADMIN — ENOXAPARIN SODIUM 40 MG: 40 INJECTION SUBCUTANEOUS at 09:16

## 2021-12-12 RX ADMIN — Medication 40 MG: at 06:49

## 2021-12-12 RX ADMIN — METHYLPREDNISOLONE SODIUM SUCCINATE 30 MG: 40 INJECTION, POWDER, FOR SOLUTION INTRAMUSCULAR; INTRAVENOUS at 09:24

## 2021-12-12 RX ADMIN — CLONIDINE HYDROCHLORIDE 0.1 MG: 0.1 TABLET ORAL at 06:47

## 2021-12-12 RX ADMIN — METOPROLOL TARTRATE 25 MG: 25 TABLET, FILM COATED ORAL at 09:14

## 2021-12-12 RX ADMIN — SERTRALINE HYDROCHLORIDE 50 MG: 50 TABLET ORAL at 09:14

## 2021-12-12 RX ADMIN — Medication 1 PACKET: at 20:09

## 2021-12-12 RX ADMIN — METOPROLOL TARTRATE 25 MG: 25 TABLET, FILM COATED ORAL at 21:59

## 2021-12-12 RX ADMIN — CLONIDINE HYDROCHLORIDE 0.1 MG: 0.1 TABLET ORAL at 21:59

## 2021-12-12 RX ADMIN — ENOXAPARIN SODIUM 40 MG: 40 INJECTION SUBCUTANEOUS at 21:59

## 2021-12-12 RX ADMIN — Medication 1 PACKET: at 09:18

## 2021-12-12 RX ADMIN — DOXAZOSIN 4 MG: 4 TABLET ORAL at 09:14

## 2021-12-12 RX ADMIN — INSULIN GLARGINE 10 UNITS: 100 INJECTION, SOLUTION SUBCUTANEOUS at 22:03

## 2021-12-12 RX ADMIN — CLONIDINE HYDROCHLORIDE 0.1 MG: 0.1 TABLET ORAL at 14:05

## 2021-12-12 ASSESSMENT — ACTIVITIES OF DAILY LIVING (ADL)
ADLS_ACUITY_SCORE: 8

## 2021-12-12 NOTE — PROGRESS NOTES
Care Management Follow Up    Length of Stay (days): 6    Expected Discharge Date: 12/13/2021     Concerns to be Addressed: discharge planning     Patient plan of care discussed at interdisciplinary rounds: Yes    Anticipated Discharge Disposition: Acute Rehab,Transitional Care     Anticipated Discharge Services: None  Anticipated Discharge DME: None    Patient/family educated on Medicare website which has current facility and service quality ratings:    Education Provided on the Discharge Plan:  yes  Patient/Family in Agreement with the Plan: yes    Referrals Placed by CM/SW: Post Acute Facilities  Private pay costs discussed: Not applicable    Additional Information:  Spoke with pt about discharge to ARU when ready. He is agreeable. He reports it is important to him to get stronger for his children. He understands his brother Brad is assisting with discharge and is agreeable to this. Explained referral process is still pending and we will know more on Monday.       DINH George, [facility]  [phone]  [facility]

## 2021-12-12 NOTE — PROGRESS NOTES
[facility]    Hospitalist Progress Note    Assessment & Plan   Brandon Schafer is a 41 year old male with PMHx significant for substance abuse who is a direct admission to [facility] from [facility] for ongoing cares followed prolonged hospitalization for respiratory failure in the setting of COVID 19 pneumonia/ARDS.      Acute hypoxic respiratory failure 2/2 COVID pneumonia; improving  ARDS 2/2 COVID pneumonia  Intubated 11/2 after presenting with stats in 50s.  CT on 11/25 with mild fibrotic changes, dense consolidations consistent with ARDS. Possible organizing pneumonia, started on methylprednisolone 60 mg x3 days. CT Chest 12/1/21 - no significant change.   Extubated 12/2/21.  Transition to BiPAP/HFNC, with progressive weaning now on O2 per NC.  Increased activity tolerance without desat.    - Started on Methylprednisolone 40 mg IV every day               Plan to drop by 10 mg each week (first taper on 12/7) until 10 mg total reached, then will remain on 10 mg daily until seen by outpatient pulmonology, will need pulmonology outpatient visit on discharge within 2-3 months with PFTs and CT chest.  Can transition to prednisone, 40 mg of methylprednisolone equates to 50 mg of prednisone.  (Not yet done)  --RCAT, continue albuterol inhalers,  -Continue to progress with O2 wean currently on 2 L per NC; increased activity tolerance, ambulates with wheeled walker with assistance.       Acute Stress Reaction related to prolonged ICU stay  Delirium in the setting of prolonged critical illness   H/o methampetamine use  Patient reports history of methamphetamine use and reports using 1-2 times per week up until time of admission.  Current anxiety level making him want to use again. Reports significant anxiety and appears very anxious on admission.   Weaned off precedex 12/5  - Addiction medicine consulted at [facility] but it does not appear this was completed prior to transfer. Pt/family had been receiving some  support from palliative team through hospital stay.   -Psychiatry consult  -- sertraline 25mg daily started 12/5, increased to 50 mg 12/8/2021  - Seroquel 25 mg qam, 50 mg q HS scheduled, increased to 25/75 mg 12/8/2021  -Avoid hydroxyzine/Ativan.  - Melatonin scheduled  - Delirium precautions   -Nursing notes no acute behavioral issues, observations confirm, affect brighter, motivated.      Non-oliguric STEFAN, resolved  Hypokalemia, in setting of ongoing diuresis; resolved  Urinary retention; improving  Unclear baseline, has not seen doctor in 20 years. Cr 2.5 on admission, now normalized.   12/06: stevenson catheter removed,  Urinating  -Continue doxazosin started at [facility] this admission.  -Taking in fluids/p.o.'s, renal function stable.     Hypertension  Not on anti-HTN meds PTA   - Labetolol, hydralazine prn for SBP >160  - Amlodipine 5 mg daily and clonidine 0.1mg TID started at [facility] this admission, blood pressure WNL to episodic soft on current regimen, close monitor, adjust antihypertensives accordingly.     Stress and steroid induced hyperglycemia  BG controlled on current regimen.   - Medium resistance sliding scale insulin   - 10 U Glargine, minimal requirements per SSI, 1-2/24 hours  -A1c 6.7.  -Glucoses WNL glucoses WNL   TF and p.o. calories.     At risk for malnutrition  - Continue TF  + Full Liquid Diet, TF now nocturnal with goal rate 40 cc   - Calorie counts and nutrition is following; recommends keeping feeding tube in until 75% PO intake of total calories  - SLP following      Septum wound, tongue wound 2/2 Proning   weakness/deconditioning  - WOC consult  - OT/PT consults      CAUTI   VAP  Started on broad-spectrum antibiotics on 11/11 after developing worsening oxygen saturations, fevers, and persistent leukocytosis. Urine culture 11/11 grew >100k pan-sensitive E coli. RVP and MRSA nares negative. Sputum grew E coli, S aureus, and GBS. Sputum culture 11/17 with 1+ E. coli and MSSA, blood cultures  negative.   Off antibiotics since 11/25  Abx:  - Ceftriaxone 11/14-11/17; 11/19 - 11/25 (completed)  - Zosyn 11/11-11/14; 11/17 - 11/18  - Vanc 11/11-11/12      Reducible Umbilical Hernia  Not strangulated  - Monitor for changes  DVT Prophylaxis: Lovenox subcu  Code Status: Full Code     Disposition: TBD pending Covid course/hypoxia, patient and family would like TCU/ARF closer to home in [facility].  SW assisting.    Uri Khan MD  Text Page   (7am to 6pm)    Interval History   Progressive decrease in O2 requirements currently at 2 L NC.  Up and ambulatory with wheeled walker and SBA.  Patient motivated, participates with Therapies.  No acute behavioral issues, no anxiety/depression.    -Data reviewed today: I reviewed all new labs and imaging results over the last 24 hours.  Physical Exam   Temp: 97.8  F (36.6  C) Temp src: Oral BP: (!) 125/92 Pulse: 110   Resp: 18 SpO2: 96 % O2 Device: None (Room air) Oxygen Delivery: 1 LPM  Vitals:    12/08/21 2005 12/09/21 0611 12/10/21 0605   Weight: 143.8 kg (317 lb) 141.5 kg (312 lb) 141.5 kg (312 lb)     Vital Signs with Ranges  Temp:  [97.8  F (36.6  C)-98.1  F (36.7  C)] 97.8  F (36.6  C)  Pulse:  [] 110  Resp:  [18] 18  BP: (102-145)/(64-92) 125/92  SpO2:  [96 %-99 %] 96 %  I/O last 3 completed shifts:  In: 1089 [P.O.:400; NG/GT:225]  Out: -     Constitutional:    NAD, alert, calm, cooperative    Respiratory: Bilateral basilar crackles noted; O2 now per NC  Cardiovascular: Regular rate and rhythm,  no murmur noted  GI:  soft, non-distended, non-tender  Skin/Integumen: No rashes, no cyanosis,   Neuro : moving all 4 extremities, up and ambulatory with Therapies with wheeled walker.  Psych: Oriented x3.  Affect:  Calm     Medications     dextrose       - MEDICATION INSTRUCTIONS -       - MEDICATION INSTRUCTIONS -       - MEDICATION INSTRUCTIONS -         cloNIDine  0.1 mg Oral or Feeding Tube Q8H     doxazosin  4 mg Oral or Feeding Tube Daily      enoxaparin ANTICOAGULANT  40 mg Subcutaneous BID     insulin aspart  1-7 Units Subcutaneous TID AC     insulin aspart  1-5 Units Subcutaneous At Bedtime     insulin glargine  10 Units Subcutaneous At Bedtime     methylPREDNISolone  30 mg Intravenous Daily    Followed by     [START ON 12/14/2021] methylPREDNISolone  20 mg Intravenous Daily    Followed by     [START ON 12/21/2021] methylPREDNISolone  10 mg Intravenous Daily     metoprolol tartrate  25 mg Oral or Feeding Tube BID     multivitamins w/minerals  15 mL Per Feeding Tube Daily     pantoprazole  40 mg Per Feeding Tube QAM AC     protein modular  1 packet Per Feeding Tube BID     QUEtiapine  25 mg Oral or Feeding Tube BID w/meals     QUEtiapine  75 mg Oral or Feeding Tube At Bedtime     sertraline  50 mg Oral or Feeding Tube Daily     sodium chloride (PF)  3 mL Intracatheter Q8H       Data   Recent Labs   Lab 12/12/21  1138 12/12/21  0803 12/12/21  0248 12/11/21  1231 12/11/21  1110 12/10/21  1303 12/10/21  0924 12/08/21  0854 12/08/21  0852 12/07/21  1202 12/07/21  1059 12/06/21  0422 12/06/21  0411   WBC  --   --   --   --   --   --  10.2  --   --   --  11.3*  --  13.1*   HGB  --   --   --   --   --   --  11.8*  --   --   --  11.6*  --  12.1*   MCV  --   --   --   --   --   --  87  --   --   --  87  --  88   PLT  --   --   --   --  236  --  230  --  230  --  249  --  231   NA  --   --   --   --   --   --  139  --   --   --  137  --  137   POTASSIUM  --   --   --   --   --   --  3.7  --   --   --  3.6  --  3.7   CHLORIDE  --   --   --   --   --   --  105  --   --   --  104  --  104   CO2  --   --   --   --   --   --  26  --   --   --  28  --  24   BUN  --   --   --   --   --   --  18  --   --   --  22  --  27   CR  --   --   --   --  0.73  --  0.73  --  0.86  --  0.82  --  0.86   ANIONGAP  --   --   --   --   --   --  8  --   --   --  5  --  9   VIPUL  --   --   --   --   --   --  9.2  --   --   --  9.5  --  9.3   * 119* 116*   < >  --    < > 123*   < >   --    < > 152*   < > 133*    < > = values in this interval not displayed.     Recent Labs   Lab 12/12/21  1138 12/12/21  0803 12/12/21  0248 12/11/21  2130 12/11/21  1628   * 119* 116* 96 128*       Imaging:

## 2021-12-12 NOTE — PLAN OF CARE
Summary: Recovered COVID   DATE & TIME: 12/12/21 5534-6426  Cognitive Concerns/ Orientation : A&O x4,   BEHAVIOR & AGGRESSION TOOL COLOR: Green   ABNL VS/O2: VSS; weaned to 2 lpm nasal cannula when in bed; up sitting in chair sats well on RA  MOBILITY: SBA/GB/walker; up in chair x 2  PAIN MANAGMENT: Denies   DIET: Diet advanced to regular; good intake;  TF running at 40 ml/hr (8 pm to 8 am). Tolerating. Denies nausea. Followed by Dietician - he is hopeful that NG can be removed soon.  BOWEL/BLADDER: Continent, last BM 12/11  ABNL LAB/BG: , 124  DRAIN/DEVICES: PIV SL.  NG tube in R nare, pressure injury to lower part of nare   TELEMETRY RHYTHM: NSR   SKIN: Pale/ toby. Scattered bruising. +1 BLE edema. Blanchable redness to coccyx. Dry/tough skin on hands and heels.   TESTS/PROCEDURES: NA  D/C DAY/GOALS/PLACE: Pending placement to ARF,   OTHER IMPORTANT INFO: Declined Seroquel this morning Followed by PT/OT; met goals with SLP;

## 2021-12-12 NOTE — PLAN OF CARE
Summary: Recovered COVID   DATE & TIME: 12/11/21 8303-1569  Cognitive Concerns/ Orientation : A&O x4,   BEHAVIOR & AGGRESSION TOOL COLOR: Green   ABNL VS/O2: VSS; weaned to 2 lpm nasal cannula, satting 94-96%. Denies SOB. CONDE noted. LS diminished.   MOBILITY: Assist x1 GB and walker did not get out of bed this shift.   PAIN MANAGMENT: Denies   DIET: Diet advanced to regular; TF running at 40 ml/hr (8 pm to 8 am). Tolerating. Denies nausea.   BOWEL/BLADDER: Continent, No BM  ABNL LAB/BG:   DRAIN/DEVICES: PIV SL.  NG tube in R nare, pressure injury to lower part of nare - taped TF tubing to forehead to relieve pressure  TELEMETRY RHYTHM: NSR   SKIN: Pale/ toby. Scattered bruising. +1 BLE edema. Blanchable redness to coccyx. Dry/tough skin on hands and heels.   TESTS/PROCEDURES: NA  D/C DAY/GOALS/PLACE: Pending improvement on strength and oxygen requirements. TCU or ARF, CC is following.   OTHER IMPORTANT INFO: Followed by PT/OT/SLP.  Slept well this shift.

## 2021-12-12 NOTE — PLAN OF CARE
Summary: Recovered COVID   DATE & TIME: 12/11/21 3-11  Cognitive Concerns/ Orientation : A&O x4,   BEHAVIOR & AGGRESSION TOOL COLOR: Green   ABNL VS/O2: VSS; weaned to 2 lpm nasal cannula, satting 94-96%. Denies SOB. CONDE noted. LS diminished.   MOBILITY: Assist x1 GB and walker did not get out of bed this shift.   PAIN MANAGMENT: Denies   DIET: Diet advanced to regular; TF running at 25 ml/hr (8 pm to 8 am). Tolerating. Denies nausea.   BOWEL/BLADDER: Continent, No BM  ABNL LAB/BG: ,96  DRAIN/DEVICES: PIV SL.  NG tube in R nare, pressure injury to lower part of nare - taped TF tubing to forehead to relieve pressure  TELEMETRY RHYTHM: NSR - ST  SKIN: Pale/ toby. Scattered bruising. +1 BLE edema. Blanchable redness to coccyx. Dry/tough skin on hands and heels.   TESTS/PROCEDURES: NA  D/C DAY/GOALS/PLACE: Pending improvement on strength and oxygen requirements. TCU or ARF, CC is following.   OTHER IMPORTANT INFO: Followed by PT/OT/SLP.  Grieving this morning as he was told his uncle passed away last night, spiritual health consult ordered.

## 2021-12-12 NOTE — PLAN OF CARE
Speech Language Therapy Discharge Summary    Reason for therapy discharge:    All goals and outcomes met, no further needs identified.    Progress towards therapy goal(s). See goals on Care Plan in [facility] electronic health record for goal details.  Goals met.    Dysphagia tx completed with portion of breakfast tray of regular solids (Maltese toast, sausage), soft solids (scrambled eggs) and thin liquids. Pt demonstrated rapid rate of eating/drinking though complete mastication/oral clearance, timely swallows with no overt clinical signs/sx aspiration including clear vocal quality. Pt re-educated on signs/sx aspiration/dysphagia and he denies all/any concerns. In agreement that goals met/ no further dysphagia tx needed. He also denies any difficulty with his communication - per informal conversation pt did not demonstrate any word finding deficits and was able maintain attention, respond appropriately.     Therapy recommendation(s):    No further therapy is recommended.

## 2021-12-13 ENCOUNTER — APPOINTMENT (OUTPATIENT)
Dept: PHYSICAL THERAPY | Facility: CLINIC | Age: 41
End: 2021-12-13
Attending: INTERNAL MEDICINE
Payer: MEDICAID

## 2021-12-13 ENCOUNTER — APPOINTMENT (OUTPATIENT)
Dept: OCCUPATIONAL THERAPY | Facility: CLINIC | Age: 41
End: 2021-12-13
Attending: INTERNAL MEDICINE
Payer: MEDICAID

## 2021-12-13 LAB
ANION GAP SERPL CALCULATED.3IONS-SCNC: 10 MMOL/L (ref 3–14)
BUN SERPL-MCNC: 15 MG/DL (ref 7–30)
CALCIUM SERPL-MCNC: 9.8 MG/DL (ref 8.5–10.1)
CHLORIDE BLD-SCNC: 106 MMOL/L (ref 94–109)
CO2 SERPL-SCNC: 24 MMOL/L (ref 20–32)
CREAT SERPL-MCNC: 0.8 MG/DL (ref 0.66–1.25)
GFR SERPL CREATININE-BSD FRML MDRD: >90 ML/MIN/1.73M2
GLUCOSE BLD-MCNC: 165 MG/DL (ref 70–99)
GLUCOSE BLDC GLUCOMTR-MCNC: 114 MG/DL (ref 70–99)
GLUCOSE BLDC GLUCOMTR-MCNC: 114 MG/DL (ref 70–99)
GLUCOSE BLDC GLUCOMTR-MCNC: 120 MG/DL (ref 70–99)
GLUCOSE BLDC GLUCOMTR-MCNC: 136 MG/DL (ref 70–99)
GLUCOSE BLDC GLUCOMTR-MCNC: 138 MG/DL (ref 70–99)
MAGNESIUM SERPL-MCNC: 2 MG/DL (ref 1.6–2.3)
PHOSPHATE SERPL-MCNC: 3.7 MG/DL (ref 2.5–4.5)
POTASSIUM BLD-SCNC: 3.8 MMOL/L (ref 3.4–5.3)
SODIUM SERPL-SCNC: 140 MMOL/L (ref 133–144)

## 2021-12-13 PROCEDURE — 97116 GAIT TRAINING THERAPY: CPT | Mod: GP

## 2021-12-13 PROCEDURE — 250N000011 HC RX IP 250 OP 636: Performed by: PHYSICIAN ASSISTANT

## 2021-12-13 PROCEDURE — 83735 ASSAY OF MAGNESIUM: CPT | Performed by: INTERNAL MEDICINE

## 2021-12-13 PROCEDURE — 250N000013 HC RX MED GY IP 250 OP 250 PS 637: Performed by: PHYSICIAN ASSISTANT

## 2021-12-13 PROCEDURE — 97535 SELF CARE MNGMENT TRAINING: CPT | Mod: GO | Performed by: OCCUPATIONAL THERAPIST

## 2021-12-13 PROCEDURE — 250N000013 HC RX MED GY IP 250 OP 250 PS 637: Performed by: PSYCHIATRY & NEUROLOGY

## 2021-12-13 PROCEDURE — 84100 ASSAY OF PHOSPHORUS: CPT | Performed by: INTERNAL MEDICINE

## 2021-12-13 PROCEDURE — 99232 SBSQ HOSP IP/OBS MODERATE 35: CPT | Performed by: INTERNAL MEDICINE

## 2021-12-13 PROCEDURE — 250N000011 HC RX IP 250 OP 636: Performed by: INTERNAL MEDICINE

## 2021-12-13 PROCEDURE — 80048 BASIC METABOLIC PNL TOTAL CA: CPT | Performed by: INTERNAL MEDICINE

## 2021-12-13 PROCEDURE — 250N000013 HC RX MED GY IP 250 OP 250 PS 637: Performed by: INTERNAL MEDICINE

## 2021-12-13 PROCEDURE — 36415 COLL VENOUS BLD VENIPUNCTURE: CPT | Performed by: INTERNAL MEDICINE

## 2021-12-13 PROCEDURE — 120N000001 HC R&B MED SURG/OB

## 2021-12-13 RX ORDER — MULTIPLE VITAMINS W/ MINERALS TAB 9MG-400MCG
1 TAB ORAL DAILY
Status: DISCONTINUED | OUTPATIENT
Start: 2021-12-14 | End: 2021-12-14 | Stop reason: HOSPADM

## 2021-12-13 RX ADMIN — CLONIDINE HYDROCHLORIDE 0.1 MG: 0.1 TABLET ORAL at 13:24

## 2021-12-13 RX ADMIN — ENOXAPARIN SODIUM 40 MG: 40 INJECTION SUBCUTANEOUS at 20:58

## 2021-12-13 RX ADMIN — DOXAZOSIN 4 MG: 4 TABLET ORAL at 08:35

## 2021-12-13 RX ADMIN — SERTRALINE HYDROCHLORIDE 50 MG: 50 TABLET ORAL at 08:35

## 2021-12-13 RX ADMIN — Medication 1 PACKET: at 08:42

## 2021-12-13 RX ADMIN — ENOXAPARIN SODIUM 40 MG: 40 INJECTION SUBCUTANEOUS at 08:39

## 2021-12-13 RX ADMIN — CLONIDINE HYDROCHLORIDE 0.1 MG: 0.1 TABLET ORAL at 06:36

## 2021-12-13 RX ADMIN — METHYLPREDNISOLONE SODIUM SUCCINATE 30 MG: 40 INJECTION, POWDER, FOR SOLUTION INTRAMUSCULAR; INTRAVENOUS at 08:37

## 2021-12-13 RX ADMIN — METOPROLOL TARTRATE 25 MG: 25 TABLET, FILM COATED ORAL at 08:35

## 2021-12-13 RX ADMIN — CLONIDINE HYDROCHLORIDE 0.1 MG: 0.1 TABLET ORAL at 20:58

## 2021-12-13 RX ADMIN — METOPROLOL TARTRATE 25 MG: 25 TABLET, FILM COATED ORAL at 20:58

## 2021-12-13 RX ADMIN — Medication 40 MG: at 06:36

## 2021-12-13 RX ADMIN — MULTIVIT AND MINERALS-FERROUS GLUCONATE 9 MG IRON/15 ML ORAL LIQUID 15 ML: at 08:42

## 2021-12-13 ASSESSMENT — ACTIVITIES OF DAILY LIVING (ADL)
ADLS_ACUITY_SCORE: 5
ADLS_ACUITY_SCORE: 8
ADLS_ACUITY_SCORE: 5
ADLS_ACUITY_SCORE: 8
ADLS_ACUITY_SCORE: 5
ADLS_ACUITY_SCORE: 5
ADLS_ACUITY_SCORE: 8
ADLS_ACUITY_SCORE: 5
ADLS_ACUITY_SCORE: 8
ADLS_ACUITY_SCORE: 5
ADLS_ACUITY_SCORE: 8

## 2021-12-13 NOTE — PLAN OF CARE
"Summary: Recovered COVID   DATE & TIME: 12/12/21 6539-1587  Cognitive Concerns/ Orientation : A&O x4,   BEHAVIOR & AGGRESSION TOOL COLOR: Green   ABNL VS/O2: VSS on RA- 1-2L NC while sleeping- pt dips down to 87-89% while sleeping  MOBILITY: SBA/GB/walker - up to BR  PAIN MANAGMENT: Denies   DIET: Diet advanced to regular; ate 75% of dinner.  TF running at 40 ml/hr (8 pm to 8 am).   BOWEL/BLADDER: Continent, last BM 12/11  ABNL LAB/BG: No BG checks this shift. Pt's BG has been <150 x3 days.  DRAIN/DEVICES: PIV SL. NG tube in R nare, pressure injury to lower part of nare   TELEMETRY RHYTHM: NSR (can be tachy at times)  SKIN: Pale/ toby. Scattered bruising. +1 BLE edema. Blanchable redness to coccyx. Dry/tough skin on hands and heels.   TESTS/PROCEDURES: NA  D/C DAY/GOALS/PLACE: Pending placement to ARF  OTHER IMPORTANT INFO: Declined Seroquel this evening- doesn't like the way it makes him feel. Followed by PT/OT.     Pt's girlfriend (Ana) was very rude to people at door 6 and nurse this shift. Ana called nurse said she was running late and asked if she could stay until 9pm. Nurse told Ana that she could stay a little while longer if she got here before visiting hours ended at 8:30pm. Ana arrived at 8:33pm and was not allowed in because of visiting hours being over. Ana called nurse and was rude on the phone, told the nurse \"this is fucking bullshit\" and hung up on nurse.  "

## 2021-12-13 NOTE — PLAN OF CARE
Summary: Recovered COVID   DATE & TIME: 12/12/21 0579-0041  Cognitive Concerns/ Orientation : A&O x4,   BEHAVIOR & AGGRESSION TOOL COLOR: Green   ABNL VS/O2: VSS on RA- 1L NC while sleeping- pt dips at times  MOBILITY: SBA/GB/walker - up to BR  PAIN MANAGMENT: Denies   DIET: Regular, TF running at 40 ml/hr from 8 pm to 8 am  BOWEL/BLADDER: Continent, BM this morning  ABNL LAB/BG:   DRAIN/DEVICES: PIV SL. NG tube in R nare, pressure injury to lower part of nare   TELEMETRY RHYTHM: NSR   SKIN: Pale/ toby. Scattered bruising. +1 BLE edema. Blanchable redness to coccyx. Dry/tough skin on hands and heels.   TESTS/PROCEDURES: NA  D/C DAY/GOALS/PLACE: Pending placement to [facility]  OTHER IMPORTANT INFO: Followed by PT/OT.

## 2021-12-13 NOTE — PROGRESS NOTES
Care Management Follow Up    Length of Stay (days): 7    Expected Discharge Date: 12/15/2021     Concerns to be Addressed: discharge planning     Patient plan of care discussed at interdisciplinary rounds: Yes    Anticipated Discharge Disposition: Acute Rehab,Transitional Care     Anticipated Discharge Services: None  Anticipated Discharge DME: None    Patient/family educated on Medicare website which has current facility and service quality ratings:    Education Provided on the Discharge Plan:    Patient/Family in Agreement with the Plan: yes    Referrals Placed by CM/SW: Post Acute Facilities  Private pay costs discussed: Not applicable    Additional Information:  Patient's MA insurance is now active.  Therapies continue to recommend ARU.  [facility] is assessing patient for possible admission on Wednesday.   Met with patient and his S.O., nAa.    Patient has a strong desire to be home for [place] however has agreed to transition to ARU as is recommended by therapists.  Ana verbalizes to patient that she would like him to go to ARU before coming home.      ZAHRAA ZhouSW

## 2021-12-13 NOTE — PLAN OF CARE
Summary: Recovered COVID   DATE & TIME: 12/13/21 9144-6431  Cognitive Concerns/ Orientation : A&O x4,   BEHAVIOR & AGGRESSION TOOL COLOR: Green   ABNL VS/O2: VSS on RA- 1L NC while sleeping- pt dips at times; HR slightly tachy  MOBILITY: SBA/GB/walker - up to chair x 2; BRP x 1; still weak and  shaky with fatigue.  Walking with PT.  PAIN MANAGMENT: Denies   DIET: Regular, good intake, discontinued TF/NG  BOWEL/BLADDER: Continent, BM this morning  ABNL LAB/BG: , 138  DRAIN/DEVICES: PIV SL.  redness to lower part of R nare from NG, healing  TELEMETRY RHYTHM: NSR/ST  SKIN: Pale/ toby. Scattered bruising. +1 BLE edema. Blanchable redness to coccyx. Dry/tough skin on hands and heels.   TESTS/PROCEDURES: NA  D/C DAY/GOALS/PLACE: Pending placement to [facility]  OTHER IMPORTANT INFO: Followed by PT/OT.

## 2021-12-13 NOTE — PROGRESS NOTES
CLINICAL NUTRITION SERVICES - REASSESSMENT NOTE    Recommendations Ordered by Registered Dietitian (RD):   - All TF orders discontinued.   - Encouraged pt to consume protein foods with each meal. Reviewed sources and rationale.    Malnutrition:   (12/7)   % Weight Loss:  Unable to accurately assess   % Intake:  Decreased intake does not meet criteria for malnutrition   11/30 Subcutaneous Fat Loss: None observed  11/30 Muscle Loss: Upper arm (bicep, tricep):  Mild and Posterior calf:  Mild - difficult to fully assess with body habitus  Fluid Retention:  None noted      Malnutrition Diagnosis: Unable to determine due to inability to decipher weight trending at this time.      EVALUATION OF PROGRESS TOWARD GOALS   Diet: Regular diet    Nutrition Support:  FT removed, orders discontinued this morning.     Intake/Tolerance:    - Pt seen in room, he is feeling good about his appetite and states he's been eating well over the weekend.   - really happy to have his FT out.   - Per flowsheets he is tolerating % of most meals over the weekend. Orders 2-3 adequate meals.     - Last BM x1 this morning. Stooling daily   - Last weight recorded 12/10: 141.5 kg    ASSESSED NUTRITION NEEDS:  Dosing Weight: 81 kg IBW  Estimated Energy Needs: 3276-9045 kcals/day (22-25 kcal/kg IBW)  Justification: Obese   Estimated Protein Needs:  grams protein/day (1.2-2 grams of pro/kg IBW)  Justification: Obesity guidelines    Previous Goals:   Intake of 75% meals or better consistently to discontinue TF   Evaluation: Met on average    Previous Nutrition Diagnosis:   Inadequate oral intake related to prolonged intubatoin as evidenced by pt tolerating <60% est needs assessed by calorie count x3 days.  Evaluation: Improving    CURRENT NUTRITION DIAGNOSIS  Increased nutrient needs (protein) related to prolonged hospitalization including intubation and catabolic infection as evidenced by pt to consume at least ~100 g protein per day.      INTERVENTIONS  Recommendations / Nutrition Prescription  Regular     Encouraged pt to consume protein foods with each meal. Reviewed sources and rationale.     Implementation  Make sure all TF orders discontinued   Nutrition Education: above    Goals  Intake of >/=75% meals TID each with an adequate protein source.       MONITORING AND EVALUATION:  Progress towards goals will be monitored and evaluated per protocol and Practice Guidelines    Irma Ang RD, LD  [facility], Orth[facility], [facility]  Pager: [phone]  Weekend Pager: [phone]

## 2021-12-13 NOTE — PROGRESS NOTES
[facility]    Hospitalist Progress Note    Assessment & Plan   Brandon Schafer is a 41 year old male with PMHx significant for substance abuse who is a direct admission to [facility] from [facility] for ongoing cares followed prolonged hospitalization for respiratory failure in the setting of COVID 19 pneumonia/ARDS.      Acute hypoxic respiratory failure 2/2 COVID pneumonia; improving  ARDS 2/2 COVID pneumonia  Intubated 11/2 after presenting with stats in 50s.  CT on 11/25 with mild fibrotic changes, dense consolidations consistent with ARDS. Possible organizing pneumonia, started on methylprednisolone 60 mg x3 days. CT Chest 12/1/21 - no significant change.   Extubated 12/2/21.  Transition to BiPAP/HFNC, with progressive weaning now on O2 per NC.  Increased activity tolerance without desat.    - Started on Methylprednisolone 40 mg IV every day               Plan to drop by 10 mg each week (first taper on 12/7) until 10 mg total reached, then will remain on 10 mg daily until seen by outpatient pulmonology, will need pulmonology outpatient visit on discharge within 2-3 months with PFTs and CT chest.  Can transition to prednisone, 40 mg of methylprednisolone equates to 50 mg of prednisone.  (Not yet done)  --RCAT, continue albuterol inhalers,  -Continue to progress with O2 wean currently on 2 L per NC; increased activity tolerance, ambulates with wheeled walker with assistance.       Acute Stress Reaction related to prolonged ICU stay  Delirium in the setting of prolonged critical illness   H/o methampetamine use  Patient reports history of methamphetamine use and reports using 1-2 times per week up until time of admission.  Current anxiety level making him want to use again. Reports significant anxiety and appears very anxious on admission.   Weaned off precedex 12/5  - Addiction medicine consulted at [facility] but it does not appear this was completed prior to transfer. Pt/family had been receiving some  support from palliative team through hospital stay.   -Psychiatry consult requested  -- sertraline 25mg daily started 12/5, increased to 50 mg 12/8/2021  - Seroquel 25 mg qam, 50 mg q HS scheduled, increased to 25/75 mg 12/8/2021  -Avoid hydroxyzine/Ativan.  - Melatonin scheduled  - Delirium precautions   -Nursing notes no acute behavioral issues, observations confirm, affect brighter, motivated.      Non-oliguric STEFAN, resolved  Hypokalemia, in setting of ongoing diuresis; resolved  Urinary retention; improving  Unclear baseline, has not seen doctor in 20 years. Cr 2.5 on admission, now normalized.   12/06: stevenson catheter removed,  Urinating  -Continue doxazosin started at [facility] this admission.  -Taking in fluids/p.o.'s, renal function stable.     Hypertension  Not on anti-HTN meds PTA   - Labetolol, hydralazine prn for SBP >160  - Amlodipine 5 mg daily and clonidine 0.1mg TID started at [facility] this admission, blood pressure WNL to episodic soft on current regimen, close monitor, adjust antihypertensives accordingly.     Stress and steroid induced hyperglycemia  BG controlled on current regimen.   - Medium resistance sliding scale insulin   - 10 U Glargine, minimal requirements per SSI, 1-2/24 hours  -A1c 6.7.  -Glucoses WNL glucoses WNL   TF and p.o. calories.     At risk for malnutrition  - Calorie counts and nutrition is following; recommended keeping feeding tube in until 75% PO intake of total calories  -Patient met, on average, goal of intake of 75% of meals are better consistently  - Tube feedings discontinued, feeding tube removed  - SLP signed off     Septum wound, tongue wound 2/2 Proning   weakness/deconditioning  - WOC consult  - OT/PT consults      CAUTI   VAP  Started on broad-spectrum antibiotics on 11/11 after developing worsening oxygen saturations, fevers, and persistent leukocytosis. Urine culture 11/11 grew >100k pan-sensitive E coli. RVP and MRSA nares negative. Sputum grew E coli, S aureus, and  "GBS. Sputum culture 11/17 with 1+ E. coli and MSSA, blood cultures negative.   Off antibiotics since 11/25  Abx:  - Ceftriaxone 11/14-11/17; 11/19 - 11/25 (completed)  - Zosyn 11/11-11/14; 11/17 - 11/18  - Vanc 11/11-11/12      Reducible Umbilical Hernia  Not strangulated  - Monitor for changes  DVT Prophylaxis: Lovenox subcu  Code Status: Full Code     Disposition: TBD pending Covid course/hypoxia, patient and family would like TCU/ARF closer to home in [facility].  SW assisting, please see Alexia Ayala's note, \"Therapies continue to recommend ARU. [facility] in [place] is assessing patient for possible admission on Wednesday. Met with patient and his S.O., Ana.    Patient has a strong desire to be home for [place] however has agreed to transition to ARU as is recommended by therapists.  Ana verbalizes to patient that she would like him to go to ARU before coming home.\"    Ebonie Caraballo MD  Hospitalist  [facility]  Text Page (7am - 6pm, M-F)      Interval History   \"I walked around the bed by myself today.\"  Patient says he is becoming independent.  Per RN, his oral intake is good so feeding tube was removed today, tube feedings discontinued.  Patient talks extensively about his home support system, he has 5 children, his brother-in-law is at home, available to help him 24/7.  He denies shortness of breath, no pain.    -Data reviewed today: I reviewed all new labs and imaging results over the last 24 hours.  Physical Exam   Temp: 99.3  F (37.4  C) Temp src: Oral BP: 127/81 Pulse: 110   Resp: 20 SpO2: 98 % O2 Device: None (Room air) Oxygen Delivery: 1 LPM  Vitals:    12/08/21 2005 12/09/21 0611 12/10/21 0605   Weight: 143.8 kg (317 lb) 141.5 kg (312 lb) 141.5 kg (312 lb)     Vital Signs with Ranges  Temp:  [98.4  F (36.9  C)-99.3  F (37.4  C)] 99.3  F (37.4  C)  Pulse:  [] 110  Resp:  [18-20] 20  BP: (101-135)/(64-92) 127/81  SpO2:  [96 %-98 %] 98 %  I/O last 3 completed " shifts:  In: 2225 [P.O.:1510; NG/GT:235]  Out: -     Constitutional:    NAD, alert, calm, cooperative    Respiratory: Bilateral basilar crackles noted; O2 now per NC  Cardiovascular: Regular rate and rhythm,  no murmur noted  GI:  soft, non-distended, non-tender  Skin/Integumen: No rashes, no cyanosis,   Neuro : moving all 4 extremities, up and ambulatory with Therapies with wheeled walker.  Psych: Oriented x3.  Affect:  Calm     Medications     dextrose       - MEDICATION INSTRUCTIONS -       - MEDICATION INSTRUCTIONS -       - MEDICATION INSTRUCTIONS -         cloNIDine  0.1 mg Oral or Feeding Tube Q8H     doxazosin  4 mg Oral or Feeding Tube Daily     enoxaparin ANTICOAGULANT  40 mg Subcutaneous BID     insulin aspart  1-7 Units Subcutaneous TID AC     insulin aspart  1-5 Units Subcutaneous At Bedtime     insulin glargine  10 Units Subcutaneous At Bedtime     [START ON 12/14/2021] methylPREDNISolone  20 mg Intravenous Daily    Followed by     [START ON 12/21/2021] methylPREDNISolone  10 mg Intravenous Daily     metoprolol tartrate  25 mg Oral or Feeding Tube BID     multivitamins w/minerals  15 mL Per Feeding Tube Daily     pantoprazole  40 mg Per Feeding Tube QAM AC     protein modular  1 packet Per Feeding Tube BID     QUEtiapine  25 mg Oral or Feeding Tube BID w/meals     QUEtiapine  75 mg Oral or Feeding Tube At Bedtime     sertraline  50 mg Oral or Feeding Tube Daily     sodium chloride (PF)  3 mL Intracatheter Q8H       Data   Recent Labs   Lab 12/13/21  0828 12/13/21  0146 12/12/21  1138 12/11/21  1231 12/11/21  1110 12/10/21  1303 12/10/21  0924 12/08/21  0854 12/08/21  0852 12/07/21  1202 12/07/21  1059   WBC  --   --   --   --   --   --  10.2  --   --   --  11.3*   HGB  --   --   --   --   --   --  11.8*  --   --   --  11.6*   MCV  --   --   --   --   --   --  87  --   --   --  87   PLT  --   --   --   --  236  --  230  --  230  --  249   NA  --   --   --   --   --   --  139  --   --   --  137    POTASSIUM  --   --   --   --   --   --  3.7  --   --   --  3.6   CHLORIDE  --   --   --   --   --   --  105  --   --   --  104   CO2  --   --   --   --   --   --  26  --   --   --  28   BUN  --   --   --   --   --   --  18  --   --   --  22   CR  --   --   --   --  0.73  --  0.73  --  0.86  --  0.82   ANIONGAP  --   --   --   --   --   --  8  --   --   --  5   VIPUL  --   --   --   --   --   --  9.2  --   --   --  9.5   * 120* 124*   < >  --    < > 123*   < >  --    < > 152*    < > = values in this interval not displayed.     Recent Labs   Lab 12/13/21  0828 12/13/21  0146 12/12/21  1138 12/12/21  0803 12/12/21  0248   * 120* 124* 119* 116*       Imaging:

## 2021-12-13 NOTE — PROVIDER NOTIFICATION
PT reports HR up to 140's with activity; 120-130's at rest.  Tele is ST.  Patient states he is anxious to go home and was under the impression that would be today.  He is recommended to go to ARU.  Text page to Dr. Caraballo to see if she could see patient and significant other who is in the room.    Return message from Dr. Caraballo:  Unable to round on patient again.  She states he is not discharging today but referrals sent to , insurance pending, etc.  Relayed to significant other as patient was on the ipad talking with someone.

## 2021-12-14 ENCOUNTER — APPOINTMENT (OUTPATIENT)
Dept: PHYSICAL THERAPY | Facility: CLINIC | Age: 41
End: 2021-12-14
Attending: INTERNAL MEDICINE
Payer: MEDICAID

## 2021-12-14 ENCOUNTER — APPOINTMENT (OUTPATIENT)
Dept: OCCUPATIONAL THERAPY | Facility: CLINIC | Age: 41
End: 2021-12-14
Attending: INTERNAL MEDICINE
Payer: MEDICAID

## 2021-12-14 ENCOUNTER — HOSPITAL ENCOUNTER (INPATIENT)
Facility: SKILLED NURSING FACILITY | Age: 41
LOS: 1 days | Discharge: HOME | End: 2021-12-15
Attending: INTERNAL MEDICINE | Admitting: INTERNAL MEDICINE
Payer: MEDICAID

## 2021-12-14 VITALS
TEMPERATURE: 98.5 F | HEART RATE: 105 BPM | OXYGEN SATURATION: 95 % | BODY MASS INDEX: 42.46 KG/M2 | RESPIRATION RATE: 20 BRPM | SYSTOLIC BLOOD PRESSURE: 116 MMHG | WEIGHT: 313.05 LBS | DIASTOLIC BLOOD PRESSURE: 69 MMHG

## 2021-12-14 DIAGNOSIS — R10.13 DYSPEPSIA: ICD-10-CM

## 2021-12-14 DIAGNOSIS — R53.81 PHYSICAL DECONDITIONING: ICD-10-CM

## 2021-12-14 DIAGNOSIS — I10 HYPERTENSION, UNSPECIFIED TYPE: Primary | ICD-10-CM

## 2021-12-14 DIAGNOSIS — K59.00 CONSTIPATION, UNSPECIFIED CONSTIPATION TYPE: ICD-10-CM

## 2021-12-14 DIAGNOSIS — J96.01 ACUTE RESPIRATORY FAILURE WITH HYPOXIA (H): ICD-10-CM

## 2021-12-14 LAB
CREAT SERPL-MCNC: 0.84 MG/DL (ref 0.66–1.25)
GFR SERPL CREATININE-BSD FRML MDRD: >90 ML/MIN/1.73M2
GLUCOSE BLDC GLUCOMTR-MCNC: 113 MG/DL (ref 70–99)
GLUCOSE BLDC GLUCOMTR-MCNC: 114 MG/DL (ref 70–99)
GLUCOSE BLDC GLUCOMTR-MCNC: 146 MG/DL (ref 70–99)
PLATELET # BLD AUTO: 249 10E3/UL (ref 150–450)

## 2021-12-14 PROCEDURE — 99239 HOSP IP/OBS DSCHRG MGMT >30: CPT | Performed by: INTERNAL MEDICINE

## 2021-12-14 PROCEDURE — 250N000011 HC RX IP 250 OP 636: Performed by: PHYSICIAN ASSISTANT

## 2021-12-14 PROCEDURE — 250N000013 HC RX MED GY IP 250 OP 250 PS 637: Performed by: INTERNAL MEDICINE

## 2021-12-14 PROCEDURE — 250N000011 HC RX IP 250 OP 636: Performed by: INTERNAL MEDICINE

## 2021-12-14 PROCEDURE — 36415 COLL VENOUS BLD VENIPUNCTURE: CPT | Performed by: INTERNAL MEDICINE

## 2021-12-14 PROCEDURE — 97116 GAIT TRAINING THERAPY: CPT | Mod: GP

## 2021-12-14 PROCEDURE — 82565 ASSAY OF CREATININE: CPT | Performed by: INTERNAL MEDICINE

## 2021-12-14 PROCEDURE — 120N000009 HC R&B SNF

## 2021-12-14 PROCEDURE — 97110 THERAPEUTIC EXERCISES: CPT | Mod: GP

## 2021-12-14 PROCEDURE — 250N000013 HC RX MED GY IP 250 OP 250 PS 637: Performed by: PHYSICIAN ASSISTANT

## 2021-12-14 PROCEDURE — 97110 THERAPEUTIC EXERCISES: CPT | Mod: GO | Performed by: OCCUPATIONAL THERAPIST

## 2021-12-14 PROCEDURE — 250N000013 HC RX MED GY IP 250 OP 250 PS 637: Performed by: PSYCHIATRY & NEUROLOGY

## 2021-12-14 PROCEDURE — 85049 AUTOMATED PLATELET COUNT: CPT | Performed by: INTERNAL MEDICINE

## 2021-12-14 RX ORDER — SERTRALINE HYDROCHLORIDE 25 MG/1
50 TABLET, FILM COATED ORAL DAILY
Status: DISCONTINUED | OUTPATIENT
Start: 2021-12-15 | End: 2021-12-15 | Stop reason: HOSPADM

## 2021-12-14 RX ORDER — PANTOPRAZOLE SODIUM 40 MG/1
40 TABLET, DELAYED RELEASE ORAL
Status: DISCONTINUED | OUTPATIENT
Start: 2021-12-15 | End: 2021-12-15 | Stop reason: HOSPADM

## 2021-12-14 RX ORDER — MULTIPLE VITAMINS W/ MINERALS TAB 9MG-400MCG
1 TAB ORAL DAILY
Status: DISCONTINUED | OUTPATIENT
Start: 2021-12-15 | End: 2021-12-15 | Stop reason: HOSPADM

## 2021-12-14 RX ORDER — ONDANSETRON 2 MG/ML
4 INJECTION INTRAMUSCULAR; INTRAVENOUS EVERY 6 HOURS PRN
Status: DISCONTINUED | OUTPATIENT
Start: 2021-12-14 | End: 2021-12-15 | Stop reason: HOSPADM

## 2021-12-14 RX ORDER — PREDNISONE 10 MG/1
TABLET ORAL
Qty: 86 TABLET | Refills: 0 | Status: ON HOLD
Start: 2021-12-14 | End: 2021-12-15

## 2021-12-14 RX ORDER — PREDNISONE 5 MG/1
10 TABLET ORAL DAILY
Status: DISCONTINUED | OUTPATIENT
Start: 2021-12-28 | End: 2021-12-15 | Stop reason: HOSPADM

## 2021-12-14 RX ORDER — METOPROLOL TARTRATE 25 MG/1
25 TABLET, FILM COATED ORAL 2 TIMES DAILY
Status: ON HOLD
Start: 2021-12-14 | End: 2021-12-15

## 2021-12-14 RX ORDER — DOXAZOSIN 4 MG/1
4 TABLET ORAL DAILY
Status: DISCONTINUED | OUTPATIENT
Start: 2021-12-15 | End: 2021-12-15 | Stop reason: HOSPADM

## 2021-12-14 RX ORDER — METOPROLOL TARTRATE 25 MG/1
25 TABLET, FILM COATED ORAL 2 TIMES DAILY
Status: DISCONTINUED | OUTPATIENT
Start: 2021-12-14 | End: 2021-12-15 | Stop reason: HOSPADM

## 2021-12-14 RX ORDER — CLONIDINE HYDROCHLORIDE 0.1 MG/1
0.1 TABLET ORAL EVERY 8 HOURS
Status: DISCONTINUED | OUTPATIENT
Start: 2021-12-14 | End: 2021-12-15 | Stop reason: HOSPADM

## 2021-12-14 RX ORDER — ONDANSETRON 4 MG/1
4 TABLET, ORALLY DISINTEGRATING ORAL EVERY 6 HOURS PRN
Status: DISCONTINUED | OUTPATIENT
Start: 2021-12-14 | End: 2021-12-15 | Stop reason: HOSPADM

## 2021-12-14 RX ORDER — SERTRALINE HYDROCHLORIDE 25 MG/1
50 TABLET, FILM COATED ORAL DAILY
Status: ON HOLD
Start: 2021-12-14 | End: 2021-12-15

## 2021-12-14 RX ORDER — POLYETHYLENE GLYCOL 3350 17 G/17G
17 POWDER, FOR SOLUTION ORAL 2 TIMES DAILY PRN
Status: DISCONTINUED | OUTPATIENT
Start: 2021-12-14 | End: 2021-12-15 | Stop reason: HOSPADM

## 2021-12-14 RX ORDER — ACETAMINOPHEN 325 MG/1
650 TABLET ORAL EVERY 4 HOURS PRN
Status: DISCONTINUED | OUTPATIENT
Start: 2021-12-14 | End: 2021-12-15 | Stop reason: HOSPADM

## 2021-12-14 RX ORDER — ALBUTEROL SULFATE 0.83 MG/ML
2.5 SOLUTION RESPIRATORY (INHALATION) EVERY 6 HOURS PRN
Status: DISCONTINUED | OUTPATIENT
Start: 2021-12-14 | End: 2021-12-15 | Stop reason: HOSPADM

## 2021-12-14 RX ORDER — PREDNISONE 5 MG/1
15 TABLET ORAL DAILY
Status: DISCONTINUED | OUTPATIENT
Start: 2021-12-21 | End: 2021-12-15 | Stop reason: HOSPADM

## 2021-12-14 RX ADMIN — CLONIDINE HYDROCHLORIDE 0.1 MG: 0.1 TABLET ORAL at 06:09

## 2021-12-14 RX ADMIN — CLONIDINE HYDROCHLORIDE 0.1 MG: 0.1 TABLET ORAL at 15:20

## 2021-12-14 RX ADMIN — MULTIPLE VITAMINS W/ MINERALS TAB 1 TABLET: TAB at 08:33

## 2021-12-14 RX ADMIN — DOXAZOSIN 4 MG: 4 TABLET ORAL at 08:33

## 2021-12-14 RX ADMIN — ENOXAPARIN SODIUM 40 MG: 40 INJECTION SUBCUTANEOUS at 21:21

## 2021-12-14 RX ADMIN — ENOXAPARIN SODIUM 40 MG: 40 INJECTION SUBCUTANEOUS at 08:32

## 2021-12-14 RX ADMIN — CLONIDINE HYDROCHLORIDE 0.1 MG: 0.1 TABLET ORAL at 21:21

## 2021-12-14 RX ADMIN — METOPROLOL TARTRATE 25 MG: 25 TABLET, FILM COATED ORAL at 21:21

## 2021-12-14 RX ADMIN — SERTRALINE HYDROCHLORIDE 50 MG: 50 TABLET ORAL at 08:33

## 2021-12-14 RX ADMIN — METHYLPREDNISOLONE SODIUM SUCCINATE 20 MG: 40 INJECTION, POWDER, FOR SOLUTION INTRAMUSCULAR; INTRAVENOUS at 08:32

## 2021-12-14 RX ADMIN — Medication 40 MG: at 06:09

## 2021-12-14 RX ADMIN — METOPROLOL TARTRATE 25 MG: 25 TABLET, FILM COATED ORAL at 08:33

## 2021-12-14 ASSESSMENT — ACTIVITIES OF DAILY LIVING (ADL)
ADLS_ACUITY_SCORE: 7
ADLS_ACUITY_SCORE: 5
ADLS_ACUITY_SCORE: 7
ADLS_ACUITY_SCORE: 5
ADLS_ACUITY_SCORE: 5
ADLS_ACUITY_SCORE: 7
ADLS_ACUITY_SCORE: 5
ADLS_ACUITY_SCORE: 14
ADLS_ACUITY_SCORE: 5
ADLS_ACUITY_SCORE: 10
ADLS_ACUITY_SCORE: 5
ADLS_ACUITY_SCORE: 5
ADLS_ACUITY_SCORE: 7
ADLS_ACUITY_SCORE: 12
ADLS_ACUITY_SCORE: 5
ADLS_ACUITY_SCORE: 5

## 2021-12-14 NOTE — PROGRESS NOTES
Care Management Follow Up    Length of Stay (days): 8    Expected Discharge Date: 12/15/2021     Concerns to be Addressed: discharge planning     Patient plan of care discussed at interdisciplinary rounds: Yes    Anticipated Discharge Disposition: Acute Rehab,Transitional Care     Anticipated Discharge Services: None  Anticipated Discharge DME: None    Patient/family educated on Medicare website which has current facility and service quality ratings:    Education Provided on the Discharge Plan:    Patient/Family in Agreement with the Plan: yes    Referrals Placed by CM/SW: Post Acute Facilities  Private pay costs discussed: Not applicable    Additional Information:  [facility] [place] [facility] and has a wait list.  [facility] [place] [facility] patient however explains due to the needs of their hospital patient's they are now not accepting any patient's from outside of their system.  [facility] in [place] which is 11 miles from patient's home town has declined patient due to his Substance Use history and mental health history. The facility does not feel able to meet patient's needs.  PLAN:  [facility]  ARU is re-assessing patient.   Writer will update patient and make additional TCU referrals       YOANA Zhou

## 2021-12-14 NOTE — PLAN OF CARE
Summary: Recovered COVID   DATE & TIME: 12/13/21 [id number]  Cognitive Concerns/ Orientation : A&O x4, anxious at times but cooperative    BEHAVIOR & AGGRESSION TOOL COLOR: Green   ABNL VS/O2: VSS on RA- 1L NC while sleeping- pt dips at times; HR slightly tachy  MOBILITY: SBA, pt refusing bed alarm. Educated pt and pt still refusing  PAIN MANAGMENT: Denies   DIET: Regular, good intake, discontinued TF/NG  BOWEL/BLADDER: Continent  ABNL LAB/BG:   DRAIN/DEVICES: PIV SL.  redness to lower part of R nare from NG, healing  TELEMETRY RHYTHM: NSR/ST  SKIN: Pale/ toby. Scattered bruising. +1 BLE edema. Blanchable redness to coccyx. Dry/tough skin on hands and heels.   TESTS/PROCEDURES: N/A  D/C DAY/GOALS/PLACE: Possible discharge to [place] ARU on Wednesday 12/15  OTHER IMPORTANT INFO: LS diminished, BS active x4. CONDE.

## 2021-12-14 NOTE — PROGRESS NOTES
Care Management Discharge Note    Discharge Date: 12/15/2021       Discharge Disposition: Transitional Care    Discharge Services: None    Discharge DME: None    Discharge Transportation: family or friend will provide    Private pay costs discussed: Not applicable    PAS Confirmation Code: [id number]  Patient/family educated on Medicare website which has current facility and service quality ratings: yes    Education Provided on the Discharge Plan:  yes  Persons Notified of Discharge Plans: patient, brother MD Brad, RN  Patient/Family in Agreement with the Plan: yes    Handoff Referral Completed: Yes    Additional Information:   ACU felt by the time they had a vacancy, patient would not qualify for ARU however they have a TCU vacancy to offer patient today.  Reviewed this option with patient who accepted the plan  Reviewed with his brother Brad who accepts this option.  Explained to Brad, patient had been declined by a [facility] due to patient's Substance Use history.  Brad expressed understanding that his brother is ready for discharge and accepts  the metro option for patient.  Patient stated he will call Ana, his significant other and update her as to where he will be.  Both patient and Brad were given directions to  TCU.  Brad happens to be arriving today for a business function and he will transport patient to  TCU.   PA approved.  Effective date: 12/14/21  PA reference #:[id number]  Pt. notified: yes.         YOANA Zhou

## 2021-12-14 NOTE — PLAN OF CARE
Physical Therapy Discharge Summary    Reason for therapy discharge:    Discharged to transitional care facility.    Progress towards therapy goal(s). See goals on Care Plan in [facility] electronic health record for goal details.  Goals partially met.  Barriers to achieving goals:   discharge from facility.    Therapy recommendation(s):    Continued therapy is recommended.  Rationale/Recommendations:  impaired functional mobility.    Marcelle Domingo, PT

## 2021-12-14 NOTE — PROVIDER NOTIFICATION
Paged by RN regarding insulin orders. It appears tube feeds stopped today. BG WNL. No known history of DM that I can see. No longer on steroids. Okay to hold on tonight's dose. Day team to address in AM.    Ronnie Quispe MD, MPH  Internal Medicine

## 2021-12-14 NOTE — PLAN OF CARE
Discharge    Patient discharged to SNF via vehicle with brother  Care plan note resolved    Listed belongings gathered and given to patient (including from security/pharmacy). Yes  Care Plan and Patient education resolved: Yes  Prescriptions if needed, hard copies sent with patient  NA  Medication Bin checked and emptied on discharge Yes  SW/care coordinator/charge RN aware of discharge: Yes

## 2021-12-14 NOTE — PROGRESS NOTES
Pt update 1900-2300:    2100 , no sliding scale insulin given - MD paged about 10 units of lantus - ok to hold. TF discontinued on day shift, good oral intake. Tele NSR/ST. Ambulating independently, refusing bed alarms. If ambulating in halls should be SBA. Awaiting ARU discharge - possibly on 12/15 to [facility]. VSS, denies pain.

## 2021-12-14 NOTE — PLAN OF CARE
Summary: Recovered COVID   DATE & TIME: 12/13/21 ([id number])  Cognitive Concerns/ Orientation : A&Ox4, calm, cooperative    BEHAVIOR & AGGRESSION TOOL COLOR: Green   ABNL VS/O2: VSS on RA- 1L NC while sleeping- pt dips at times; HR tachy at times  MOBILITY: SBA, pt refusing bed alarm. Educated pt and pt still refusing. Ind in room.  PAIN MANAGMENT: Denies   DIET: Regular, good intake, discontinued TF/NG during day shift  BOWEL/BLADDER: Continent  ABNL LAB/BG:   DRAIN/DEVICES: L arm PIV SL. Redness to lower part of R nare from NG, healing  TELEMETRY RHYTHM: NSR/ST  SKIN: Pale/ toby. Scattered bruising. +1 BLE edema. Blanchable redness to coccyx. Dry/tough skin on hands and heels.   TESTS/PROCEDURES: N/A  D/C DAY/GOALS/PLACE: Possible discharge to [facility] on Wednesday 12/15  OTHER IMPORTANT INFO: LS diminished, BS active x4. CONDE.

## 2021-12-14 NOTE — PLAN OF CARE
Summary: Recovered COVID   DATE & TIME: 12/14/21 [id number]  Cognitive Concerns/ Orientation : A&Ox4, calm, cooperative    BEHAVIOR & AGGRESSION TOOL COLOR: Green   ABNL VS/O2: VSS on RA-Tachycardic at times  MOBILITY: SBA, pt refusing bed alarm. Educated pt and pt still refusing. Ind in room.  PAIN MANAGMENT: Denies   DIET: Regular, good intake,   BOWEL/BLADDER: Continent  ABNL LAB/BG:  and 146  DRAIN/DEVICES: L arm PIV SL. Redness to lower part of R nare from NG, healing  TELEMETRY RHYTHM: NSR/ST  SKIN: Pale/ toby. Scattered bruising. +1 BLE edema. Blanchable redness to coccyx. Dry/tough skin on hands and heels.   TESTS/PROCEDURES: N/A  D/C DAY/GOALS/PLACE: Possible discharge to [place] [facility] on Wednesday 12/15  OTHER IMPORTANT INFO: LS diminished, BS active x4. CONDE. Tremors noted.  Pt states he has had tremors since extubation

## 2021-12-14 NOTE — DISCHARGE SUMMARY
[facility]  Hospitalist Discharge Summary      Date of Admission:  12/6/2021  Date of Discharge:  12/14/2021  Discharging Provider: Ebonie Caraballo MD      Discharge Diagnoses   Acute hypoxic respiratory failure 2/2 COVID pneumonia; improving  ARDS 2/2 COVID pneumonia  Acute Stress Reaction related to prolonged ICU stay  Delirium in the setting of prolonged critical illness   H/o methampetamine use  Non-oliguric STEFAN, resolved  Hypokalemia, in setting of ongoing diuresis; resolved  Urinary retention; improving hypertension  Stress and steroid-induced hyperglycemia  At risk for malnutrition  Septum wound, tongue wound 2/2 Proning   Weakness/deconditioning  CAUTI   VAP  Reducible Umbilical Hernia    Follow-ups Needed After Discharge   Follow-up Appointments     Follow Up and recommended labs and tests      Follow up with snf physician.  The following labs/tests are   recommended: CBC, BMP in one week. Needs pulmonology outpatient visit   within 2-3 months with PFTs and CT chest.           Unresulted Labs Ordered in the Past 30 Days of this Admission     No orders found from 11/6/2021 to 12/7/2021.          Discharge Disposition   Discharged to short-term care facility  Condition at discharge: Stable      Hospital Course   Brandon Schafer is a 41 year old male with PMHx significant for substance abuse who is a direct admission to [facility] from [facility] for ongoing cares followed prolonged hospitalization for respiratory failure in the setting of COVID 19 pneumonia/ARDS.      Acute hypoxic respiratory failure 2/2 COVID pneumonia; improving  ARDS 2/2 COVID pneumonia  Intubated 11/2 after presenting with stats in 50s.  CT on 11/25 with mild fibrotic changes, dense consolidations consistent with ARDS. Possible organizing pneumonia, started on methylprednisolone 60 mg x3 days. CT Chest 12/1/21 - no significant change.   Extubated 12/2/21.  Transition to BiPAP/HFNC  Off supplemental oxygen!  -  Started on Methylprednisolone 40 mg IV every day               Plan to drop by 10 mg each week (first taper on 12/7) until 10 mg total reached, then will remain on 10 mg daily until seen by outpatient pulmonology, will need pulmonology outpatient visit on discharge within 2-3 months with PFTs and CT chest. Prednisone, 40 mg of methylprednisolone equates to 50 mg of prednisone.        Acute Stress Reaction related to prolonged ICU stay  Delirium in the setting of prolonged critical illness   H/o methampetamine use  Patient reports history of methamphetamine use and reports using 1-2 times per week up until time of admission.  Current anxiety level making him want to use again. Reports significant anxiety and appears very anxious on admission.   Weaned off precedex 12/5  - Addiction medicine consulted at [facility] but it does not appear this was completed prior to transfer. Pt/family had been receiving some support from palliative team through hospital stay.   -Psychiatry consult requested  -- sertraline 25mg daily started 12/5, increased to 50 mg 12/8/2021  - Seroquel 25 mg qam, 50 mg q HS scheduled, increased to 25/75 mg 12/8/2021  -Avoid hydroxyzine/Ativan.  - Melatonin scheduled  - Delirium precautions   -Nursing notes no acute behavioral issues, observations confirm, affect brighter, motivated.      Non-oliguric STEFAN, resolved  Hypokalemia, in setting of ongoing diuresis; resolved  Urinary retention; improving  Unclear baseline, has not seen doctor in 20 years. Cr 2.5 on admission, now normalized.   12/06: stevenson catheter removed,  Urinating  -Continue doxazosin started at [facility] this admission.  -Taking in fluids/p.o.'s, renal function stable.     Hypertension  Not on anti-HTN meds PTA   - Labetolol, hydralazine prn for SBP >160  - Amlodipine 5 mg daily and clonidine 0.1mg TID started at [facility] this admission, blood pressure WNL to episodic soft on current regimen, close monitor, adjust antihypertensives  accordingly.     Stress and steroid induced hyperglycemia  BG controlled on current regimen.   - Medium resistance sliding scale insulin   - 10 U Glargine, minimal requirements per SSI, 1-2/24 hours  -A1c 6.7.  -Glucoses WNL glucoses WNL   TF and p.o. calories.     At risk for malnutrition  - Calorie counts and nutrition is following; recommended keeping feeding tube in until 75% PO intake of total calories  -Patient met, on average, goal of intake of 75% of meals are better consistently  - Tube feedings discontinued, feeding tube removed  - SLP signed off     Septum wound, tongue wound 2/2 Proning   weakness/deconditioning  - WOC consult  - OT/PT consults      CAUTI   VAP  Started on broad-spectrum antibiotics on 11/11 after developing worsening oxygen saturations, fevers, and persistent leukocytosis. Urine culture 11/11 grew >100k pan-sensitive E coli. RVP and MRSA nares negative. Sputum grew E coli, S aureus, and GBS. Sputum culture 11/17 with 1+ E. coli and MSSA, blood cultures negative.   Off antibiotics since 11/25  Abx:  - Ceftriaxone 11/14-11/17; 11/19 - 11/25 (completed)  - Zosyn 11/11-11/14; 11/17 - 11/18  - Vanc 11/11-11/12      Reducible Umbilical Hernia  Not strangulated  - Monitor for changes      Consultations This Hospital Stay   CARE MANAGEMENT / SOCIAL WORK IP CONSULT  PHYSICAL THERAPY ADULT IP CONSULT  OCCUPATIONAL THERAPY ADULT IP CONSULT  PSYCHIATRY IP CONSULT  NUTRITION SERVICES ADULT IP CONSULT  SPEECH LANGUAGE PATH ADULT IP CONSULT  PHARMACY IP CONSULT  PSYCHIATRY IP CONSULT  SPIRITUAL HEALTH SERVICES IP CONSULT  PHYSICAL THERAPY ADULT IP CONSULT  OCCUPATIONAL THERAPY ADULT IP CONSULT    Code Status   Full Code    Time Spent on this Encounter   I, Ebonie Caraballo MD, personally saw the patient today and spent greater than 30 minutes discharging this patient.       Ebonie Caraballo MD  [place] [id number] [facility]  [address] [place] [address]  Phone:  [phone]  ______________________________________________________________________    Physical Exam   Vital Signs: Temp: (P) 98.6  F (37  C) Temp src: (P) Oral BP: (P) 133/86 Pulse: (P) 95   Resp: (P) 20 SpO2: (P) 97 % O2 Device: (P) None (Room air)    Weight: 313 lbs .85 oz  I saw and examined the patient on the date of discharge.         Primary Care Physician   Physician No Ref-Primary    Discharge Orders      General info for SNF    Length of Stay Estimate: Short Term Care: Estimated # of Days <30  Condition at Discharge: Stable  Level of care:skilled   Rehabilitation Potential: Excellent  Admission H&P remains valid and up-to-date: Yes  Recent Chemotherapy: N/A  Use Nursing Home Standing Orders: Yes     Mantoux instructions    Give two-step Mantoux (PPD) Per Facility Policy Yes     Follow Up and recommended labs and tests    Follow up with half-way physician.  The following labs/tests are recommended: CBC, BMP in one week. Needs pulmonology outpatient visit within 2-3 months with PFTs and CT chest.     Reason for your hospital stay    You had respiratory failure due COVID.     Activity - Up with nursing assistance     Patient care order    Taper (decrease) Prednisone by 10 mg each week until 10 mg total reached, then will remain on 10 mg daily until seen by outpatient pulmonology, will need pulmonology outpatient visit on discharge within 2-3 months with PFTs and CT chest.     Physical Therapy Adult Consult    Evaluate and treat as clinically indicated.    Reason:  deconditioning     Occupational Therapy Adult Consult    Evaluate and treat as clinically indicated.    Reason:  deconditioning     Diet    Follow this diet upon discharge: Orders Placed This Encounter      Calorie Counts      Regular Diet Adult       Significant Results and Procedures   Most Recent 3 CBC's:Recent Labs   Lab Test 12/14/21  0902 12/11/21  1110 12/10/21  0924 12/08/21  0852 12/07/21  1059 12/06/21  0411   WBC  --   --  10.2  --   11.3* 13.1*   HGB  --   --  11.8*  --  11.6* 12.1*   MCV  --   --  87  --  87 88    236 230   < > 249 231    < > = values in this interval not displayed.     Most Recent 3 BMP's:Recent Labs   Lab Test 12/14/21  1144 12/14/21  0902 12/14/21  0754 12/14/21  0248 12/13/21  1203 12/13/21  0918 12/11/21  1231 12/11/21  1110 12/10/21  1303 12/10/21  0924 12/07/21  1202 12/07/21  1059   NA  --   --   --   --   --  140  --   --   --  139  --  137   POTASSIUM  --   --   --   --   --  3.8  --   --   --  3.7  --  3.6   CHLORIDE  --   --   --   --   --  106  --   --   --  105  --  104   CO2  --   --   --   --   --  24  --   --   --  26  --  28   BUN  --   --   --   --   --  15  --   --   --  18  --  22   CR  --  0.84  --   --   --  0.80  --  0.73  --  0.73   < > 0.82   ANIONGAP  --   --   --   --   --  10  --   --   --  8  --  5   VIPUL  --   --   --   --   --  9.8  --   --   --  9.2  --  9.5   *  --  113* 114*   < > 165*   < >  --    < > 123*   < > 152*    < > = values in this interval not displayed.     Most Recent 6 Bacteria Isolates From Any Culture (See EPIC Reports for Culture Details):No lab results found.,   Results for orders placed or performed during the hospital encounter of 12/06/21   XR Abdomen Port 1 View    Narrative    ABDOMEN ONE VIEW PORTABLE  12/8/2021 8:38 AM     HISTORY: Evaluate FT location.    COMPARISON: November 6, 2021       Impression    IMPRESSION: Feeding tube coiled in the stomach.    DEVAUGHN NATION MD         SYSTEM ID:  [id number]       Discharge Medications   Current Discharge Medication List      START taking these medications    Details   metoprolol tartrate (LOPRESSOR) 25 MG tablet 1 tablet (25 mg) by Oral or Feeding Tube route 2 times daily    Associated Diagnoses: Benign essential hypertension      predniSONE (DELTASONE) 10 MG tablet Take 2.5 tablets (25 mg) by mouth daily for 6 days, THEN 1.5 tablets (15 mg) daily for 7 days, THEN 1 tablet (10 mg) daily.  Qty: 86 tablet, Refills:  0    Associated Diagnoses: Acute respiratory failure with hypoxia (H)         CONTINUE these medications which have CHANGED    Details   sertraline (ZOLOFT) 25 MG tablet Take 2 tablets (50 mg) by mouth daily    Associated Diagnoses: Acute respiratory failure with hypoxia (H)         CONTINUE these medications which have NOT CHANGED    Details   acetaminophen (TYLENOL) 325 MG tablet Take 2 tablets (650 mg) by mouth every 4 hours as needed for mild pain    Associated Diagnoses: Acute respiratory failure with hypoxia (H)      cloNIDine (CATAPRES) 0.1 MG tablet 1 tablet (0.1 mg) by Oral or Feeding Tube route every 8 hours    Associated Diagnoses: Acute respiratory failure with hypoxia (H)      doxazosin (CARDURA) 4 MG tablet 1 tablet (4 mg) by Oral or Feeding Tube route daily    Associated Diagnoses: Acute respiratory failure with hypoxia (H)      multivitamin w/minerals (THERA-VIT-M) tablet 1 tablet by Oral or Feeding Tube route daily    Associated Diagnoses: Acute respiratory failure with hypoxia (H)      pantoprazole (PROTONIX) 2 mg/mL SUSP suspension 20 mLs (40 mg) by Oral or Feeding Tube route every morning (before breakfast)    Associated Diagnoses: Acute respiratory failure with hypoxia (H)      albuterol (PROVENTIL) (2.5 MG/3ML) 0.083% neb solution Take 1 vial (2.5 mg) by nebulization every 6 hours as needed for wheezing  Qty: 90 mL    Associated Diagnoses: Acute respiratory failure with hypoxia (H)      polyethylene glycol (MIRALAX) 17 g packet 17 g by Oral or Feeding Tube route 2 times daily as needed for constipation    Associated Diagnoses: Acute respiratory failure with hypoxia (H)         STOP taking these medications       amLODIPine (NORVASC) 5 MG tablet Comments:   Reason for Stopping:         enoxaparin ANTICOAGULANT (LOVENOX) 40 MG/0.4ML syringe Comments:   Reason for Stopping:         hydrOXYzine (ATARAX) 25 MG tablet Comments:   Reason for Stopping:         hydrOXYzine (ATARAX) 50 MG tablet  Comments:   Reason for Stopping:         insulin aspart (NOVOLOG PEN) 100 UNIT/ML pen Comments:   Reason for Stopping:         insulin glargine (LANTUS PEN) 100 UNIT/ML pen Comments:   Reason for Stopping:         ipratropium - albuterol 0.5 mg/2.5 mg/3 mL (DUONEB) 0.5-2.5 (3) MG/3ML neb solution Comments:   Reason for Stopping:         melatonin 10 MG TABS tablet Comments:   Reason for Stopping:         methylPREDNISolone sodium succinate (SOLU-MEDROL) 40 mg/mL injection Comments:   Reason for Stopping:         methylPREDNISolone sodium succinate (SOLU-MEDROL) 40 mg/mL injection Comments:   Reason for Stopping:         methylPREDNISolone sodium succinate (SOLU-MEDROL) 40 mg/mL injection Comments:   Reason for Stopping:         ondansetron (ZOFRAN-ODT) 4 MG ODT tab Comments:   Reason for Stopping:         protein modular (PROSOURCE TF) LIQD Comments:   Reason for Stopping:         QUEtiapine (SEROQUEL) 25 MG tablet Comments:   Reason for Stopping:         QUEtiapine (SEROQUEL) 50 MG tablet Comments:   Reason for Stopping:             Allergies   No Known Allergies

## 2021-12-15 ENCOUNTER — APPOINTMENT (OUTPATIENT)
Dept: PHYSICAL THERAPY | Facility: SKILLED NURSING FACILITY | Age: 41
End: 2021-12-15
Attending: INTERNAL MEDICINE
Payer: MEDICAID

## 2021-12-15 ENCOUNTER — APPOINTMENT (OUTPATIENT)
Dept: OCCUPATIONAL THERAPY | Facility: SKILLED NURSING FACILITY | Age: 41
End: 2021-12-15
Attending: INTERNAL MEDICINE
Payer: MEDICAID

## 2021-12-15 VITALS
OXYGEN SATURATION: 96 % | HEART RATE: 110 BPM | SYSTOLIC BLOOD PRESSURE: 140 MMHG | DIASTOLIC BLOOD PRESSURE: 76 MMHG | RESPIRATION RATE: 18 BRPM | WEIGHT: 309.6 LBS | TEMPERATURE: 97 F | BODY MASS INDEX: 41.99 KG/M2

## 2021-12-15 PROCEDURE — 99207 PR CDG-HISTORY COMPONENT: MEETS DETAILED - DOWN CODED LACK OF PFSH: CPT | Performed by: INTERNAL MEDICINE

## 2021-12-15 PROCEDURE — 97161 PT EVAL LOW COMPLEX 20 MIN: CPT | Mod: GP | Performed by: PHYSICAL THERAPIST

## 2021-12-15 PROCEDURE — 97535 SELF CARE MNGMENT TRAINING: CPT | Mod: GO

## 2021-12-15 PROCEDURE — 99304 1ST NF CARE SF/LOW MDM 25: CPT | Performed by: INTERNAL MEDICINE

## 2021-12-15 PROCEDURE — 97165 OT EVAL LOW COMPLEX 30 MIN: CPT | Mod: GO

## 2021-12-15 PROCEDURE — 250N000009 HC RX 250: Performed by: INTERNAL MEDICINE

## 2021-12-15 PROCEDURE — 97116 GAIT TRAINING THERAPY: CPT | Mod: GP | Performed by: PHYSICAL THERAPIST

## 2021-12-15 PROCEDURE — 97530 THERAPEUTIC ACTIVITIES: CPT | Mod: GP | Performed by: PHYSICAL THERAPIST

## 2021-12-15 PROCEDURE — 250N000013 HC RX MED GY IP 250 OP 250 PS 637: Performed by: INTERNAL MEDICINE

## 2021-12-15 PROCEDURE — 250N000011 HC RX IP 250 OP 636: Performed by: INTERNAL MEDICINE

## 2021-12-15 PROCEDURE — 250N000012 HC RX MED GY IP 250 OP 636 PS 637: Performed by: INTERNAL MEDICINE

## 2021-12-15 RX ORDER — DOXAZOSIN 4 MG/1
4 TABLET ORAL DAILY
Qty: 30 TABLET | Refills: 0 | Status: SHIPPED | OUTPATIENT
Start: 2021-12-16

## 2021-12-15 RX ORDER — ACETAMINOPHEN 325 MG/1
650 TABLET ORAL EVERY 4 HOURS PRN
Qty: 30 TABLET | Refills: 0 | Status: SHIPPED | OUTPATIENT
Start: 2021-12-15

## 2021-12-15 RX ORDER — MULTIPLE VITAMINS W/ MINERALS TAB 9MG-400MCG
1 TAB ORAL DAILY
Qty: 30 TABLET | Refills: 0 | Status: SHIPPED | OUTPATIENT
Start: 2021-12-16

## 2021-12-15 RX ORDER — POLYETHYLENE GLYCOL 3350 17 G/17G
17 POWDER, FOR SOLUTION ORAL 2 TIMES DAILY PRN
Qty: 30 EACH | Refills: 0 | Status: SHIPPED | OUTPATIENT
Start: 2021-12-15

## 2021-12-15 RX ORDER — CLONIDINE HYDROCHLORIDE 0.1 MG/1
0.1 TABLET ORAL EVERY 8 HOURS
Qty: 90 TABLET | Refills: 0 | Status: SHIPPED | OUTPATIENT
Start: 2021-12-15

## 2021-12-15 RX ORDER — PANTOPRAZOLE SODIUM 40 MG/1
40 TABLET, DELAYED RELEASE ORAL
Qty: 30 TABLET | Refills: 0 | Status: SHIPPED | OUTPATIENT
Start: 2021-12-16

## 2021-12-15 RX ORDER — METOPROLOL TARTRATE 25 MG/1
25 TABLET, FILM COATED ORAL 2 TIMES DAILY
Qty: 60 TABLET | Refills: 0 | Status: SHIPPED | OUTPATIENT
Start: 2021-12-15

## 2021-12-15 RX ORDER — SERTRALINE HYDROCHLORIDE 25 MG/1
50 TABLET, FILM COATED ORAL DAILY
Qty: 60 TABLET | Refills: 0 | Status: SHIPPED | OUTPATIENT
Start: 2021-12-15

## 2021-12-15 RX ORDER — PREDNISONE 10 MG/1
TABLET ORAL
Qty: 86 TABLET | Refills: 0 | Status: SHIPPED | OUTPATIENT
Start: 2021-12-15 | End: 2022-01-27

## 2021-12-15 RX ADMIN — DOXAZOSIN 4 MG: 4 TABLET ORAL at 08:37

## 2021-12-15 RX ADMIN — PANTOPRAZOLE SODIUM 40 MG: 40 TABLET, DELAYED RELEASE ORAL at 06:25

## 2021-12-15 RX ADMIN — METOPROLOL TARTRATE 25 MG: 25 TABLET, FILM COATED ORAL at 08:37

## 2021-12-15 RX ADMIN — ENOXAPARIN SODIUM 40 MG: 40 INJECTION SUBCUTANEOUS at 16:27

## 2021-12-15 RX ADMIN — SERTRALINE HYDROCHLORIDE 50 MG: 25 TABLET ORAL at 08:36

## 2021-12-15 RX ADMIN — CLONIDINE HYDROCHLORIDE 0.1 MG: 0.1 TABLET ORAL at 06:25

## 2021-12-15 RX ADMIN — MULTIPLE VITAMINS W/ MINERALS TAB 1 TABLET: TAB at 08:36

## 2021-12-15 RX ADMIN — PREDNISONE 25 MG: 5 TABLET ORAL at 08:36

## 2021-12-15 RX ADMIN — Medication 5 UNITS: at 11:12

## 2021-12-15 RX ADMIN — CLONIDINE HYDROCHLORIDE 0.1 MG: 0.1 TABLET ORAL at 13:44

## 2021-12-15 ASSESSMENT — ACTIVITIES OF DAILY LIVING (ADL)
ADLS_ACUITY_SCORE: 5
PREVIOUS_RESPONSIBILITIES: MEAL PREP;HOUSEKEEPING;LAUNDRY;SHOPPING;YARDWORK;MEDICATION MANAGEMENT;FINANCES;DRIVING

## 2021-12-15 NOTE — PLAN OF CARE
The patient is alert an d oriented x 3. VSS. Denies HA, SOB, chest pain, N/V currently. Upper extremities tremors noted. Independent in room. Voids spontaneously without difficulty. The patient requested to be discharged today. The provider Dr. Palma, SW and PT implored the patient to stay and continue therapy for few more days as recommended but insisted on leaving and will be picked up by his girlfriend today. Discharge [facility]. Discharge instructions given to the patient, he denies any questions or clarifications and he verbalizes understanding of instructions. Patient was discharged with a walker and his belongings.       Patient's most recent vital signs are:     Vital signs:  BP: 140/76  Temp: 97  HR: 110  RR: 18  SpO2: 96 %     Patient does not have new respiratory symptoms.  Patient does not have new sore throat.  Patient does not have a fever greater than 99.5.

## 2021-12-15 NOTE — PROGRESS NOTES
12/15/21 0900   Quick Adds   Type of Visit Initial Occupational Therapy Evaluation   Living Environment   People in home child(rosey), dependent;significant other  (6 kids, 2-18 year old )   Current Living Arrangements house  (split level)   Home Accessibility stairs to enter home;stairs within home   Number of Stairs, Main Entrance greater than 10 stairs   Stair Railings, Main Entrance railings on both sides of stairs   Number of Stairs, Within Home, Primary greater than 10 stairs   Stair Railings, Within Home, Primary none  (platform just to enter front of the house)   Transportation Anticipated family or friend will provide   Living Environment Comments 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 Pt I w/ all ADLs.    Self-Care   Usual Activity Tolerance good   Current Activity Tolerance fair   Regular Exercise No   Equipment Currently Used at Home none   Activity/Exercise/Self-Care Comment Pt hopes to use his tread mill and sit down bike but pt only used minimally, pt hopes to use it when he gets home. Tub shower combo w/ grab bars. Shower chair.    Instrumental Activities of Daily Living (IADL)   Previous Responsibilities meal prep;housekeeping;laundry;shopping;yardwork;medication management;finances;driving   IADL Comments Doing everything for the family. Wife is also a stay at home mom as pt lives on a trust fund from his grandparents.    Disability/Function   Hearing Difficulty or Deaf no   Wear Glasses or Blind no   Concentrating, Remembering or Making Decisions Difficulty no   Difficulty Communicating no   Difficulty Eating/Swallowing no   Walking or Climbing Stairs Difficulty no   Dressing/Bathing Difficulty no   Toileting issues no   Doing Errands Independently Difficulty (such as shopping) no   Fall history within last six months no   Change in Functional Status Since Onset of Current Illness/Injury yes   General Information   Onset of Illness/Injury or Date of Surgery 12/06/21   Referring  Physician Chance Craig MD   Patient/Family Therapy Goal Statement (OT) I want to go home in the next couple days.    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 Pt reports he is unemployed and has a significant other. Pt was I w/ ADLs, I w/ ambulation and tsfers w/o device. I w/ IADLs.    Existing Precautions/Restrictions no known precautions/restrictions   Left Upper Extremity (Weight-bearing Status) full weight-bearing (FWB)   Right Upper Extremity (Weight-bearing Status) full weight-bearing (FWB)   Left Lower Extremity (Weight-bearing Status) full weight-bearing (FWB)   Right Lower Extremity (Weight-bearing Status) full weight-bearing (FWB)   Heart Disease Risk Factors Overweight;Medical history;Gender;Age   Cognitive Status Examination   Orientation Status orientation to person, place and time;person;place;time   Visual Perception   Visual Impairment/Limitations WNL   Pain Assessment   Patient Currently in Pain No   Posture   Posture protracted shoulders   Range of Motion Comprehensive   General Range of Motion no range of motion deficits identified   Strength Comprehensive (MMT)   Comment, General Manual Muscle Testing (MMT) Assessment Grossly 4+/5 strength.    Clinical Impression   Criteria for Skilled Therapeutic Interventions Met (OT) yes   OT Problem List-Impairments impacting ADL activity tolerance impaired;balance   Planned Therapy Interventions (OT) ADL retraining;IADL retraining   Clinical Decision Making Complexity (OT) low complexity   Therapy Frequency (OT) 1x eval and treat   Anticipated Equipment Needs Upon Discharge (OT) shower chair   Risk & Benefits of therapy have been explained evaluation/treatment results reviewed;care plan/treatment goals reviewed;risks/benefits reviewed;current/potential barriers reviewed;participants voiced agreement with care plan;participants included;patient   Comment-Clinical  Impression Pt was seen for an OT eval and tx. Pt engaged in ADLs and I w/ TB ADLs/ tsfers and g/h at sink. Pt educated on fall prevention and risk and demonstrated understanding. Pt made I to Mod I w/ FWW prn, order placed w/ charge RN. Pt ambulated supervision FWW to kitchen and elevators w/ seated breaks and completed simple non-stove meal prep w/ supervision and no walker. No LOB. Pt able was receptive to ECT strategies and self initiated seated breaks in the kitchen to manage his fatigue. O2 on RA WNL, HR peak at 116 and down to 90s when pt is in recovery following seated brief breaks. Pt has met all ADLs goals and supervision w/ simple meal prep. Pt verbalized and demo I w/ HEP for UE.  Pt edu to use his shower chair for home to manage his fatigue and pt reports SO and VINH are home to assist him as needed.    OT Discharge Planning    OT Discharge Recommendation (DC Rec) Home with assist   Therapy Certification   Start of Care Date 12/14/21   Certification date from 12/14/21   Total Evaluation Time (Minutes)   Total Evaluation Time (Minutes) 15  (EVAL)   Skin WDL   Skin WDL X   Skin Temperature warm   Skin Moisture dry,flaky   Skin Elasticity quick return to original state   Skin Integrity/Characteristics bruised

## 2021-12-15 NOTE — PROGRESS NOTES
12/15/21 1400   Quick Adds   Quick Adds Certification   Type of Visit Initial PT Evaluation   Living Environment   People in home child(rosey), dependent;significant other  (6 kids, 2-18 year old )   Current Living Arrangements house  (split level)   Home Accessibility stairs to enter home;stairs within home   Number of Stairs, Main Entrance greater than 10 stairs   Stair Railings, Main Entrance railings on both sides of stairs   Number of Stairs, Within Home, Primary greater than 10 stairs   Stair Railings, Within Home, Primary none  (platform just to enter front of the house)   Transportation Anticipated family or friend will provide   Living Environment Comments 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 Pt I w/ all ADLs. Pt has 11--13 rails on both sides.    Self-Care   Usual Activity Tolerance good   Current Activity Tolerance fair   Regular Exercise No   Equipment Currently Used at Home none   Activity/Exercise/Self-Care Comment Pt hopes to use his tread mill and sit down bike when he returns home as pt only used minimally PTA. Has a tub shower combo w/ grab bars. Owns a shower chair but did not need it at baseline.   Pt has a 4ww, wheelchair.   Pt states he was starting to exercise more lately but no regular ex program.    Disability/Function   Hearing Difficulty or Deaf no   Wear Glasses or Blind no   Concentrating, Remembering or Making Decisions Difficulty no   Difficulty Communicating no   Difficulty Eating/Swallowing no   Walking or Climbing Stairs Difficulty no   Dressing/Bathing Difficulty no   Toileting issues no   Doing Errands Independently Difficulty (such as shopping) no   Fall history within last six months no   Change in Functional Status Since Onset of Current Illness/Injury yes   General Information   Onset of Illness/Injury or Date of Surgery 11/02/21   Referring Physician Dr Craig   Patient/Family Therapy Goals Statement (PT) PT: Pt wants to get home soon.    Pertinent History of  Current Problem (include personal factors and/or comorbidities that impact the POC) 41 year old male with hx of substance abuse is being admitted to  TCU on 12/15/2021 for ongoing rehabilitation after prolonged hospitalization for respiratory failure (ARDS) d/t covid 19 pneumonia. Hospital course complicated by Delirium, STEFAN, Urinary retention, Stress induced hyperglycemia.    Existing Precautions/Restrictions fall   Heart Disease Risk Factors Lack of physical activity;Overweight   Cognition   Cognitive Status Comments PT; alert, oriented x 4    Pain Assessment   Patient Currently in Pain No   Integumentary/Edema   Integumentary/Edema no deficits were identifed   Posture    Posture Not impaired   Range of Motion (ROM)   ROM Comment WFL BLEs   Strength Comprehensive (MMT)   Comment, General Manual Muscle Testing (MMT) Assessment B LEs 4/5 hip flex, grossly 4+/5 knee ext, flex   .  Ankles tested seated 4/5 BLEs.   Bed Mobility   Comment (Bed Mobility) I, rolling, sit <-> sup   Transfers   Transfer Safety Comments PT: standing , no ad, Ami,    Gait/Stairs (Locomotion)   Comment (Gait/Stairs) PT; Pt amb with fww, slow pace, pt shaky, s for safety due to weakness, 190 ft x 2 , needs seated rest break, omni 7/10.   bpm, O2 sats 93% ,  PT; stairs for endurance, 2 HR, cues to pace self x 6 stairs, LEs shaky, close sba, cga to amb back to chair without AD.  seated rest, x 12 stairs, but needing standing rest at top of stairs FPC, needing to use accessory mm to help breathe.     Balance   Balance other (describe)   Balance Comments PT: Pt can stand , no AD no lob, wide SUNNI, somewhat shaky/tremulous but no lob.  Using fww for longer distances.  Can amb in room, no ad Ami.   FOr safety, pt leaning on walker to  object without LOB.     Sensory Examination   Sensory Perception WNL   Coordination   Coordination no deficits were identified   Muscle Tone   Muscle Tone Comments pt tremulous.    Clinical Impression    Criteria for Skilled Therapeutic Intervention evaluation only;other (see comments)   PT Diagnosis (PT) PT: Pt with debility, s/p covid, ARDs, critical illness myopathy.    Influenced by the following impairments PT; Pt with decreased functional endurance, activity toelrance, proximal LE strength, tremulous, increased resting HR, up to 140 bpm with activity.     Functional limitations due to impairments PT: Pt with difficulty with stairs, needs 2 rails, CONDE witha ctivity, needs standing rest break after 6 stairs, leaning on rails but able to complete 12 with CONDE, needing seated rest right after.  Pt needs fww with ambulation, supervision to sba for safety due to deconditioning, weakness, decreased activity tolerance.    Clinical Presentation Stable/Uncomplicated   Clinical Presentation Rationale see below   Clinical Decision Making (Complexity) low complexity   Therapy Frequency (PT) Other (see comments)   Planned Therapy Interventions (PT) gait training;home exercise program;neuromuscular re-education;patient/family education;stair training;strengthening;progressive activity/exercise;risk factor education;other (see comments)   Anticipated Equipment Needs at Discharge (PT) walker, rolling   Risk & Benefits of therapy have been explained evaluation/treatment results reviewed;care plan/treatment goals reviewed;risks/benefits reviewed;current/potential barriers reviewed  (see comments. )   Clinical Impression Comments PT: Pt states his girlfriend is driving back to [employer] [employer] , and he wants to leave tonight.  Pt educated that he would benefit from skilled PT rehab to ensure safety with mobility , especially stairs (had only climbed stairs today since hospitalization ) and significant CONDE on RA , HR to 140, sats 92%.  Pt insisting on leaving, and spoke with MD and CHRISTIAN.  The team brought up the fact that the doctor discharging him from acute care wnated the pt to have a CD consult for meth use, but pt states he has  supports (, [employer], [employer]) for support.  He states he wants to change his life has second chance at life after this hospitalization.  PT ed in safety with mobility, energy conservation, issued FWW for safety to use until strength and endurance improved.  Discussed fall rpevention and pt verbalizing understanding.  Pt wants OP PT at [facility], order in and that clinic will have to call pt . Pt is I in the room, no ad, but recommended he use fww for longer distances due to his significant deconditioning, tremor and short stay.    Pt states he misses his family, has family  soon and he  needs to be there.  Pt educated on safety awareness, and gradual return to activities.  Pt states family and friends will be at home to supervise him, and pt educated to have sba on stairs for safety, and pt verbalizing understanding of this precaution and to abstain from driving for the time being, follow up with PCP.    Therapy Certification   Start of care date 12/15/21   Certification date from 12/15/21   Certification date to 22   Medical Diagnosis   Critical Illness Myelopathy, Physical deconditioning    Total Evaluation Time   Total Evaluation Time (Minutes) 25   Skin WDL   Skin WDL X   Skin Temperature warm   Skin Moisture dry,flaky   Skin Elasticity quick return to original state   Skin Integrity/Characteristics bruised

## 2021-12-15 NOTE — PROGRESS NOTES
"Social Work: Initial Assessment with Discharge Plan    Patient Name: Brandon Schafer  : 1980  Age: 41 year old  MRN: [id number]  Completed assessment with: Interview w/ pt and chart review.  Admitted to TCU: 2021    Presenting Information   Date of SW assessment: December 15, 2021  Health Care Directive: Declined completing  Primary Health Care Agent: Pt  Secondary Health Care Agent: Defer to [facility] in absence of HCD.  Living Situation: Lives in [place], [place] (3 hours from [place]) in a duplex w/ girlfriend, Ana, brother in law, and 4 children (ages 2-18 years old).  Previous Functional Status: Independent w/ mobility, ADLs, IADLs, transfers and gait.  DME available: See therapy evals.  Patient and family understanding of hospitalization: Appropriate  Cultural/Language/Spiritual Considerations: English speaking male. [employer]ism.   Abuse concerns: None  BIMS: Pt scored 15 on BIMS indicating cognition intact  PHQ-9: Pt scored 0 on PHQ-9 indicating minimal depressive symptoms  PAS: confirmation number- [id number]  Has there been a level II screen?  No  Were there any recommendations in the screen? N/A  If yes, will the recommendations we incorporated into the Plan of Care?  N/A  Physical Health  Reason for admission: Rehab post COVID    Provider Information   Primary Care Physician:No Ref-Primary, Physician - pt plans to set this up when he goes back home.   : None reported    Mental Health:   Diagnosis: None reported  Current Support/Services: NA  Previous Services: NA  Services Needed/Recommended: Health psychology and spiritual health services available in TCU by consult and referral if needed.     Substance Use:  Diagnosis: None reported. However, per chart, \"Patient reports history of methamphetamine use and reports using 1-2 times per week up until time of admission.  Current anxiety level making him want to use again. Reports significant anxiety and appears very anxious on " "admission.\"  Current Support/Services: [facility]  Previous Services: [facility]  Services Needed/Recommended: [facility]    Support System  Marital Status: Girlfriend, Ana.  Family support: 4 children (ages 2-18 years old), brother in law and extended family.  Other support available: Friends  Gaps in support system: None     Community Resources  Current in home services: None reported  Previous services: None reporoted    Financial/Employment/Education  Employment Status: Unemployed  Income Source: Trust fund  Education: [employer]  Financial Concerns:  None reported  Insurance: Medicaid      Discharge Plan   Patient and family discharge goal: Discharge home w/ recommended services.  Provided Education on discharge plan: YES  Patient agreeable to discharge plan:  YES  A list of Medicare Certified Facilities was provided to the patient and/or family to encourage patient choice. Based on location and rating, patient would like referrals made to: [facility]  General information regarding anticipated insurance coverage and possible out of pocket cost was discussed. Patient and patient's family are aware patient may incur the cost of transportation to the facility, pending insurance payment: YES  Barriers to discharge: Medical clearance, therapy goals met.    Discharge Recommendations   Disposition: Home  Transportation Needs: Family will provide  Name of Transportation Company and Phone: [employer]   Phone: [phone]  Pager: [phone]      "

## 2021-12-15 NOTE — DISCHARGE SUMMARY
Discharge Summary    Brandon Schafer MRN# [id number]   YOB: 1980 Age: 41 year old     Date of Admission:  12/14/2021  Date of Discharge:  12/15/2021  Admitting Physician:  Chance Craig MD  Discharge Physician:  Chance Craig MD   Discharging Service:  Internal Medicine     Primary Provider: No Ref-Primary, Physician          Discharge Diagnosis:   Acute hypoxic respiratory failure 2/2 COVID pneumonia; improving  ARDS 2/2 COVID pneumonia  Acute Stress Reaction related to prolonged ICU stay  Delirium in the setting of prolonged critical illness   H/o methampetamine use  Non-oliguric STEFAN, resolved  Hypokalemia, in setting of ongoing diuresis; resolved  Urinary retention; improving hypertension  Stress and steroid-induced hyperglycemia  At risk for malnutrition  Septum wound, tongue wound 2/2 Proning   Weakness/deconditioning  CAUTI   VAP  Reducible Umbilical Hernia            Brief History of Illness:     Brandon Schafer is a 41 year old male who was admitted for deconditioning    For more details, please refer to the admission H&P on 12/14/2021        TCU  Course:   41 year old male with hx of substance abuse is being admitted to  TCU on 12/15/2021 for ongoing rehabilitation after prolonged hospitalization for respiratory failure (ARDS) d/t covid 19 pneumonia. Hospital course complicated by Delirium, STEFAN, Urinary retention, Stress induced hyperglycemia.     Patient was admitted to TCU on 12/14 evening and wanting to be discharged by 12/15. He was encouraged to stay for continuation of treatment, but he said he will leave against medical advise if needed. So, we decided to come up with last minute plan. He was evaluated by PT. He will be given Walker, and outpatient PT referral.     Rest of the problems at hospital that were addressed are:        41 year old male with hx of substance abuse is being admitted to  TCUon 12/15/2021 after prolonged hospitalization for respiratory failure (ARDS) d/t covid  19 pneumonia. Hospital course complicated by Delirium, STEFAN, Urinary retention, Stress induced hyperglycemia.            Acute hypoxic respiratory failure 2/2 COVID pneumonia; improving  ARDS 2/2 COVID pneumonia  Intubated 11/2 after presenting with stats in 50s.  CT on 11/25 with mild fibrotic changes, dense consolidations consistent with ARDS. Possible organizing pneumonia, started on methylprednisolone 60 mg x3 days. CT Chest 12/1/21 - no significant change.   Extubated 12/2/21.  Transition to BiPAP/HFNC  Off supplemental oxygen!  Started on Methylprednisolone 40 mg IV every day, and changed to Prednisone.   - Plan to drop by 10 mg each week (first taper on 12/7) until 10 mg total reached, then will remain on 10 mg daily until seen by outpatient pulmonology  - Pulmonology outpatient visit on discharge within 2-3 months with PFTs and CT chest..    - Apixaban 2.5 mg BID for 30 days. D[facility]ed with [facility]        # Acute Stress Reaction related to prolonged ICU stay  # Delirium in the setting of prolonged critical illness   # H/o methampetamine use  Patient reports history of methamphetamine use and reports using 1-2 times per week up until time of admission.  Current anxiety level making him want to use again. Reports significant anxiety and appears very anxious on admission.   Weaned off precedex 12/5  Delirium overall improving upon admission to TCU.      - [facility] medicine consulted at [facility] but it does not appear this was completed prior to transfer. Pt/family had been receiving some support from palliative team through hospital stay.   -Psychiatry consult requested  -- sertraline 25mg daily started 12/5, increased to 50 mg 12/8/2021  - Seroquel patient refused, so was discontinued upon discharge to TCU  - Avoid hydroxyzine/Ativan.  - Melatonin scheduled  - Delirium precautions   -      # Non-oliguric STEFAN, resolved  # Hypokalemia, in setting of ongoing diuresis; resolved  # Urinary retention; improving  Unclear  baseline, has not seen doctor in 20 years. Cr 2.5 on admission, now normalized. 12/06: stevenson catheter removed   Creatinine level normal now at 0.8 on 12/14  -Continue doxazosin   - Monitor BMP        # Hypertension  Not on anti-HTN meds PTA   - Continue Amlodipine 5 mg daily and clonidine 0.1mg TID   - Monitor and titrate     # Stress and steroid induced hyperglycemia  BG controlled on current regimen. He was on Insulin Glargine during hospitalization. Not on any upon discharge to TCU  - Medium resistance sliding scale insulin   - Continue to monitor     At risk for malnutrition  - Continue to monitor      # Septum wound, tongue wound 2/2 Proning   - Wound care     # Deconditioning: PT/OT consult.      # CAUTI  # VAP  Started on broad-spectrum antibiotics on 11/11 after developing worsening oxygen saturations, fevers, and persistent leukocytosis. Urine culture 11/11 grew >100k pan-sensitive E coli. RVP and MRSA nares negative. Sputum grew E coli, S aureus, and GBS. Sputum culture 11/17 with 1+ E. coli and MSSA, blood cultures negative.   Off antibiotics since 11/25  Abx:  - Ceftriaxone 11/14-11/17; 11/19 - 11/25 (completed)  - Zosyn 11/11-11/14; 11/17 - 11/18  - Vanc 11/11-11/12      # Reducible Umbilical Hernia  Not strangulated  - Monitor for changes     # FEN: Normal diet.   # Lines &Tubes:   # Activity & Physical condition: PT/OT  # Prophylaxis:Apixaban on discharge  # Pneumococcal vaccine: Does not qualify   # Social Issues:   # Code status: Full code                              Discharge Medications:     Current Discharge Medication List      START taking these medications    Details   apixaban ANTICOAGULANT (ELIQUIS) 2.5 MG tablet Take 1 tablet (2.5 mg) by mouth 2 times daily  Qty: 60 tablet, Refills: 0    Associated Diagnoses: DVT prophylaxis      pantoprazole (PROTONIX) 40 MG EC tablet Take 1 tablet (40 mg) by mouth every morning (before breakfast)  Qty: 30 tablet, Refills: 0    Associated Diagnoses:  Dyspepsia         CONTINUE these medications which have CHANGED    Details   acetaminophen (TYLENOL) 325 MG tablet Take 2 tablets (650 mg) by mouth every 4 hours as needed for mild pain  Qty: 30 tablet, Refills: 0    Associated Diagnoses: Acute respiratory failure with hypoxia (H)      cloNIDine (CATAPRES) 0.1 MG tablet Take 1 tablet (0.1 mg) by mouth every 8 hours  Qty: 90 tablet, Refills: 0    Associated Diagnoses: Hypertension, unspecified type      doxazosin (CARDURA) 4 MG tablet Take 1 tablet (4 mg) by mouth daily  Qty: 30 tablet, Refills: 0    Associated Diagnoses: Hypertension, unspecified type      metoprolol tartrate (LOPRESSOR) 25 MG tablet Take 1 tablet (25 mg) by mouth 2 times daily  Qty: 60 tablet, Refills: 0    Associated Diagnoses: Hypertension, unspecified type; Physical deconditioning      multivitamin w/minerals (THERA-VIT-M) tablet Take 1 tablet by mouth daily  Qty: 30 tablet, Refills: 0    Associated Diagnoses: Physical deconditioning      polyethylene glycol (MIRALAX) 17 g packet Take 17 g by mouth 2 times daily as needed for constipation  Qty: 30 each, Refills: 0    Associated Diagnoses: Constipation, unspecified constipation type      predniSONE (DELTASONE) 10 MG tablet Take 2.5 tablets (25 mg) by mouth daily for 6 days, THEN 1.5 tablets (15 mg) daily for 7 days, THEN 1 tablet (10 mg) daily.  Qty: 86 tablet, Refills: 0    Associated Diagnoses: Acute respiratory failure with hypoxia (H)      sertraline (ZOLOFT) 25 MG tablet Take 2 tablets (50 mg) by mouth daily  Qty: 60 tablet, Refills: 0    Associated Diagnoses: Acute respiratory failure with hypoxia (H)         STOP taking these medications       albuterol (PROVENTIL) (2.5 MG/3ML) 0.083% neb solution Comments:   Reason for Stopping:         pantoprazole (PROTONIX) 2 mg/mL SUSP suspension Comments:   Reason for Stopping:                   Procedures/Consultations:   No procedures performed during this admission  Consultation during this  admission received from PT/OT           Final Day of Progress before Discharge:     Patient desperately wanting to be discharged  Reports he can handle this at home  Reports he is able to walk up and down, and feels he does not need to be here.     Physical Exam:  Blood pressure (!) 141/79, pulse 119, temperature (!) 96.7  F (35.9  C), temperature source Oral, resp. rate 20, weight 140.4 kg (309 lb 9.6 oz), SpO2 96 %.      Eye: No icterus, no pallor  Mouth/ENT: Normal oral mucosa  Cardiovascular: S1, S2 normal  Respiratory: B/L CTA  GI: Soft, NT, BS+  :   Neurology: Alert, awake, and oriented. No tremors  Psych:   MSK: No pretibial edema  Integumentary:   Heme/Lymph/Imm:     Data:  All laboratory data reviewed             Condition on Discharge:   Discharge condition: Stable   Code status on discharge: Full Code                Discharge Disposition:   Discharged to home               Pending Results:   Unresulted Labs Ordered in the Past 30 Days of this Admission     No orders found for last 31 day(s).                  Discharge Instructions and Follow-Up:     Discharge Procedure Orders   Physical Therapy Referral   Standing Status: Future   Referral Priority: Routine Referral Type: Rehab Therapy Physical Therapy   Number of Visits Requested: 1     Reason for your hospital stay   Order Comments: Admitted to TCU for rehabilitation     Activity   Order Comments: Your activity upon discharge: activity as tolerated     Order Specific Question Answer Comments   Is discharge order? Yes      Follow Up ([facility])   Order Comments: Follow up with primary care provider, Physician No Ref-Primary, within 7 days for hospital follow- up.  The following labs/tests are recommended: CBC, CMP  Follow up with Pulmonology stephany 2-3 months with PFTs and CT chest. .      Appointments on [facility]/or [facility] (with [facility] or [facility] provider or service). Call [phone] if you haven't heard regarding these appointments within  7 days of discharge.     Diet   Order Comments: Follow this diet upon discharge: Orders Placed This Encounter      Regular Diet Adult     Order Specific Question Answer Comments   Is discharge order? Yes           Attestation:  Chance Craig MD.    Time spent on patient: 55 minutes total including face to face and coordinating care time reviewing current illness, any medication changes, and the care plan for today.

## 2021-12-15 NOTE — PROGRESS NOTES
Occupational Therapy Discharge Summary    Reason for therapy discharge:    All goals and outcomes met, no further needs identified.    Progress towards therapy goal(s). See goals on Care Plan in [facility] electronic health record for goal details.  Pt is I w/ ADLs. May need supervision w/ showering tsfer onto shower chair. Pt was educated. Pt is I in his room.     Therapy recommendation(s):    No further therapy is recommended. Pt will return home with SO and brother in law to assist w/ heavy IADLs as needed. Pt given HEP tband exercise program and education handout, verbal education on energy conservation strategies and technique.

## 2021-12-15 NOTE — PLAN OF CARE
Discharge Planner Post-Acute Rehab OT:     Discharge Plan: Home w/ significant other and VINH assistance, OP pulmonary rehab if appropriate.     Precautions: Falls for standard precautions.     Current Status:  ADLs:    Mobility: I in room w/o device. FWW supervision w/ long distance ambulation approx. 100ft at a time.     Grooming: I w/ face g/h tasks.     Dressing: I w/ UB dressing, verbalized I w/ LB dressing/socks.     Bathing: NT, Pt reports SBA w/ nsg prior to pt coming over to hospital.     Toileting: I w/ standard BR toilet in room, verbalized I w/ clothing mgmt and toilet hygiene, pt simulated w/o any further incident.   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 I w/ all IADLs. Significant other and VINH will be assist w/ IADLs per pt.   Vision/Cognition: SLUMS score 26/30 w/ score on 12.15.21. No vision concerns. Pt grossly oriented. Pt reports he is done w/ meth and doesn't plan to go back to drugs. Reported he lost several family members in the last 12 months and found his recovery as a second chance and wants to return home to back to his kids. Anticipates he can leave in a few days.     Assessment: Pt was seen for an OT eval and tx. Pt engaged in ADLs and I w/ TB ADLs/ tsfers and g/h at sink. Pt educated on fall prevention and risk and demonstrated understanding. Pt made I to Mod I w/ FWW prn, order placed w/ charge RN. Pt ambulated supervision FWW to kitchen and elevators w/ seated breaks and completed simple non-stove meal prep w/ supervision and no walker. No LOB. Pt able was receptive to ECT strategies and self initiated seated breaks in the kitchen to manage his fatigue. O2 on RA WNL, HR peak at 116 and down to 90s when pt is in recovery following seated brief breaks. Pt has met all ADLs goals and supervision w/ simple meal prep. Pt verbalized and demo I w/ HEP for UE.  Pt edu to use his shower chair for home to manage his fatigue and pt reports SO and VINH are home to assist him as  needed.   No further skilled OT warranted at this time.     Other Barriers to Discharge (DME, Family Training, etc):   Prior equipment and living environment: Shower chair w/ tub shower combo and grab bars. Didn't use a shower chair before but recommended pt use shower chair for home d/t fatigue and pt appeared receptive to education.   Lives w/ SO (whose also a stay at home mom) and brother in law. Split level house w/ bedrooms upstairs 13 steps up and down to basement laundry. Reports SO and VINH can assist w/ heavy IADLs as needed.

## 2021-12-15 NOTE — PROGRESS NOTES
SW notified by PT that pt wants to go home today and is doing well enough in PT/OT to be safe to discharge home. Per chart, pt was using meth 1-2x/week PTA and discharge team recommended pt have CD assessment. SW discussed w/ team members and decided it's best if pt stay at [facility] a few more days to show consistency w/ therapy (stairs in particular) and have CD assessment. SW and PT met w/ pt in his room to update him. Pt adamant that he is going home today because he misses his family and his girlfriend is in a hotel in [place] and is going back to their home (3 hours [place]) today. SW and PT explained why the team recommends he stay a few more days (consistancy w/ therapy and CD assessment) and pt declined, stating he is going home today. Pt stated his girlfriend plans to pick him up at TCU tonight.     SW and PT updated provider. PT is going to get a walker for pt and pt will go home w/ OP therapy.     Kaitlin Garrido MSW, CORINNA  [facility]   Phone: [phone]  Pager: [phone]

## 2021-12-15 NOTE — PLAN OF CARE
Pt.A&Ox4. Uses call light appropriately. Awake initial rounds, watching movie on phone. Denied pain, discomfort, CP and SOB. Had no other c/o's or requests @ that time. In street clothes - declined offer of hospital gown.         Patient's most recent vital signs are:     Vital signs:  BP: 124/70  Temp: 96.7  HR: 90  RR: 20  SpO2: 97 %     Patient does not have new respiratory symptoms.  Patient does not have new sore throat.  Patient does not have a fever greater than 99.5.

## 2021-12-15 NOTE — H&P
[facility]  HISTORY AND PHYSICAL   Chief Complaint       History of Present Illness       41 year old male with hx of substance abuse is being admitted to  TCU on 12/15/2021 for ongoing rehabilitation after prolonged hospitalization for respiratory failure (ARDS) d/t covid 19 pneumonia. Hospital course complicated by Delirium, STEFAN, Urinary retention, Stress induced hyperglycemia.     Currently reports doing well. No chest pain, shortness of breath, lightheadedness or dizziness. No fever, chills.              Past Medical History     No past medical history on file.      Past Surgical History     Past Surgical History:   Procedure Laterality Date     PICC TRIPLE LUMEN PLACEMENT Right 11/09/2021    5FR TL PICC        Social History     Social History     Socioeconomic History     Marital status: Single     Spouse name: Not on file     Number of children: Not on file     Years of education: Not on file     Highest education level: Not on file   Occupational History     Not on file   Tobacco Use     Smoking status: Current Every Day Smoker     Packs/day: 0.75     Years: 15.00     Pack years: 11.25     Smokeless tobacco: Never Used   Substance and Sexual Activity     Alcohol use: Yes     Comment: 1 drink yesterday, none for 4 years prior     Drug use: No     Comment: sober from drugs since 10/2011     Sexual activity: Not on file   Other Topics Concern     Not on file   Social History Narrative     Not on file     Social Determinants of Health     Financial Resource Strain: Not on file   Food Insecurity: Not on file   Transportation Needs: Not on file   Physical Activity: Not on file   Stress: Not on file   Social Connections: Not on file   Intimate Partner Violence: Not on file   Housing Stability: Not on file            Family History     Reviewed  No pertinent family hx      Medications     Reviewed and medication reconciliation done.  Current Facility-Administered Medications    Medication     [START ON 12/17/2021] - Skin Test Reading -     acetaminophen (TYLENOL) tablet 650 mg     albuterol (PROVENTIL) neb solution 2.5 mg     cloNIDine (CATAPRES) tablet 0.1 mg     doxazosin (CARDURA) tablet 4 mg     enoxaparin ANTICOAGULANT (LOVENOX) injection 40 mg     metoprolol tartrate (LOPRESSOR) tablet 25 mg     multivitamin w/minerals (THERA-VIT-M) tablet 1 tablet     Nurse may request from Pharmacy a change of form of medication (e.g. Liquid to tablet).     ondansetron (ZOFRAN-ODT) ODT tab 4 mg    Or     ondansetron (ZOFRAN) injection 4 mg     pantoprazole (PROTONIX) EC tablet 40 mg     polyethylene glycol (MIRALAX) Packet 17 g     predniSONE (DELTASONE) tablet 25 mg    Followed by     [START ON 12/21/2021] predniSONE (DELTASONE) tablet 15 mg    Followed by     [START ON 12/28/2021] predniSONE (DELTASONE) tablet 10 mg     sertraline (ZOLOFT) tablet 50 mg     tuberculin injection 5 Units          Review of Systems     10 point  review of systems negative, except for what is mentioned above      Physical Exam     General:   Vital signs:    Blood pressure (!) 141/79, pulse 119, temperature (!) 96.7  F (35.9  C), temperature source Oral, resp. rate 20, weight 140.4 kg (309 lb 9.6 oz), SpO2 96 %.  Estimated body mass index is 41.99 kg/m  as calculated from the following:    Height as of 11/3/21: 1.829 m (6').    Weight as of this encounter: 140.4 kg (309 lb 9.6 oz).      Intake/Output Summary (Last 24 hours) at 12/15/2021 1501  Last data filed at 12/14/2021 2121  Gross per 24 hour   Intake 600 ml   Output --   Net 600 ml    Constitutional:     Eye: No icterus, no pallor  Mouth/ENT: Normal oral mucosa  Cardiovascular: S1, S2 normal  Respiratory: B/L CTA  GI: Soft, NT, BS+  :   Neurology: Alert, awake, and oriented. No tremors  Psych:   MSK: No pretibial edema  Integumentary:   Heme/Lymph/Imm:               Laboratory/Imaging Studies     I have reviewed  laboratory and imaging studies in the Epic.  Pertinent findings are as below    CMP  Recent Labs   Lab 12/14/21  1144 12/14/21  0902 12/14/21  0754 12/14/21  0248 12/13/21  1949 12/13/21  1203 12/13/21  0918 12/11/21  1231 12/11/21  1110 12/10/21  1303 12/10/21  0924   NA  --   --   --   --   --   --  140  --   --   --  139   POTASSIUM  --   --   --   --   --   --  3.8  --   --   --  3.7   CHLORIDE  --   --   --   --   --   --  106  --   --   --  105   CO2  --   --   --   --   --   --  24  --   --   --  26   ANIONGAP  --   --   --   --   --   --  10  --   --   --  8   *  --  113* 114* 114*   < > 165*   < >  --    < > 123*   BUN  --   --   --   --   --   --  15  --   --   --  18   CR  --  0.84  --   --   --   --  0.80  --  0.73  --  0.73   GFRESTIMATED  --  >90  --   --   --   --  >90  --  >90  --  >90   VIPUL  --   --   --   --   --   --  9.8  --   --   --  9.2   MAG  --   --   --   --   --   --  2.0  --   --   --  2.2   PHOS  --   --   --   --   --   --  3.7  --   --   --   --     < > = values in this interval not displayed.     CBC  Recent Labs   Lab 12/14/21  0902 12/11/21  1110 12/10/21  0924   WBC  --   --  10.2   RBC  --   --  4.28*   HGB  --   --  11.8*   HCT  --   --  37.2*   MCV  --   --  87   MCH  --   --  27.6   MCHC  --   --  31.7   RDW  --   --  15.3*    236 230     INRNo lab results found in last 7 days.      Impression/Plan       41 year old male with hx of substance abuse is being admitted to [facility] 12/15/2021 after prolonged hospitalization for respiratory failure (ARDS) d/t covid 19 pneumonia. Hospital course complicated by Delirium, STEFAN, Urinary retention, Stress induced hyperglycemia.          Acute hypoxic respiratory failure 2/2 COVID pneumonia; improving  ARDS 2/2 COVID pneumonia  Intubated 11/2 after presenting with stats in 50s.  CT on 11/25 with mild fibrotic changes, dense consolidations consistent with ARDS. Possible organizing pneumonia, started on methylprednisolone 60 mg x3 days. CT Chest 12/1/21 - no significant  change.   Extubated 12/2/21.  Transition to BiPAP/HFNC  Off supplemental oxygen!  Started on Methylprednisolone 40 mg IV every day, and changed to Prednisone.   - Plan to drop by 10 mg each week (first taper on 12/7) until 10 mg total reached, then will remain on 10 mg daily until seen by outpatient pulmonology  - Pulmonology outpatient visit on discharge within 2-3 months with PFTs and CT chest..          # Acute Stress Reaction related to prolonged ICU stay  # Delirium in the setting of prolonged critical illness   # H/o methampetamine use  Patient reports history of methamphetamine use and reports using 1-2 times per week up until time of admission.  Current anxiety level making him want to use again. Reports significant anxiety and appears very anxious on admission.   Weaned off precedex 12/5  Delirium overall improving upon admission to TCU.     - Addiction medicine consulted at [facility] but it does not appear this was completed prior to transfer. Pt/family had been receiving some support from palliative team through hospital stay.   -Psychiatry consult requested  -- sertraline 25mg daily started 12/5, increased to 50 mg 12/8/2021  - Seroquel patient refused, so was discontinued upon discharge to TCU  - Avoid hydroxyzine/Ativan.  - Melatonin scheduled  - Delirium precautions   -      # Non-oliguric STEFAN, resolved  # Hypokalemia, in setting of ongoing diuresis; resolved  # Urinary retention; improving  Unclear baseline, has not seen doctor in 20 years. Cr 2.5 on admission, now normalized. 12/06: stevenson catheter removed   Creatinine level normal now at 0.8 on 12/14  -Continue doxazosin   - Monitor BMP       # Hypertension  Not on anti-HTN meds PTA   - Continue Amlodipine 5 mg daily and clonidine 0.1mg TID   - Monitor and titrate    # Stress and steroid induced hyperglycemia  BG controlled on current regimen. He was on Insulin Glargine during hospitalization. Not on any upon discharge to TCU  - Medium resistance sliding  scale insulin   - Continue to monitor     At risk for malnutrition  - Continue to monitor      # Septum wound, tongue wound 2/2 Proning   - Wound care    # Deconditioning: PT/OT consult.      # CAUTI  # VAP  Started on broad-spectrum antibiotics on 11/11 after developing worsening oxygen saturations, fevers, and persistent leukocytosis. Urine culture 11/11 grew >100k pan-sensitive E coli. RVP and MRSA nares negative. Sputum grew E coli, S aureus, and GBS. Sputum culture 11/17 with 1+ E. coli and MSSA, blood cultures negative.   Off antibiotics since 11/25  Abx:  - Ceftriaxone 11/14-11/17; 11/19 - 11/25 (completed)  - Zosyn 11/11-11/14; 11/17 - 11/18  - Vanc 11/11-11/12      # Reducible Umbilical Hernia  Not strangulated  - Monitor for changes     # FEN: Normal diet.   # Lines &Tubes:   # Activity & Physical condition: PT/OT  # Prophylaxis: Enxoaparin  # Pneumococcal vaccine: Does not qualify   # Social Issues:   # Code status: Full code         Chance Craig MD  [facility]  Contact information available via [facility] Paging/Directory

## 2021-12-15 NOTE — PLAN OF CARE
BP on higher side, BP meds given otherwise VSS- see latest BP. Alert and orientedx4. Able to verbalized needs nad used call light appropriately. Denies any pain, N/V, dizziness, headache, SOB. Not in respiratory distress. Continent of B/B.  Appetite good.  Declined full skin assessment, per pt he does not have skin issues.  Will continue plan of care.      Patient's most recent vital signs are:     Vital signs:  BP: 141/79  Temp: 96.7  HR: 119  RR: 20  SpO2: 96 %     Patient does not have new respiratory symptoms.  Patient does not have new sore throat.  Patient does not have a fever greater than 99.5.

## 2021-12-15 NOTE — PLAN OF CARE
Physical Therapy Discharge Summary    Reason for therapy discharge:    Pt insisting on leaving tonight. : Pt states his girlfriend is driving back to [place] tonight , and he wants to leave tonight.  Pt educated that he would benefit from skilled PT rehab to ensure safety with mobility , especially stairs (had only climbed stairs today since hospitalization ) and significant CONDE on RA , HR to 140, sats 92%.  Pt insisting on leaving, and spoke with MD and CHRISTIAN.  The team brought up the fact that the doctor discharging him from acute care wnated the pt to have a CD consult for meth use, but pt states he has supports (, AA, [facility]) for support.  He states he wants to change his life has second chance at life after this hospitalization.  PT ed in safety with mobility, energy conservation, issued FWW for safety to use until strength and endurance improved.  Discussed fall rpevention and pt verbalizing understanding.  Pt wants OP PT at [facility], order in and that clinic will have to call pt . Pt is I in the room, no ad, but recommended he use fww for longer distances due to his significant deconditioning, tremor and short stay.    Pt states he misses his family, has family  soon and he  needs to be there.  Pt educated on safety awareness, and gradual return to activities.  Pt states family and friends will be at home to supervise him, and pt educated to have sba on stairs for safety, and pt verbalizing understanding of this precaution and to abstain from driving for the time being, follow up with PCP.     Progress towards therapy goal(s). See goals on Care Plan in [facility] electronic health record for goal details.  Pt is I for room ambulation, fww for longer distances (issued wide FWW).  Pt can do stairs with S, pace self, x 12, but difficult with CONDE, needing standing rest break.     Therapy recommendation(s):    Continued therapy is recommended.  Rationale/Recommendations:  OP Pt at [facility]  for CP conditioning, strengthening, progress to community ambulation distances without assistive device.  Pt does not work (states he lives off a trust fund) but has 5 kids between the ages of 2-18, and has been a caregiver to them. .

## 2021-12-15 NOTE — PROGRESS NOTES
Patient is a 41 year old male admitted to room 435 via wheelchair. Patient is alert and oriented X 3. See Epic for VS and assessment. Patient denies HA, SOB, chest pain or N/V on admission. Patient is able to transfer with walker and 1 assist. Patient was settled into their room, shown call light, tv, mealtimes etc. Oriented to unit. Will continue monitoring pain level and VS. Notifying MD with any concerns. Follow MD orders for cares and medications.    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   Ethnicity: Caucasion   Marital Status: Single  Vascular Access: none  Dentures: none  Smoker: no   Glasses: yes   Occupation: disability   Falls 0-1 mo:      Patient's most recent vital signs are:     Vital signs:  BP: 129/66  Temp: 96.7  HR: 98  RR: 20  SpO2: 97 %     Patient does not have new respiratory symptoms.  Patient does not have new sore throat.  Patient does not have a fever greater than 99.5.

## 2022-01-19 NOTE — TELEPHONE ENCOUNTER
RECORDS RECEIVED FROM:    DATE RECEIVED: 2.25.22   NOTES STATUS DETAILS   OFFICE NOTE from referring provider N/A    OFFICE NOTE from other specialist N/A    DISCHARGE SUMMARY from hospital Internal 11.3.21-12.6.21 [facility]   DISCHARGE REPORT from the ER Internal 11.2.21 [facility]   MEDICATION LIST Internal    IMAGING  (NEED IMAGES AND REPORTS)     CT SCAN Internal 11.3.21     CHEST XRAY (CXR) Internal 11.3.21 (numerous on this day)   TESTS     PULMONARY FUNCTION TESTING (PFT) N/A

## 2022-02-25 ENCOUNTER — PRE VISIT (OUTPATIENT)
Dept: PULMONOLOGY | Facility: CLINIC | Age: 42
End: 2022-02-25

## 2022-03-10 ENCOUNTER — TELEPHONE (OUTPATIENT)
Dept: PULMONOLOGY | Facility: CLINIC | Age: 42
End: 2022-03-10
Payer: COMMERCIAL

## 2022-03-10 DIAGNOSIS — Z86.16 HISTORY OF COVID-19: ICD-10-CM

## 2022-03-10 DIAGNOSIS — R06.00 DYSPNEA: Primary | ICD-10-CM

## 2022-03-10 NOTE — TELEPHONE ENCOUNTER
RECORDS RECEIVED FROM: internal    DATE RECEIVED: 5.25.22    NOTES STATUS DETAILS   OFFICE NOTE from referring provider     OFFICE NOTE from other specialist internal  12.14.21 Rafa   12.6.21 Becky   11.3.21 [facility]  11.2.21 [facility]    DISCHARGE SUMMARY from hospital     DISCHARGE REPORT from the ER     MEDICATION LIST internal     IMAGING  (NEED IMAGES AND REPORTS)     CT SCAN internal  12.1.21, 11.25.21,    CHEST XRAY (CXR) internal  11.30.21, 11.24.21, 11.17.21, 11.15.21, 11.11.21 more in epic    TESTS     PULMONARY FUNCTION TESTING (PFT) internal  Scheduled 5.25.22        Action 5.25.22 sv    Action Taken Message sent to nurse pool to place PFT and CXR=

## 2022-03-10 NOTE — TELEPHONE ENCOUNTER
Called pt no answer lvm to contact call center [phone] to schedule CXR prior to apptmnt with Dr. Willingham

## 2022-03-14 ENCOUNTER — TELEPHONE (OUTPATIENT)
Dept: PULMONOLOGY | Facility: CLINIC | Age: 42
End: 2022-03-14
Payer: COMMERCIAL

## 2022-03-14 NOTE — TELEPHONE ENCOUNTER
LVM to contact me directly [phone] to schedule CXR on 5/25/22 no active my chart (2nd attempt)

## 2022-05-25 ENCOUNTER — PRE VISIT (OUTPATIENT)
Dept: PULMONOLOGY | Facility: CLINIC | Age: 42
End: 2022-05-25
Payer: COMMERCIAL

## 2023-12-10 ENCOUNTER — HEALTH MAINTENANCE LETTER (OUTPATIENT)
Age: 43
End: 2023-12-10

## 2024-10-21 ENCOUNTER — VIRTUAL VISIT (OUTPATIENT)
Dept: FAMILY MEDICINE | Facility: CLINIC | Age: 44
End: 2024-10-21
Payer: COMMERCIAL

## 2024-10-21 DIAGNOSIS — H10.33 ACUTE BACTERIAL CONJUNCTIVITIS OF BOTH EYES: Primary | ICD-10-CM

## 2024-10-21 PROCEDURE — 99203 OFFICE O/P NEW LOW 30 MIN: CPT | Mod: 95 | Performed by: FAMILY MEDICINE

## 2024-10-21 RX ORDER — TOBRAMYCIN 3 MG/ML
1-2 SOLUTION/ DROPS OPHTHALMIC
Qty: 5 ML | Refills: 0 | Status: SHIPPED | OUTPATIENT
Start: 2024-10-21 | End: 2024-10-28

## 2024-10-21 NOTE — PROGRESS NOTES
Brandon is a 44 year old who is being evaluated via a billable video visit.    How would you like to obtain your AVS? MyChart  If the video visit is dropped, the invitation should be resent by: Text to cell phone: [phone]  Will anyone else be joining your video visit? No      Assessment & Plan     (H10.33) Acute bacterial conjunctivitis of both eyes  (primary encounter diagnosis)  Comment: pt has bilateral eyes erythema and discharge for 2 days. No eye pain, vision change, sick contact, injury. He dose not wear contact lens. Eyes mild conjunctival injection. Likely he has acute bacterial conjunctivitis.   Plan: tobramycin (TOBREX) 0.3 % ophthalmic solution        Advise to avoid rub on the eyes. Hands hygiene addressed. If symptoms no improvement tomorrow he will see provider. He understands.           Nicotine/Tobacco Cessation  He reports that he has been smoking cigarettes. He has a 11.3 pack-year smoking history. He has been exposed to tobacco smoke. He has never used smokeless tobacco.  Nicotine/Tobacco Cessation Plan  Information offered: Patient not interested at this time        See Patient Instructions    Subjective   Brandon is a 44 year old, presenting for the following health issues:  Covid Concern (Woke up with red goopy right eye 2 days ago and concerned about pink eye)        10/21/2024     2:27 PM   Additional Questions   Roomed by Gen ANDRE CMA          History of Present Illness       Reason for visit:  Conjunctivitis?   He is taking medications regularly.         Review of Systems  Constitutional, HEENT, cardiovascular, pulmonary, gi and gu systems are negative, except as otherwise noted.      Objective           Vitals:  No vitals were obtained today due to virtual visit.    Physical Exam   GENERAL: alert and no distress  EYES: Some discharge and mild bilateral conjunctival  erythema,    RESP: No audible wheeze, cough, or visible cyanosis.    SKIN: Visible skin clear. No significant rash,  abnormal pigmentation or lesions.  NEURO: Cranial nerves grossly intact.  Mentation and speech appropriate for age.  PSYCH: Appropriate affect, tone, and pace of words          Video-Visit Details    Type of service:  Video Visit      Originating Location (pt. Location): Home    Distant Location (provider location):  On-site  Platform used for Video Visit: Clara  Signed Electronically by: Candi Gifford MD

## 2025-01-01 ENCOUNTER — HOSPITAL ENCOUNTER (EMERGENCY)
Facility: HOSPITAL | Age: 45
End: 2025-07-04
Attending: STUDENT IN AN ORGANIZED HEALTH CARE EDUCATION/TRAINING PROGRAM | Admitting: STUDENT IN AN ORGANIZED HEALTH CARE EDUCATION/TRAINING PROGRAM
Payer: COMMERCIAL

## 2025-01-01 DIAGNOSIS — I46.9 CARDIAC ARREST (H): ICD-10-CM

## 2025-01-01 LAB — GLUCOSE BLDC GLUCOMTR-MCNC: 277 MG/DL (ref 70–99)

## 2025-01-01 PROCEDURE — 96375 TX/PRO/DX INJ NEW DRUG ADDON: CPT | Mod: XU

## 2025-01-01 PROCEDURE — 92950 HEART/LUNG RESUSCITATION CPR: CPT | Performed by: STUDENT IN AN ORGANIZED HEALTH CARE EDUCATION/TRAINING PROGRAM

## 2025-01-01 PROCEDURE — 96374 THER/PROPH/DIAG INJ IV PUSH: CPT | Mod: XU

## 2025-01-01 PROCEDURE — 258N000003 HC RX IP 258 OP 636

## 2025-01-01 PROCEDURE — 999N000157 HC STATISTIC RCP TIME EA 10 MIN

## 2025-01-01 PROCEDURE — 250N000011 HC RX IP 250 OP 636

## 2025-01-01 PROCEDURE — 99285 EMERGENCY DEPT VISIT HI MDM: CPT | Mod: 25

## 2025-01-01 PROCEDURE — 250N000009 HC RX 250

## 2025-01-01 PROCEDURE — 99291 CRITICAL CARE FIRST HOUR: CPT | Mod: 25 | Performed by: STUDENT IN AN ORGANIZED HEALTH CARE EDUCATION/TRAINING PROGRAM

## 2025-01-01 PROCEDURE — 92950 HEART/LUNG RESUSCITATION CPR: CPT

## 2025-01-01 PROCEDURE — 82962 GLUCOSE BLOOD TEST: CPT

## 2025-01-01 ASSESSMENT — ACTIVITIES OF DAILY LIVING (ADL)
ADLS_ACUITY_SCORE: 50

## 2025-01-02 NOTE — PROGRESS NOTES
MEDICAL ICU PROGRESS NOTE  11/08/2021      Date of Service (when I saw the patient): 11/08/2021    ASSESSMENT: Brandon Schafer is a 41 year old male with questionable PMH of remote VTE who was admitted on 11/3/2021 with ARDS 2/2 COVID pneumonia.    CHANGES and MAJOR THINGS TODAY:   - Obtain ABG at noon prior to re-proning  - Increase FWF to 60ml q4h   - 20mg IV lasix this am   - Goal net negative 500ml  - Discontinue heparin and start lovenox for VTE ppx in the setting of Covid     PLAN:    Neuro:  # Pain and sedation  - Fentanyl infusion  - Midazolam infusion  - Ketamine infusion  - Seroquel 25mg, 25mg, 50mg   - RASS goal -2 while supine, -5 while proned   - Not currently planning on initiating NMB    Pulmonary:  # ARDS 2/2 COVID pneumonia  Intubated 11/2 prior to transport flight. Proned on 11/5.   - Lung protective ventilation with AC 30/450/+14/70%   - No indication for NMB at this time  - Inhaled epoprostenol at 20 ng/kg/min  - Secretion clearance with duonebs q6 hours, and BID mucomyst, metaneb  - Infectious work-up and treatment as below  - will consider re-proning pending noon ABG    # ?COPD exacerbation  Wheezing on exam on 11/4. S/p azithromycin for 5d. Steroids per Covid protocol.     Cardiovascular:  # Hypotension, resolved   Transient, and in the setting of sedation. No prior TTE. Troponin peaked at 0.293. EKG with T wave inversion in lateral leads. Suspect troponin was 2/2 demand ischemia in setting of hypoxia.  - Norepinephrine gtt as needed     GI/Nutrition:  # At risk for malnutrition  - Nutrition consult for TF  - Recipe per RD   - Bowel regimen: scheduled Miralax, and scheduled senna; prn senna and Miralax    # Coffee ground material from OG (11/4), resolved  - PPI BID    Renal/Fluids/Electrolytes:  # Non-oliguric STEFAN  Unclear baseline, has not seen doctor in 20 years. Cr 2.5 on admission.  - Continue diuresis with furosemide 20 mg daily   - Goal net negative 1L    # Hypernatremia  - increase FWF  to 60ml q4h     Endocrine:  # Stress and steroid induced hyperglycemia  - Medium resistance sliding scale insulin     ID:  # COVID pneumonia  Symptoms (headache, SOB, fever, cough, diarrhea) 1 week prior to presentation. Not COVID vaccinated. Tested positive on presentation on 11/2. S/p tocilizumab 11/4.  - Dexamethasone 6 mg daily (total 10 days)  - Deferring remdesivir given STEFAN    Hematology:    # Coagulopathy 2/2 COVID  US BLE negative for DVT.  - Stop heparin gtt  - Start lovenox ppx     General Cares/Prophylaxis:    DVT Prophylaxis: Lovenox ppx  GI Prophylaxis: PPI  Family Communication: Brother Brad   Code Status: DNR    Lines/tubes/drains:  - PIV x2  - CVC internal jugular   - Arterial line left radial  - OG  - Flores  - ETT    Disposition:  - Medical ICU     Patient seen and findings/plan discussed with medical ICU staff, Dr. Rolle.    Tamara Bailey MD  [facility]  Medicine-Pediatrics, PGY-3    ====================================  INTERVAL HISTORY:   Sedation inadequate while proned. Sedation was increased and he was much more comfortable. See resident note overnight. Good UOP with lasix. No longer requiring NE for BP support.     OBJECTIVE:   1. VITAL SIGNS:   Temp:  [98.3  F (36.8  C)-99.5  F (37.5  C)] 99.2  F (37.3  C)  Pulse:  [] 89  Resp:  [10-35] 29  MAP:  [77 mmHg-131 mmHg] 96 mmHg  Arterial Line BP: ()/() 125/80  FiO2 (%):  [60 %-80 %] 60 %  SpO2:  [94 %-99 %] 97 %  Ventilation Mode: CMV/AC  (Continuous Mandatory Ventilation/ Assist Control)  FiO2 (%): 60 %  Rate Set (breaths/minute): 30 breaths/min  Tidal Volume Set (mL): 450 mL  PEEP (cm H2O): 14 cmH2O  Oxygen Concentration (%): 70 %  Resp: 29    2. INTAKE/ OUTPUT:   I/O last 3 completed shifts:  In: 2479.55 [I.V.:1069.55; NG/GT:690]  Out: 3050 [Urine:3050]    3. PHYSICAL EXAMINATION:  General: Sedate  HEENT: Mucous membranes moist  Neuro: RASS -4  Pulm/Resp: Bilateral coarse breath sounds, breathing  non-labored  CV: RRR, S1/S2 without m/r/g; pedal pulses 2+ with trace LE edema  Abdomen: Obese, soft, more distended today, non-tender; chelo-umbilical hernia that is soft and compressible   : Flores catheter in place, urine yellow and clear  Incisions/Skin: No rashes noted    4. LABS:   Arterial Blood Gases   Recent Labs   Lab 11/08/21 0427 11/07/21  1425 11/07/21  1131 11/07/21  0523   PH 7.39 7.35 7.38 7.41   PCO2 47* 51* 47* 46*   PO2 85 88 80 63*   HCO3 28 28 28 29*     Complete Blood Count   Recent Labs   Lab 11/08/21 0427 11/07/21  0507 11/06/21  0317 11/05/21  0318   WBC 15.4* 15.6* 15.1* 10.5   HGB 13.6 12.8* 12.7* 12.2*    350 354 303     Basic Metabolic Panel  Recent Labs   Lab 11/08/21 0427 11/08/21  0424 11/08/21  0016 11/07/21  1949 11/07/21  1616 11/07/21  1615 11/07/21  0744 11/07/21  0507 11/06/21  1749 11/06/21  1746   *  --   --   --   --  142  --  141  --  139   POTASSIUM 4.1  --   --   --   --  4.1  --  4.0  --  4.2   CHLORIDE 113*  --   --   --   --  109  --  109  --  107   CO2 26  --   --   --   --  27  --  28  --  26   BUN 48*  --   --   --   --  46*  --  42*  --  44*   CR 1.16  --   --   --   --  1.26*  --  1.15  --  1.30*   * 100* 115* 142*   < > 187*   < > 105*   < > 171*    < > = values in this interval not displayed.     Liver Function Tests  Recent Labs   Lab 11/07/21 0507 11/04/21  0335 11/02/21 1943   * 40 62*   ALT 98* 31 37   ALKPHOS 82 62 81   BILITOTAL 0.4 0.3 0.3   ALBUMIN 2.4* 1.9* 2.4*     5. RADIOLOGY:   No results found for this or any previous visit (from the past 24 hour(s)).     No

## 2025-01-11 ENCOUNTER — HEALTH MAINTENANCE LETTER (OUTPATIENT)
Age: 45
End: 2025-01-11

## 2025-07-04 NOTE — ED TRIAGE NOTES
Per EMS report significant other states he had walked down the stairs and passed out. Per her report he's been having chest pains for weeks and not been taking any of his meds. He was given 8 doses of EPI , 2 of bicarb, and 300 of amio in the field he was given 2 shocks prior to arrival. Manual compressions as pt was to large for the gracie. Per report CPR started 20/25 minutes prior to arrival.

## 2025-07-04 NOTE — ED NOTES
Mother remains at bedside. Spoke to mother about calling a  if that would be wanted. Mother states patient was not [employer] and she does not want a  called.

## 2025-07-04 NOTE — PROGRESS NOTES
Responded to code blue. Pt to ED intubated with 7.5 oral ETT secured 23 cm at the lip.BBS equal. Continued ventilations with ambu bag 100% with resQpod in line.EtCo2 11-18

## 2025-07-04 NOTE — ED NOTES
Girlfriend Ana remains at bedside. Offered to call family for her. Ana states she is calling patient's mother now and declines wanting anyone called. Also declines any other wants or needs at this time.

## 2025-07-04 NOTE — ED PROVIDER NOTES
[facility]  ED Provider Note    Chief Complaint   Patient presents with    Cardiac Arrest     History:  Brandon Schafer is a 44 year old male with hx of covid and associated acute respiratory failure in 2021 presents to the emergency department today in cardiac arrest.  Report from EMS was that he had been having chest pain for a couple of weeks with scheduled for an echo or a stress test and collapsed on the stairs.  He was reportedly sitting at the bottom of the steps with his 13-year-old son began shaking and collapsed.  Son called his mother and then called 911.  The patient's mother then arrived but could not get him positioned to start CPR.  EMS arrived and shocked twice for V-fib and V. tach gave 300 of amnio and transported here after having given 7 epis or so.  They also intubated him.    Review of Systems   Limited give cardiac arrest; see HPI for pertinent positives and negatives.     Medical history, surgical history, and social history was reviewed.  Nursing documentation, triage note, and vitals were reviewed.    Vitals:       Physical Exam:  Constitutional: Ashen no signs of life  HENT: NCAT   Eyes: 6 mm nonresponsive pupils, symmetric  Neck: supple   CV: Mottled, no pulses  Pulmonary/Chest: Intubated  Abdominal: Protuberant but soft  MSK: No meaningful muscle activity  Neuro: Unresponsive  Skin: Cool and dry, mottled      MDM:      ED Course as of 07/04/25 1043   Fri Jul 04, 2025   1037 44-year-old male here in the emergency department in cardiac arrest.  Excellent prehospital care was performed.  Airway was already secured access had already been attained.  We did get another site of IO access.  Rush exam was performed without any actionable findings.  No images were taken due to the circumstances requiring immediate imaging and me not being willing to wait for imaging orders to go through.  No evidence of pneumothorax.  Considered trauma given the story about the steps, however,  there were no signs of trauma and I favored cardiac arrest leading to collapse which ultimately based on the history sounds substantiated.  Patient was asystole throughout.  No further shocks were given.  ACLS was followed.  We did try bicarb and calcium.  His glucose was checked and was not low.  Despite our extensive and heroic efforts, the patient showed no signs of life and no improvement in his state after an hour of cardiac arrest with active CPR.  Ultimately I decided that further attempts would be medically futile and time of death was called at 10 in the morning.       Procedures:  Procedures       Critical Care Addendum  My initial assessment, based on my review of prehospital provider report, review of nursing observations, review of vital signs, focused history, and physical exam, established a high suspicion that Brandon Schafer has cardiac arrest, which requires immediate intervention, and therefore he is critically ill.     After the initial assessment, the care team initiated medication therapy with numerous medciatdions as above and performed ACLS to provide stabilization care. Due to the critical nature of this patient, I reassessed nursing observations, vital signs, physical exam, and review of cardiac rhythm monitor multiple times prior to his disposition.     Time also spent performing documentation and discussion with family to obtain medical information for decision making.     Critical care time (excluding teaching time and procedures): 45 minutes.    Impression:  Final diagnoses:   Cardiac arrest (H)            Charly Kaur MD  07/04/25 1043

## 2025-07-04 NOTE — ED NOTES
"Per [facility] patient was a witnessed arrest after walking down the stairs and \"passing out.\" EMS reports patient's brother started CPR at that time.   "

## (undated) RX ORDER — INDOMETHACIN 25 MG/1
CAPSULE ORAL
Status: DISPENSED

## (undated) RX ORDER — SODIUM CHLORIDE 9 MG/ML
INJECTION, SOLUTION INTRAVENOUS
Status: DISPENSED

## (undated) RX ORDER — CALCIUM CHLORIDE 100 MG/ML
INJECTION INTRAVENOUS; INTRAVENTRICULAR
Status: DISPENSED

## (undated) RX ORDER — EPINEPHRINE 0.1 MG/ML
INJECTION INTRAVENOUS
Status: DISPENSED